# Patient Record
Sex: MALE | Race: WHITE | NOT HISPANIC OR LATINO | Employment: OTHER | URBAN - NONMETROPOLITAN AREA
[De-identification: names, ages, dates, MRNs, and addresses within clinical notes are randomized per-mention and may not be internally consistent; named-entity substitution may affect disease eponyms.]

---

## 2017-09-19 DIAGNOSIS — M25.511 RIGHT SHOULDER PAIN, UNSPECIFIED CHRONICITY: Primary | ICD-10-CM

## 2017-09-21 ENCOUNTER — OFFICE VISIT (OUTPATIENT)
Dept: ORTHOPEDIC SURGERY | Facility: CLINIC | Age: 72
End: 2017-09-21

## 2017-09-21 VITALS — BODY MASS INDEX: 28.25 KG/M2 | HEIGHT: 67 IN | WEIGHT: 180 LBS

## 2017-09-21 DIAGNOSIS — M25.511 RIGHT SHOULDER PAIN, UNSPECIFIED CHRONICITY: ICD-10-CM

## 2017-09-21 DIAGNOSIS — M19.011 PRIMARY OSTEOARTHRITIS OF RIGHT SHOULDER: Primary | ICD-10-CM

## 2017-09-21 PROCEDURE — 20610 DRAIN/INJ JOINT/BURSA W/O US: CPT | Performed by: NURSE PRACTITIONER

## 2017-09-21 PROCEDURE — 99214 OFFICE O/P EST MOD 30 MIN: CPT | Performed by: NURSE PRACTITIONER

## 2017-09-21 RX ORDER — FINASTERIDE 5 MG/1
5 TABLET, FILM COATED ORAL DAILY
COMMUNITY

## 2017-09-21 RX ORDER — NEBIVOLOL 20 MG/1
20 TABLET ORAL NIGHTLY
COMMUNITY

## 2017-09-21 RX ORDER — PHENYTOIN SODIUM 100 MG/1
CAPSULE, EXTENDED RELEASE ORAL 2 TIMES DAILY
COMMUNITY

## 2017-09-21 RX ORDER — BACLOFEN 10 MG/1
10 TABLET ORAL 2 TIMES DAILY
COMMUNITY

## 2017-09-21 RX ORDER — ASPIRIN 81 MG/1
81 TABLET ORAL DAILY
COMMUNITY

## 2017-09-21 RX ORDER — ASCORBIC ACID 500 MG
500 TABLET ORAL DAILY
COMMUNITY
End: 2018-01-01

## 2017-09-21 RX ORDER — TAMSULOSIN HYDROCHLORIDE 0.4 MG/1
1 CAPSULE ORAL NIGHTLY
COMMUNITY

## 2017-09-21 RX ORDER — DILTIAZEM HYDROCHLORIDE 60 MG/1
60 TABLET, FILM COATED ORAL 4 TIMES DAILY
COMMUNITY
End: 2018-03-16 | Stop reason: HOSPADM

## 2017-09-21 RX ORDER — HYDROCODONE BITARTRATE AND ACETAMINOPHEN 7.5; 325 MG/1; MG/1
1 TABLET ORAL EVERY 6 HOURS PRN
COMMUNITY
End: 2018-02-21 | Stop reason: DRUGHIGH

## 2017-09-21 RX ORDER — TRIAMCINOLONE ACETONIDE 40 MG/ML
80 INJECTION, SUSPENSION INTRA-ARTICULAR; INTRAMUSCULAR
Status: COMPLETED | OUTPATIENT
Start: 2017-09-21 | End: 2017-09-21

## 2017-09-21 RX ORDER — LIDOCAINE HYDROCHLORIDE 10 MG/ML
2 INJECTION, SOLUTION INFILTRATION; PERINEURAL
Status: COMPLETED | OUTPATIENT
Start: 2017-09-21 | End: 2017-09-21

## 2017-09-21 RX ORDER — CHLORAL HYDRATE 500 MG
CAPSULE ORAL
COMMUNITY
End: 2018-01-01

## 2017-09-21 RX ADMIN — LIDOCAINE HYDROCHLORIDE 2 ML: 10 INJECTION, SOLUTION INFILTRATION; PERINEURAL at 14:05

## 2017-09-21 RX ADMIN — TRIAMCINOLONE ACETONIDE 80 MG: 40 INJECTION, SUSPENSION INTRA-ARTICULAR; INTRAMUSCULAR at 14:05

## 2017-09-21 NOTE — PROGRESS NOTES
Mando Hill is a 72 y.o. male   Primary provider:  Valerie Marvin MD       Chief Complaint   Patient presents with   • Right Shoulder - Establish Care     Xray 9/8/17 (pateint brought disc)       HISTORY OF PRESENT ILLNESS:    Shoulder Injury    The right shoulder is affected. Incident onset: years. There was no injury mechanism. The quality of the pain is described as aching and stabbing. The pain is at a severity of 5/10. The pain is mild. Associated symptoms include numbness and tingling. The symptoms are aggravated by movement. He has tried rest and immobilization for the symptoms.        CONCURRENT MEDICAL HISTORY:    History reviewed. No pertinent past medical history.    Allergies   Allergen Reactions   • Aleve [Naproxen]    • Ambien [Zolpidem]    • Vancomycin          Current Outpatient Prescriptions:   •  aspirin 81 MG EC tablet, Take 81 mg by mouth Daily., Disp: , Rfl:   •  baclofen (LIORESAL) 10 MG tablet, Take 10 mg by mouth 2 (Two) Times a Day., Disp: , Rfl:   •  diltiaZEM (CARDIZEM) 60 MG tablet, Take 60 mg by mouth 4 (Four) Times a Day., Disp: , Rfl:   •  finasteride (PROSCAR) 5 MG tablet, Take 5 mg by mouth Daily., Disp: , Rfl:   •  HYDROcodone-acetaminophen (NORCO) 7.5-325 MG per tablet, Take 1 tablet by mouth Every 6 (Six) Hours As Needed for Moderate Pain ., Disp: , Rfl:   •  nebivolol (BYSTOLIC) 20 MG tablet, Take 20 mg by mouth Daily., Disp: , Rfl:   •  Omega-3 Fatty Acids (FISH OIL) 1000 MG capsule capsule, Take  by mouth Daily With Breakfast., Disp: , Rfl:   •  phenytoin (DILANTIN) 100 MG ER capsule, Take  by mouth 2 (Two) Times a Day., Disp: , Rfl:   •  rivaroxaban (XARELTO) 20 MG tablet, Take 20 mg by mouth Daily., Disp: , Rfl:   •  tamsulosin (FLOMAX) 0.4 MG capsule 24 hr capsule, Take 1 capsule by mouth Every Night., Disp: , Rfl:   •  vitamin C (ASCORBIC ACID) 500 MG tablet, Take 500 mg by mouth Daily., Disp: , Rfl:     Past Surgical History:   Procedure Laterality Date  "  • GALLBLADDER SURGERY         Family History   Problem Relation Age of Onset   • Stroke Mother    • Heart disease Father    • Hypertension Father    • Diabetes Father    • Stroke Father         Social History     Social History   • Marital status:      Spouse name: N/A   • Number of children: N/A   • Years of education: N/A     Occupational History   • Not on file.     Social History Main Topics   • Smoking status: Never Smoker   • Smokeless tobacco: Never Used   • Alcohol use No   • Drug use: No   • Sexual activity: Not on file     Other Topics Concern   • Not on file     Social History Narrative   • No narrative on file        Review of Systems   Constitutional: Positive for fatigue.   Musculoskeletal: Positive for arthralgias, back pain and joint swelling.   Neurological: Positive for tingling and numbness.   Psychiatric/Behavioral: Positive for sleep disturbance. The patient is nervous/anxious.    All other systems reviewed and are negative.      PHYSICAL EXAMINATION:       Ht 67\" (170.2 cm)  Wt 180 lb (81.6 kg)  BMI 28.19 kg/m2    Physical Exam   Constitutional: He is oriented to person, place, and time. Vital signs are normal. He appears well-developed and well-nourished. He is cooperative.   HENT:   Head: Normocephalic and atraumatic.   Neck: Trachea normal and phonation normal.   Pulmonary/Chest: Effort normal. No respiratory distress.   Abdominal: Soft. Normal appearance. He exhibits no distension.   Neurological: He is alert and oriented to person, place, and time. He displays atrophy. He exhibits abnormal muscle tone (Unable to move right upper extremity due to paralysis). Coordination normal. GCS eye subscore is 4. GCS verbal subscore is 5. GCS motor subscore is 6.   Skin: Skin is warm, dry and intact.   Psychiatric: He has a normal mood and affect. His speech is normal and behavior is normal. Judgment and thought content normal. Cognition and memory are normal.   Vitals reviewed.      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    Range of Motion   Active Abduction: abnormal   Forward Flexion: abnormal   External Rotation: abnormal   Internal Rotation 90 degrees: abnormal     Muscle Strength   Abduction: 4/5   Internal Rotation: 4/5   External Rotation: 4/5   Supraspinatus: 4/5     Other   Erythema: absent  Scars: absent  Sensation: normal  Pulse: present         Large Joint Arthrocentesis  Date/Time: 9/21/2017 2:05 PM  Consent given by: patient  Timeout: Immediately prior to procedure a time out was called to verify the correct patient, procedure, equipment, support staff and site/side marked as required   Supporting Documentation  Indications: pain   Procedure Details  Location: shoulder - R subacromial bursa  Preparation: Patient was prepped and draped in the usual sterile fashion  Needle size: 22 G  Approach: posterior  Medications administered: 2 mL lidocaine 1 %; 80 mg triamcinolone acetonide 40 MG/ML  Patient tolerance: patient tolerated the procedure well with no immediate complications              No results found.        ASSESSMENT:    Diagnoses and all orders for this visit:    Primary osteoarthritis of right shoulder    Right shoulder pain, unspecified chronicity    Other orders  -     rivaroxaban (XARELTO) 20 MG tablet; Take 20 mg by mouth Daily.  -     baclofen (LIORESAL) 10 MG tablet; Take 10 mg by mouth 2 (Two) Times a Day.  -     nebivolol (BYSTOLIC) 20 MG tablet; Take 20 mg by mouth Daily.  -     phenytoin (DILANTIN) 100 MG ER capsule; Take  by mouth 2 (Two) Times a Day.  -     finasteride (PROSCAR) 5 MG tablet; Take 5 mg by mouth Daily.  -     tamsulosin (FLOMAX) 0.4 MG  capsule 24 hr capsule; Take 1 capsule by mouth Every Night.  -     HYDROcodone-acetaminophen (NORCO) 7.5-325 MG per tablet; Take 1 tablet by mouth Every 6 (Six) Hours As Needed for Moderate Pain .  -     Omega-3 Fatty Acids (FISH OIL) 1000 MG capsule capsule; Take  by mouth Daily With Breakfast.  -     vitamin C (ASCORBIC ACID) 500 MG tablet; Take 500 mg by mouth Daily.  -     aspirin 81 MG EC tablet; Take 81 mg by mouth Daily.  -     diltiaZEM (CARDIZEM) 60 MG tablet; Take 60 mg by mouth 4 (Four) Times a Day.  -     Large Joint Arthrocentesis          PLAN  Injected the right shoulder and recommended continue progression range of motion and activity as tolerated based on pain.  Follow-up as needed for exacerbations and pain.  No Follow-up on file.    Edgar Irizarry, APRN

## 2017-12-21 ENCOUNTER — OFFICE VISIT (OUTPATIENT)
Dept: ORTHOPEDIC SURGERY | Facility: CLINIC | Age: 72
End: 2017-12-21

## 2017-12-21 VITALS — WEIGHT: 180 LBS | BODY MASS INDEX: 28.25 KG/M2 | HEIGHT: 67 IN

## 2017-12-21 DIAGNOSIS — M19.011 PRIMARY OSTEOARTHRITIS OF RIGHT SHOULDER: Primary | ICD-10-CM

## 2017-12-21 PROCEDURE — 20610 DRAIN/INJ JOINT/BURSA W/O US: CPT | Performed by: NURSE PRACTITIONER

## 2017-12-21 RX ADMIN — LIDOCAINE HYDROCHLORIDE 2 ML: 20 INJECTION, SOLUTION INFILTRATION; PERINEURAL at 10:41

## 2017-12-21 RX ADMIN — TRIAMCINOLONE ACETONIDE 40 MG: 40 INJECTION, SUSPENSION INTRA-ARTICULAR; INTRAMUSCULAR at 10:41

## 2017-12-21 NOTE — PROGRESS NOTES
"Mando Hill is a 72 y.o. male returns for     Chief Complaint   Patient presents with   • Right Shoulder - Follow-up       HISTORY OF PRESENT ILLNESS: Patient is here today for right shoulder osteoarthritis follow up. Patient states that he has severe pain in the shoulder today and constantly has pain.        CONCURRENT MEDICAL HISTORY:    No past medical history on file.    Allergies   Allergen Reactions   • Aleve [Naproxen]    • Ambien [Zolpidem]    • Vancomycin          Current Outpatient Prescriptions:   •  aspirin 81 MG EC tablet, Take 81 mg by mouth Daily., Disp: , Rfl:   •  baclofen (LIORESAL) 10 MG tablet, Take 10 mg by mouth 2 (Two) Times a Day., Disp: , Rfl:   •  diltiaZEM (CARDIZEM) 60 MG tablet, Take 60 mg by mouth 4 (Four) Times a Day., Disp: , Rfl:   •  finasteride (PROSCAR) 5 MG tablet, Take 5 mg by mouth Daily., Disp: , Rfl:   •  HYDROcodone-acetaminophen (NORCO) 7.5-325 MG per tablet, Take 1 tablet by mouth Every 6 (Six) Hours As Needed for Moderate Pain ., Disp: , Rfl:   •  nebivolol (BYSTOLIC) 20 MG tablet, Take 20 mg by mouth Daily., Disp: , Rfl:   •  Omega-3 Fatty Acids (FISH OIL) 1000 MG capsule capsule, Take  by mouth Daily With Breakfast., Disp: , Rfl:   •  phenytoin (DILANTIN) 100 MG ER capsule, Take  by mouth 2 (Two) Times a Day., Disp: , Rfl:   •  rivaroxaban (XARELTO) 20 MG tablet, Take 20 mg by mouth Daily., Disp: , Rfl:   •  tamsulosin (FLOMAX) 0.4 MG capsule 24 hr capsule, Take 1 capsule by mouth Every Night., Disp: , Rfl:   •  vitamin C (ASCORBIC ACID) 500 MG tablet, Take 500 mg by mouth Daily., Disp: , Rfl:     Past Surgical History:   Procedure Laterality Date   • GALLBLADDER SURGERY         ROS  No fevers or chills.  No chest pain or shortness of air.  No GI or  disturbances.    PHYSICAL EXAMINATION:       Ht 170.2 cm (67\")  Wt 81.6 kg (180 lb)  BMI 28.19 kg/m2    Physical Exam   Constitutional: He is oriented to person, place, and time. Vital signs are normal. He " appears well-developed and well-nourished. He is cooperative.   HENT:   Head: Normocephalic and atraumatic.   Neck: Trachea normal and phonation normal.   Pulmonary/Chest: Effort normal. No respiratory distress.   Abdominal: Soft. Normal appearance. He exhibits no distension.   Neurological: He is alert and oriented to person, place, and time. GCS eye subscore is 4. GCS verbal subscore is 5. GCS motor subscore is 6.   Skin: Skin is warm, dry and intact.   Psychiatric: He has a normal mood and affect. His speech is normal and behavior is normal. Judgment and thought content normal. Cognition and memory are normal.   Vitals reviewed.      GAIT:     []  Normal  []  Antalgic    Assistive device: []  None  []  Walker     []  Crutches  []  Cane     [x]  Wheelchair  []  Stretcher    Right Shoulder Exam     Tenderness   The patient is experiencing tenderness in the acromioclavicular joint.    Range of Motion   Right shoulder active abduction: 90.   Right shoulder forward flexion: 90.   External Rotation: abnormal   Internal Rotation 90 degrees: abnormal     Muscle Strength   Abduction: 4/5   Internal Rotation: 4/5   External Rotation: 4/5   Supraspinatus: 4/5     Tests   Drop Arm: positive  Impingement: positive    Other   Erythema: absent  Scars: absent  Sensation: normal  Pulse: present      Left Shoulder Exam     Tenderness   The patient is experiencing no tenderness.         Comments:  Hx of stroke, little function on left upper ex.               No results found.      Large Joint Arthrocentesis  Date/Time: 12/21/2017 10:41 AM  Consent given by: patient  Timeout: Immediately prior to procedure a time out was called to verify the correct patient, procedure, equipment, support staff and site/side marked as required   Supporting Documentation  Indications: pain   Procedure Details  Location: shoulder - R subacromial bursa  Preparation: Patient was prepped and draped in the usual sterile fashion  Needle size: 22  G  Approach: posterior  Medications administered: 40 mg triamcinolone acetonide 40 MG/ML; 2 mL lidocaine 2%  Patient tolerance: patient tolerated the procedure well with no immediate complications            ASSESSMENT:    Diagnoses and all orders for this visit:    Primary osteoarthritis of right shoulder  -     Large Joint Arthrocentesis          PLAN  Repeated steroid injection today and recommended progression range of motion and activity as tolerated based on pain follow-up as needed.  No Follow-up on file.    Edgar Irizarry, APRN

## 2017-12-22 RX ORDER — LIDOCAINE HYDROCHLORIDE 20 MG/ML
2 INJECTION, SOLUTION INFILTRATION; PERINEURAL
Status: COMPLETED | OUTPATIENT
Start: 2017-12-21 | End: 2017-12-21

## 2017-12-22 RX ORDER — TRIAMCINOLONE ACETONIDE 40 MG/ML
40 INJECTION, SUSPENSION INTRA-ARTICULAR; INTRAMUSCULAR
Status: COMPLETED | OUTPATIENT
Start: 2017-12-21 | End: 2017-12-21

## 2018-01-01 ENCOUNTER — ANESTHESIA (OUTPATIENT)
Dept: PERIOP | Facility: HOSPITAL | Age: 73
End: 2018-01-01

## 2018-01-01 ENCOUNTER — APPOINTMENT (OUTPATIENT)
Dept: GENERAL RADIOLOGY | Facility: HOSPITAL | Age: 73
End: 2018-01-01

## 2018-01-01 ENCOUNTER — ANESTHESIA EVENT (OUTPATIENT)
Dept: PERIOP | Facility: HOSPITAL | Age: 73
End: 2018-01-01

## 2018-01-01 ENCOUNTER — HOSPITAL ENCOUNTER (OUTPATIENT)
Facility: HOSPITAL | Age: 73
Setting detail: HOSPITAL OUTPATIENT SURGERY
Discharge: HOME | End: 2018-09-21
Attending: UROLOGY | Admitting: UROLOGY

## 2018-01-01 VITALS
WEIGHT: 170 LBS | TEMPERATURE: 96.9 F | SYSTOLIC BLOOD PRESSURE: 115 MMHG | HEART RATE: 78 BPM | BODY MASS INDEX: 26.68 KG/M2 | RESPIRATION RATE: 20 BRPM | OXYGEN SATURATION: 93 % | HEIGHT: 67 IN | DIASTOLIC BLOOD PRESSURE: 57 MMHG

## 2018-01-01 DIAGNOSIS — N20.0 CALCULUS OF KIDNEY: ICD-10-CM

## 2018-01-01 LAB
BILIRUB UR QL STRIP: NEGATIVE
CLARITY UR: ABNORMAL
COLOR UR: YELLOW
GLUCOSE UR STRIP-MCNC: NEGATIVE MG/DL
HGB UR QL STRIP.AUTO: NEGATIVE
KETONES UR QL STRIP: NEGATIVE
LEUKOCYTE ESTERASE UR QL STRIP.AUTO: ABNORMAL
NITRITE UR QL STRIP: NEGATIVE
PH UR STRIP.AUTO: 7 [PH] (ref 5–9)
PROT UR QL STRIP: ABNORMAL
SP GR UR STRIP: 1.01 (ref 1–1.03)
UROBILINOGEN UR QL STRIP: ABNORMAL

## 2018-01-01 PROCEDURE — 25010000002 ONDANSETRON PER 1 MG: Performed by: NURSE ANESTHETIST, CERTIFIED REGISTERED

## 2018-01-01 PROCEDURE — 74018 RADEX ABDOMEN 1 VIEW: CPT

## 2018-01-01 PROCEDURE — 81003 URINALYSIS AUTO W/O SCOPE: CPT | Performed by: UROLOGY

## 2018-01-01 PROCEDURE — 25010000002 MIDAZOLAM PER 1 MG: Performed by: NURSE ANESTHETIST, CERTIFIED REGISTERED

## 2018-01-01 PROCEDURE — 25010000002 FENTANYL CITRATE (PF) 100 MCG/2ML SOLUTION: Performed by: NURSE ANESTHETIST, CERTIFIED REGISTERED

## 2018-01-01 PROCEDURE — 25010000002 PROPOFOL 10 MG/ML EMULSION: Performed by: NURSE ANESTHETIST, CERTIFIED REGISTERED

## 2018-01-01 RX ORDER — PROPOFOL 10 MG/ML
VIAL (ML) INTRAVENOUS AS NEEDED
Status: DISCONTINUED | OUTPATIENT
Start: 2018-01-01 | End: 2018-01-01 | Stop reason: SURG

## 2018-01-01 RX ORDER — OXYCODONE AND ACETAMINOPHEN 7.5; 325 MG/1; MG/1
1-2 TABLET ORAL EVERY 4 HOURS PRN
Qty: 10 TABLET | Refills: 0 | Status: ON HOLD | OUTPATIENT
Start: 2018-01-01 | End: 2019-01-01

## 2018-01-01 RX ORDER — LEVOFLOXACIN 5 MG/ML
500 INJECTION, SOLUTION INTRAVENOUS ONCE
Status: DISCONTINUED | OUTPATIENT
Start: 2018-01-01 | End: 2018-01-01 | Stop reason: HOSPADM

## 2018-01-01 RX ORDER — SODIUM CHLORIDE, SODIUM GLUCONATE, SODIUM ACETATE, POTASSIUM CHLORIDE, AND MAGNESIUM CHLORIDE 526; 502; 368; 37; 30 MG/100ML; MG/100ML; MG/100ML; MG/100ML; MG/100ML
1000 INJECTION, SOLUTION INTRAVENOUS CONTINUOUS
Status: DISCONTINUED | OUTPATIENT
Start: 2018-01-01 | End: 2018-01-01 | Stop reason: HOSPADM

## 2018-01-01 RX ORDER — ONDANSETRON 2 MG/ML
4 INJECTION INTRAMUSCULAR; INTRAVENOUS ONCE AS NEEDED
Status: DISCONTINUED | OUTPATIENT
Start: 2018-01-01 | End: 2018-01-01 | Stop reason: HOSPADM

## 2018-01-01 RX ORDER — HYDROCODONE BITARTRATE AND ACETAMINOPHEN 10; 325 MG/1; MG/1
1 TABLET ORAL EVERY 6 HOURS PRN
COMMUNITY

## 2018-01-01 RX ORDER — LIDOCAINE HYDROCHLORIDE 20 MG/ML
INJECTION, SOLUTION INFILTRATION; PERINEURAL AS NEEDED
Status: DISCONTINUED | OUTPATIENT
Start: 2018-01-01 | End: 2018-01-01 | Stop reason: SURG

## 2018-01-01 RX ORDER — DOXYCYCLINE HYCLATE 100 MG/1
100 CAPSULE ORAL DAILY
Qty: 7 CAPSULE | Refills: 0 | Status: SHIPPED | OUTPATIENT
Start: 2018-01-01 | End: 2018-01-01

## 2018-01-01 RX ORDER — ALBUTEROL SULFATE 2.5 MG/3ML
2.5 SOLUTION RESPIRATORY (INHALATION) ONCE AS NEEDED
Status: DISCONTINUED | OUTPATIENT
Start: 2018-01-01 | End: 2018-01-01 | Stop reason: HOSPADM

## 2018-01-01 RX ORDER — ONDANSETRON 2 MG/ML
INJECTION INTRAMUSCULAR; INTRAVENOUS AS NEEDED
Status: DISCONTINUED | OUTPATIENT
Start: 2018-01-01 | End: 2018-01-01 | Stop reason: SURG

## 2018-01-01 RX ORDER — POTASSIUM CHLORIDE 750 MG/1
10 TABLET, EXTENDED RELEASE ORAL 2 TIMES DAILY
COMMUNITY

## 2018-01-01 RX ORDER — FENTANYL CITRATE 50 UG/ML
INJECTION, SOLUTION INTRAMUSCULAR; INTRAVENOUS AS NEEDED
Status: DISCONTINUED | OUTPATIENT
Start: 2018-01-01 | End: 2018-01-01 | Stop reason: SURG

## 2018-01-01 RX ORDER — MELATONIN
1000 DAILY
Status: ON HOLD | COMMUNITY
End: 2019-01-01

## 2018-01-01 RX ORDER — IPRATROPIUM BROMIDE AND ALBUTEROL SULFATE 2.5; .5 MG/3ML; MG/3ML
3 SOLUTION RESPIRATORY (INHALATION) ONCE
Status: DISCONTINUED | OUTPATIENT
Start: 2018-01-01 | End: 2018-01-01 | Stop reason: HOSPADM

## 2018-01-01 RX ORDER — EPHEDRINE SULFATE 50 MG/ML
INJECTION, SOLUTION INTRAVENOUS AS NEEDED
Status: DISCONTINUED | OUTPATIENT
Start: 2018-01-01 | End: 2018-01-01 | Stop reason: SURG

## 2018-01-01 RX ORDER — MIDAZOLAM HYDROCHLORIDE 1 MG/ML
INJECTION INTRAMUSCULAR; INTRAVENOUS AS NEEDED
Status: DISCONTINUED | OUTPATIENT
Start: 2018-01-01 | End: 2018-01-01 | Stop reason: SURG

## 2018-01-01 RX ORDER — MEPERIDINE HYDROCHLORIDE 50 MG/ML
12.5 INJECTION INTRAMUSCULAR; INTRAVENOUS; SUBCUTANEOUS
Status: DISCONTINUED | OUTPATIENT
Start: 2018-01-01 | End: 2018-01-01 | Stop reason: HOSPADM

## 2018-01-01 RX ADMIN — MIDAZOLAM HYDROCHLORIDE 2 MG: 2 INJECTION, SOLUTION INTRAMUSCULAR; INTRAVENOUS at 14:45

## 2018-01-01 RX ADMIN — SODIUM CHLORIDE, SODIUM GLUCONATE, SODIUM ACETATE, POTASSIUM CHLORIDE, AND MAGNESIUM CHLORIDE: 526; 502; 368; 37; 30 INJECTION, SOLUTION INTRAVENOUS at 14:56

## 2018-01-01 RX ADMIN — EPHEDRINE SULFATE 10 MG: 50 INJECTION INTRAVENOUS at 15:26

## 2018-01-01 RX ADMIN — LIDOCAINE HYDROCHLORIDE 50 MG: 20 INJECTION, SOLUTION INFILTRATION; PERINEURAL at 15:03

## 2018-01-01 RX ADMIN — PROPOFOL 50 MG: 10 INJECTION, EMULSION INTRAVENOUS at 15:03

## 2018-01-01 RX ADMIN — FENTANYL CITRATE 25 MCG: 50 INJECTION, SOLUTION INTRAMUSCULAR; INTRAVENOUS at 15:32

## 2018-01-01 RX ADMIN — EPHEDRINE SULFATE 10 MG: 50 INJECTION INTRAVENOUS at 15:18

## 2018-01-01 RX ADMIN — ONDANSETRON 4 MG: 2 INJECTION INTRAMUSCULAR; INTRAVENOUS at 15:12

## 2018-01-01 RX ADMIN — FENTANYL CITRATE 25 MCG: 50 INJECTION, SOLUTION INTRAMUSCULAR; INTRAVENOUS at 15:30

## 2018-02-21 ENCOUNTER — OFFICE VISIT (OUTPATIENT)
Dept: ORTHOPEDIC SURGERY | Facility: CLINIC | Age: 73
End: 2018-02-21

## 2018-02-21 DIAGNOSIS — M19.011 PRIMARY OSTEOARTHRITIS OF RIGHT SHOULDER: Primary | ICD-10-CM

## 2018-02-21 DIAGNOSIS — M25.511 RIGHT SHOULDER PAIN, UNSPECIFIED CHRONICITY: ICD-10-CM

## 2018-02-21 PROCEDURE — 20610 DRAIN/INJ JOINT/BURSA W/O US: CPT | Performed by: NURSE PRACTITIONER

## 2018-02-21 PROCEDURE — 99213 OFFICE O/P EST LOW 20 MIN: CPT | Performed by: NURSE PRACTITIONER

## 2018-02-21 RX ORDER — HYDROCODONE BITARTRATE AND ACETAMINOPHEN 10; 325 MG/1; MG/1
TABLET ORAL
Refills: 0 | COMMUNITY
Start: 2018-01-04 | End: 2018-01-01

## 2018-02-21 RX ORDER — TRIAMCINOLONE ACETONIDE 40 MG/ML
80 INJECTION, SUSPENSION INTRA-ARTICULAR; INTRAMUSCULAR
Status: COMPLETED | OUTPATIENT
Start: 2018-02-21 | End: 2018-02-21

## 2018-02-21 RX ORDER — LIDOCAINE HYDROCHLORIDE 10 MG/ML
2 INJECTION, SOLUTION INFILTRATION; PERINEURAL
Status: COMPLETED | OUTPATIENT
Start: 2018-02-21 | End: 2018-02-21

## 2018-02-21 RX ADMIN — TRIAMCINOLONE ACETONIDE 80 MG: 40 INJECTION, SUSPENSION INTRA-ARTICULAR; INTRAMUSCULAR at 11:00

## 2018-02-21 RX ADMIN — LIDOCAINE HYDROCHLORIDE 2 ML: 10 INJECTION, SOLUTION INFILTRATION; PERINEURAL at 11:00

## 2018-02-21 NOTE — PROGRESS NOTES
Mando Hill is a 73 y.o. male returns for     Chief Complaint   Patient presents with   • Right Shoulder - Follow-up       HISTORY OF PRESENT ILLNESS: Patient had a shoulder injection 12/21/17 that did not help at all.        CONCURRENT MEDICAL HISTORY:    History reviewed. No pertinent past medical history.    Allergies   Allergen Reactions   • Aleve [Naproxen]    • Ambien [Zolpidem]    • Vancomycin          Current Outpatient Prescriptions:   •  aspirin 81 MG EC tablet, Take 81 mg by mouth Daily., Disp: , Rfl:   •  baclofen (LIORESAL) 10 MG tablet, Take 10 mg by mouth 2 (Two) Times a Day., Disp: , Rfl:   •  diltiaZEM (CARDIZEM) 60 MG tablet, Take 60 mg by mouth 4 (Four) Times a Day., Disp: , Rfl:   •  finasteride (PROSCAR) 5 MG tablet, Take 5 mg by mouth Daily., Disp: , Rfl:   •  HYDROcodone-acetaminophen (NORCO)  MG per tablet, TAKE 1 TABLET BY MOUTH EVERY SIX HOURS AS NEEDED FOR CHRONIC PAIN, Disp: , Rfl: 0  •  nebivolol (BYSTOLIC) 20 MG tablet, Take 20 mg by mouth Daily., Disp: , Rfl:   •  Omega-3 Fatty Acids (FISH OIL) 1000 MG capsule capsule, Take  by mouth Daily With Breakfast., Disp: , Rfl:   •  phenytoin (DILANTIN) 100 MG ER capsule, Take  by mouth 2 (Two) Times a Day., Disp: , Rfl:   •  rivaroxaban (XARELTO) 20 MG tablet, Take 20 mg by mouth Daily., Disp: , Rfl:   •  tamsulosin (FLOMAX) 0.4 MG capsule 24 hr capsule, Take 1 capsule by mouth Every Night., Disp: , Rfl:   •  vitamin C (ASCORBIC ACID) 500 MG tablet, Take 500 mg by mouth Daily., Disp: , Rfl:     Past Surgical History:   Procedure Laterality Date   • GALLBLADDER SURGERY         ROS  No fevers or chills.  No chest pain or shortness of air.  No GI or  disturbances.    PHYSICAL EXAMINATION:       There were no vitals taken for this visit.    Physical Exam   Constitutional: He is oriented to person, place, and time. Vital signs are normal. He appears well-developed and well-nourished. He is cooperative.   HENT:   Head:  Normocephalic and atraumatic.   Neck: Trachea normal and phonation normal.   Pulmonary/Chest: Effort normal. No respiratory distress.   Abdominal: Soft. Normal appearance. He exhibits no distension.   Neurological: He is alert and oriented to person, place, and time. GCS eye subscore is 4. GCS verbal subscore is 5. GCS motor subscore is 6.   Skin: Skin is warm, dry and intact.   Psychiatric: He has a normal mood and affect. His speech is normal and behavior is normal. Judgment and thought content normal. Cognition and memory are normal.   Vitals reviewed.      GAIT:     []  Normal  []  Antalgic    Assistive device: []  None  []  Walker     []  Crutches  []  Cane     []  Wheelchair  []  Stretcher    Right Shoulder Exam     Tenderness   The patient is experiencing tenderness in the acromioclavicular joint.    Range of Motion   Right shoulder active abduction: 90.   Right shoulder forward flexion: 90.   External Rotation: abnormal   Internal Rotation 90 degrees: abnormal     Muscle Strength   Abduction: 4/5   Internal Rotation: 4/5   External Rotation: 4/5   Supraspinatus: 4/5     Tests   Drop Arm: positive  Impingement: positive    Other   Erythema: absent  Scars: absent  Sensation: normal  Pulse: present      Left Shoulder Exam     Tenderness   The patient is experiencing no tenderness.         Comments:  Hx of stroke, little function on left upper ex.         Large Joint Arthrocentesis  Date/Time: 2/21/2018 11:00 AM  Consent given by: patient  Site marked: site marked  Timeout: Immediately prior to procedure a time out was called to verify the correct patient, procedure, equipment, support staff and site/side marked as required   Supporting Documentation  Indications: pain   Procedure Details  Location: shoulder - R glenohumeral  Preparation: Patient was prepped and draped in the usual sterile fashion  Needle size: 22 G  Approach: posterior  Medications administered: 80 mg triamcinolone acetonide 40 MG/ML; 2 mL  lidocaine 1 %  Patient tolerance: patient tolerated the procedure well with no immediate complications            No results found.          ASSESSMENT:    Diagnoses and all orders for this visit:    Primary osteoarthritis of right shoulder    Right shoulder pain, unspecified chronicity    Other orders  -     HYDROcodone-acetaminophen (NORCO)  MG per tablet; TAKE 1 TABLET BY MOUTH EVERY SIX HOURS AS NEEDED FOR CHRONIC PAIN  -     Large Joint Arthrocentesis          PLAN  Repeated the intra-articular injection of the right shoulder recommended continue progression range of motion and activity as tolerated based on pain.  Follow-up in 3 months for recheck.  No Follow-up on file.    Edgar Irizarry, APRN

## 2018-03-05 ENCOUNTER — HOSPITAL ENCOUNTER (INPATIENT)
Facility: HOSPITAL | Age: 73
LOS: 11 days | Discharge: HOME HEALTH | End: 2018-03-16
Attending: FAMILY MEDICINE | Admitting: INTERNAL MEDICINE

## 2018-03-05 ENCOUNTER — APPOINTMENT (OUTPATIENT)
Dept: GENERAL RADIOLOGY | Facility: HOSPITAL | Age: 73
End: 2018-03-05

## 2018-03-05 DIAGNOSIS — R50.9 FEVER, UNSPECIFIED FEVER CAUSE: Primary | ICD-10-CM

## 2018-03-05 DIAGNOSIS — A41.9 SEPSIS, DUE TO UNSPECIFIED ORGANISM: ICD-10-CM

## 2018-03-05 DIAGNOSIS — Z74.09 IMPAIRED FUNCTIONAL MOBILITY, BALANCE, AND ENDURANCE: ICD-10-CM

## 2018-03-05 DIAGNOSIS — L03.317 CELLULITIS OF BUTTOCK: ICD-10-CM

## 2018-03-05 PROBLEM — T83.511A UTI (URINARY TRACT INFECTION) DUE TO URINARY INDWELLING CATHETER (HCC): Chronic | Status: ACTIVE | Noted: 2018-03-05

## 2018-03-05 PROBLEM — N39.0 UTI (URINARY TRACT INFECTION) DUE TO URINARY INDWELLING CATHETER (HCC): Chronic | Status: ACTIVE | Noted: 2018-03-05

## 2018-03-05 PROBLEM — I95.9 HYPOTENSION: Status: ACTIVE | Noted: 2018-03-05

## 2018-03-05 PROBLEM — L89.159 SACRAL DECUBITUS ULCER: Chronic | Status: ACTIVE | Noted: 2018-03-05

## 2018-03-05 LAB
ALBUMIN SERPL-MCNC: 3.1 G/DL (ref 3.4–4.8)
ALBUMIN SERPL-MCNC: 3.9 G/DL (ref 3.4–4.8)
ALBUMIN/GLOB SERPL: 1 G/DL (ref 1.1–1.8)
ALBUMIN/GLOB SERPL: 1.1 G/DL (ref 1.1–1.8)
ALP SERPL-CCNC: 122 U/L (ref 38–126)
ALP SERPL-CCNC: 89 U/L (ref 38–126)
ALT SERPL W P-5'-P-CCNC: 31 U/L (ref 21–72)
ALT SERPL W P-5'-P-CCNC: 32 U/L (ref 21–72)
ANION GAP SERPL CALCULATED.3IONS-SCNC: 13 MMOL/L (ref 5–15)
ANION GAP SERPL CALCULATED.3IONS-SCNC: 16 MMOL/L (ref 5–15)
AST SERPL-CCNC: 23 U/L (ref 17–59)
AST SERPL-CCNC: 27 U/L (ref 17–59)
BACTERIA UR QL AUTO: ABNORMAL /HPF
BASOPHILS # BLD AUTO: 0.02 10*3/MM3 (ref 0–0.2)
BASOPHILS # BLD AUTO: 0.03 10*3/MM3 (ref 0–0.2)
BASOPHILS NFR BLD AUTO: 0.1 % (ref 0–2)
BASOPHILS NFR BLD AUTO: 0.1 % (ref 0–2)
BILIRUB SERPL-MCNC: 0.4 MG/DL (ref 0.2–1.3)
BILIRUB SERPL-MCNC: 0.5 MG/DL (ref 0.2–1.3)
BILIRUB UR QL STRIP: ABNORMAL
BUN BLD-MCNC: 24 MG/DL (ref 7–21)
BUN BLD-MCNC: 29 MG/DL (ref 7–21)
BUN/CREAT SERPL: 20 (ref 7–25)
BUN/CREAT SERPL: 22.3 (ref 7–25)
CALCIUM SPEC-SCNC: 7.7 MG/DL (ref 8.4–10.2)
CALCIUM SPEC-SCNC: 8.6 MG/DL (ref 8.4–10.2)
CHLORIDE SERPL-SCNC: 100 MMOL/L (ref 95–110)
CHLORIDE SERPL-SCNC: 106 MMOL/L (ref 95–110)
CK SERPL-CCNC: 60 U/L (ref 55–170)
CLARITY UR: ABNORMAL
CO2 SERPL-SCNC: 24 MMOL/L (ref 22–31)
CO2 SERPL-SCNC: 26 MMOL/L (ref 22–31)
COLOR UR: YELLOW
CREAT BLD-MCNC: 1.2 MG/DL (ref 0.7–1.3)
CREAT BLD-MCNC: 1.3 MG/DL (ref 0.7–1.3)
D-LACTATE SERPL-SCNC: 2.4 MMOL/L (ref 0.5–2)
D-LACTATE SERPL-SCNC: 2.7 MMOL/L (ref 0.5–2)
DEPRECATED RDW RBC AUTO: 45.4 FL (ref 35.1–43.9)
DEPRECATED RDW RBC AUTO: 48.6 FL (ref 35.1–43.9)
EOSINOPHIL # BLD AUTO: 0.05 10*3/MM3 (ref 0–0.7)
EOSINOPHIL # BLD AUTO: 0.22 10*3/MM3 (ref 0–0.7)
EOSINOPHIL NFR BLD AUTO: 0.2 % (ref 0–7)
EOSINOPHIL NFR BLD AUTO: 0.9 % (ref 0–7)
ERYTHROCYTE [DISTWIDTH] IN BLOOD BY AUTOMATED COUNT: 13.4 % (ref 11.5–14.5)
ERYTHROCYTE [DISTWIDTH] IN BLOOD BY AUTOMATED COUNT: 13.8 % (ref 11.5–14.5)
FLUAV AG NPH QL: NEGATIVE
FLUBV AG NPH QL IA: NEGATIVE
GFR SERPL CREATININE-BSD FRML MDRD: 54 ML/MIN/1.73 (ref 42–98)
GFR SERPL CREATININE-BSD FRML MDRD: 59 ML/MIN/1.73 (ref 42–98)
GLOBULIN UR ELPH-MCNC: 3.1 GM/DL (ref 2.3–3.5)
GLOBULIN UR ELPH-MCNC: 3.6 GM/DL (ref 2.3–3.5)
GLUCOSE BLD-MCNC: 125 MG/DL (ref 60–100)
GLUCOSE BLD-MCNC: 128 MG/DL (ref 60–100)
GLUCOSE UR STRIP-MCNC: NEGATIVE MG/DL
HCT VFR BLD AUTO: 43.1 % (ref 39–49)
HCT VFR BLD AUTO: 45.6 % (ref 39–49)
HGB BLD-MCNC: 14.5 G/DL (ref 13.7–17.3)
HGB BLD-MCNC: 15.6 G/DL (ref 13.7–17.3)
HGB UR QL STRIP.AUTO: ABNORMAL
HOLD SPECIMEN: NORMAL
HYALINE CASTS UR QL AUTO: ABNORMAL /LPF
IMM GRANULOCYTES # BLD: 0.12 10*3/MM3 (ref 0–0.02)
IMM GRANULOCYTES # BLD: 0.26 10*3/MM3 (ref 0–0.02)
IMM GRANULOCYTES NFR BLD: 0.5 % (ref 0–0.5)
IMM GRANULOCYTES NFR BLD: 1.1 % (ref 0–0.5)
KETONES UR QL STRIP: NEGATIVE
LEUKOCYTE ESTERASE UR QL STRIP.AUTO: ABNORMAL
LYMPHOCYTES # BLD AUTO: 0.46 10*3/MM3 (ref 0.6–4.2)
LYMPHOCYTES # BLD AUTO: 0.65 10*3/MM3 (ref 0.6–4.2)
LYMPHOCYTES NFR BLD AUTO: 2 % (ref 10–50)
LYMPHOCYTES NFR BLD AUTO: 2.9 % (ref 10–50)
MAGNESIUM SERPL-MCNC: 1.8 MG/DL (ref 1.6–2.3)
MCH RBC QN AUTO: 31.6 PG (ref 26.5–34)
MCH RBC QN AUTO: 32.2 PG (ref 26.5–34)
MCHC RBC AUTO-ENTMCNC: 33.6 G/DL (ref 31.5–36.3)
MCHC RBC AUTO-ENTMCNC: 34.2 G/DL (ref 31.5–36.3)
MCV RBC AUTO: 92.5 FL (ref 80–98)
MCV RBC AUTO: 95.6 FL (ref 80–98)
MONOCYTES # BLD AUTO: 0.95 10*3/MM3 (ref 0–0.9)
MONOCYTES # BLD AUTO: 1.49 10*3/MM3 (ref 0–0.9)
MONOCYTES NFR BLD AUTO: 4.2 % (ref 0–12)
MONOCYTES NFR BLD AUTO: 6.4 % (ref 0–12)
NEUTROPHILS # BLD AUTO: 20.96 10*3/MM3 (ref 2–8.6)
NEUTROPHILS # BLD AUTO: 21.01 10*3/MM3 (ref 2–8.6)
NEUTROPHILS NFR BLD AUTO: 89.5 % (ref 37–80)
NEUTROPHILS NFR BLD AUTO: 92.1 % (ref 37–80)
NITRITE UR QL STRIP: NEGATIVE
NRBC BLD MANUAL-RTO: 0 /100 WBC (ref 0–0)
PH UR STRIP.AUTO: <=5 [PH] (ref 5–9)
PHENYTOIN SERPL-MCNC: 10.5 MCG/ML (ref 10–20)
PLATELET # BLD AUTO: 168 10*3/MM3 (ref 150–450)
PLATELET # BLD AUTO: 197 10*3/MM3 (ref 150–450)
PMV BLD AUTO: 10 FL (ref 8–12)
PMV BLD AUTO: 9.2 FL (ref 8–12)
POTASSIUM BLD-SCNC: 3.7 MMOL/L (ref 3.5–5.1)
POTASSIUM BLD-SCNC: 3.9 MMOL/L (ref 3.5–5.1)
PROT SERPL-MCNC: 6.2 G/DL (ref 6.3–8.6)
PROT SERPL-MCNC: 7.5 G/DL (ref 6.3–8.6)
PROT UR QL STRIP: ABNORMAL
RBC # BLD AUTO: 4.51 10*6/MM3 (ref 4.37–5.74)
RBC # BLD AUTO: 4.93 10*6/MM3 (ref 4.37–5.74)
RBC # UR: ABNORMAL /HPF
REF LAB TEST METHOD: ABNORMAL
SODIUM BLD-SCNC: 142 MMOL/L (ref 137–145)
SODIUM BLD-SCNC: 143 MMOL/L (ref 137–145)
SP GR UR STRIP: 1.02 (ref 1–1.03)
SQUAMOUS #/AREA URNS HPF: ABNORMAL /HPF
UNIDENT CRYS URNS QL MICRO: ABNORMAL /HPF
UROBILINOGEN UR QL STRIP: ABNORMAL
WBC NRBC COR # BLD: 22.76 10*3/MM3 (ref 3.2–9.8)
WBC NRBC COR # BLD: 23.46 10*3/MM3 (ref 3.2–9.8)
WBC UR QL AUTO: ABNORMAL /HPF
WHOLE BLOOD HOLD SPECIMEN: NORMAL
WHOLE BLOOD HOLD SPECIMEN: NORMAL

## 2018-03-05 PROCEDURE — 25010000002 PROMETHAZINE PER 50 MG: Performed by: HOSPITALIST

## 2018-03-05 PROCEDURE — 83605 ASSAY OF LACTIC ACID: CPT | Performed by: FAMILY MEDICINE

## 2018-03-05 PROCEDURE — 99285 EMERGENCY DEPT VISIT HI MDM: CPT

## 2018-03-05 PROCEDURE — 80053 COMPREHEN METABOLIC PANEL: CPT | Performed by: FAMILY MEDICINE

## 2018-03-05 PROCEDURE — 87086 URINE CULTURE/COLONY COUNT: CPT | Performed by: NURSE PRACTITIONER

## 2018-03-05 PROCEDURE — 71045 X-RAY EXAM CHEST 1 VIEW: CPT

## 2018-03-05 PROCEDURE — 25010000002 PIPERACILLIN SOD-TAZOBACTAM PER 1 G: Performed by: INTERNAL MEDICINE

## 2018-03-05 PROCEDURE — 87077 CULTURE AEROBIC IDENTIFY: CPT | Performed by: FAMILY MEDICINE

## 2018-03-05 PROCEDURE — 25010000002 LEVOFLOXACIN PER 250 MG: Performed by: FAMILY MEDICINE

## 2018-03-05 PROCEDURE — 25010000002 PIPERACILLIN SOD-TAZOBACTAM PER 1 G: Performed by: FAMILY MEDICINE

## 2018-03-05 PROCEDURE — 85025 COMPLETE CBC W/AUTO DIFF WBC: CPT | Performed by: FAMILY MEDICINE

## 2018-03-05 PROCEDURE — 81001 URINALYSIS AUTO W/SCOPE: CPT | Performed by: FAMILY MEDICINE

## 2018-03-05 PROCEDURE — 87804 INFLUENZA ASSAY W/OPTIC: CPT | Performed by: FAMILY MEDICINE

## 2018-03-05 PROCEDURE — 93005 ELECTROCARDIOGRAM TRACING: CPT | Performed by: INTERNAL MEDICINE

## 2018-03-05 PROCEDURE — 83735 ASSAY OF MAGNESIUM: CPT | Performed by: FAMILY MEDICINE

## 2018-03-05 PROCEDURE — 87086 URINE CULTURE/COLONY COUNT: CPT | Performed by: FAMILY MEDICINE

## 2018-03-05 PROCEDURE — 87186 SC STD MICRODIL/AGAR DIL: CPT | Performed by: FAMILY MEDICINE

## 2018-03-05 PROCEDURE — 93010 ELECTROCARDIOGRAM REPORT: CPT | Performed by: INTERNAL MEDICINE

## 2018-03-05 PROCEDURE — 25010000002 ONDANSETRON PER 1 MG: Performed by: FAMILY MEDICINE

## 2018-03-05 PROCEDURE — 80185 ASSAY OF PHENYTOIN TOTAL: CPT | Performed by: FAMILY MEDICINE

## 2018-03-05 PROCEDURE — 82550 ASSAY OF CK (CPK): CPT | Performed by: FAMILY MEDICINE

## 2018-03-05 PROCEDURE — 87040 BLOOD CULTURE FOR BACTERIA: CPT | Performed by: FAMILY MEDICINE

## 2018-03-05 RX ORDER — SODIUM CHLORIDE 9 MG/ML
125 INJECTION, SOLUTION INTRAVENOUS CONTINUOUS
Status: DISCONTINUED | OUTPATIENT
Start: 2018-03-05 | End: 2018-03-07

## 2018-03-05 RX ORDER — SODIUM CHLORIDE 0.9 % (FLUSH) 0.9 %
1-10 SYRINGE (ML) INJECTION AS NEEDED
Status: DISCONTINUED | OUTPATIENT
Start: 2018-03-05 | End: 2018-03-16 | Stop reason: HOSPADM

## 2018-03-05 RX ORDER — TAMSULOSIN HYDROCHLORIDE 0.4 MG/1
0.4 CAPSULE ORAL NIGHTLY
Status: DISCONTINUED | OUTPATIENT
Start: 2018-03-05 | End: 2018-03-16 | Stop reason: HOSPADM

## 2018-03-05 RX ORDER — ASCORBIC ACID 500 MG
500 TABLET ORAL DAILY
Status: DISCONTINUED | OUTPATIENT
Start: 2018-03-05 | End: 2018-03-16 | Stop reason: HOSPADM

## 2018-03-05 RX ORDER — BACLOFEN 10 MG/1
10 TABLET ORAL EVERY 12 HOURS SCHEDULED
Status: DISCONTINUED | OUTPATIENT
Start: 2018-03-06 | End: 2018-03-16 | Stop reason: HOSPADM

## 2018-03-05 RX ORDER — FINASTERIDE 5 MG/1
5 TABLET, FILM COATED ORAL DAILY
Status: DISCONTINUED | OUTPATIENT
Start: 2018-03-05 | End: 2018-03-16 | Stop reason: HOSPADM

## 2018-03-05 RX ORDER — SODIUM CHLORIDE 0.9 % (FLUSH) 0.9 %
10 SYRINGE (ML) INJECTION AS NEEDED
Status: DISCONTINUED | OUTPATIENT
Start: 2018-03-05 | End: 2018-03-16 | Stop reason: HOSPADM

## 2018-03-05 RX ORDER — NITROFURANTOIN MACROCRYSTALS 50 MG/1
50 CAPSULE ORAL NIGHTLY
COMMUNITY
End: 2019-01-01 | Stop reason: HOSPADM

## 2018-03-05 RX ORDER — ONDANSETRON 2 MG/ML
4 INJECTION INTRAMUSCULAR; INTRAVENOUS EVERY 4 HOURS PRN
Status: DISCONTINUED | OUTPATIENT
Start: 2018-03-05 | End: 2018-03-16 | Stop reason: HOSPADM

## 2018-03-05 RX ORDER — HYDROCODONE BITARTRATE AND ACETAMINOPHEN 10; 325 MG/1; MG/1
1 TABLET ORAL EVERY 4 HOURS PRN
Status: DISCONTINUED | OUTPATIENT
Start: 2018-03-05 | End: 2018-03-16 | Stop reason: HOSPADM

## 2018-03-05 RX ORDER — ASPIRIN 81 MG/1
81 TABLET ORAL DAILY
Status: DISCONTINUED | OUTPATIENT
Start: 2018-03-05 | End: 2018-03-16 | Stop reason: HOSPADM

## 2018-03-05 RX ORDER — PHENYTOIN SODIUM 100 MG/1
100 CAPSULE, EXTENDED RELEASE ORAL EVERY 12 HOURS SCHEDULED
Status: DISCONTINUED | OUTPATIENT
Start: 2018-03-05 | End: 2018-03-16 | Stop reason: HOSPADM

## 2018-03-05 RX ORDER — LEVOFLOXACIN 5 MG/ML
500 INJECTION, SOLUTION INTRAVENOUS EVERY 24 HOURS
Status: DISCONTINUED | OUTPATIENT
Start: 2018-03-05 | End: 2018-03-07

## 2018-03-05 RX ORDER — HYDROXYZINE HYDROCHLORIDE 25 MG/1
25 TABLET, FILM COATED ORAL 3 TIMES DAILY PRN
COMMUNITY
End: 2018-01-01

## 2018-03-05 RX ORDER — PROMETHAZINE HYDROCHLORIDE 25 MG/ML
12.5 INJECTION, SOLUTION INTRAMUSCULAR; INTRAVENOUS EVERY 6 HOURS PRN
Status: DISCONTINUED | OUTPATIENT
Start: 2018-03-05 | End: 2018-03-16 | Stop reason: HOSPADM

## 2018-03-05 RX ORDER — FUROSEMIDE 20 MG/1
20 TABLET ORAL DAILY
COMMUNITY
End: 2019-01-01 | Stop reason: HOSPADM

## 2018-03-05 RX ADMIN — TAMSULOSIN HYDROCHLORIDE 0.4 MG: 0.4 CAPSULE ORAL at 21:21

## 2018-03-05 RX ADMIN — HYDROCODONE BITARTRATE AND ACETAMINOPHEN 1 TABLET: 10; 325 TABLET ORAL at 08:27

## 2018-03-05 RX ADMIN — SODIUM CHLORIDE 2448 ML: 9 INJECTION, SOLUTION INTRAVENOUS at 01:53

## 2018-03-05 RX ADMIN — LEVOFLOXACIN 500 MG: 5 INJECTION, SOLUTION INTRAVENOUS at 13:33

## 2018-03-05 RX ADMIN — PIPERACILLIN SODIUM,TAZOBACTAM SODIUM 3.38 G: 3; .375 INJECTION, POWDER, FOR SOLUTION INTRAVENOUS at 08:42

## 2018-03-05 RX ADMIN — PHENYTOIN SODIUM 100 MG: 100 CAPSULE, EXTENDED RELEASE ORAL at 21:21

## 2018-03-05 RX ADMIN — PIPERACILLIN SODIUM,TAZOBACTAM SODIUM 3.38 G: 3; .375 INJECTION, POWDER, FOR SOLUTION INTRAVENOUS at 17:03

## 2018-03-05 RX ADMIN — HYDROCODONE BITARTRATE AND ACETAMINOPHEN 1 TABLET: 10; 325 TABLET ORAL at 13:39

## 2018-03-05 RX ADMIN — PHENYTOIN SODIUM 100 MG: 100 CAPSULE, EXTENDED RELEASE ORAL at 08:41

## 2018-03-05 RX ADMIN — HYDROCORTISONE: 1 CREAM TOPICAL at 21:31

## 2018-03-05 RX ADMIN — ONDANSETRON 4 MG: 2 INJECTION INTRAMUSCULAR; INTRAVENOUS at 16:11

## 2018-03-05 RX ADMIN — ASPIRIN 81 MG: 81 TABLET, COATED ORAL at 08:43

## 2018-03-05 RX ADMIN — SODIUM CHLORIDE 125 ML/HR: 900 INJECTION, SOLUTION INTRAVENOUS at 08:33

## 2018-03-05 RX ADMIN — SODIUM CHLORIDE 125 ML/HR: 900 INJECTION, SOLUTION INTRAVENOUS at 08:27

## 2018-03-05 RX ADMIN — RIVAROXABAN 20 MG: 10 TABLET, FILM COATED ORAL at 08:42

## 2018-03-05 RX ADMIN — PROMETHAZINE HYDROCHLORIDE 12.5 MG: 25 INJECTION INTRAMUSCULAR; INTRAVENOUS at 21:21

## 2018-03-05 RX ADMIN — OXYCODONE HYDROCHLORIDE AND ACETAMINOPHEN 500 MG: 500 TABLET ORAL at 08:42

## 2018-03-05 RX ADMIN — Medication: at 15:41

## 2018-03-05 RX ADMIN — BACLOFEN 10 MG: 10 TABLET ORAL at 23:58

## 2018-03-05 RX ADMIN — FINASTERIDE 5 MG: 5 TABLET, FILM COATED ORAL at 08:43

## 2018-03-05 RX ADMIN — PIPERACILLIN SODIUM,TAZOBACTAM SODIUM 3.38 G: 3; .375 INJECTION, POWDER, FOR SOLUTION INTRAVENOUS at 01:53

## 2018-03-05 RX ADMIN — ONDANSETRON 4 MG: 2 INJECTION INTRAMUSCULAR; INTRAVENOUS at 23:58

## 2018-03-05 NOTE — H&P
History and Physical  Yoan Hamilton MD  Hospitalist    Date of admission: 3/5/2018    Patient Care Team:  Valerie Marvin MD as PCP - General    Chief complaint   Chief Complaint   Patient presents with   • Nausea     Subjective     Patient is a 73 y.o. male admitted for weakness, mild nausea, low grade fever, worsening weakness. He has felt bad for the last several days, has experienced more generalized pain than usual and has had a poorer appetite. He might have been coughing more than usual as well. His effort capacity is significantly limited by his L hemiplegia.    History  Past Medical History:   Diagnosis Date   • Arthritis    • Atherosclerosis    • Atrial fibrillation    • BPH (benign prostatic hyperplasia)    • Brain injury    • Chronic bronchitis    • Hypertension    • Left hemiplegia    • UTI (urinary tract infection) due to urinary indwelling catheter 3/5/2018     Past Surgical History:   Procedure Laterality Date   • CHOLECYSTECTOMY     • EYE SURGERY     • SUPRAPUBIC CATHETER INSERTION       Family History   Problem Relation Age of Onset   • Stroke Mother    • Hypertension Father      Social History   Substance Use Topics   • Smoking status: Former Smoker   • Smokeless tobacco: Never Used   • Alcohol use No     Prescriptions Prior to Admission   Medication Sig Dispense Refill Last Dose   • aspirin 81 MG EC tablet Take 81 mg by mouth Daily.   Taking   • baclofen (LIORESAL) 10 MG tablet Take 10 mg by mouth 2 (Two) Times a Day.   Taking   • diltiaZEM (CARDIZEM) 60 MG tablet Take 60 mg by mouth 4 (Four) Times a Day.   Taking   • finasteride (PROSCAR) 5 MG tablet Take 5 mg by mouth Daily.   Taking   • furosemide (LASIX) 20 MG tablet Take 20 mg by mouth Daily.      • HYDROcodone-acetaminophen (NORCO)  MG per tablet TAKE 1 TABLET BY MOUTH EVERY SIX HOURS AS NEEDED FOR CHRONIC PAIN  0 Taking   • hydrOXYzine (ATARAX) 25 MG tablet Take 25 mg by mouth 3 (Three) Times a Day As Needed for Itching.      •  nebivolol (BYSTOLIC) 20 MG tablet Take 20 mg by mouth Daily.   Taking   • nitrofurantoin (MACRODANTIN) 50 MG capsule Take 50 mg by mouth Every Night.      • Omega-3 Fatty Acids (FISH OIL) 1000 MG capsule capsule Take  by mouth Daily With Breakfast.   Taking   • phenytoin (DILANTIN) 100 MG ER capsule Take  by mouth 2 (Two) Times a Day.   Taking   • rivaroxaban (XARELTO) 20 MG tablet Take 20 mg by mouth Daily.   Taking   • tamsulosin (FLOMAX) 0.4 MG capsule 24 hr capsule Take 1 capsule by mouth Every Night.   Taking   • vitamin C (ASCORBIC ACID) 500 MG tablet Take 500 mg by mouth Daily.   Taking     Allergies:  Aleve [naproxen]; Ambien [zolpidem]; and Vancomycin    Review of Systems  Review of Systems   Constitutional: Positive for fatigue and fever.   HENT: Positive for congestion.    Respiratory: Positive for cough. Negative for stridor.    Cardiovascular: Positive for chest pain. Negative for palpitations and leg swelling.   Gastrointestinal: Negative for abdominal distention, abdominal pain, anal bleeding, diarrhea, rectal pain and vomiting.   Genitourinary: Negative for dysuria, frequency, testicular pain and urgency.   Musculoskeletal: Positive for arthralgias, back pain and myalgias. Negative for neck pain.   Skin: Positive for pallor.   Neurological: Positive for weakness and headaches. Negative for seizures, syncope and numbness.   Psychiatric/Behavioral: Negative for agitation, behavioral problems and confusion.   All other systems reviewed and are negative.      Objective     Vital Signs  Temp:  [98.5 °F (36.9 °C)-100.9 °F (38.3 °C)] 98.5 °F (36.9 °C)  Heart Rate:  [] 96  Resp:  [18-20] 18  BP: ()/(54-58) 100/58    Physical Exam:  Physical Exam   Constitutional: He appears cachectic. He appears ill. No distress.   HENT:   Head: Normocephalic and atraumatic.   Eyes: EOM are normal. Pupils are equal, round, and reactive to light. No scleral icterus.   Neck: Normal range of motion. Neck supple.    Cardiovascular: Normal rate.  An irregular rhythm present.   Pulmonary/Chest: Effort normal. He has wheezes. He has no rales.   Abdominal: Soft. Bowel sounds are normal. He exhibits no distension. There is no tenderness.   Suprapubic catheter present   Musculoskeletal: He exhibits no edema or tenderness.   Neurological: He is alert. No cranial nerve deficit.   L hemiparesis   Skin: Skin is dry. No rash noted. There is pallor.   Extensive area of erythema / skin breakdown on his buttocks   Psychiatric: He has a normal mood and affect. His behavior is normal.   Vitals reviewed.      Results Review:   Lab Results (last 24 hours)     Procedure Component Value Units Date/Time    Blood Culture - Blood, [[id number]] Collected:  03/05/18 0136    Specimen:  Blood from Arm, Right Updated:  03/05/18 0146    CBC & Differential [[id number]] Collected:  03/05/18 0136    Specimen:  Blood Updated:  03/05/18 0149    Narrative:       The following orders were created for panel order CBC & Differential.  Procedure                               Abnormality         Status                     ---------                               -----------         ------                     CBC Auto Differential[[id number]]        Abnormal            Final result                 Please view results for these tests on the individual orders.    CBC Auto Differential [[id number]]  (Abnormal) Collected:  03/05/18 0136    Specimen:  Blood Updated:  03/05/18 0149     WBC 23.46 (H) 10*3/mm3      RBC 4.93 10*6/mm3      Hemoglobin 15.6 g/dL      Hematocrit 45.6 %      MCV 92.5 fL      MCH 31.6 pg      MCHC 34.2 g/dL      RDW 13.4 %      RDW-SD 45.4 (H) fl      MPV 10.0 fL      Platelets 197 10*3/mm3      Neutrophil % 89.5 (H) %      Lymphocyte % 2.0 (L) %      Monocyte % 6.4 %      Eosinophil % 0.9 %      Basophil % 0.1 %      Immature Grans % 1.1 (H) %      Neutrophils, Absolute 21.01 (H) 10*3/mm3      Lymphocytes, Absolute 0.46 (L) 10*3/mm3      Monocytes,  Absolute 1.49 (H) 10*3/mm3      Eosinophils, Absolute 0.22 10*3/mm3      Basophils, Absolute 0.02 10*3/mm3      Immature Grans, Absolute 0.26 (H) 10*3/mm3     Lactic Acid, Plasma [[id number]]  (Abnormal) Collected:  03/05/18 0136    Specimen:  Blood Updated:  03/05/18 0205     Lactate 2.7 (C) mmol/L     Urinalysis With / Culture If Indicated - Urine, Clean Catch [[id number]]  (Abnormal) Collected:  03/05/18 0154    Specimen:  Urine from Urine, Catheter Updated:  03/05/18 0209     Color, UA Yellow     Appearance, UA Cloudy (A)     pH, UA <=5.0     Specific Gravity, UA 1.020     Glucose, UA Negative     Ketones, UA Negative     Bilirubin, UA Small (1+) (A)     Blood, UA Large (3+) (A)     Protein, UA Trace (A)     Leuk Esterase, UA Large (3+) (A)     Nitrite, UA Negative     Urobilinogen, UA 1.0 E.U./dL    Influenza Antigen, Rapid - Swab, Nasopharynx [[id number]]  (Normal) Collected:  03/05/18 0147    Specimen:  Swab from Nasopharynx Updated:  03/05/18 0210     Influenza A Ag, EIA Negative     Influenza B Ag, EIA Negative    Phenytoin Level, Total [[id number]]  (Normal) Collected:  03/05/18 0136    Specimen:  Blood Updated:  03/05/18 0212     Phenytoin Level 10.5 mcg/mL     CK [[id number]]  (Normal) Collected:  03/05/18 0136    Specimen:  Blood Updated:  03/05/18 0213     Creatine Kinase 60 U/L     Magnesium [[id number]]  (Normal) Collected:  03/05/18 0136    Specimen:  Blood Updated:  03/05/18 0234     Magnesium 1.8 mg/dL     Urinalysis, Microscopic Only - Urine, Clean Catch [[id number]]  (Abnormal) Collected:  03/05/18 0154    Specimen:  Urine from Urine, Catheter Updated:  03/05/18 0236     RBC, UA 21-30 (A) /HPF      WBC, UA 13-20 (A) /HPF      Bacteria, UA 1+ (A) /HPF      Squamous Epithelial Cells, UA 3-5 (A) /HPF      Hyaline Casts, UA None Seen /LPF      Amorphous Urate Crystals, UA Small/1+ /HPF      Methodology Manual Light Microscopy    Comprehensive Metabolic Panel [[id number]]  (Abnormal) Collected:  03/05/18  0136    Specimen:  Blood Updated:  03/05/18 0241     Glucose 125 (H) mg/dL      BUN 29 (H) mg/dL      Creatinine 1.30 mg/dL      Sodium 142 mmol/L      Potassium 3.7 mmol/L      Chloride 100 mmol/L      CO2 26.0 mmol/L      Calcium 8.6 mg/dL      Total Protein 7.5 g/dL      Albumin 3.90 g/dL      ALT (SGPT) 31 U/L      AST (SGOT) 27 U/L      Alkaline Phosphatase 122 U/L      Total Bilirubin 0.4 mg/dL      eGFR Non African Amer 54 mL/min/1.73      Globulin 3.6 (H) gm/dL      A/G Ratio 1.1 g/dL      BUN/Creatinine Ratio 22.3     Anion Gap 16.0 (H) mmol/L     Narrative:       The MDRD GFR formula is only valid for adults with stable renal function between ages 18 and 70.    Blood Culture - Blood, [[id number]] Collected:  03/05/18 0235    Specimen:  Blood from Wrist, Right Updated:  03/05/18 0243    Light Blue Top [[id number]] Collected:  03/05/18 0136    Specimen:  Blood Updated:  03/05/18 0246     Extra Tube hold for add-on      Auto resulted       Lavender Top [[id number]] Collected:  03/05/18 0136    Specimen:  Blood Updated:  03/05/18 0246     Extra Tube hold for add-on      Auto resulted       Gold Top - SST [[id number]] Collected:  03/05/18 0136    Specimen:  Blood Updated:  03/05/18 0246     Extra Tube Hold for add-ons.      Auto resulted.       [place] Draw [[id number]] Collected:  03/05/18 0136    Specimen:  Blood Updated:  03/05/18 0346    Narrative:       The following orders were created for panel order [place] Draw.  Procedure                               Abnormality         Status                     ---------                               -----------         ------                     Light Blue Top[[id number]]                                   Final result               Green Top (Gel)[[id number]]                                  Final result               Lavender Top[[id number]]                                     Final result               Gold Top - SST[[id number]]                                   Final  result                 Please view results for these tests on the individual orders.    Green Top (Gel) [[id number]] Collected:  03/05/18 0235    Specimen:  Blood Updated:  03/05/18 0346     Extra Tube Hold for add-ons.      Auto resulted.       Lactic Acid, Reflex Timer (This will reflex a repeat order 3-3:15 hours after ordered.) [[id number]] Collected:  03/05/18 0136    Specimen:  Blood Updated:  03/05/18 0516     Extra Tube Hold for add-ons.      Auto resulted.       Lactic Acid, Reflex [[id number]]  (Abnormal) Collected:  03/05/18 0539    Specimen:  Blood Updated:  03/05/18 0610     Lactate 2.4 (C) mmol/L     Urine Culture - Urine, Urine, Clean Catch [[id number]]  (Normal) Collected:  03/05/18 0154    Specimen:  Urine from Urine, Catheter Updated:  03/05/18 0618     Urine Culture Culture in progress          Imaging Results (last 24 hours)     Procedure Component Value Units Date/Time    XR Chest 1 View [[id number]] Collected:  03/05/18 0140     Updated:  03/05/18 0200    Narrative:         Chest single view on  3/5/2018     CLINICAL INDICATION: Nausea, fever, tachycardia    COMPARISON: 12/15/2016    FINDINGS: There is mild elevation of the right hemidiaphragm.  Mild chronic interstitial changes are noted. Heart is upper  limits normal for size. Hilar and mediastinal contours are within  normal limits.      Impression:       No acute disease.    Electronically signed by:  Luke Bustos  3/5/2018 1:58 AM CST  [email or web address]          Assessment/Plan     Principal Problem:    Sepsis  Active Problems:    UTI (urinary tract infection) due to urinary indwelling catheter    Hypotension    Sacral decubitus ulcer    Atrial fibrillation    IV fluid, IV antibiotics, follow up blood and urine cultures. Consult Wound Care regarding his extensive skin breakdown on his buttocks - provide local care.    Yoan Hamilton MD  03/05/18  7:40 AM

## 2018-03-05 NOTE — CONSULTS
Inpatient Urology Consult  Consult performed by: LOULOU URBINA  Consult ordered by: JIMENEZ ROBERSON          Patient Care Team:  Valerie Marvin MD as PCP - General    Chief complaint: NGB UTI    Subjective     HPI Comments: Mr. Hill is a 73-year-old male known to Dr. Al at [facility], history of neurogenic bladder, BPH, left hemiplegia, and has chronic suprapubic catheter that is changed monthly by nursing staff by [facility]. He takes antibiotics daily for UTI suppression. He states that in the last few days he noticed weakness, fever unknown temperature max, chills, nausea. These symptoms came on more acutely than with his previous UTIs and noted is left lower extremity redness and buttock wound. He is admitted for sepsis, urinary and wound sources.     Urinary Tract Infection    This is a new problem. The current episode started in the past 7 days. The quality of the pain is described as aching. Maximum temperature: unknown TMax. The fever has been present for 1 - 2 days. There is a history of pyelonephritis. Associated symptoms include chills and nausea. Pertinent negatives include no discharge, flank pain, hematuria or vomiting. He has tried antibiotics for the symptoms. The treatment provided mild relief. His past medical history is significant for catheterization and recurrent UTIs.       Review of Systems   Constitutional: Positive for activity change, chills, fatigue and fever.   Respiratory: Negative for cough and shortness of breath.    Cardiovascular: Negative for palpitations.   Gastrointestinal: Positive for nausea. Negative for abdominal distention, abdominal pain and vomiting.   Genitourinary: Positive for decreased urine volume. Negative for flank pain, hematuria, penile pain, scrotal swelling and testicular pain.   Skin: Positive for wound.   Neurological: Positive for weakness. Negative for dizziness and headaches.   Hematological: Does not bruise/bleed easily.    Psychiatric/Behavioral: Negative for confusion.   All other systems reviewed and are negative.       Past Medical History:   Diagnosis Date   • Arthritis    • Atherosclerosis    • Atrial fibrillation    • BPH (benign prostatic hyperplasia)    • Brain injury    • Chronic bronchitis    • Hypertension    • Left hemiplegia    • UTI (urinary tract infection) due to urinary indwelling catheter 3/5/2018   ,   Past Surgical History:   Procedure Laterality Date   • CHOLECYSTECTOMY     • EYE SURGERY     • SUPRAPUBIC CATHETER INSERTION     ,   Family History   Problem Relation Age of Onset   • Stroke Mother    • Hypertension Father    ,   Social History   Substance Use Topics   • Smoking status: Former Smoker   • Smokeless tobacco: Never Used   • Alcohol use No   ,   Prescriptions Prior to Admission   Medication Sig Dispense Refill Last Dose   • aspirin 81 MG EC tablet Take 81 mg by mouth Daily.   Taking   • baclofen (LIORESAL) 10 MG tablet Take 10 mg by mouth 2 (Two) Times a Day.   Taking   • diltiaZEM (CARDIZEM) 60 MG tablet Take 60 mg by mouth 4 (Four) Times a Day.   Taking   • finasteride (PROSCAR) 5 MG tablet Take 5 mg by mouth Daily.   Taking   • furosemide (LASIX) 20 MG tablet Take 20 mg by mouth Daily.      • HYDROcodone-acetaminophen (NORCO)  MG per tablet TAKE 1 TABLET BY MOUTH EVERY SIX HOURS AS NEEDED FOR CHRONIC PAIN  0 Taking   • hydrOXYzine (ATARAX) 25 MG tablet Take 25 mg by mouth 3 (Three) Times a Day As Needed for Itching.      • nebivolol (BYSTOLIC) 20 MG tablet Take 20 mg by mouth Daily.   Taking   • nitrofurantoin (MACRODANTIN) 50 MG capsule Take 50 mg by mouth Every Night.      • Omega-3 Fatty Acids (FISH OIL) 1000 MG capsule capsule Take  by mouth Daily With Breakfast.   Taking   • phenytoin (DILANTIN) 100 MG ER capsule Take  by mouth 2 (Two) Times a Day.   Taking   • rivaroxaban (XARELTO) 20 MG tablet Take 20 mg by mouth Daily.   Taking   • tamsulosin (FLOMAX) 0.4 MG capsule 24 hr capsule Take 1  capsule by mouth Every Night.   Taking   • vitamin C (ASCORBIC ACID) 500 MG tablet Take 500 mg by mouth Daily.   Taking        Current Facility-Administered Medications:   •  albuterol (PROVENTIL) nebulizer solution 0.5% 2.5 mg/0.5mL, 2.5 mg, Nebulization, Q6H PRN, Yoan Hamilton MD  •  aspirin EC tablet 81 mg, 81 mg, Oral, Daily, Yoan Hamilton MD, 81 mg at 03/05/18 0843  •  finasteride (PROSCAR) tablet 5 mg, 5 mg, Oral, Daily, Yoan Hamilton MD, 5 mg at 03/05/18 0843  •  HYDROcodone-acetaminophen (NORCO)  MG per tablet 1 tablet, 1 tablet, Oral, Q4H PRN, Yoan Hamilton MD, 1 tablet at 03/05/18 1339  •  levoFLOXacin (LEVAQUIN) 500 mg/100 mL D5W (premix) (LEVAQUIN) 500 mg, 500 mg, Intravenous, Q24H, Evelio Christensen MD, 500 mg at 03/05/18 1333  •  Pharmacy to Dose LevoFLOXacin (LEVAQUIN), , Does not apply, Continuous PRN, Evelio Christensen MD  •  phenytoin (DILANTIN) ER capsule 100 mg, 100 mg, Oral, Q12H, Yoan Hamilton MD, 100 mg at 03/05/18 0841  •  piperacillin-tazobactam (ZOSYN) 3.375 g/100 mL 0.9% NS IVPB (mbp), 3.375 g, Intravenous, Q8H, Yoan Hamilton MD, 3.375 g at 03/05/18 0842  •  rivaroxaban (XARELTO) tablet 20 mg, 20 mg, Oral, Daily, Yoan Hamilton MD, 20 mg at 03/05/18 0842  •  sodium chloride 0.9 % flush 1-10 mL, 1-10 mL, Intravenous, PRN, Yoan Hamilton MD  •  sodium chloride 0.9 % flush 10 mL, 10 mL, Intravenous, PRN, Robert Anderson MD  •  sodium chloride 0.9 % infusion, 125 mL/hr, Intravenous, Continuous, Yoan Hamilton MD, Last Rate: 125 mL/hr at 03/05/18 0833, 125 mL/hr at 03/05/18 0833  •  tamsulosin (FLOMAX) 24 hr capsule 0.4 mg, 0.4 mg, Oral, Nightly, Yoan Hamilton MD  •  vitamin C (ASCORBIC ACID) tablet 500 mg, 500 mg, Oral, Daily, Yoan Hamilton MD, 500 mg at 03/05/18 0842      ALLERGIES:  Aleve [naproxen]; Ambien [zolpidem]; and Vancomycin    Objective      Vital Signs  Temp:  [98 °F (36.7 °C)-100.9 °F (38.3 °C)] 98 °F (36.7 °C)  Heart Rate:  [] 112  Resp:  [18-20] 18  BP:  ()/(54-64) 104/64    Physical Exam   Constitutional: He is oriented to person, place, and time. He appears well-developed and well-nourished.   HENT:   Head: Normocephalic and atraumatic.   Eyes: Pupils are equal, round, and reactive to light. Right eye exhibits no discharge. Left eye exhibits no discharge. No scleral icterus.   Cardiovascular: Normal rate, normal heart sounds and intact distal pulses.  An irregular rhythm present.   No murmur heard.  Pulmonary/Chest: Effort normal and breath sounds normal. No respiratory distress. He has no wheezes.   Abdominal: Soft. Bowel sounds are normal. He exhibits no distension.   SP tube   Musculoskeletal: He exhibits no edema.   Neurological: He is alert and oriented to person, place, and time.   L hemiparesis    Skin: Skin is warm and dry. Rash (cystostomy site) noted. There is erythema (LLE). There is pallor.   Psychiatric: He has a normal mood and affect. His speech is normal and behavior is normal. Judgment and thought content normal. He is attentive.   Vitals reviewed.      Results Review:   Lab Results (last 24 hours)     Procedure Component Value Units Date/Time    CBC & Differential [[id number]] Collected:  03/05/18 0136    Specimen:  Blood Updated:  03/05/18 0149    Narrative:       The following orders were created for panel order CBC & Differential.  Procedure                               Abnormality         Status                     ---------                               -----------         ------                     CBC Auto Differential[[id number]]        Abnormal            Final result                 Please view results for these tests on the individual orders.    CBC Auto Differential [[id number]]  (Abnormal) Collected:  03/05/18 0136    Specimen:  Blood Updated:  03/05/18 0149     WBC 23.46 (H) 10*3/mm3      RBC 4.93 10*6/mm3      Hemoglobin 15.6 g/dL      Hematocrit 45.6 %      MCV 92.5 fL      MCH 31.6 pg      MCHC 34.2 g/dL      RDW 13.4 %       RDW-SD 45.4 (H) fl      MPV 10.0 fL      Platelets 197 10*3/mm3      Neutrophil % 89.5 (H) %      Lymphocyte % 2.0 (L) %      Monocyte % 6.4 %      Eosinophil % 0.9 %      Basophil % 0.1 %      Immature Grans % 1.1 (H) %      Neutrophils, Absolute 21.01 (H) 10*3/mm3      Lymphocytes, Absolute 0.46 (L) 10*3/mm3      Monocytes, Absolute 1.49 (H) 10*3/mm3      Eosinophils, Absolute 0.22 10*3/mm3      Basophils, Absolute 0.02 10*3/mm3      Immature Grans, Absolute 0.26 (H) 10*3/mm3     Lactic Acid, Plasma [[id number]]  (Abnormal) Collected:  03/05/18 0136    Specimen:  Blood Updated:  03/05/18 0205     Lactate 2.7 (C) mmol/L     Urinalysis With / Culture If Indicated - Urine, Clean Catch [[id number]]  (Abnormal) Collected:  03/05/18 0154    Specimen:  Urine from Urine, Catheter Updated:  03/05/18 0209     Color, UA Yellow     Appearance, UA Cloudy (A)     pH, UA <=5.0     Specific Gravity, UA 1.020     Glucose, UA Negative     Ketones, UA Negative     Bilirubin, UA Small (1+) (A)     Blood, UA Large (3+) (A)     Protein, UA Trace (A)     Leuk Esterase, UA Large (3+) (A)     Nitrite, UA Negative     Urobilinogen, UA 1.0 E.U./dL    Influenza Antigen, Rapid - Swab, Nasopharynx [[id number]]  (Normal) Collected:  03/05/18 0147    Specimen:  Swab from Nasopharynx Updated:  03/05/18 0210     Influenza A Ag, EIA Negative     Influenza B Ag, EIA Negative    Phenytoin Level, Total [[id number]]  (Normal) Collected:  03/05/18 0136    Specimen:  Blood Updated:  03/05/18 0212     Phenytoin Level 10.5 mcg/mL     CK [[id number]]  (Normal) Collected:  03/05/18 0136    Specimen:  Blood Updated:  03/05/18 0213     Creatine Kinase 60 U/L     Magnesium [[id number]]  (Normal) Collected:  03/05/18 0136    Specimen:  Blood Updated:  03/05/18 0234     Magnesium 1.8 mg/dL     Urinalysis, Microscopic Only - Urine, Clean Catch [[id number]]  (Abnormal) Collected:  03/05/18 0154    Specimen:  Urine from Urine, Catheter Updated:  03/05/18 0236     RBC, UA  21-30 (A) /HPF      WBC, UA 13-20 (A) /HPF      Bacteria, UA 1+ (A) /HPF      Squamous Epithelial Cells, UA 3-5 (A) /HPF      Hyaline Casts, UA None Seen /LPF      Amorphous Urate Crystals, UA Small/1+ /HPF      Methodology Manual Light Microscopy    Comprehensive Metabolic Panel [[id number]]  (Abnormal) Collected:  03/05/18 0136    Specimen:  Blood Updated:  03/05/18 0241     Glucose 125 (H) mg/dL      BUN 29 (H) mg/dL      Creatinine 1.30 mg/dL      Sodium 142 mmol/L      Potassium 3.7 mmol/L      Chloride 100 mmol/L      CO2 26.0 mmol/L      Calcium 8.6 mg/dL      Total Protein 7.5 g/dL      Albumin 3.90 g/dL      ALT (SGPT) 31 U/L      AST (SGOT) 27 U/L      Alkaline Phosphatase 122 U/L      Total Bilirubin 0.4 mg/dL      eGFR Non African Amer 54 mL/min/1.73      Globulin 3.6 (H) gm/dL      A/G Ratio 1.1 g/dL      BUN/Creatinine Ratio 22.3     Anion Gap 16.0 (H) mmol/L     Narrative:       The MDRD GFR formula is only valid for adults with stable renal function between ages 18 and 70.    Blood Culture - Blood, [[id number]] Collected:  03/05/18 0235    Specimen:  Blood from Wrist, Right Updated:  03/05/18 0243    Light Blue Top [[id number]] Collected:  03/05/18 0136    Specimen:  Blood Updated:  03/05/18 0246     Extra Tube hold for add-on      Auto resulted       Lavender Top [[id number]] Collected:  03/05/18 0136    Specimen:  Blood Updated:  03/05/18 0246     Extra Tube hold for add-on      Auto resulted       Gold Top - SST [[id number]] Collected:  03/05/18 0136    Specimen:  Blood Updated:  03/05/18 0246     Extra Tube Hold for add-ons.      Auto resulted.       [place] Draw [[id number]] Collected:  03/05/18 0136    Specimen:  Blood Updated:  03/05/18 0346    Narrative:       The following orders were created for panel order [place] Draw.  Procedure                               Abnormality         Status                     ---------                               -----------         ------                      Light Blue Top[[id number]]                                   Final result               Green Top (Gel)[[id number]]                                  Final result               Lavender Top[[id number]]                                     Final result               Gold Top - SST[[id number]]                                   Final result                 Please view results for these tests on the individual orders.    Green Top (Gel) [[id number]] Collected:  03/05/18 0235    Specimen:  Blood Updated:  03/05/18 0346     Extra Tube Hold for add-ons.      Auto resulted.       Lactic Acid, Reflex Timer (This will reflex a repeat order 3-3:15 hours after ordered.) [[id number]] Collected:  03/05/18 0136    Specimen:  Blood Updated:  03/05/18 0516     Extra Tube Hold for add-ons.      Auto resulted.       Lactic Acid, Reflex [[id number]]  (Abnormal) Collected:  03/05/18 0539    Specimen:  Blood Updated:  03/05/18 0610     Lactate 2.4 (C) mmol/L     Urine Culture - Urine, Urine, Clean Catch [[id number]]  (Normal) Collected:  03/05/18 0154    Specimen:  Urine from Urine, Catheter Updated:  03/05/18 0618     Urine Culture Culture in progress    CBC & Differential [[id number]] Collected:  03/05/18 0923    Specimen:  Blood Updated:  03/05/18 0938    Narrative:       The following orders were created for panel order CBC & Differential.  Procedure                               Abnormality         Status                     ---------                               -----------         ------                     CBC Auto Differential[[id number]]        Abnormal            Final result                 Please view results for these tests on the individual orders.    CBC Auto Differential [[id number]]  (Abnormal) Collected:  03/05/18 0923    Specimen:  Blood Updated:  03/05/18 0938     WBC 22.76 (H) 10*3/mm3      RBC 4.51 10*6/mm3      Hemoglobin 14.5 g/dL      Hematocrit 43.1 %      MCV 95.6 fL      MCH 32.2 pg      MCHC 33.6 g/dL      RDW 13.8 %       RDW-SD 48.6 (H) fl      MPV 9.2 fL      Platelets 168 10*3/mm3      Neutrophil % 92.1 (H) %      Lymphocyte % 2.9 (L) %      Monocyte % 4.2 %      Eosinophil % 0.2 %      Basophil % 0.1 %      Immature Grans % 0.5 %      Neutrophils, Absolute 20.96 (H) 10*3/mm3      Lymphocytes, Absolute 0.65 10*3/mm3      Monocytes, Absolute 0.95 (H) 10*3/mm3      Eosinophils, Absolute 0.05 10*3/mm3      Basophils, Absolute 0.03 10*3/mm3      Immature Grans, Absolute 0.12 (H) 10*3/mm3      nRBC 0.0 /100 WBC     Comprehensive Metabolic Panel [[id number]]  (Abnormal) Collected:  03/05/18 0924    Specimen:  Blood Updated:  03/05/18 0954     Glucose 128 (H) mg/dL      BUN 24 (H) mg/dL      Creatinine 1.20 mg/dL      Sodium 143 mmol/L      Potassium 3.9 mmol/L      Chloride 106 mmol/L      CO2 24.0 mmol/L      Calcium 7.7 (L) mg/dL      Total Protein 6.2 (L) g/dL      Albumin 3.10 (L) g/dL      ALT (SGPT) 32 U/L      AST (SGOT) 23 U/L      Alkaline Phosphatase 89 U/L      Total Bilirubin 0.5 mg/dL      eGFR Non African Amer 59 mL/min/1.73      Globulin 3.1 gm/dL      A/G Ratio 1.0 (L) g/dL      BUN/Creatinine Ratio 20.0     Anion Gap 13.0 mmol/L     Narrative:       The MDRD GFR formula is only valid for adults with stable renal function between ages 18 and 70.    Blood Culture - Blood, [[id number]]  (Normal) Collected:  03/05/18 0136    Specimen:  Blood from Arm, Right Updated:  03/05/18 1401     Blood Culture No growth at less than 24 hours         Imaging Results (last 24 hours)     Procedure Component Value Units Date/Time    XR Chest 1 View [[id number]] Collected:  03/05/18 0140     Updated:  03/05/18 0200    Narrative:         Chest single view on  3/5/2018     CLINICAL INDICATION: Nausea, fever, tachycardia    COMPARISON: 12/15/2016    FINDINGS: There is mild elevation of the right hemidiaphragm.  Mild chronic interstitial changes are noted. Heart is upper  limits normal for size. Hilar and mediastinal contours are  within  normal limits.      Impression:       No acute disease.    Electronically signed by:  Luke Bustos  3/5/2018 1:58 AM [facility]  Workstation: [id number]           I reviewed the patient's new clinical results.  I reviewed the patient's new imaging results and agree with the interpretation.  I reviewed the patient's other test results and agree with the interpretation        Assessment/Plan     Principal Problem:    Sepsis  Active Problems:    Sacral decubitus ulcer    UTI (urinary tract infection) due to urinary indwelling catheter    Hypotension    Atrial fibrillation      Assessment & Plan  UTI, acute, causing sepsis related to chronic SP tube last changed 4 weeks ago.  -Urine culture in progress  -WBC 23.46-->22.76, lactic acid 2.7-->2.4  -Zosyn and Levaquin, IV fluids  -TMax 100.9, vitals noted  -Estimated Creatinine Clearance: 54.4 mL/min (by C-G formula based on Cr of 1.2).    Neurogenic bladder  -chronic SP tube, changed last 4 weeks ago    Plan: SP tube changed, urine culture, antibiotics.    PRE DIAGNOSIS:  NGB     POST DIAGNOSIS:  NGB     PROCEDURE DETAILS:  I have reviewed the diagnosis and treatment options with patient. Risks and benefits explained and patient wishes to proceed with catheter change. Supra pubic site prepped in sterile technique. Existing catheter removed with 16 F catheter inserted with 10 mL sterile water in balloon. Supra pubic site with erythematic rash and no drainage noted. Placement was checked. Catheter connected to gravity bag, closed system.      COMPLICATIONS:  No Complications.     FINDINGS: Urine clear and yellow, sediment in tubing.       INSTRUCTIONS: Appropriate post procedure instructions given. Verbalized understanding. Order placed for urine culture.     I discussed the patients findings and my recommendations with patient, nursing staff, primary care team and JEAN Chou  03/05/18  2:27 PM

## 2018-03-05 NOTE — NURSING NOTE
"Patient has reddened buttocks. Patient says he has a history of \"Nena \" skin condition. Heels dry and flakey . Needs good skin care , offloading and protection. POA yes  "

## 2018-03-05 NOTE — ED PROVIDER NOTES
Subjective   HPI Comments: Suprapubic cath x 2 years secondary to prostatic hypertrophy    Patient is a 73 y.o. male presenting with nausea.   Nausea   The primary symptoms include fever, nausea and myalgias. Primary symptoms do not include fatigue, abdominal pain, vomiting, diarrhea, jaundice, dysuria, arthralgias or rash. The illness began today.   Myalgias began today. The myalgias are not associated with weakness.   The illness is also significant for chills. The illness does not include bloating or constipation.       Review of Systems   Constitutional: Positive for chills and fever. Negative for appetite change, diaphoresis and fatigue.   HENT: Negative for congestion, ear discharge, ear pain, nosebleeds, rhinorrhea, sinus pressure, sore throat and trouble swallowing.    Eyes: Negative for discharge and redness.   Respiratory: Negative for apnea, cough, chest tightness, shortness of breath and wheezing.    Cardiovascular: Negative for chest pain.   Gastrointestinal: Positive for nausea. Negative for abdominal pain, bloating, constipation, diarrhea, jaundice and vomiting.   Endocrine: Negative for polyuria.   Genitourinary: Negative for dysuria, frequency and urgency.   Musculoskeletal: Positive for myalgias. Negative for arthralgias and neck pain.   Skin: Negative for color change and rash.   Allergic/Immunologic: Negative for immunocompromised state.   Neurological: Negative for dizziness, seizures, syncope, weakness, light-headedness and headaches.   Hematological: Negative for adenopathy. Does not bruise/bleed easily.   Psychiatric/Behavioral: Negative for behavioral problems and confusion.   All other systems reviewed and are negative.      History reviewed. No pertinent past medical history.    Allergies   Allergen Reactions   • Aleve [Naproxen]    • Ambien [Zolpidem]    • Vancomycin        Past Surgical History:   Procedure Laterality Date   • GALLBLADDER SURGERY         Family History   Problem Relation  Age of Onset   • Stroke Mother    • Heart disease Father    • Hypertension Father    • Diabetes Father    • Stroke Father        Social History     Social History   • Marital status:      Social History Main Topics   • Smoking status: Never Smoker   • Smokeless tobacco: Never Used   • Alcohol use No   • Drug use: No           Objective   Physical Exam   Constitutional: He is oriented to person, place, and time. He appears well-developed and well-nourished.   HENT:   Head: Normocephalic and atraumatic.   Nose: Nose normal.   Mouth/Throat: Oropharynx is clear and moist.   Eyes: Conjunctivae and EOM are normal. Pupils are equal, round, and reactive to light. Right eye exhibits no discharge. Left eye exhibits no discharge. No scleral icterus.   Neck: Normal range of motion. Neck supple. No tracheal deviation present.   Cardiovascular: Regular rhythm and normal heart sounds.  Tachycardia present.    No murmur heard.  Pulmonary/Chest: Effort normal and breath sounds normal. No stridor. No respiratory distress. He has no wheezes. He has no rales.   Abdominal: Soft. Bowel sounds are normal. He exhibits no distension and no mass. There is no tenderness. There is no rebound and no guarding.   Musculoskeletal: He exhibits no edema.   Neurological: He is alert and oriented to person, place, and time. Coordination normal.   Skin: Skin is warm and dry. No rash noted. There is erythema.        Large area of warmth and erythema over the lower lumbar sacral and buttock region, including the scrotum.  Small 2 cm area of broken skin noted in the sacral area.   Psychiatric: He has a normal mood and affect. His behavior is normal. Thought content normal.   Nursing note and vitals reviewed.      Procedures         ED Course  ED Course          Labs Reviewed   COMPREHENSIVE METABOLIC PANEL - Abnormal; Notable for the following:        Result Value    Glucose 125 (*)     BUN 29 (*)     Globulin 3.6 (*)     Anion Gap 16.0 (*)      All other components within normal limits    Narrative:     The MDRD GFR formula is only valid for adults with stable renal function between ages 18 and 70.   LACTIC ACID, PLASMA - Abnormal; Notable for the following:     Lactate 2.7 (*)     All other components within normal limits   URINALYSIS W/ CULTURE IF INDICATED - Abnormal; Notable for the following:     Appearance, UA Cloudy (*)     Bilirubin, UA Small (1+) (*)     Blood, UA Large (3+) (*)     Protein, UA Trace (*)     Leuk Esterase, UA Large (3+) (*)     All other components within normal limits   CBC WITH AUTO DIFFERENTIAL - Abnormal; Notable for the following:     WBC 23.46 (*)     RDW-SD 45.4 (*)     Neutrophil % 89.5 (*)     Lymphocyte % 2.0 (*)     Immature Grans % 1.1 (*)     Neutrophils, Absolute 21.01 (*)     Lymphocytes, Absolute 0.46 (*)     Monocytes, Absolute 1.49 (*)     Immature Grans, Absolute 0.26 (*)     All other components within normal limits   URINALYSIS, MICROSCOPIC ONLY - Abnormal; Notable for the following:     RBC, UA 21-30 (*)     WBC, UA 13-20 (*)     Bacteria, UA 1+ (*)     Squamous Epithelial Cells, UA 3-5 (*)     All other components within normal limits   INFLUENZA ANTIGEN, RAPID - Normal   PHENYTOIN LEVEL, TOTAL - Normal   CK - Normal   MAGNESIUM - Normal   BLOOD CULTURE   BLOOD CULTURE   URINE CULTURE   RAINBOW DRAW    Narrative:     The following orders were created for panel order [place] Draw.  Procedure                               Abnormality         Status                     ---------                               -----------         ------                     Light Blue Top[[id number]]                                   Final result               Green Top (Gel)[[id number]]                                  In process                 Lavender Top[[id number]]                                     Final result               Gold Top - SST[[id number]]                                   Final result                 Please view  results for these tests on the individual orders.   LACTIC ACID REFLEX TIMER   CBC AND DIFFERENTIAL    Narrative:     The following orders were created for panel order CBC & Differential.  Procedure                               Abnormality         Status                     ---------                               -----------         ------                     CBC Auto Differential[[id number]]        Abnormal            Final result                 Please view results for these tests on the individual orders.   LIGHT BLUE TOP   LAVENDER TOP   GOLD TOP - SST   GREEN TOP       XR Chest 1 View   Final Result   No acute disease.      Electronically signed by:  Luke Bustos  3/5/2018 1:58 AM [facility]   Workstation: [id number]                    [facility]    Final diagnoses:   Fever, unspecified fever cause   Sepsis, due to unspecified organism   Cellulitis of buttock            Robert Anderson MD  03/05/18 0256

## 2018-03-06 LAB
ALBUMIN SERPL-MCNC: 3 G/DL (ref 3.4–4.8)
ALBUMIN/GLOB SERPL: 0.9 G/DL (ref 1.1–1.8)
ALP SERPL-CCNC: 70 U/L (ref 38–126)
ALT SERPL W P-5'-P-CCNC: 32 U/L (ref 21–72)
ANION GAP SERPL CALCULATED.3IONS-SCNC: 11 MMOL/L (ref 5–15)
AST SERPL-CCNC: 18 U/L (ref 17–59)
BASOPHILS # BLD AUTO: 0.02 10*3/MM3 (ref 0–0.2)
BASOPHILS NFR BLD AUTO: 0.1 % (ref 0–2)
BILIRUB SERPL-MCNC: 0.4 MG/DL (ref 0.2–1.3)
BUN BLD-MCNC: 19 MG/DL (ref 7–21)
BUN/CREAT SERPL: 17.8 (ref 7–25)
CALCIUM SPEC-SCNC: 7.6 MG/DL (ref 8.4–10.2)
CHLORIDE SERPL-SCNC: 107 MMOL/L (ref 95–110)
CO2 SERPL-SCNC: 23 MMOL/L (ref 22–31)
CREAT BLD-MCNC: 1.07 MG/DL (ref 0.7–1.3)
DEPRECATED RDW RBC AUTO: 46.5 FL (ref 35.1–43.9)
EOSINOPHIL # BLD AUTO: 0.01 10*3/MM3 (ref 0–0.7)
EOSINOPHIL NFR BLD AUTO: 0.1 % (ref 0–7)
ERYTHROCYTE [DISTWIDTH] IN BLOOD BY AUTOMATED COUNT: 13.6 % (ref 11.5–14.5)
GFR SERPL CREATININE-BSD FRML MDRD: 67 ML/MIN/1.73 (ref 42–98)
GLOBULIN UR ELPH-MCNC: 3.2 GM/DL (ref 2.3–3.5)
GLUCOSE BLD-MCNC: 123 MG/DL (ref 60–100)
HCT VFR BLD AUTO: 38.5 % (ref 39–49)
HGB BLD-MCNC: 12.7 G/DL (ref 13.7–17.3)
IMM GRANULOCYTES # BLD: 0.08 10*3/MM3 (ref 0–0.02)
IMM GRANULOCYTES NFR BLD: 0.5 % (ref 0–0.5)
LYMPHOCYTES # BLD AUTO: 0.7 10*3/MM3 (ref 0.6–4.2)
LYMPHOCYTES NFR BLD AUTO: 4.4 % (ref 10–50)
MCH RBC QN AUTO: 30.8 PG (ref 26.5–34)
MCHC RBC AUTO-ENTMCNC: 33 G/DL (ref 31.5–36.3)
MCV RBC AUTO: 93.2 FL (ref 80–98)
MONOCYTES # BLD AUTO: 0.95 10*3/MM3 (ref 0–0.9)
MONOCYTES NFR BLD AUTO: 6 % (ref 0–12)
NEUTROPHILS # BLD AUTO: 14.14 10*3/MM3 (ref 2–8.6)
NEUTROPHILS NFR BLD AUTO: 88.9 % (ref 37–80)
PLATELET # BLD AUTO: 172 10*3/MM3 (ref 150–450)
PMV BLD AUTO: 10 FL (ref 8–12)
POTASSIUM BLD-SCNC: 3.9 MMOL/L (ref 3.5–5.1)
PROT SERPL-MCNC: 6.2 G/DL (ref 6.3–8.6)
RBC # BLD AUTO: 4.13 10*6/MM3 (ref 4.37–5.74)
SODIUM BLD-SCNC: 141 MMOL/L (ref 137–145)
WBC NRBC COR # BLD: 15.9 10*3/MM3 (ref 3.2–9.8)

## 2018-03-06 PROCEDURE — 85025 COMPLETE CBC W/AUTO DIFF WBC: CPT | Performed by: FAMILY MEDICINE

## 2018-03-06 PROCEDURE — 25010000002 ONDANSETRON PER 1 MG: Performed by: FAMILY MEDICINE

## 2018-03-06 PROCEDURE — 80053 COMPREHEN METABOLIC PANEL: CPT | Performed by: FAMILY MEDICINE

## 2018-03-06 PROCEDURE — 25010000002 LEVOFLOXACIN PER 250 MG: Performed by: FAMILY MEDICINE

## 2018-03-06 PROCEDURE — 25010000002 PROMETHAZINE PER 50 MG: Performed by: HOSPITALIST

## 2018-03-06 PROCEDURE — 25010000002 PIPERACILLIN SOD-TAZOBACTAM PER 1 G: Performed by: INTERNAL MEDICINE

## 2018-03-06 RX ORDER — DOCUSATE SODIUM 100 MG/1
100 CAPSULE, LIQUID FILLED ORAL DAILY
Status: DISCONTINUED | OUTPATIENT
Start: 2018-03-06 | End: 2018-03-16 | Stop reason: HOSPADM

## 2018-03-06 RX ADMIN — HYDROCORTISONE: 1 CREAM TOPICAL at 08:48

## 2018-03-06 RX ADMIN — TAMSULOSIN HYDROCHLORIDE 0.4 MG: 0.4 CAPSULE ORAL at 20:23

## 2018-03-06 RX ADMIN — FINASTERIDE 5 MG: 5 TABLET, FILM COATED ORAL at 08:46

## 2018-03-06 RX ADMIN — BACLOFEN 10 MG: 10 TABLET ORAL at 20:23

## 2018-03-06 RX ADMIN — DOCUSATE SODIUM 100 MG: 100 CAPSULE, LIQUID FILLED ORAL at 22:13

## 2018-03-06 RX ADMIN — ONDANSETRON 4 MG: 2 INJECTION INTRAMUSCULAR; INTRAVENOUS at 15:20

## 2018-03-06 RX ADMIN — Medication: at 08:48

## 2018-03-06 RX ADMIN — RIVAROXABAN 20 MG: 10 TABLET, FILM COATED ORAL at 08:45

## 2018-03-06 RX ADMIN — BACLOFEN 10 MG: 10 TABLET ORAL at 08:45

## 2018-03-06 RX ADMIN — PIPERACILLIN SODIUM,TAZOBACTAM SODIUM 3.38 G: 3; .375 INJECTION, POWDER, FOR SOLUTION INTRAVENOUS at 01:54

## 2018-03-06 RX ADMIN — PHENYTOIN SODIUM 100 MG: 100 CAPSULE, EXTENDED RELEASE ORAL at 20:23

## 2018-03-06 RX ADMIN — HYDROCODONE BITARTRATE AND ACETAMINOPHEN 1 TABLET: 10; 325 TABLET ORAL at 20:23

## 2018-03-06 RX ADMIN — ASPIRIN 81 MG: 81 TABLET, COATED ORAL at 08:45

## 2018-03-06 RX ADMIN — ONDANSETRON 4 MG: 2 INJECTION INTRAMUSCULAR; INTRAVENOUS at 19:43

## 2018-03-06 RX ADMIN — PROMETHAZINE HYDROCHLORIDE 12.5 MG: 25 INJECTION INTRAMUSCULAR; INTRAVENOUS at 08:14

## 2018-03-06 RX ADMIN — HYDROCORTISONE: 1 CREAM TOPICAL at 20:24

## 2018-03-06 RX ADMIN — SODIUM CHLORIDE 125 ML/HR: 900 INJECTION, SOLUTION INTRAVENOUS at 17:50

## 2018-03-06 RX ADMIN — SODIUM CHLORIDE 125 ML/HR: 900 INJECTION, SOLUTION INTRAVENOUS at 08:45

## 2018-03-06 RX ADMIN — LEVOFLOXACIN 500 MG: 5 INJECTION, SOLUTION INTRAVENOUS at 11:22

## 2018-03-06 RX ADMIN — OXYCODONE HYDROCHLORIDE AND ACETAMINOPHEN 500 MG: 500 TABLET ORAL at 08:46

## 2018-03-06 RX ADMIN — PHENYTOIN SODIUM 100 MG: 100 CAPSULE, EXTENDED RELEASE ORAL at 08:45

## 2018-03-06 NOTE — PLAN OF CARE
Problem: Patient Care Overview (Adult)  Goal: Plan of Care Review  Outcome: Ongoing (interventions implemented as appropriate)   03/06/18 0417   Coping/Psychosocial Response Interventions   Plan Of Care Reviewed With patient   Patient Care Overview   Progress no change   Outcome Evaluation   Outcome Summary/Follow up Plan Pt experiencing nausea throughout shift. PRN phenergan and zofran provided as ordered. Vital signs stable.       Problem: Fall Risk (Adult)  Goal: Absence of Falls  Outcome: Ongoing (interventions implemented as appropriate)   03/06/18 0417   Fall Risk (Adult)   Absence of Falls achieves outcome       Problem: Skin Integrity Impairment, Risk/Actual (Adult)  Goal: Skin Integrity/Wound Healing  Outcome: Ongoing (interventions implemented as appropriate)   03/06/18 0417   Skin Integrity Impairment, Risk/Actual (Adult)   Skin Integrity/Wound Healing making progress toward outcome       Problem: Sepsis (Adult)  Goal: Signs and Symptoms of Listed Potential Problems Will be Absent or Manageable (Sepsis)  Outcome: Ongoing (interventions implemented as appropriate)   03/06/18 0417   Sepsis   Problems Assessed (Sepsis) all   Problems Present (Sepsis) situational response       Problem: Pressure Ulcer (Adult)  Goal: Signs and Symptoms of Listed Potential Problems Will be Absent or Manageable (Pressure Ulcer)  Outcome: Ongoing (interventions implemented as appropriate)   03/06/18 0417   Pressure Ulcer   Problems Assessed (Pressure Ulcer) all   Problems Present (Pressure Ulcer) wound progression/extension       Problem: Pain, Acute (Adult)  Goal: Acceptable Pain Control/Comfort Level  Outcome: Ongoing (interventions implemented as appropriate)   03/06/18 0417   Pain, Acute (Adult)   Acceptable Pain Control/Comfort Level making progress toward outcome

## 2018-03-06 NOTE — PLAN OF CARE
Problem: Patient Care Overview (Adult)  Goal: Plan of Care Review  Outcome: Ongoing (interventions implemented as appropriate)   03/05/18 1948   Coping/Psychosocial Response Interventions   Plan Of Care Reviewed With patient     Goal: Adult Individualization and Mutuality  Outcome: Ongoing (interventions implemented as appropriate)    Goal: Discharge Needs Assessment  Outcome: Ongoing (interventions implemented as appropriate)      Problem: Fall Risk (Adult)  Goal: Identify Related Risk Factors and Signs and Symptoms  Outcome: Ongoing (interventions implemented as appropriate)    Goal: Absence of Falls  Outcome: Ongoing (interventions implemented as appropriate)      Problem: Skin Integrity Impairment, Risk/Actual (Adult)  Goal: Identify Related Risk Factors and Signs and Symptoms  Outcome: Ongoing (interventions implemented as appropriate)    Goal: Skin Integrity/Wound Healing  Outcome: Ongoing (interventions implemented as appropriate)

## 2018-03-06 NOTE — PROGRESS NOTES
"   LOS: 1 day   Patient Care Team:  Valerie Marvin MD as PCP - General    Subjective     Subjective:  Symptoms:  (Patient complains of nausea. Family concerned about LLE redness. Urine clear and yellow in SP tube's gravity bag. ).    Diet:  He reports  nausea.        History taken from: patient chart family    Objective     Vital Signs  Temp:  [98.4 °F (36.9 °C)] 98.4 °F (36.9 °C)  Heart Rate:  [78-87] 78  Resp:  [18] 18  BP: (112-120)/(63-70) 120/70    Objective:  General Appearance:  In no acute distress.    Vital signs: (most recent): Blood pressure 120/70, pulse 78, temperature 98.4 °F (36.9 °C), temperature source Temporal Artery , resp. rate 18, height 170.2 cm (67\"), weight 81.6 kg (180 lb), SpO2 98 %.  Vital signs are normal.  No fever.    Output: Producing urine.    HEENT: Normal HEENT exam.    Lungs:  Normal respiratory rate and normal effort.  He is not in respiratory distress.  Breath sounds clear to auscultation.    Heart: Normal rate.  S1 normal and S2 normal.    Chest: Symmetric chest wall expansion.   Abdomen: Abdomen is soft and non-distended.  Bowel sounds are normal.     Extremities: Decreased range of motion.  There is deformity and dependent edema (LLE).    Pulses: Distal pulses are intact.    Neurological: Patient is alert and oriented to person, place and time.  GCS score is 15.    Pupils:  Pupils are equal, round, and reactive to light.    Skin:  Warm and dry.  (Erythema and increased warmth LLE)            Results Review:    Lab Results (last 24 hours)     Procedure Component Value Units Date/Time    CBC & Differential [[id number]] Collected:  03/05/18 0923    Specimen:  Blood Updated:  03/05/18 0938    Narrative:       The following orders were created for panel order CBC & Differential.  Procedure                               Abnormality         Status                     ---------                               -----------         ------                     CBC Auto " Differential[[id number]]        Abnormal            Final result                 Please view results for these tests on the individual orders.    CBC Auto Differential [[id number]]  (Abnormal) Collected:  03/05/18 0923    Specimen:  Blood Updated:  03/05/18 0938     WBC 22.76 (H) 10*3/mm3      RBC 4.51 10*6/mm3      Hemoglobin 14.5 g/dL      Hematocrit 43.1 %      MCV 95.6 fL      MCH 32.2 pg      MCHC 33.6 g/dL      RDW 13.8 %      RDW-SD 48.6 (H) fl      MPV 9.2 fL      Platelets 168 10*3/mm3      Neutrophil % 92.1 (H) %      Lymphocyte % 2.9 (L) %      Monocyte % 4.2 %      Eosinophil % 0.2 %      Basophil % 0.1 %      Immature Grans % 0.5 %      Neutrophils, Absolute 20.96 (H) 10*3/mm3      Lymphocytes, Absolute 0.65 10*3/mm3      Monocytes, Absolute 0.95 (H) 10*3/mm3      Eosinophils, Absolute 0.05 10*3/mm3      Basophils, Absolute 0.03 10*3/mm3      Immature Grans, Absolute 0.12 (H) 10*3/mm3      nRBC 0.0 /100 WBC     Comprehensive Metabolic Panel [[id number]]  (Abnormal) Collected:  03/05/18 0924    Specimen:  Blood Updated:  03/05/18 0954     Glucose 128 (H) mg/dL      BUN 24 (H) mg/dL      Creatinine 1.20 mg/dL      Sodium 143 mmol/L      Potassium 3.9 mmol/L      Chloride 106 mmol/L      CO2 24.0 mmol/L      Calcium 7.7 (L) mg/dL      Total Protein 6.2 (L) g/dL      Albumin 3.10 (L) g/dL      ALT (SGPT) 32 U/L      AST (SGOT) 23 U/L      Alkaline Phosphatase 89 U/L      Total Bilirubin 0.5 mg/dL      eGFR Non African Amer 59 mL/min/1.73      Globulin 3.1 gm/dL      A/G Ratio 1.0 (L) g/dL      BUN/Creatinine Ratio 20.0     Anion Gap 13.0 mmol/L     Narrative:       The MDRD GFR formula is only valid for adults with stable renal function between ages 18 and 70.    Blood Culture - Blood, [[id number]]  (Normal) Collected:  03/05/18 0136    Specimen:  Blood from Arm, Right Updated:  03/06/18 0203     Blood Culture No growth at 24 hours    Blood Culture - Blood, [[id number]]  (Normal) Collected:  03/05/18 0231     Specimen:  Blood from Wrist, Right Updated:  03/06/18 0246     Blood Culture No growth at 24 hours    CBC & Differential [[id number]] Collected:  03/06/18 0600    Specimen:  Blood Updated:  03/06/18 0619    Narrative:       The following orders were created for panel order CBC & Differential.  Procedure                               Abnormality         Status                     ---------                               -----------         ------                     CBC Auto Differential[[id number]]        Abnormal            Final result                 Please view results for these tests on the individual orders.    CBC Auto Differential [[id number]]  (Abnormal) Collected:  03/06/18 0600    Specimen:  Blood Updated:  03/06/18 0619     WBC 15.90 (H) 10*3/mm3      RBC 4.13 (L) 10*6/mm3      Hemoglobin 12.7 (L) g/dL      Hematocrit 38.5 (L) %      MCV 93.2 fL      MCH 30.8 pg      MCHC 33.0 g/dL      RDW 13.6 %      RDW-SD 46.5 (H) fl      MPV 10.0 fL      Platelets 172 10*3/mm3      Neutrophil % 88.9 (H) %      Lymphocyte % 4.4 (L) %      Monocyte % 6.0 %      Eosinophil % 0.1 %      Basophil % 0.1 %      Immature Grans % 0.5 %      Neutrophils, Absolute 14.14 (H) 10*3/mm3      Lymphocytes, Absolute 0.70 10*3/mm3      Monocytes, Absolute 0.95 (H) 10*3/mm3      Eosinophils, Absolute 0.01 10*3/mm3      Basophils, Absolute 0.02 10*3/mm3      Immature Grans, Absolute 0.08 (H) 10*3/mm3     Urine Culture - Urine, Urine, Clean Catch [[id number]]  (Abnormal) Collected:  03/05/18 0154    Specimen:  Urine from Urine, Catheter Updated:  03/06/18 0620     Urine Culture --      80,000-90,000 CFU/mL Gram Negative Bacilli (A)    Urine Culture - Urine, Urine, Clean Catch [[id number]]  (Normal) Collected:  03/05/18 1549    Specimen:  Urine from Urine, Clean Catch Updated:  03/06/18 0628     Urine Culture Culture in progress    Comprehensive Metabolic Panel [[id number]]  (Abnormal) Collected:  03/06/18 0600    Specimen:  Blood Updated:   03/06/18 0632     Glucose 123 (H) mg/dL      BUN 19 mg/dL      Creatinine 1.07 mg/dL      Sodium 141 mmol/L      Potassium 3.9 mmol/L      Chloride 107 mmol/L      CO2 23.0 mmol/L      Calcium 7.6 (L) mg/dL      Total Protein 6.2 (L) g/dL      Albumin 3.00 (L) g/dL      ALT (SGPT) 32 U/L      AST (SGOT) 18 U/L      Alkaline Phosphatase 70 U/L      Total Bilirubin 0.4 mg/dL      eGFR Non African Amer 67 mL/min/1.73      Globulin 3.2 gm/dL      A/G Ratio 0.9 (L) g/dL      BUN/Creatinine Ratio 17.8     Anion Gap 11.0 mmol/L     Narrative:       The MDRD GFR formula is only valid for adults with stable renal function between ages 18 and 70.         Imaging Results (last 24 hours)     ** No results found for the last 24 hours. **           I reviewed the patient's new clinical results.  I reviewed the patient's new imaging results and agree with the interpretation.  I reviewed the patient's other test results and agree with the interpretation      Assessment/Plan     Principal Problem:    Sepsis  Active Problems:    Sacral decubitus ulcer    UTI (urinary tract infection) due to urinary indwelling catheter    Hypotension    Atrial fibrillation      Assessment & Plan    UTI, acute, causing sepsis related to chronic SP tube last changed 4 weeks ago.  -Urine culture 80,000-90,000 CFU/mL Gram Negative Bacilli   -WBC 23.46-->22.76-->15.9   -lactic acid 2.7-->2.4  -Zosyn and Levaquin, IV fluids  -TMax 98.4, vitals noted  -Estimated Creatinine Clearance: 61 mL/min (by C-G formula based on Cr of 1.07).     Neurogenic bladder  -chronic SP tube changed 3/5/18 and urine recollected for culture    JEAN Robbins  03/06/18  8:57 AM

## 2018-03-07 ENCOUNTER — APPOINTMENT (OUTPATIENT)
Dept: GENERAL RADIOLOGY | Facility: HOSPITAL | Age: 73
End: 2018-03-07

## 2018-03-07 ENCOUNTER — APPOINTMENT (OUTPATIENT)
Dept: CARDIOLOGY | Facility: HOSPITAL | Age: 73
End: 2018-03-07
Attending: INTERNAL MEDICINE

## 2018-03-07 ENCOUNTER — APPOINTMENT (OUTPATIENT)
Dept: INTERVENTIONAL RADIOLOGY/VASCULAR | Facility: HOSPITAL | Age: 73
End: 2018-03-07

## 2018-03-07 ENCOUNTER — APPOINTMENT (OUTPATIENT)
Dept: ULTRASOUND IMAGING | Facility: HOSPITAL | Age: 73
End: 2018-03-07

## 2018-03-07 LAB
ALBUMIN SERPL-MCNC: 3.3 G/DL (ref 3.4–4.8)
ALBUMIN/GLOB SERPL: 1 G/DL (ref 1.1–1.8)
ALP SERPL-CCNC: 79 U/L (ref 38–126)
ALT SERPL W P-5'-P-CCNC: 28 U/L (ref 21–72)
ANION GAP SERPL CALCULATED.3IONS-SCNC: 14 MMOL/L (ref 5–15)
AST SERPL-CCNC: 17 U/L (ref 17–59)
BACTERIA SPEC AEROBE CULT: ABNORMAL
BACTERIA SPEC AEROBE CULT: ABNORMAL
BACTERIA SPEC AEROBE CULT: NORMAL
BACTERIA SPEC AEROBE CULT: NORMAL
BASOPHILS # BLD AUTO: 0.01 10*3/MM3 (ref 0–0.2)
BASOPHILS NFR BLD AUTO: 0.1 % (ref 0–2)
BH CV ECHO MEAS - ACS: 2 CM
BH CV ECHO MEAS - AO MAX PG (FULL): 1.6 MMHG
BH CV ECHO MEAS - AO MAX PG: 5 MMHG
BH CV ECHO MEAS - AO MEAN PG (FULL): 0 MMHG
BH CV ECHO MEAS - AO MEAN PG: 2 MMHG
BH CV ECHO MEAS - AO ROOT AREA (BSA CORRECTED): 1.9
BH CV ECHO MEAS - AO ROOT AREA: 10.2 CM^2
BH CV ECHO MEAS - AO ROOT DIAM: 3.6 CM
BH CV ECHO MEAS - AO V2 MAX: 112 CM/SEC
BH CV ECHO MEAS - AO V2 MEAN: 69.5 CM/SEC
BH CV ECHO MEAS - AO V2 VTI: 13.2 CM
BH CV ECHO MEAS - AVA(I,A): 2.9 CM^2
BH CV ECHO MEAS - AVA(I,D): 2.9 CM^2
BH CV ECHO MEAS - AVA(V,A): 2.3 CM^2
BH CV ECHO MEAS - AVA(V,D): 2.3 CM^2
BH CV ECHO MEAS - BSA(HAYCOCK): 2 M^2
BH CV ECHO MEAS - BSA: 1.9 M^2
BH CV ECHO MEAS - BZI_BMI: 28.2 KILOGRAMS/M^2
BH CV ECHO MEAS - BZI_METRIC_HEIGHT: 170.2 CM
BH CV ECHO MEAS - BZI_METRIC_WEIGHT: 81.6 KG
BH CV ECHO MEAS - EDV(CUBED): 74.1 ML
BH CV ECHO MEAS - EDV(TEICH): 78.6 ML
BH CV ECHO MEAS - EF(CUBED): 51.5 %
BH CV ECHO MEAS - EF(MOD-SP4): 60 %
BH CV ECHO MEAS - EF(TEICH): 43.8 %
BH CV ECHO MEAS - ESV(CUBED): 35.9 ML
BH CV ECHO MEAS - ESV(TEICH): 44.1 ML
BH CV ECHO MEAS - FS: 21.4 %
BH CV ECHO MEAS - IVS/LVPW: 1
BH CV ECHO MEAS - IVSD: 1.1 CM
BH CV ECHO MEAS - LA DIMENSION: 4 CM
BH CV ECHO MEAS - LA/AO: 1.1
BH CV ECHO MEAS - LV MASS(C)D: 157.1 GRAMS
BH CV ECHO MEAS - LV MASS(C)DI: 81.2 GRAMS/M^2
BH CV ECHO MEAS - LV MAX PG: 3.4 MMHG
BH CV ECHO MEAS - LV MEAN PG: 2 MMHG
BH CV ECHO MEAS - LV V1 MAX: 92.5 CM/SEC
BH CV ECHO MEAS - LV V1 MEAN: 59.6 CM/SEC
BH CV ECHO MEAS - LV V1 VTI: 13.4 CM
BH CV ECHO MEAS - LVIDD: 4.2 CM
BH CV ECHO MEAS - LVIDS: 3.3 CM
BH CV ECHO MEAS - LVOT AREA (M): 2.8 CM^2
BH CV ECHO MEAS - LVOT AREA: 2.8 CM^2
BH CV ECHO MEAS - LVOT DIAM: 1.9 CM
BH CV ECHO MEAS - LVPWD: 1.1 CM
BH CV ECHO MEAS - MR MAX PG: 78.1 MMHG
BH CV ECHO MEAS - MR MAX VEL: 442 CM/SEC
BH CV ECHO MEAS - MV A MAX VEL: 134 CM/SEC
BH CV ECHO MEAS - MV DEC SLOPE: 828 CM/SEC^2
BH CV ECHO MEAS - MV E MAX VEL: 87.9 CM/SEC
BH CV ECHO MEAS - MV E/A: 0.66
BH CV ECHO MEAS - MV MAX PG: 14.9 MMHG
BH CV ECHO MEAS - MV MEAN PG: 6 MMHG
BH CV ECHO MEAS - MV P1/2T MAX VEL: 117 CM/SEC
BH CV ECHO MEAS - MV P1/2T: 41.4 MSEC
BH CV ECHO MEAS - MV V2 MAX: 193 CM/SEC
BH CV ECHO MEAS - MV V2 MEAN: 113 CM/SEC
BH CV ECHO MEAS - MV V2 VTI: 19.6 CM
BH CV ECHO MEAS - MVA P1/2T LCG: 1.9 CM^2
BH CV ECHO MEAS - MVA(P1/2T): 5.3 CM^2
BH CV ECHO MEAS - MVA(VTI): 1.9 CM^2
BH CV ECHO MEAS - PA MAX PG: 4 MMHG
BH CV ECHO MEAS - PA V2 MAX: 100 CM/SEC
BH CV ECHO MEAS - RAP SYSTOLE: 10 MMHG
BH CV ECHO MEAS - RVDD: 1.9 CM
BH CV ECHO MEAS - RVSP: 50.4 MMHG
BH CV ECHO MEAS - SI(AO): 69.5 ML/M^2
BH CV ECHO MEAS - SI(CUBED): 19.7 ML/M^2
BH CV ECHO MEAS - SI(LVOT): 19.6 ML/M^2
BH CV ECHO MEAS - SI(TEICH): 17.8 ML/M^2
BH CV ECHO MEAS - SV(AO): 134.4 ML
BH CV ECHO MEAS - SV(CUBED): 38.2 ML
BH CV ECHO MEAS - SV(LVOT): 38 ML
BH CV ECHO MEAS - SV(TEICH): 34.4 ML
BH CV ECHO MEAS - TR MAX VEL: 318 CM/SEC
BILIRUB SERPL-MCNC: 0.3 MG/DL (ref 0.2–1.3)
BUN BLD-MCNC: 14 MG/DL (ref 7–21)
BUN/CREAT SERPL: 14.3 (ref 7–25)
CALCIUM SPEC-SCNC: 7.9 MG/DL (ref 8.4–10.2)
CHLORIDE SERPL-SCNC: 109 MMOL/L (ref 95–110)
CO2 SERPL-SCNC: 19 MMOL/L (ref 22–31)
CREAT BLD-MCNC: 0.98 MG/DL (ref 0.7–1.3)
DEPRECATED RDW RBC AUTO: 50.5 FL (ref 35.1–43.9)
EOSINOPHIL # BLD AUTO: 0.02 10*3/MM3 (ref 0–0.7)
EOSINOPHIL NFR BLD AUTO: 0.1 % (ref 0–7)
ERYTHROCYTE [DISTWIDTH] IN BLOOD BY AUTOMATED COUNT: 14 % (ref 11.5–14.5)
GFR SERPL CREATININE-BSD FRML MDRD: 75 ML/MIN/1.73 (ref 42–98)
GLOBULIN UR ELPH-MCNC: 3.4 GM/DL (ref 2.3–3.5)
GLUCOSE BLD-MCNC: 126 MG/DL (ref 60–100)
HCT VFR BLD AUTO: 45.7 % (ref 39–49)
HGB BLD-MCNC: 14.9 G/DL (ref 13.7–17.3)
IMM GRANULOCYTES # BLD: 0.07 10*3/MM3 (ref 0–0.02)
IMM GRANULOCYTES NFR BLD: 0.4 % (ref 0–0.5)
LV EF 2D ECHO EST: 55 %
LYMPHOCYTES # BLD AUTO: 0.74 10*3/MM3 (ref 0.6–4.2)
LYMPHOCYTES NFR BLD AUTO: 4.5 % (ref 10–50)
MAXIMAL PREDICTED HEART RATE: 145 BPM
MCH RBC QN AUTO: 32.1 PG (ref 26.5–34)
MCHC RBC AUTO-ENTMCNC: 32.6 G/DL (ref 31.5–36.3)
MCV RBC AUTO: 98.5 FL (ref 80–98)
MONOCYTES # BLD AUTO: 1.13 10*3/MM3 (ref 0–0.9)
MONOCYTES NFR BLD AUTO: 6.9 % (ref 0–12)
NEUTROPHILS # BLD AUTO: 14.33 10*3/MM3 (ref 2–8.6)
NEUTROPHILS NFR BLD AUTO: 88 % (ref 37–80)
NRBC BLD MANUAL-RTO: 0 /100 WBC (ref 0–0)
PLATELET # BLD AUTO: 152 10*3/MM3 (ref 150–450)
PMV BLD AUTO: 10.5 FL (ref 8–12)
POTASSIUM BLD-SCNC: 4.4 MMOL/L (ref 3.5–5.1)
PROT SERPL-MCNC: 6.7 G/DL (ref 6.3–8.6)
RBC # BLD AUTO: 4.64 10*6/MM3 (ref 4.37–5.74)
SODIUM BLD-SCNC: 142 MMOL/L (ref 137–145)
STRESS TARGET HR: 123 BPM
WBC NRBC COR # BLD: 16.3 10*3/MM3 (ref 3.2–9.8)

## 2018-03-07 PROCEDURE — G8989 SELF CARE D/C STATUS: HCPCS

## 2018-03-07 PROCEDURE — 25010000002 AMIODARONE IN DEXTROSE 5% 360-4.14 MG/200ML-% SOLUTION: Performed by: INTERNAL MEDICINE

## 2018-03-07 PROCEDURE — G8987 SELF CARE CURRENT STATUS: HCPCS

## 2018-03-07 PROCEDURE — 93306 TTE W/DOPPLER COMPLETE: CPT

## 2018-03-07 PROCEDURE — C1751 CATH, INF, PER/CENT/MIDLINE: HCPCS

## 2018-03-07 PROCEDURE — 93005 ELECTROCARDIOGRAM TRACING: CPT | Performed by: FAMILY MEDICINE

## 2018-03-07 PROCEDURE — G8988 SELF CARE GOAL STATUS: HCPCS

## 2018-03-07 PROCEDURE — 25010000002 LEVOFLOXACIN PER 250 MG: Performed by: INTERNAL MEDICINE

## 2018-03-07 PROCEDURE — 85025 COMPLETE CBC W/AUTO DIFF WBC: CPT | Performed by: FAMILY MEDICINE

## 2018-03-07 PROCEDURE — 93010 ELECTROCARDIOGRAM REPORT: CPT | Performed by: INTERNAL MEDICINE

## 2018-03-07 PROCEDURE — 25010000002 ONDANSETRON PER 1 MG: Performed by: FAMILY MEDICINE

## 2018-03-07 PROCEDURE — 94799 UNLISTED PULMONARY SVC/PX: CPT

## 2018-03-07 PROCEDURE — 25010000002 METHYLPREDNISOLONE PER 125 MG: Performed by: HOSPITALIST

## 2018-03-07 PROCEDURE — 82803 BLOOD GASES ANY COMBINATION: CPT | Performed by: HOSPITALIST

## 2018-03-07 PROCEDURE — B548ZZA ULTRASONOGRAPHY OF SUPERIOR VENA CAVA, GUIDANCE: ICD-10-PCS | Performed by: RADIOLOGY

## 2018-03-07 PROCEDURE — 93005 ELECTROCARDIOGRAM TRACING: CPT | Performed by: INTERNAL MEDICINE

## 2018-03-07 PROCEDURE — 94760 N-INVAS EAR/PLS OXIMETRY 1: CPT

## 2018-03-07 PROCEDURE — 25010000002 PIPERACILLIN SOD-TAZOBACTAM PER 1 G: Performed by: INTERNAL MEDICINE

## 2018-03-07 PROCEDURE — 76937 US GUIDE VASCULAR ACCESS: CPT

## 2018-03-07 PROCEDURE — 93306 TTE W/DOPPLER COMPLETE: CPT | Performed by: INTERNAL MEDICINE

## 2018-03-07 PROCEDURE — 97165 OT EVAL LOW COMPLEX 30 MIN: CPT

## 2018-03-07 PROCEDURE — 25010000002 PROMETHAZINE PER 50 MG: Performed by: HOSPITALIST

## 2018-03-07 PROCEDURE — 99233 SBSQ HOSP IP/OBS HIGH 50: CPT | Performed by: INTERNAL MEDICINE

## 2018-03-07 PROCEDURE — 02HV33Z INSERTION OF INFUSION DEVICE INTO SUPERIOR VENA CAVA, PERCUTANEOUS APPROACH: ICD-10-PCS | Performed by: RADIOLOGY

## 2018-03-07 PROCEDURE — 80053 COMPREHEN METABOLIC PANEL: CPT | Performed by: FAMILY MEDICINE

## 2018-03-07 RX ORDER — SODIUM CHLORIDE 0.9 % (FLUSH) 0.9 %
10 SYRINGE (ML) INJECTION AS NEEDED
Status: DISCONTINUED | OUTPATIENT
Start: 2018-03-07 | End: 2018-03-16 | Stop reason: HOSPADM

## 2018-03-07 RX ORDER — DILTIAZEM HYDROCHLORIDE 240 MG/1
240 CAPSULE, COATED, EXTENDED RELEASE ORAL
Status: DISCONTINUED | OUTPATIENT
Start: 2018-03-07 | End: 2018-03-15

## 2018-03-07 RX ORDER — METHYLPREDNISOLONE SODIUM SUCCINATE 125 MG/2ML
60 INJECTION, POWDER, LYOPHILIZED, FOR SOLUTION INTRAMUSCULAR; INTRAVENOUS EVERY 8 HOURS
Status: DISCONTINUED | OUTPATIENT
Start: 2018-03-07 | End: 2018-03-08

## 2018-03-07 RX ORDER — ECHINACEA PURPUREA EXTRACT 125 MG
2 TABLET ORAL AS NEEDED
Status: DISCONTINUED | OUTPATIENT
Start: 2018-03-07 | End: 2018-03-16 | Stop reason: HOSPADM

## 2018-03-07 RX ORDER — DILTIAZEM HYDROCHLORIDE 5 MG/ML
20 INJECTION INTRAVENOUS ONCE
Status: COMPLETED | OUTPATIENT
Start: 2018-03-07 | End: 2018-03-07

## 2018-03-07 RX ORDER — TEMAZEPAM 15 MG/1
15 CAPSULE ORAL NIGHTLY PRN
Status: DISCONTINUED | OUTPATIENT
Start: 2018-03-07 | End: 2018-03-16 | Stop reason: HOSPADM

## 2018-03-07 RX ORDER — LEVOFLOXACIN 5 MG/ML
750 INJECTION, SOLUTION INTRAVENOUS EVERY 24 HOURS
Status: DISCONTINUED | OUTPATIENT
Start: 2018-03-07 | End: 2018-03-07 | Stop reason: ALTCHOICE

## 2018-03-07 RX ORDER — DILTIAZEM HYDROCHLORIDE 60 MG/1
60 TABLET, FILM COATED ORAL 4 TIMES DAILY
Status: DISCONTINUED | OUTPATIENT
Start: 2018-03-07 | End: 2018-03-07

## 2018-03-07 RX ORDER — SODIUM CHLORIDE 0.9 % (FLUSH) 0.9 %
10 SYRINGE (ML) INJECTION AS NEEDED
Status: DISCONTINUED | OUTPATIENT
Start: 2018-03-07 | End: 2018-03-10

## 2018-03-07 RX ORDER — SODIUM CHLORIDE 0.9 % (FLUSH) 0.9 %
10 SYRINGE (ML) INJECTION EVERY 12 HOURS SCHEDULED
Status: DISCONTINUED | OUTPATIENT
Start: 2018-03-07 | End: 2018-03-16 | Stop reason: HOSPADM

## 2018-03-07 RX ORDER — NEBIVOLOL 5 MG/1
20 TABLET ORAL DAILY
Status: DISCONTINUED | OUTPATIENT
Start: 2018-03-07 | End: 2018-03-16 | Stop reason: HOSPADM

## 2018-03-07 RX ADMIN — DILTIAZEM HYDROCHLORIDE 20 MG: 5 INJECTION INTRAVENOUS at 03:12

## 2018-03-07 RX ADMIN — BACLOFEN 10 MG: 10 TABLET ORAL at 09:52

## 2018-03-07 RX ADMIN — PHENYTOIN SODIUM 100 MG: 100 CAPSULE, EXTENDED RELEASE ORAL at 09:52

## 2018-03-07 RX ADMIN — NEBIVOLOL HYDROCHLORIDE 20 MG: 5 TABLET ORAL at 10:00

## 2018-03-07 RX ADMIN — HYDROCORTISONE: 1 CREAM TOPICAL at 20:57

## 2018-03-07 RX ADMIN — TAMSULOSIN HYDROCHLORIDE 0.4 MG: 0.4 CAPSULE ORAL at 20:57

## 2018-03-07 RX ADMIN — AMIODARONE HYDROCHLORIDE 1 MG/MIN: 1.8 INJECTION, SOLUTION INTRAVENOUS at 14:04

## 2018-03-07 RX ADMIN — Medication 10 ML: at 14:15

## 2018-03-07 RX ADMIN — DILTIAZEM HYDROCHLORIDE 60 MG: 60 TABLET, FILM COATED ORAL at 11:33

## 2018-03-07 RX ADMIN — FINASTERIDE 5 MG: 5 TABLET, FILM COATED ORAL at 09:52

## 2018-03-07 RX ADMIN — DILTIAZEM HYDROCHLORIDE 240 MG: 240 CAPSULE, COATED, EXTENDED RELEASE ORAL at 17:52

## 2018-03-07 RX ADMIN — TAZOBACTAM SODIUM AND PIPERACILLIN SODIUM 3.38 G: 375; 3 INJECTION, SOLUTION INTRAVENOUS at 19:46

## 2018-03-07 RX ADMIN — Medication 10 ML: at 20:57

## 2018-03-07 RX ADMIN — Medication 10 ML: at 16:15

## 2018-03-07 RX ADMIN — ASPIRIN 81 MG: 81 TABLET, COATED ORAL at 09:52

## 2018-03-07 RX ADMIN — ONDANSETRON 4 MG: 2 INJECTION INTRAMUSCULAR; INTRAVENOUS at 01:34

## 2018-03-07 RX ADMIN — AMIODARONE HYDROCHLORIDE 1 MG/MIN: 1.8 INJECTION, SOLUTION INTRAVENOUS at 19:46

## 2018-03-07 RX ADMIN — METHYLPREDNISOLONE SODIUM SUCCINATE 60 MG: 125 INJECTION, POWDER, FOR SOLUTION INTRAMUSCULAR; INTRAVENOUS at 23:29

## 2018-03-07 RX ADMIN — Medication 10 ML: at 10:22

## 2018-03-07 RX ADMIN — Medication 10 ML: at 14:04

## 2018-03-07 RX ADMIN — PROMETHAZINE HYDROCHLORIDE 12.5 MG: 25 INJECTION INTRAMUSCULAR; INTRAVENOUS at 10:21

## 2018-03-07 RX ADMIN — DILTIAZEM HYDROCHLORIDE 5 MG/HR: 5 INJECTION INTRAVENOUS at 04:45

## 2018-03-07 RX ADMIN — RIVAROXABAN 20 MG: 10 TABLET, FILM COATED ORAL at 09:52

## 2018-03-07 RX ADMIN — PROMETHAZINE HYDROCHLORIDE 12.5 MG: 25 INJECTION INTRAMUSCULAR; INTRAVENOUS at 16:13

## 2018-03-07 RX ADMIN — TAZOBACTAM SODIUM AND PIPERACILLIN SODIUM 3.38 G: 375; 3 INJECTION, SOLUTION INTRAVENOUS at 13:59

## 2018-03-07 RX ADMIN — PROMETHAZINE HYDROCHLORIDE 12.5 MG: 25 INJECTION INTRAMUSCULAR; INTRAVENOUS at 02:33

## 2018-03-07 RX ADMIN — TEMAZEPAM 15 MG: 15 CAPSULE ORAL at 20:57

## 2018-03-07 RX ADMIN — OXYCODONE HYDROCHLORIDE AND ACETAMINOPHEN 500 MG: 500 TABLET ORAL at 09:52

## 2018-03-07 RX ADMIN — PHENYTOIN SODIUM 100 MG: 100 CAPSULE, EXTENDED RELEASE ORAL at 20:57

## 2018-03-07 RX ADMIN — LEVOFLOXACIN 750 MG: 5 INJECTION, SOLUTION INTRAVENOUS at 10:02

## 2018-03-07 RX ADMIN — BACLOFEN 10 MG: 10 TABLET ORAL at 20:57

## 2018-03-07 RX ADMIN — SODIUM CHLORIDE 125 ML/HR: 900 INJECTION, SOLUTION INTRAVENOUS at 01:33

## 2018-03-07 RX ADMIN — SODIUM CHLORIDE 125 ML/HR: 900 INJECTION, SOLUTION INTRAVENOUS at 04:27

## 2018-03-07 RX ADMIN — Medication: at 09:54

## 2018-03-07 RX ADMIN — SALINE NASAL SPRAY 2 SPRAY: 1.5 SOLUTION NASAL at 17:50

## 2018-03-07 RX ADMIN — HYDROCORTISONE: 1 CREAM TOPICAL at 09:54

## 2018-03-07 NOTE — SIGNIFICANT NOTE
Called by nursing secondary to change in patient's condition.  On arrival to the room, patient was short of breath and diaphoretic.  He was markedly tachycardic by pulse.  Blood pressure was difficult to obtain, but systolic was in the 150's when it was obtained.  EKG showed atrial fibrillation with rapid ventricular rate in the 200's.  He was given a cardizem bolus of 20mg and transferred to CCU for a cardizem gtt.          This document has been electronically signed by Jordi Marie MD on March 7, 2018 4:11 AM

## 2018-03-07 NOTE — PLAN OF CARE
Problem: Patient Care Overview (Adult)  Goal: Plan of Care Review  Outcome: Ongoing (interventions implemented as appropriate)   03/07/18 0300   Coping/Psychosocial Response Interventions   Plan Of Care Reviewed With patient   Patient Care Overview   Progress declining   Outcome Evaluation   Outcome Summary/Follow up Plan Pt is being transfered to the unit.

## 2018-03-07 NOTE — PROGRESS NOTES
TWO PATIENT IDENTIFIERS WERE USED. CONSENT WAS SIGNED PER PATIENT EDUCATION MATERIAL WAS GIVEN TO PATIENT AND / OR FAMILY. THE PATIENT WAS DRAPED WITH FULL BODY DRAPE AND PATIENT'S RIGHT ARM WAS PREPPED WITH CHLORAPREP.  ULTRASOUND WAS USED TO LOCALIZE THERIGHT BASILIC  VEIN. SUBCUTANEOUS TISSUE AT THE CATHETER SITE WAS INFILTRATED WITH 2% LIDOCAINE. UNDER ULTRASOUND GUIDANCE, THE VEIN WAS ACCESSED WITH A 21GAUGE  NEEDLE. AN 0.018 WIRE WAS THEN THREADED THROUGH THE NEEDLE INTO THE CENTRAL VENOUS SYSTEM. THE 21GAUGE  NEEDLE WAS REMOVED AND A 6 Iranian PEEL AWAY SHEATH WAS PLACED OVER THE WIRE. THE PICC LINE CATHETER WAS CUT AT 36 CM. THE PICC LINE CATHETER WAS THEN PLACED OVER THE WIRE INTO THE VEIN, THE SHEATH WAS PEELED AWAY,WIRE WAS REMOVED. CATHETER WAS FLUSHED WITH NORMAL SALINE AND TIPS APPLIED. BIOPATCH PLACED. CATHETER SECURED WITH STATLOCK AND TEGADERM. PATIENT TOLERATED PROCEDURE WELL. THIS WAS DONE IN THE   ICU      IMPRESSION: SUCCESSFUL PLACEMENT OF TRIPLE LUMEN SOLO PICC        Patricia Reilly  3/7/2018  1:36 PM

## 2018-03-07 NOTE — PROGRESS NOTES
[facility] Medicine Services  INPATIENT PROGRESS NOTE     LOS: 2 days   Patient Care Team:  Valerie Marvin MD as PCP - General    Chief Complaint:  Sepsis      Subjective     Interval History:   Patient is in for follow-up today.  He was transferred to the ICU yesterday due to uncontrolled atrial fibrillation.  He has since been on Cardizem infusion and now in controlled ventricular response.  Patient is mostly bedbound due to history of stroke but does use a wheelchair as an assistive means of ambulation.  He is on supplemental oxygen of 2 L nasal prongs and maintaining saturation of greater than 94%.  He is tolerating prescribed diet and no new complaints or changes been reported.    Patient Complaints: As above    History taken from: Patient and wife    Review of Systems:    Review of Systems   Constitutional: Positive for activity change and fatigue. Negative for appetite change, chills, diaphoresis and fever.   HENT: Negative for trouble swallowing and voice change.    Eyes: Negative for photophobia and visual disturbance.   Respiratory: Negative for cough, choking, chest tightness, shortness of breath, wheezing and stridor.    Cardiovascular: Negative for chest pain, palpitations and leg swelling.   Gastrointestinal: Negative for abdominal distention, abdominal pain, blood in stool, constipation, diarrhea, nausea and vomiting.   Endocrine: Negative for cold intolerance, heat intolerance, polydipsia, polyphagia and polyuria.   Genitourinary: Negative for decreased urine volume, difficulty urinating, dysuria, enuresis, flank pain, frequency, hematuria and urgency.   Musculoskeletal: Positive for gait problem. Negative for arthralgias, myalgias, neck pain and neck stiffness.   Skin: Positive for color change and wound. Negative for pallor and rash.   Neurological: Positive for weakness. Negative for dizziness, tremors, seizures, syncope, facial asymmetry, speech  difficulty, light-headedness, numbness and headaches.   Hematological: Does not bruise/bleed easily.   Psychiatric/Behavioral: Negative for agitation, behavioral problems and confusion.         Objective     Vital Signs  Temp:  [97 °F (36.1 °C)-99.1 °F (37.3 °C)] 98.6 °F (37 °C)  Heart Rate:  [] 80  Resp:  [18-26] 24  BP: (112-150)/(57-90) 133/86    Physical Exam:   Physical Exam   Constitutional: He is oriented to person, place, and time. He appears well-developed and well-nourished. No distress.   HENT:   Head: Normocephalic and atraumatic.   Eyes: EOM are normal. Pupils are equal, round, and reactive to light. No scleral icterus.   Neck: Normal range of motion. Neck supple. No JVD present. No thyromegaly present.   Cardiovascular: Normal rate and normal heart sounds.  An irregularly irregular rhythm present. Exam reveals no gallop and no friction rub.    No murmur heard.  Pulmonary/Chest: Effort normal. He has no wheezes. He has rhonchi. He has no rales. He exhibits no tenderness.   Abdominal: Soft. Bowel sounds are normal. He exhibits no distension and no mass. There is no tenderness. There is no rebound and no guarding.   There is a suprapubic catheter which was changed 2 days ago.   Musculoskeletal: He exhibits deformity. He exhibits no edema or tenderness.   Neurological: He is alert and oriented to person, place, and time. No cranial nerve deficit. He exhibits normal muscle tone.   Skin: Skin is warm and dry. No rash noted. He is not diaphoretic. No erythema. No pallor.   Cellulitis left lower extremity and buttock persists but improving.   Psychiatric: He has a normal mood and affect. His behavior is normal. Judgment and thought content normal.   Nursing note and vitals reviewed.         Results Review:       Results from last 7 days  Lab Units 03/07/18  0751 03/06/18  0600 03/05/18  0924 03/05/18  0136   SODIUM mmol/L 142 141 143 142   POTASSIUM mmol/L 4.4 3.9 3.9 3.7   CHLORIDE mmol/L 109 107 106  100   CO2 mmol/L 19.0* 23.0 24.0 26.0   BUN mg/dL 14 19 24* 29*   CREATININE mg/dL 0.98 1.07 1.20 1.30   GLUCOSE mg/dL 126* 123* 128* 125*   CALCIUM mg/dL 7.9* 7.6* 7.7* 8.6   BILIRUBIN mg/dL 0.3 0.4 0.5 0.4   ALK PHOS U/L 79 70 89 122   ALT (SGPT) U/L 28 32 32 31   AST (SGOT) U/L 17 18 23 27         Results from last 7 days  Lab Units 03/05/18  0136   MAGNESIUM mg/dL 1.8         Results from last 7 days  Lab Units 03/07/18  0547 03/06/18  0600 03/05/18  0923 03/05/18  0136   WBC 10*3/mm3 16.30* 15.90* 22.76* 23.46*   HEMOGLOBIN g/dL 14.9 12.7* 14.5 15.6   HEMATOCRIT % 45.7 38.5* 43.1 45.6   PLATELETS 10*3/mm3 152 172 168 197       Lab Results   Component Value Date    CKTOTAL 60 03/05/2018    CKMB 2.6 02/01/2015    TROPONINI 0.015 02/01/2015       CO2   Date Value Ref Range Status   03/07/2018 19.0 (L) 22.0 - 31.0 mmol/L Final              Imaging Results (last 7 days)     Procedure Component Value Units Date/Time    XR Chest 1 View [[id number]] Collected:  03/05/18 0140     Updated:  03/05/18 0200    Narrative:         Chest single view on  3/5/2018     CLINICAL INDICATION: Nausea, fever, tachycardia    COMPARISON: 12/15/2016    FINDINGS: There is mild elevation of the right hemidiaphragm.  Mild chronic interstitial changes are noted. Heart is upper  limits normal for size. Hilar and mediastinal contours are within  normal limits.      Impression:       No acute disease.    Electronically signed by:  Luke Bustos  3/5/2018 1:58 AM CST  Workstation: [id number]           Blood Culture   Date Value Ref Range Status   03/05/2018 No growth at 2 days  Preliminary                    Urine Culture   Date Value Ref Range Status   03/05/2018 Mixed Tina Isolated  Final           Medication Review:   Current Facility-Administered Medications   Medication Dose Route Frequency Provider Last Rate Last Dose   • 8-hydroxyquinoline sulfate (BAG BALM) ointment   Apply externally Daily Evelio Christensen MD       • albuterol  (PROVENTIL) nebulizer solution 0.5% 2.5 mg/0.5mL  2.5 mg Nebulization Q6H PRN Yoan Hamilton MD       • aspirin EC tablet 81 mg  81 mg Oral Daily Yoan Hamilton MD   81 mg at 03/07/18 0952   • baclofen (LIORESAL) tablet 10 mg  10 mg Oral Q12H Jordi Marie MD   10 mg at 03/07/18 0952   • diltiaZEM (CARDIZEM) 125 mg in 100 mL sodium chloride 0.9% (total volume 125 mL)  5-15 mg/hr Intravenous Titrated Jordi Marie MD 10 mL/hr at 03/07/18 1029 10 mg/hr at 03/07/18 1029   • diltiaZEM (CARDIZEM) tablet 60 mg  60 mg Oral 4x Daily Hardy Fisher MD   60 mg at 03/07/18 1133   • docusate sodium (COLACE) capsule 100 mg  100 mg Oral Daily Jordi Marie MD   100 mg at 03/06/18 2213   • finasteride (PROSCAR) tablet 5 mg  5 mg Oral Daily Yoan Hamilton MD   5 mg at 03/07/18 0952   • HYDROcodone-acetaminophen (NORCO)  MG per tablet 1 tablet  1 tablet Oral Q4H PRN Yoan Hamilton MD   1 tablet at 03/06/18 2023   • magic butt ointment   Topical BID Evelio Christensen MD       • nebivolol (BYSTOLIC) tablet 20 mg  20 mg Oral Daily Hardy Fisher MD   20 mg at 03/07/18 1000   • ondansetron (ZOFRAN) injection 4 mg  4 mg Intravenous Q4H PRN Evelio Christensen MD   4 mg at 03/07/18 0134   • phenytoin (DILANTIN) ER capsule 100 mg  100 mg Oral Q12H Yoan Hamilton MD   100 mg at 03/07/18 0952   • piperacillin-tazobactam (ZOSYN) 3.375 g in iso-osmotic dextrose 50 ml (premix)  3.375 g Intravenous Once Hardy Fisher MD       • piperacillin-tazobactam (ZOSYN) 3.375 g in iso-osmotic dextrose 50 ml (premix)  3.375 g Intravenous Q8H Hardy Fisher MD       • promethazine (PHENERGAN) injection 12.5 mg  12.5 mg Intravenous Q6H PRN Jordi Marie MD   12.5 mg at 03/07/18 1021   • rivaroxaban (XARELTO) tablet 20 mg  20 mg Oral Daily Yoan Hamilton MD   20 mg at 03/07/18 0952   • sodium chloride 0.9 % flush 1-10 mL  1-10 mL Intravenous PRN Yoan Hamilton MD   10 mL at 03/07/18 1022   • sodium chloride 0.9 % flush 10 mL  10 mL  Intravenous PRN Robert Anderson MD       • tamsulosin (FLOMAX) 24 hr capsule 0.4 mg  0.4 mg Oral Nightly Yoan Hamilton MD   0.4 mg at 03/06/18 2023   • vitamin C (ASCORBIC ACID) tablet 500 mg  500 mg Oral Daily Yoan Hamilton MD   500 mg at 03/07/18 0952         Assessment/Plan     Principal Problem:    Sepsis  Active Problems:    Sacral decubitus ulcer    UTI (urinary tract infection) due to urinary indwelling catheter    Hypotension    Atrial fibrillation  - Pseudomonas UTI complicated by sepsis: Continue Zosyn. Leucocytosis is reactive and will be monitored.  - Cellulitis: Improving.  Continue broad-spectrum antibiotics.  - Atrial fibrillation with rapid ventricular response: Continue rate control and anti-coagulation.  Check echocardiogram and consult cardiologist.  -History of BPH: Continue home medications.  - Continue GI prophylaxis.  - Discharge planning is ongoing.            Haryd Fisher MD  03/07/18      EMR Dragon/Transcription disclaimer:   Much of this encounter note is an electronic transcription/translation of spoken language to printed text. The electronic translation of spoken language may permit erroneous, or at times, nonsensical words or phrases to be inadvertently transcribed; Although I have reviewed the note for such errors, some may still exist.

## 2018-03-07 NOTE — PROGRESS NOTES
"   LOS: 2 days   Patient Care Team:  Valerie Marvin MD as PCP - General    Subjective     Subjective:  Symptoms:  (No urinary complaints, less nausea since Cardizem restarted. ).    Diet:  He reports  nausea.        History taken from: patient chart family    Objective     Vital Signs  Temp:  [97 °F (36.1 °C)-98.7 °F (37.1 °C)] 98.2 °F (36.8 °C)  Heart Rate:  [] 76  Resp:  [20-26] 24  BP: (112-150)/(57-90) 149/65    Objective:  General Appearance:  In no acute distress.    Vital signs: (most recent): Blood pressure 149/65, pulse 76, temperature 98.2 °F (36.8 °C), temperature source Oral, resp. rate 24, height 170.2 cm (67\"), weight 81.6 kg (180 lb), SpO2 95 %.  Vital signs are normal.  No fever.    Output: Producing urine (SP Tube, urine clear and yellow).    HEENT: Normal HEENT exam.    Lungs:  Normal respiratory rate and normal effort.  He is not in respiratory distress.  Breath sounds clear to auscultation.    Heart: Normal rate.  S1 normal and S2 normal.    Chest: Symmetric chest wall expansion.   Abdomen: Abdomen is soft and non-distended.  Bowel sounds are normal.     Extremities: Decreased range of motion.  There is deformity and dependent edema (LLE).    Pulses: Distal pulses are intact.    Neurological: Patient is alert and oriented to person, place and time.  GCS score is 15.    Pupils:  Pupils are equal, round, and reactive to light.    Skin:  Warm and dry.  (Erythema and increased warmth LLE)          Results Review:    Lab Results (last 24 hours)     Procedure Component Value Units Date/Time    Blood Culture - Blood, [[id number]]  (Normal) Collected:  03/05/18 0136    Specimen:  Blood from Arm, Right Updated:  03/07/18 0203     Blood Culture No growth at 2 days    Blood Culture - Blood, [[id number]]  (Normal) Collected:  03/05/18 0235    Specimen:  Blood from Wrist, Right Updated:  03/07/18 0246     Blood Culture No growth at 2 days    Urine Culture - Urine, Urine, Clean Catch [[id number]] " Collected:  03/05/18 1549    Specimen:  Urine from Urine, Clean Catch Updated:  03/07/18 0622     Urine Culture --      Mixed Annamarie Isolated    Narrative:         Specimen contains mixed organisms of questionable pathogenicity which indicates contamination with commensal annamarie.  Further identification is unlikely to provide clinically useful information.  Suggest recollection.    CBC & Differential [[id number]] Collected:  03/07/18 0547    Specimen:  Blood Updated:  03/07/18 0628    Narrative:       The following orders were created for panel order CBC & Differential.  Procedure                               Abnormality         Status                     ---------                               -----------         ------                     CBC Auto Differential[[id number]]        Abnormal            Final result                 Please view results for these tests on the individual orders.    CBC Auto Differential [[id number]]  (Abnormal) Collected:  03/07/18 0547    Specimen:  Blood Updated:  03/07/18 0628     WBC 16.30 (H) 10*3/mm3      RBC 4.64 10*6/mm3      Hemoglobin 14.9 g/dL      Hematocrit 45.7 %      MCV 98.5 (H) fL      MCH 32.1 pg      MCHC 32.6 g/dL      RDW 14.0 %      RDW-SD 50.5 (H) fl      MPV 10.5 fL      Platelets 152 10*3/mm3      Neutrophil % 88.0 (H) %      Lymphocyte % 4.5 (L) %      Monocyte % 6.9 %      Eosinophil % 0.1 %      Basophil % 0.1 %      Immature Grans % 0.4 %      Neutrophils, Absolute 14.33 (H) 10*3/mm3      Lymphocytes, Absolute 0.74 10*3/mm3      Monocytes, Absolute 1.13 (H) 10*3/mm3      Eosinophils, Absolute 0.02 10*3/mm3      Basophils, Absolute 0.01 10*3/mm3      Immature Grans, Absolute 0.07 (H) 10*3/mm3      nRBC 0.0 /100 WBC     Comprehensive Metabolic Panel [[id number]]  (Abnormal) Collected:  03/07/18 0751    Specimen:  Blood Updated:  03/07/18 0842     Glucose 126 (H) mg/dL      BUN 14 mg/dL      Creatinine 0.98 mg/dL      Sodium 142 mmol/L      Potassium 4.4 mmol/L       Chloride 109 mmol/L      CO2 19.0 (L) mmol/L      Calcium 7.9 (L) mg/dL      Total Protein 6.7 g/dL      Albumin 3.30 (L) g/dL      ALT (SGPT) 28 U/L      AST (SGOT) 17 U/L      Alkaline Phosphatase 79 U/L      Total Bilirubin 0.3 mg/dL      eGFR Non African Amer 75 mL/min/1.73      Globulin 3.4 gm/dL      A/G Ratio 1.0 (L) g/dL      BUN/Creatinine Ratio 14.3     Anion Gap 14.0 mmol/L     Narrative:       The MDRD GFR formula is only valid for adults with stable renal function between ages 18 and 70.    Urine Culture - Urine, Urine, Clean Catch [[id number]]  (Abnormal)  (Susceptibility) Collected:  03/05/18 0154    Specimen:  Urine from Urine, Catheter Updated:  03/07/18 0849     Urine Culture --      80,000-90,000 CFU/mL Pseudomonas aeruginosa (A)    Narrative:       Pseudomonas aeruginosa may develop resistance during prolonged therapy with all antibiotics. Isolates that are initially susceptible may become resistant within 3 or 4 days. Repeat susceptibility testing may be indicated.    Susceptibility      Pseudomonas aeruginosa     LULA     Amikacin <=16 ug/ml Susceptible     Aztreonam <=8 ug/ml Susceptible     Cefepime >16 ug/ml Resistant     Ceftazidime 8 ug/ml Susceptible     Gentamicin <=4 ug/ml Susceptible     Levofloxacin >4 ug/ml Resistant     Meropenem <=4 ug/ml Susceptible     Piperacillin + Tazobactam <=16 ug/ml Susceptible     Tobramycin <=4 ug/ml Susceptible                         Imaging Results (last 24 hours)     Procedure Component Value Units Date/Time    IR PICC wo fluoro guidance [[id number]] Resulted:  03/07/18 1337     Updated:  03/07/18 1337    Narrative:       This procedure was auto-finalized with no dictation required.    XR Chest Post CVA Port [[id number]] Collected:  03/07/18 1330     Updated:  03/07/18 1348    Narrative:         EXAM:         Radiograph(s), Chest   VIEWS:   Portable ; 1       DATE/TIME:  3/7/2018 1:46 PM CST                INDICATION:   verification, R50.9 Fever,  unspecified A41.9  Sepsis, unspecified organism L03.317 Cellulitis of buttock    COMPARISON:  CXR: 3/5/18             FINDINGS:             - lines/tubes:    Right approach PICC line in standard position.      - cardiac:         size within normal limits         - mediastinum: contour within normal limits         - lungs:         Focal airspace disease, right upper lobe. A  second focus of airspace disease may be present in the left base.              - pleura:         no evidence of  fluid                  - osseous:         unremarkable for age                  - misc.:         Impression:       CONCLUSION:        1. Right approach PICC line in standard position.  2. Right upper lobe airspace disease.                                               Electronically signed by:  ERIC Sibley MD  3/7/2018 1:47 PM  CST Workstation: [id number]    US Guided Vascular Access [[id number]] Resulted:  03/07/18 1355     Updated:  03/07/18 1355    Narrative:       This procedure was auto-finalized with no dictation required.           I reviewed the patient's new clinical results.  I reviewed the patient's new imaging results and agree with the interpretation.  I reviewed the patient's other test results and agree with the interpretation      Assessment/Plan     Principal Problem:    Sepsis  Active Problems:    Sacral decubitus ulcer    UTI (urinary tract infection) due to urinary indwelling catheter    Hypotension    Atrial fibrillation      Assessment & Plan    UTI, acute, causing sepsis related to chronic SP tube last changed 4 weeks ago.  -Urine culture Pseudomonas aeruginosa  -WBC 23.46-->22.76-->15.9-->16.3  -Zosyn  -TMax 98.2, vitals noted  -Estimated Creatinine Clearance: 66.6 mL/min (by C-G formula based on Cr of 0.98).     Neurogenic bladder  -chronic SP tube changed 3/5/18 and urine recollected was mixed.     JEAN Robbins  03/07/18  4:46 PM

## 2018-03-07 NOTE — CONSULTS
[facility]  INPATIENT CONSULT NOTE  [place] Hill  73 y.o. 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 fib with RVR and he was hypotensive.  Currently he is doing okay he never had any cardiac problems.  He has atrial flutter with variable block.  He is on anticoagulation with Xarelto.  He has a suprapubic catheter chronically.  He lives with his girlfriend.  Never had any cardiac problems in the past other than atrial fibrillation and on anticoagulation.      Allergies   Allergen Reactions   • Aleve [Naproxen]    • Ambien [Zolpidem]    • Vancomycin          Past Medical History:   Diagnosis Date   • Arthritis    • Atherosclerosis    • Atrial fibrillation    • BPH (benign prostatic hyperplasia)    • Brain injury    • Chronic bronchitis    • Hypertension    • Left hemiplegia    • UTI (urinary tract infection) due to urinary indwelling catheter 3/5/2018         Past Surgical History:   Procedure Laterality Date   • CHOLECYSTECTOMY     • EYE SURGERY     • SUPRAPUBIC CATHETER INSERTION           Family History   Problem Relation Age of Onset   • Stroke Mother    • Hypertension Father          Social History     Social History   • Marital status:      Spouse name: N/A   • Number of children: N/A   • Years of education: N/A     Occupational History   • Not on file.     Social History Main Topics   • Smoking status: Former Smoker   • Smokeless tobacco: Never Used   • Alcohol use No   • Drug use: No   • Sexual activity: Not Currently     Other Topics Concern   • Not on file     Social History Narrative         Current Facility-Administered Medications   Medication Dose Route Frequency Provider Last Rate Last Dose   •  8-hydroxyquinoline sulfate (BAG BALM) ointment   Apply externally Daily Evelio Christensen MD       • albuterol (PROVENTIL) nebulizer solution 0.5% 2.5 mg/0.5mL  2.5 mg Nebulization Q6H PRN Yoan Hamilton MD       • amiodarone (NEXTERONE) 360 mg/200 mL (1.8 mg/mL) infusion  1 mg/min Intravenous Continuous Sai Muñoz MD       • aspirin EC tablet 81 mg  81 mg Oral Daily Yoan Hamilton MD   81 mg at 03/07/18 0952   • baclofen (LIORESAL) tablet 10 mg  10 mg Oral Q12H Jordi Marie MD   10 mg at 03/07/18 0952   • diltiaZEM (CARDIZEM) 125 mg in 100 mL sodium chloride 0.9% (total volume 125 mL)  5-15 mg/hr Intravenous Titrated Jordi Marie MD 10 mL/hr at 03/07/18 1029 10 mg/hr at 03/07/18 1029   • diltiaZEM CD (CARDIZEM CD) 24 hr capsule 240 mg  240 mg Oral Daily With Dinner Sai Muñoz MD       • docusate sodium (COLACE) capsule 100 mg  100 mg Oral Daily Jordi Marie MD   100 mg at 03/06/18 2213   • finasteride (PROSCAR) tablet 5 mg  5 mg Oral Daily Yoan Hamilton MD   5 mg at 03/07/18 0952   • HYDROcodone-acetaminophen (NORCO)  MG per tablet 1 tablet  1 tablet Oral Q4H PRN Yoan Hamilton MD   1 tablet at 03/06/18 2023   • magic butt ointment   Topical BID Evelio Christensen MD       • nebivolol (BYSTOLIC) tablet 20 mg  20 mg Oral Daily Hardy Fisher MD   20 mg at 03/07/18 1000   • ondansetron (ZOFRAN) injection 4 mg  4 mg Intravenous Q4H PRN Evelio Christensen MD   4 mg at 03/07/18 0134   • phenytoin (DILANTIN) ER capsule 100 mg  100 mg Oral Q12H Yoan Hamilton MD   100 mg at 03/07/18 0952   • piperacillin-tazobactam (ZOSYN) 3.375 g in iso-osmotic dextrose 50 ml (premix)  3.375 g Intravenous Once Hardy Fisher MD       • piperacillin-tazobactam (ZOSYN) 3.375 g in iso-osmotic dextrose 50 ml (premix)  3.375 g Intravenous Q8H Hardy Fisher MD       • promethazine (PHENERGAN) injection 12.5 mg  12.5 mg Intravenous Q6H PRN Jordi Marie MD   12.5 mg at 03/07/18 1021   • rivaroxaban  "(XARELTO) tablet 20 mg  20 mg Oral Daily Yoan Hamilton MD   20 mg at 03/07/18 0952   • sodium chloride 0.9 % flush 1-10 mL  1-10 mL Intravenous PRN Yoan Hamilton MD   10 mL at 03/07/18 1022   • sodium chloride 0.9 % flush 10 mL  10 mL Intravenous PRN Robert Anderson MD       • tamsulosin (FLOMAX) 24 hr capsule 0.4 mg  0.4 mg Oral Nightly Yoan Hamilton MD   0.4 mg at 03/06/18 2023   • vitamin C (ASCORBIC ACID) tablet 500 mg  500 mg Oral Daily Yoan Hamilton MD   500 mg at 03/07/18 0952         Review of Systems     Constitution: Denies any fatigue, fever or chills    HENT: Denies any headache, hearing impairment    Eyes: Denies any blurring of vision,      Cardivascular - As per history of present illness     Respiratory system-denies any COPD, shortness of breath     Endocrine:  No history of hyperlipidemia,       Musculoskeletal:  Wheelchair bound    Gastrointestinal: No nausea, vomiting, or melena    Genitourinary: Chronic suprapubic Gayle catheter due to previous injury    Neurological:   Traumatic brain injury with left hemiparesis    Psychiatric/Behavioral:        No history of depression    Hematological- no history of easy bruising            OBJECTIVE    /63  Pulse 80  Temp 98.2 °F (36.8 °C) (Oral)   Resp 24  Ht 170.2 cm (67\")  Wt 81.6 kg (180 lb)  SpO2 94%  BMI 28.19 kg/m2      Physical Exam     Constitutional: is oriented to person, place, and time.     Skin-warm and dry    Well developed and nourished in no acute distress      Head: Normocephalic    Eyes: Pupils are equal, round,     Neck: Neck supple. No bruit in the carotids,    Cardiovascular: [place] in the fifth intercostal space, irregular rate, and  Rhythm,  S1 greater than S2,       Pulmonary/Chest:   Air  Entry is equal on both sides  No wheezing or crackles,      Abdominal: Soft.  No hepatosplenomegaly, suprapubic catheter in situ    Musculoskeletal: No kyphoscoliosis    Neurological: is alert and oriented to person, place, and " time.  Left hemiparesis    Extremities- there is erythema of the left leg and the gluteal area      Psychiatric: He has a normal mood and affect.                          Lab Results (last 24 hours)     Procedure Component Value Units Date/Time    CBC & Differential [[id number]] Collected:  03/07/18 0547    Specimen:  Blood Updated:  03/07/18 0628    Narrative:       The following orders were created for panel order CBC & Differential.  Procedure                               Abnormality         Status                     ---------                               -----------         ------                     CBC Auto Differential[[id number]]        Abnormal            Final result                 Please view results for these tests on the individual orders.    CBC Auto Differential [[id number]]  (Abnormal) Collected:  03/07/18 0547    Specimen:  Blood Updated:  03/07/18 0628     WBC 16.30 (H) 10*3/mm3      RBC 4.64 10*6/mm3      Hemoglobin 14.9 g/dL      Hematocrit 45.7 %      MCV 98.5 (H) fL      MCH 32.1 pg      MCHC 32.6 g/dL      RDW 14.0 %      RDW-SD 50.5 (H) fl      MPV 10.5 fL      Platelets 152 10*3/mm3      Neutrophil % 88.0 (H) %      Lymphocyte % 4.5 (L) %      Monocyte % 6.9 %      Eosinophil % 0.1 %      Basophil % 0.1 %      Immature Grans % 0.4 %      Neutrophils, Absolute 14.33 (H) 10*3/mm3      Lymphocytes, Absolute 0.74 10*3/mm3      Monocytes, Absolute 1.13 (H) 10*3/mm3      Eosinophils, Absolute 0.02 10*3/mm3      Basophils, Absolute 0.01 10*3/mm3      Immature Grans, Absolute 0.07 (H) 10*3/mm3      nRBC 0.0 /100 WBC     Comprehensive Metabolic Panel [[id number]]  (Abnormal) Collected:  03/07/18 0751    Specimen:  Blood Updated:  03/07/18 0842     Glucose 126 (H) mg/dL      BUN 14 mg/dL      Creatinine 0.98 mg/dL      Sodium 142 mmol/L      Potassium 4.4 mmol/L      Chloride 109 mmol/L      CO2 19.0 (L) mmol/L      Calcium 7.9 (L) mg/dL      Total Protein 6.7 g/dL      Albumin 3.30 (L) g/dL      ALT  (SGPT) 28 U/L      AST (SGOT) 17 U/L      Alkaline Phosphatase 79 U/L      Total Bilirubin 0.3 mg/dL      eGFR Non African Amer 75 mL/min/1.73      Globulin 3.4 gm/dL      A/G Ratio 1.0 (L) g/dL      BUN/Creatinine Ratio 14.3     Anion Gap 14.0 mmol/L     Narrative:       The MDRD GFR formula is only valid for adults with stable renal function between ages 18 and 70.            A/P    Cellulitis with erythema of the leg and gluteal area on IV antibiotics.  Patient is wheelchair-bound due to previous stroke from traumatic brain injury.    Atrial flutter with RVR, difficult to control the rate, we will switch him from short-acting Cardizem to long-acting and add amiodarone IV loading dose and then switched to by mouth.  Patient is anticoagulated with xarelto.    Will follow          This document has been electronically signed by Sai Muñoz MD on March 7, 2018 1:05 PM           EMR Dragon/Transcription disclaimer:   Some of this note may be an electronic transcription/translation of spoken language to printed text. The electronic translation of spoken language may permit erroneous, or at times, nonsensical words or phrases to be inadvertently transcribed; Although I have reviewed the note for such errors, some may still exist.

## 2018-03-07 NOTE — NURSING NOTE
Patient began to have difficulty breathing and was diaphoretic. Patient's oxygen was 89% on 2L and was increased to 3.5L holding at 94%. Pulse ox showed a pulse rate that was flucatuating between 112 and 200's. He began to have a change in his mental status slurring his words and was not able to hold a conversation. EKG was ordered, Dr. Bhakta's paged to come assess patient due to his change in status. EKG showed a-fib with AVR (patient has a history of a-fib). VS obtained, telemetry placed and 20mg of Cardizem was given. Patient's rate before being transferred to the unit decreased down to 119.

## 2018-03-07 NOTE — PROGRESS NOTES
[facility]  INPATIENT PROGRESS NOTE    Length of Stay: 1  Date of Admission: 3/5/2018  Primary Care Physician: Valerie Marvin MD    Subjective     Chief Complaint   Patient presents with   • Nausea       HPI:  Patient was seen and examined this morning. Patient feels better . Patient is accompanied by his wife. Still complains of nausea and decreased appetite.  Patient is tolerating diet , passing gas, Patient denies fever or chills, headache or dizziness, chest pain or palpitations, difficulty in breathing or cough,  weakness or numbness or tingling in the extremities or visual changes.    Review of Systems     All pertinent negatives and positives are as above. All other systems have been reviewed and are negative unless otherwise stated.   Objective    Physical exam    Temp:  [98.4 °F (36.9 °C)-99.1 °F (37.3 °C)] 99.1 °F (37.3 °C)  Heart Rate:  [78-96] 96  Resp:  [18] 18  BP: (112-134)/(64-83) 134/83    -Constitutional: Patient is AAO x 3. Patient appears well-developed and well-nourished.   -CVS: Normal rate, regular rhythm, normal heart sounds and intact distal pulses.  Exam reveals no gallop and no friction rub.  No murmur heard.  -Pulm: Effort normal and breath sounds normal. No respiratory distress. No wheezes, rales or ronchi.  -Abdominal: Soft. Bowel sounds are normal. No distension, no tenderness. There is no rebound and no guarding. No hernia.   -Musculoskeletal: No Cyanosis clubbing . Mild b/l edema    Results Review:    Laboratory Data:     Results from last 7 days  Lab Units 03/06/18  0600 03/05/18  0924 03/05/18  0136   SODIUM mmol/L 141 143 142   POTASSIUM mmol/L 3.9 3.9 3.7   CHLORIDE mmol/L 107 106 100   CO2 mmol/L 23.0 24.0 26.0   BUN mg/dL 19 24* 29*   CREATININE mg/dL 1.07 1.20 1.30   GLUCOSE mg/dL 123* 128* 125*   CALCIUM mg/dL 7.6* 7.7* 8.6   BILIRUBIN mg/dL 0.4 0.5 0.4   ALK PHOS U/L 70 89 122   ALT (SGPT) U/L 32 32 31   AST  (SGOT) U/L 18 23 27   ANION GAP mmol/L 11.0 13.0 16.0*     Estimated Creatinine Clearance: 61 mL/min (by C-G formula based on Cr of 1.07).    Results from last 7 days  Lab Units 03/05/18  0136   MAGNESIUM mg/dL 1.8           Results from last 7 days  Lab Units 03/06/18  0600 03/05/18  0923 03/05/18  0136   WBC 10*3/mm3 15.90* 22.76* 23.46*   HEMOGLOBIN g/dL 12.7* 14.5 15.6   HEMATOCRIT % 38.5* 43.1 45.6   PLATELETS 10*3/mm3 172 168 197           Culture Data:   Blood Culture   Date Value Ref Range Status   03/05/2018 No growth at 24 hours  Preliminary   03/05/2018 No growth at 24 hours  Preliminary     Urine Culture   Date Value Ref Range Status   03/05/2018 Culture in progress  Preliminary   03/05/2018 80,000-90,000 CFU/mL Gram Negative Bacilli (A)  Preliminary     No results found for: RESPCX  No results found for: WOUNDCX  No results found for: STOOLCX  No components found for: BODYFLD    Micobiology  Blood Culture   Date Value Ref Range Status   03/05/2018 No growth at 24 hours  Preliminary                    Urine Culture   Date Value Ref Range Status   03/05/2018 Culture in progress  Preliminary          Radiology Data:   Imaging Results (all)     Procedure Component Value Units Date/Time    XR Chest 1 View [[id number]] Collected:  03/05/18 0140     Updated:  03/05/18 0200    Narrative:         Chest single view on  3/5/2018     CLINICAL INDICATION: Nausea, fever, tachycardia    COMPARISON: 12/15/2016    FINDINGS: There is mild elevation of the right hemidiaphragm.  Mild chronic interstitial changes are noted. Heart is upper  limits normal for size. Hilar and mediastinal contours are within  normal limits.      Impression:       No acute disease.    Electronically signed by:  Luke Bustos  3/5/2018 1:58 AM CST  Workstation: [id number]          I have reviewed the patient current medications.   Assessment/Plan     Hospital Problem List     * (Principal)Sepsis    Sacral decubitus ulcer (Chronic)    UTI  (urinary tract infection) due to urinary indwelling catheter (Chronic)    Hypotension    Atrial fibrillation (Chronic)          Assessment / Plan    --sepsis secondary to UTI  Improved and resolving   WBC 23.46-->15.90  continue IVF and IVAB( Levaquin and zosyn)    --UTI,gram negative bacilli  SP catheter changed by Marco A Frederick on 3/5/18  continue IVAB as above    --BPH  Flomax and finasteride     --afib  Rate controlled . continue xeralto .    This document has been electronically signed by Evelio Christensen MD on March 6, 2018 6:04 PM

## 2018-03-07 NOTE — SIGNIFICANT NOTE
03/07/18 1505   Rehab Treatment   Discipline physical therapist   Rehab Evaluation   Evaluation Not Performed other (see comments)  (OT reports pt not feeling well. CCU wanting pt to rest today. PT will check tomorrow.)   Recommendation   PT - Next Appointment 03/08/18

## 2018-03-07 NOTE — PLAN OF CARE
Problem: Patient Care Overview (Adult)  Goal: Plan of Care Review  Outcome: Ongoing (interventions implemented as appropriate)   03/07/18 6183   Coping/Psychosocial Response Interventions   Plan Of Care Reviewed With patient;significant other   Outcome Evaluation   Outcome Summary/Follow up Plan OT evaluation completed this date. Per pt/SO report, pt requires total(A) with ADLs, bed mobility, & sliding board t/f to power chair at home. Pt reports progressively decreased strength of RUE over the last few years. He deferred all mobility this date 2' SOA, fatigue, & not feeling well. Discussed OT role in hospital. Pt & SO report no concerns to be addressed by OT & no significant functional improvement expected 2' deficits prior to admit. They are agreeable to PT for OOB as tolerated but asked that PT wait until tomorrow. Will complete OT orders at this time.

## 2018-03-07 NOTE — THERAPY DISCHARGE NOTE
Acute Care - Occupational Therapy Initial Eval/Discharge  [facility]     Patient Name: Mando Hill  : 1945  MRN: [id number]  Today's Date: 3/7/2018  Onset of Illness/Injury or Date of Surgery Date: 18  Date of Referral to OT: 18  Referring Physician: Dr. Fisher      Admit Date: 3/5/2018       ICD-10-CM ICD-9-CM   1. Fever, unspecified fever cause R50.9 780.60   2. Sepsis, due to unspecified organism A41.9 038.9     995.91   3. Cellulitis of buttock L03.317 682.5     Patient Active Problem List   Diagnosis   • Osteoarthritis of right shoulder   • Sepsis   • Sacral decubitus ulcer   • UTI (urinary tract infection) due to urinary indwelling catheter   • Hypotension   • Atrial fibrillation     Past Medical History:   Diagnosis Date   • Arthritis    • Atherosclerosis    • Atrial fibrillation    • BPH (benign prostatic hyperplasia)    • Brain injury    • Chronic bronchitis    • Hypertension    • Left hemiplegia    • UTI (urinary tract infection) due to urinary indwelling catheter 3/5/2018     Past Surgical History:   Procedure Laterality Date   • CHOLECYSTECTOMY     • EYE SURGERY     • SUPRAPUBIC CATHETER INSERTION            OT ASSESSMENT FLOWSHEET (last 72 hours)      OT Evaluation       18 1608 18 1505 18 1426 18 0455 18 0448    Rehab Evaluation    Document Type   evaluation  -RW      Subjective Information   agree to therapy;no complaints  -RW      Evaluation Not Performed  other (see comments)   OT reports pt not feeling well. CCU wanting pt to rest today. PT will check tomorrow.  -BERTRAM       Patient Effort, Rehab Treatment   fair  -RW      Symptoms Noted During/After Treatment   fatigue  -RW      General Information    Patient Profile Review   yes  -RW      Onset of Illness/Injury or Date of Surgery Date   18  -RW      Referring Physician   Dr. Fisher  -RW      Precautions/Limitations   fall precautions;oxygen therapy device and L/min  -RW       Prior Level of Function   dependent:;ADL's;home management;transfer   spongebathes  -RW      Equipment Currently Used at Home   wheelchair, motorized  -RW  wheelchair, motorized;oxygen;hospital bed;grab bar;lift device;commode;nebulizer;ramp  -RE    Plans/Goals Discussed With   patient;spouse/S.O.  -RW      Barriers to Rehab   previous functional deficit  -RW      Living Environment    Lives With   significant other   girlfriend of 30 years  -RW spouse  -RE     Living Arrangements   mobile home  -RW house  -RE     Home Accessibility   ramps present at home   elevator to enter  -RW no concerns;ramps present at home  -RE     Stair Railings at Home   outside, present at both sides  -RW inside, present at both sides  -RE     Type of Financial/Environmental Concern   none  -RW none  -RE     Transportation Available   family or friend will provide  -RW car  -RE     Clinical Impression    Date of Referral to OT   03/07/18  -RW      OT Diagnosis   impaired mobility & ADLs  -RW      Impairments Found (describe specific impairments)   aerobic capacity/endurance;gait, locomotion, and balance;joint integrity and mobility;motor function;muscle performance;ROM  -RW      Patient/Family Goals Statement   return home with SO, PLOF  -RW      Criteria for Skilled Therapeutic Interventions Met   no significant expected improvement in functional status;patient/family declined skilled intervention at this time  -RW      Therapy Frequency   evaluation only  -RW      Anticipated Discharge Disposition home with 24/7 care;skilled nursing facility  -RW  home with 24/7 care;skilled nursing facility  -RW      Functional Level Prior    Ambulation     3-->assistive equipment and person  -RE    Transferring     3-->assistive equipment and person  -RE    Toileting     3-->assistive equipment and person  -RE    Bathing     3-->assistive equipment and person  -RE    Dressing     2-->assistive person  -RE    Eating     0-->independent  -RE     Communication     0-->understands/communicates without difficulty  -RE    Swallowing     0-->swallows foods/liquids without difficulty  -RE    Vital Signs    Pre Systolic BP Rehab   139  -RW      Pre Treatment Diastolic BP   64  -RW      Pretreatment Heart Rate (beats/min)   80  -RW      Posttreatment Heart Rate (beats/min)   83  -RW      Pre SpO2 (%)   96  -RW      O2 Delivery Pre Treatment   supplemental O2   3L  -RW      Post SpO2 (%)   98  -RW      O2 Delivery Post Treatment   supplemental O2  -RW      Pre Patient Position   Supine  -RW      Post Patient Position   Supine  -RW      Pain Assessment    Pain Assessment   0-10  -RW      Pain Score   4  -RW      Post Pain Score   4  -RW      Pain Type   Chronic pain  -RW      Pain Location   Back  -RW      Pain Intervention(s)   Declines  -RW      Vision Assessment/Intervention    Visual Impairment Comment   reading glasses  -RW      Cognitive Assessment/Intervention    Current Cognitive/Communication Assessment   functional  -RW      Orientation Status   oriented x 4  -RW      Follows Commands/Answers Questions   100% of the time  -RW      Personal Safety   unable/difficult to assess   bed assessment only  -RW      ROM (Range of Motion)    General ROM   no range of motion deficits identified   BUE PROM  -RW      MMT (Manual Muscle Testing)    General MMT Assessment   upper extremity strength deficits identified  -RW      General MMT Assessment Detail   no active motion LUE; RUE: shoulder 2/5, elbow flexion 3-/5, wrist extension 3/5,  3+/5  -RW      Bed Mobility, Assessment/Treatment    Bed Mobility, Comment   Pt deferred 2' SOA, reports total(A) required.  -RW      Positioning and Restraints    Pre-Treatment Position   in bed  -RW      Post Treatment Position   bed  -RW      In Bed   notified nsg;fowlers;call light within reach;encouraged to call for assist;with family/caregiver  -RW        User Key  (r) = Recorded By, (t) = Taken By, (c) = Cosigned By     Initials Name Effective Dates     Zaina Pickens OTR/L 03/07/18 -     BERTRAM Zuluaga, PT 03/07/18 -     RE Dena Ramirez RN 10/17/16 -           Occupational Therapy Education     Title: PT OT SLP Therapies (Resolved)     Topic: Occupational Therapy (Resolved)     Point: ADL training (Resolved)    Description: Instruct learner(s) on proper safety adaptation and remediation techniques during self care or transfers.   Instruct in proper use of assistive devices.    Learning Progress Summary    Learner Readiness Method Response Comment Documented by Status   Patient Acceptance E VU OT role in hospital  03/07/18 1602 Done   Significant Other Acceptance E VU OT role in hospital  03/07/18 1602 Done                      User Key     Initials Effective Dates Name Provider Type Discipline     03/07/18 -  Zaina Pickens OTR/L Occupational Therapist OT                OT Recommendation and Plan  Anticipated Discharge Disposition: home with 24/7 [facility], skilled nursing facility  Therapy Frequency: evaluation only  Plan of Care Review  Plan Of Care Reviewed With: patient, significant other  Outcome Summary/Follow up Plan: OT evaluation completed this date. Per pt/SO report, pt requires total(A) with ADLs, bed mobility, & sliding board t/f to power chair at home. Pt reports progressively decreased strength of RUE over the last few years. He deferred all mobility this date 2' SOA, fatigue, & not feeling well. Discussed OT role in hospital. Pt & SO report no concerns to be addressed by OT & no significant functional improvement expected 2' deficits prior to admit. They are agreeable to PT for OOB as tolerated but asked that PT wait until tomorrow. Will complete OT orders at this time.               Outcome Measures       03/07/18 9757          How much help from another is currently needed...    Putting on and taking off regular lower body clothing? 1  -RW      Bathing (including washing, rinsing, and drying) 1   -RW      Toileting (which includes using toilet bed pan or urinal) 1  -RW      Putting on and taking off regular upper body clothing 1  -RW      Taking care of personal grooming (such as brushing teeth) 2  -RW      Eating meals 2  -RW      Score 8  -RW      Functional Assessment    Outcome Measure Options AM-PAC 6 Clicks Daily Activity (OT)  -RW        User Key  (r) = Recorded By, (t) = Taken By, (c) = Cosigned By    Initials Name Provider Type    RW Zaina E Wieling, OTR/L Occupational Therapist          Time Calculation:         Time Calculation- OT       03/07/18 1602          Time Calculation- OT    OT Start Time 1426  -RW      OT Stop Time 1445  -RW      OT Time Calculation (min) 19 min  -RW      OT Received On 03/07/18  -RW        User Key  (r) = Recorded By, (t) = Taken By, (c) = Cosigned By    Initials Name Provider Type    RW Zaina E Wieling, OTR/L Occupational Therapist          Therapy Charges for Today     Code Description Service Date Service Provider Modifiers Qty    [id number]  OT SELFCARE CURRENT 3/7/2018 Zaina E Wieling, OTR/L GO, CM 1    [id number]  OT SELFCARE PROJECTED 3/7/2018 Zaina E Wieling, OTR/L GO, CM 1    [id number]  OT SELFCARE DISCHARGE 3/7/2018 Zaina E Wieling, OTR/L GO, CM 1    [id number]  OT EVAL LOW COMPLEXITY 1 3/7/2018 Zaina E Wieling, OTR/L GO 1          OT G-codes  OT Professional Judgement Used?: Yes  OT Functional Scales Options: AM-PAC 6 Clicks Daily Activity (OT)  Score: 8  Functional Limitation: Self care  Self Care Current Status (): At least 80 percent but less than 100 percent impaired, limited or restricted  Self Care Goal Status (): At least 80 percent but less than 100 percent impaired, limited or restricted  Self Care Discharge Status (): At least 80 percent but less than 100 percent impaired, limited or restricted    OT Discharge Summary  Anticipated Discharge Disposition: home with 24/7 care, skilled nursing  facility  Reason for Discharge: Patient/Caregiver request, At baseline function    Zaina Pickens, OTR/LAKISHA  3/7/2018

## 2018-03-08 ENCOUNTER — APPOINTMENT (OUTPATIENT)
Dept: GENERAL RADIOLOGY | Facility: HOSPITAL | Age: 73
End: 2018-03-08

## 2018-03-08 LAB
ALBUMIN SERPL-MCNC: 3.8 G/DL (ref 3.4–4.8)
ALBUMIN/GLOB SERPL: 1 G/DL (ref 1.1–1.8)
ALP SERPL-CCNC: 92 U/L (ref 38–126)
ALT SERPL W P-5'-P-CCNC: 33 U/L (ref 21–72)
ANION GAP SERPL CALCULATED.3IONS-SCNC: 13 MMOL/L (ref 5–15)
ARTERIAL PATENCY WRIST A: ABNORMAL
AST SERPL-CCNC: 22 U/L (ref 17–59)
ATMOSPHERIC PRESS: ABNORMAL MMHG
BASE EXCESS BLDA CALC-SCNC: -0.6 MMOL/L (ref -2.4–2.4)
BASE EXCESS BLDA CALC-SCNC: -5.7 MMOL/L (ref -2.4–2.4)
BASE EXCESS BLDA CALC-SCNC: -6.6 MMOL/L (ref -2.4–2.4)
BASE EXCESS BLDA CALC-SCNC: -9.6 MMOL/L (ref -2.4–2.4)
BASOPHILS # BLD AUTO: 0.01 10*3/MM3 (ref 0–0.2)
BASOPHILS NFR BLD AUTO: 0.1 % (ref 0–2)
BDY SITE: ABNORMAL
BILIRUB SERPL-MCNC: 0.3 MG/DL (ref 0.2–1.3)
BUN BLD-MCNC: 15 MG/DL (ref 7–21)
BUN/CREAT SERPL: 14 (ref 7–25)
CA-I BLD-MCNC: 4.7 MG/DL (ref 4.5–4.9)
CA-I BLD-MCNC: 4.8 MG/DL (ref 4.5–4.9)
CA-I BLD-MCNC: 4.9 MG/DL (ref 4.5–4.9)
CA-I BLD-MCNC: 4.9 MG/DL (ref 4.5–4.9)
CALCIUM SPEC-SCNC: 9 MG/DL (ref 8.4–10.2)
CHLORIDE SERPL-SCNC: 106 MMOL/L (ref 95–110)
CO2 BLDA-SCNC: 23 MMOL/L (ref 23–27)
CO2 BLDA-SCNC: 23 MMOL/L (ref 23–27)
CO2 BLDA-SCNC: 24 MMOL/L (ref 23–27)
CO2 BLDA-SCNC: 27.4 MMOL/L (ref 23–27)
CO2 SERPL-SCNC: 21 MMOL/L (ref 22–31)
CREAT BLD-MCNC: 1.07 MG/DL (ref 0.7–1.3)
DEPRECATED RDW RBC AUTO: 45.8 FL (ref 35.1–43.9)
EOSINOPHIL # BLD AUTO: 0 10*3/MM3 (ref 0–0.7)
EOSINOPHIL NFR BLD AUTO: 0 % (ref 0–7)
ERYTHROCYTE [DISTWIDTH] IN BLOOD BY AUTOMATED COUNT: 13.3 % (ref 11.5–14.5)
GFR SERPL CREATININE-BSD FRML MDRD: 67 ML/MIN/1.73 (ref 60–98)
GLOBULIN UR ELPH-MCNC: 3.9 GM/DL (ref 2.3–3.5)
GLUCOSE BLD-MCNC: 174 MG/DL (ref 60–100)
GLUCOSE BLDA-MCNC: 152 MMOL/L
GLUCOSE BLDA-MCNC: 179 MMOL/L
GLUCOSE BLDA-MCNC: 189 MMOL/L
GLUCOSE BLDA-MCNC: 202 MMOL/L
HCO3 BLDA-SCNC: 21.4 MMOL/L (ref 22–26)
HCO3 BLDA-SCNC: 21.5 MMOL/L (ref 22–26)
HCO3 BLDA-SCNC: 21.8 MMOL/L (ref 22–26)
HCO3 BLDA-SCNC: 25.9 MMOL/L (ref 22–26)
HCT VFR BLD AUTO: 47.1 % (ref 39–49)
HCT VFR BLD CALC: 42 % (ref 40–54)
HCT VFR BLD CALC: 45 % (ref 40–54)
HCT VFR BLD CALC: 47 % (ref 40–54)
HCT VFR BLD CALC: 49 % (ref 40–54)
HGB BLD-MCNC: 15.9 G/DL (ref 13.7–17.3)
HGB BLDA-MCNC: 14.3 G/DL (ref 14–18)
HGB BLDA-MCNC: 15.3 G/DL (ref 14–18)
HGB BLDA-MCNC: 16.1 G/DL (ref 14–18)
HGB BLDA-MCNC: 16.5 G/DL (ref 14–18)
IMM GRANULOCYTES # BLD: 0.06 10*3/MM3 (ref 0–0.02)
IMM GRANULOCYTES NFR BLD: 0.3 % (ref 0–0.5)
LYMPHOCYTES # BLD AUTO: 0.3 10*3/MM3 (ref 0.6–4.2)
LYMPHOCYTES NFR BLD AUTO: 1.7 % (ref 10–50)
MCH RBC QN AUTO: 31.5 PG (ref 26.5–34)
MCHC RBC AUTO-ENTMCNC: 33.8 G/DL (ref 31.5–36.3)
MCV RBC AUTO: 93.5 FL (ref 80–98)
MODALITY: ABNORMAL
MONOCYTES # BLD AUTO: 0.69 10*3/MM3 (ref 0–0.9)
MONOCYTES NFR BLD AUTO: 3.9 % (ref 0–12)
NEUTROPHILS # BLD AUTO: 16.78 10*3/MM3 (ref 2–8.6)
NEUTROPHILS NFR BLD AUTO: 94 % (ref 37–80)
PCO2 BLDA: 48 MM HG (ref 35–45)
PCO2 BLDA: 49.9 MM HG (ref 35–45)
PCO2 BLDA: 52.6 MM HG (ref 35–45)
PCO2 BLDA: 72.7 MM HG (ref 35–45)
PH BLDA: 7.09 PH UNITS (ref 7.35–7.45)
PH BLDA: 7.23 PH UNITS (ref 7.35–7.45)
PH BLDA: 7.27 PH UNITS (ref 7.35–7.45)
PH BLDA: 7.33 PH UNITS (ref 7.35–7.45)
PLATELET # BLD AUTO: 204 10*3/MM3 (ref 150–450)
PMV BLD AUTO: 10 FL (ref 8–12)
PO2 BLDA: 103.5 MM HG (ref 80–105)
PO2 BLDA: 59.7 MM HG (ref 80–105)
PO2 BLDA: 72.8 MM HG (ref 80–105)
PO2 BLDA: 81.6 MM HG (ref 80–105)
POTASSIUM BLD-SCNC: 4.1 MMOL/L (ref 3.5–5.1)
POTASSIUM BLDA-SCNC: 3.69 MMOL/L (ref 3.6–4.9)
POTASSIUM BLDA-SCNC: 4.1 MMOL/L (ref 3.6–4.9)
POTASSIUM BLDA-SCNC: 4.25 MMOL/L (ref 3.6–4.9)
POTASSIUM BLDA-SCNC: 4.45 MMOL/L (ref 3.6–4.9)
PROT SERPL-MCNC: 7.7 G/DL (ref 6.3–8.6)
RBC # BLD AUTO: 5.04 10*6/MM3 (ref 4.37–5.74)
SAO2 % BLDCOA: 90 % (ref 94–100)
SAO2 % BLDCOA: 94.4 % (ref 94–100)
SAO2 % BLDCOA: 96.1 % (ref 94–100)
SAO2 % BLDCOA: 97 % (ref 94–100)
SODIUM BLD-SCNC: 140 MMOL/L (ref 137–145)
SODIUM BLDA-SCNC: 137.5 MMOL/L (ref 138–146)
SODIUM BLDA-SCNC: 138 MMOL/L (ref 138–146)
SODIUM BLDA-SCNC: 138.3 MMOL/L (ref 138–146)
SODIUM BLDA-SCNC: 138.9 MMOL/L (ref 138–146)
WBC NRBC COR # BLD: 17.84 10*3/MM3 (ref 3.2–9.8)

## 2018-03-08 PROCEDURE — 94660 CPAP INITIATION&MGMT: CPT

## 2018-03-08 PROCEDURE — 25010000002 METHYLPREDNISOLONE PER 125 MG: Performed by: HOSPITALIST

## 2018-03-08 PROCEDURE — 71045 X-RAY EXAM CHEST 1 VIEW: CPT

## 2018-03-08 PROCEDURE — 93010 ELECTROCARDIOGRAM REPORT: CPT | Performed by: INTERNAL MEDICINE

## 2018-03-08 PROCEDURE — 25010000002 AMIODARONE IN DEXTROSE 5% 360-4.14 MG/200ML-% SOLUTION: Performed by: INTERNAL MEDICINE

## 2018-03-08 PROCEDURE — G8978 MOBILITY CURRENT STATUS: HCPCS

## 2018-03-08 PROCEDURE — G8979 MOBILITY GOAL STATUS: HCPCS

## 2018-03-08 PROCEDURE — 99233 SBSQ HOSP IP/OBS HIGH 50: CPT | Performed by: INTERNAL MEDICINE

## 2018-03-08 PROCEDURE — 97162 PT EVAL MOD COMPLEX 30 MIN: CPT

## 2018-03-08 PROCEDURE — 80053 COMPREHEN METABOLIC PANEL: CPT | Performed by: FAMILY MEDICINE

## 2018-03-08 PROCEDURE — 94799 UNLISTED PULMONARY SVC/PX: CPT

## 2018-03-08 PROCEDURE — 25010000002 PIPERACILLIN SOD-TAZOBACTAM PER 1 G: Performed by: INTERNAL MEDICINE

## 2018-03-08 PROCEDURE — 85025 COMPLETE CBC W/AUTO DIFF WBC: CPT | Performed by: FAMILY MEDICINE

## 2018-03-08 PROCEDURE — 82803 BLOOD GASES ANY COMBINATION: CPT | Performed by: HOSPITALIST

## 2018-03-08 PROCEDURE — 82803 BLOOD GASES ANY COMBINATION: CPT | Performed by: INTERNAL MEDICINE

## 2018-03-08 PROCEDURE — 93005 ELECTROCARDIOGRAM TRACING: CPT | Performed by: INTERNAL MEDICINE

## 2018-03-08 RX ORDER — PREDNISONE 20 MG/1
20 TABLET ORAL
Status: DISPENSED | OUTPATIENT
Start: 2018-03-08 | End: 2018-03-12

## 2018-03-08 RX ORDER — FUROSEMIDE 40 MG/1
40 TABLET ORAL DAILY
Status: DISCONTINUED | OUTPATIENT
Start: 2018-03-08 | End: 2018-03-11

## 2018-03-08 RX ADMIN — Medication 10 ML: at 09:16

## 2018-03-08 RX ADMIN — FINASTERIDE 5 MG: 5 TABLET, FILM COATED ORAL at 08:58

## 2018-03-08 RX ADMIN — TAZOBACTAM SODIUM AND PIPERACILLIN SODIUM 3.38 G: 375; 3 INJECTION, SOLUTION INTRAVENOUS at 12:08

## 2018-03-08 RX ADMIN — Medication 10 ML: at 19:46

## 2018-03-08 RX ADMIN — OXYCODONE HYDROCHLORIDE AND ACETAMINOPHEN 500 MG: 500 TABLET ORAL at 08:58

## 2018-03-08 RX ADMIN — DOCUSATE SODIUM 100 MG: 100 CAPSULE, LIQUID FILLED ORAL at 08:58

## 2018-03-08 RX ADMIN — TEMAZEPAM 15 MG: 15 CAPSULE ORAL at 21:38

## 2018-03-08 RX ADMIN — HYDROCORTISONE: 1 CREAM TOPICAL at 08:58

## 2018-03-08 RX ADMIN — TAMSULOSIN HYDROCHLORIDE 0.4 MG: 0.4 CAPSULE ORAL at 19:51

## 2018-03-08 RX ADMIN — AMIODARONE HYDROCHLORIDE 1 MG/MIN: 1.8 INJECTION, SOLUTION INTRAVENOUS at 09:16

## 2018-03-08 RX ADMIN — PHENYTOIN SODIUM 100 MG: 100 CAPSULE, EXTENDED RELEASE ORAL at 08:58

## 2018-03-08 RX ADMIN — BACLOFEN 10 MG: 10 TABLET ORAL at 19:50

## 2018-03-08 RX ADMIN — TAZOBACTAM SODIUM AND PIPERACILLIN SODIUM 3.38 G: 375; 3 INJECTION, SOLUTION INTRAVENOUS at 04:50

## 2018-03-08 RX ADMIN — FUROSEMIDE 40 MG: 40 TABLET ORAL at 12:20

## 2018-03-08 RX ADMIN — NEBIVOLOL HYDROCHLORIDE 20 MG: 5 TABLET ORAL at 08:58

## 2018-03-08 RX ADMIN — Medication: at 08:58

## 2018-03-08 RX ADMIN — RIVAROXABAN 20 MG: 10 TABLET, FILM COATED ORAL at 08:58

## 2018-03-08 RX ADMIN — DILTIAZEM HYDROCHLORIDE 240 MG: 240 CAPSULE, COATED, EXTENDED RELEASE ORAL at 18:07

## 2018-03-08 RX ADMIN — HYDROCORTISONE: 1 CREAM TOPICAL at 19:50

## 2018-03-08 RX ADMIN — AMIODARONE HYDROCHLORIDE 0.5 MG/MIN: 1.8 INJECTION, SOLUTION INTRAVENOUS at 19:12

## 2018-03-08 RX ADMIN — TAZOBACTAM SODIUM AND PIPERACILLIN SODIUM 3.38 G: 375; 3 INJECTION, SOLUTION INTRAVENOUS at 19:50

## 2018-03-08 RX ADMIN — ASPIRIN 81 MG: 81 TABLET, COATED ORAL at 08:58

## 2018-03-08 RX ADMIN — AMIODARONE HYDROCHLORIDE 1 MG/MIN: 1.8 INJECTION, SOLUTION INTRAVENOUS at 02:19

## 2018-03-08 RX ADMIN — PHENYTOIN SODIUM 100 MG: 100 CAPSULE, EXTENDED RELEASE ORAL at 19:51

## 2018-03-08 RX ADMIN — BACLOFEN 10 MG: 10 TABLET ORAL at 08:58

## 2018-03-08 RX ADMIN — METHYLPREDNISOLONE SODIUM SUCCINATE 60 MG: 125 INJECTION, POWDER, FOR SOLUTION INTRAMUSCULAR; INTRAVENOUS at 08:56

## 2018-03-08 NOTE — PROGRESS NOTES
"   LOS: 3 days   Patient Care Team:  Valerie Marvin MD as PCP - General    Subjective     Subjective:  Symptoms:  (Urine clear and yellow).    Diet:  He reports  nausea.        History taken from: patient chart family    Objective     Vital Signs  Temp:  [97.5 °F (36.4 °C)-98.2 °F (36.8 °C)] 98 °F (36.7 °C)  Heart Rate:  [] 105  Resp:  [16-26] 26  BP: ()/() 113/57    Objective:  General Appearance:  In no acute distress.    Vital signs: (most recent): Blood pressure 113/57, pulse 105, temperature 98 °F (36.7 °C), temperature source Oral, resp. rate 26, height 170.2 cm (67\"), weight 82.3 kg (181 lb 7 oz), SpO2 91 %.  Vital signs are normal.  No fever.    Output: Producing urine (SP Tube, urine clear and yellow).    HEENT: Normal HEENT exam.    Lungs:  Normal respiratory rate and normal effort.  He is not in respiratory distress.  Breath sounds clear to auscultation.    Heart: Normal rate.  S1 normal and S2 normal.    Chest: Symmetric chest wall expansion.   Abdomen: Abdomen is soft and non-distended.  Bowel sounds are normal.     Extremities: Decreased range of motion.  There is deformity and dependent edema (LLE).    Pulses: Distal pulses are intact.    Neurological: Patient is alert and oriented to person, place and time.  GCS score is 15.    Pupils:  Pupils are equal, round, and reactive to light.    Skin:  Warm and dry.  (Erythema and increased warmth LLE)            Results Review:    Lab Results (last 24 hours)     Procedure Component Value Units Date/Time    Blood Gas, Arterial [[id number]]  (Abnormal) Collected:  03/07/18 4519    Specimen:  Arterial Blood Updated:  03/08/18 0000     Site --      Not performed at this site.        Kevin's Test --      Not performed at this site.        pH, Arterial 7.095 (L) pH units      pCO2, Arterial 72.7 (H) mm Hg      pO2, Arterial 103.5 mm Hg      HCO3, Arterial 21.8 (L) mmol/L      Base Excess, Arterial -9.6 (L) mmol/L      O2 Saturation, Arterial " 97.0 %      Hemoglobin, Blood Gas 16.1 g/dL      Hematocrit, Blood Gas 47.0 %      CO2 Content 24.0     Sodium, Arterial 138.9 mmol/L      Potassium, Arterial 4.45 mmol/L      Glucose, Arterial 202 mmol/L      Barometric Pressure for Blood Gas -- mmHg       Not performed at this site.        Modality --      Not performed at this site.        Ionized Calcium 4.80 mg/dL     Blood Gas, Arterial [[id number]]  (Abnormal) Collected:  03/08/18 0112    Specimen:  Arterial Blood Updated:  03/08/18 0114     Site --      Not performed at this site.        Kevin's Test --      Not performed at this site.        pH, Arterial 7.228 (L) pH units      pCO2, Arterial 52.6 (H) mm Hg      pO2, Arterial 59.7 (L) mm Hg      HCO3, Arterial 21.4 (L) mmol/L      Base Excess, Arterial -6.6 (L) mmol/L      O2 Saturation, Arterial 90.0 (L) %      Hemoglobin, Blood Gas 15.3 g/dL      Hematocrit, Blood Gas 45.0 %      CO2 Content 23.0     Sodium, Arterial 137.5 (L) mmol/L      Potassium, Arterial 4.25 mmol/L      Glucose, Arterial 189 mmol/L      Barometric Pressure for Blood Gas -- mmHg       Not performed at this site.        Modality --      Not performed at this site.        Ionized Calcium 4.90 mg/dL     Blood Culture - Blood, [[id number]]  (Normal) Collected:  03/05/18 0136    Specimen:  Blood from Arm, Right Updated:  03/08/18 0203     Blood Culture No growth at 3 days    Blood Culture - Blood, [[id number]]  (Normal) Collected:  03/05/18 0235    Specimen:  Blood from Wrist, Right Updated:  03/08/18 0246     Blood Culture No growth at 3 days    Blood Gas, Arterial [[id number]]  (Abnormal) Collected:  03/08/18 0258    Specimen:  Arterial Blood Updated:  03/08/18 0305     Site --      Not performed at this site.        Kevin's Test --      Not performed at this site.        pH, Arterial 7.269 (L) pH units      pCO2, Arterial 48.0 (H) mm Hg      pO2, Arterial 81.6 mm Hg      HCO3, Arterial 21.5 (L) mmol/L      Base Excess, Arterial -5.7 (L)  mmol/L      O2 Saturation, Arterial 96.1 %      Hemoglobin, Blood Gas 16.5 g/dL      Hematocrit, Blood Gas 49.0 %      CO2 Content 23.0     Sodium, Arterial 138.0 mmol/L      Potassium, Arterial 4.10 mmol/L      Glucose, Arterial 179 mmol/L      Barometric Pressure for Blood Gas -- mmHg       Not performed at this site.        Modality --      Not performed at this site.        Ionized Calcium 4.90 mg/dL     CBC & Differential [[id number]] Collected:  03/08/18 0258    Specimen:  Blood Updated:  03/08/18 0305    Narrative:       The following orders were created for panel order CBC & Differential.  Procedure                               Abnormality         Status                     ---------                               -----------         ------                     CBC Auto Differential[[id number]]        Abnormal            Final result                 Please view results for these tests on the individual orders.    CBC Auto Differential [[id number]]  (Abnormal) Collected:  03/08/18 0258    Specimen:  Blood Updated:  03/08/18 0305     WBC 17.84 (H) 10*3/mm3      RBC 5.04 10*6/mm3      Hemoglobin 15.9 g/dL      Hematocrit 47.1 %      MCV 93.5 fL      MCH 31.5 pg      MCHC 33.8 g/dL      RDW 13.3 %      RDW-SD 45.8 (H) fl      MPV 10.0 fL      Platelets 204 10*3/mm3      Neutrophil % 94.0 (H) %      Lymphocyte % 1.7 (L) %      Monocyte % 3.9 %      Eosinophil % 0.0 %      Basophil % 0.1 %      Immature Grans % 0.3 %      Neutrophils, Absolute 16.78 (H) 10*3/mm3      Lymphocytes, Absolute 0.30 (L) 10*3/mm3      Monocytes, Absolute 0.69 10*3/mm3      Eosinophils, Absolute 0.00 10*3/mm3      Basophils, Absolute 0.01 10*3/mm3      Immature Grans, Absolute 0.06 (H) 10*3/mm3     Comprehensive Metabolic Panel [[id number]]  (Abnormal) Collected:  03/08/18 0258    Specimen:  Blood Updated:  03/08/18 0328     Glucose 174 (H) mg/dL      BUN 15 mg/dL      Creatinine 1.07 mg/dL      Sodium 140 mmol/L      Potassium 4.1 mmol/L       Chloride 106 mmol/L      CO2 21.0 (L) mmol/L      Calcium 9.0 mg/dL      Total Protein 7.7 g/dL      Albumin 3.80 g/dL      ALT (SGPT) 33 U/L      AST (SGOT) 22 U/L      Alkaline Phosphatase 92 U/L      Total Bilirubin 0.3 mg/dL      eGFR Non African Amer 67 mL/min/1.73      Globulin 3.9 (H) gm/dL      A/G Ratio 1.0 (L) g/dL      BUN/Creatinine Ratio 14.0     Anion Gap 13.0 mmol/L     Narrative:       The MDRD GFR formula is only valid for adults with stable renal function between ages 18 and 70.    Blood Gas, Arterial [[id number]]  (Abnormal) Collected:  03/08/18 1638    Specimen:  Arterial Blood Updated:  03/08/18 1641     Site --      Not performed at this site.        Kevin's Test --      Not performed at this site.        pH, Arterial 7.333 (L) pH units      pCO2, Arterial 49.9 (H) mm Hg      pO2, Arterial 72.8 (L) mm Hg      HCO3, Arterial 25.9 mmol/L      Base Excess, Arterial -0.6 mmol/L      O2 Saturation, Arterial 94.4 %      Hemoglobin, Blood Gas 14.3 g/dL      Hematocrit, Blood Gas 42.0 %      CO2 Content 27.4 (H)     Sodium, Arterial 138.3 mmol/L      Potassium, Arterial 3.69 mmol/L      Glucose, Arterial 152 mmol/L      Barometric Pressure for Blood Gas -- mmHg       Not performed at this site.        Modality --      Not performed at this site.        Ionized Calcium 4.70 mg/dL          Imaging Results (last 24 hours)     Procedure Component Value Units Date/Time    XR Chest 1 View [[id number]] Collected:  03/08/18 1037     Updated:  03/08/18 1106    Narrative:       Chest single view     CLINICAL INDICATION: Shortness of breath. Respiratory failure,  fever and sepsis    COMPARISON: Chest March 7, 2018.    FINDINGS: Cardiomegaly. Prominence of pulmonary vascular markings  slightly improved since prior exam. Infiltrative changes,  pneumonitis right lung primarily right upper lobe with partial  clearing, slight improvement since prior exam.      Impression:       CONCLUSION: As above.    Electronically  signed by:  Dennis Gates MD  3/8/2018 11:05 AM CST  Workstation: [id number]           I reviewed the patient's new clinical results.  I reviewed the patient's new imaging results and agree with the interpretation.  I reviewed the patient's other test results and agree with the interpretation      Assessment/Plan     Principal Problem:    Sepsis  Active Problems:    Sacral decubitus ulcer    UTI (urinary tract infection) due to urinary indwelling catheter    Hypotension    Atrial fibrillation      Assessment & Plan    UTI, acute, causing sepsis related to chronic SP tube last changed 4 weeks ago.  -Urine culture Pseudomonas aeruginosa  -WBC 23.46-->22.76-->15.9-->16.3-->17.84  -Zosyn  -Estimated Creatinine Clearance: 61.3 mL/min (by C-G formula based on Cr of 1.07).     Neurogenic bladder  -chronic SP tube changed 3/5/18 and urine recollected was mixed.     BPH  Flomax, Proscar    JEAN Robbins  03/08/18  4:50 PM

## 2018-03-08 NOTE — PLAN OF CARE
Problem: Patient Care Overview (Adult)  Goal: Plan of Care Review  Outcome: Ongoing (interventions implemented as appropriate)   03/07/18 4783   Coping/Psychosocial Response Interventions   Plan Of Care Reviewed With caregiver   Patient Care Overview   Progress no change   Outcome Evaluation   Outcome Summary/Follow up Plan Cardizem gtt changed to PO; amio gtt started; refused bath care et linen change due to fatigue     Goal: Adult Individualization and Mutuality  Outcome: Ongoing (interventions implemented as appropriate)    Goal: Discharge Needs Assessment  Outcome: Ongoing (interventions implemented as appropriate)      Problem: Fall Risk (Adult)  Goal: Identify Related Risk Factors and Signs and Symptoms  Outcome: Ongoing (interventions implemented as appropriate)    Goal: Absence of Falls  Outcome: Ongoing (interventions implemented as appropriate)      Problem: Skin Integrity Impairment, Risk/Actual (Adult)  Goal: Identify Related Risk Factors and Signs and Symptoms  Outcome: Ongoing (interventions implemented as appropriate)    Goal: Skin Integrity/Wound Healing  Outcome: Ongoing (interventions implemented as appropriate)      Problem: Sepsis (Adult)  Goal: Signs and Symptoms of Listed Potential Problems Will be Absent or Manageable (Sepsis)  Outcome: Ongoing (interventions implemented as appropriate)      Problem: Pressure Ulcer (Adult)  Goal: Signs and Symptoms of Listed Potential Problems Will be Absent or Manageable (Pressure Ulcer)  Outcome: Ongoing (interventions implemented as appropriate)      Problem: Pain, Acute (Adult)  Goal: Identify Related Risk Factors and Signs and Symptoms  Outcome: Ongoing (interventions implemented as appropriate)

## 2018-03-08 NOTE — PLAN OF CARE
Problem: Patient Care Overview (Adult)  Goal: Plan of Care Review  Outcome: Ongoing (interventions implemented as appropriate)   03/08/18 0536   Coping/Psychosocial Response Interventions   Plan Of Care Reviewed With patient;significant other   Patient Care Overview   Progress declining   Outcome Evaluation   Outcome Summary/Follow up Plan Pt started out on 3 L NC at the beginning of the night, and he had to be moved to a BiPap machine because of a low pH blood gas level. Since the BiPap treatment has started, his blood gas levels have steadily improved     Goal: Adult Individualization and Mutuality  Outcome: Ongoing (interventions implemented as appropriate)    Goal: Discharge Needs Assessment  Outcome: Ongoing (interventions implemented as appropriate)      Problem: Fall Risk (Adult)  Goal: Identify Related Risk Factors and Signs and Symptoms  Outcome: Ongoing (interventions implemented as appropriate)    Goal: Absence of Falls  Outcome: Ongoing (interventions implemented as appropriate)      Problem: Skin Integrity Impairment, Risk/Actual (Adult)  Goal: Identify Related Risk Factors and Signs and Symptoms  Outcome: Ongoing (interventions implemented as appropriate)    Goal: Skin Integrity/Wound Healing  Outcome: Ongoing (interventions implemented as appropriate)      Problem: Sepsis (Adult)  Goal: Signs and Symptoms of Listed Potential Problems Will be Absent or Manageable (Sepsis)  Outcome: Ongoing (interventions implemented as appropriate)      Problem: Pressure Ulcer (Adult)  Goal: Signs and Symptoms of Listed Potential Problems Will be Absent or Manageable (Pressure Ulcer)  Outcome: Ongoing (interventions implemented as appropriate)      Problem: Pain, Acute (Adult)  Goal: Identify Related Risk Factors and Signs and Symptoms  Outcome: Ongoing (interventions implemented as appropriate)    Goal: Acceptable Pain Control/Comfort Level  Outcome: Ongoing (interventions implemented as appropriate)

## 2018-03-08 NOTE — PLAN OF CARE
Problem: Patient Care Overview (Adult)  Goal: Plan of Care Review  Outcome: Ongoing (interventions implemented as appropriate)   03/08/18 7662   Coping/Psychosocial Response Interventions   Plan Of Care Reviewed With caregiver   Patient Care Overview   Progress improving   Outcome Evaluation   Outcome Summary/Follow up Plan pt. has remained off bipap for this shift since breakfast; continues to remain a&o; v/s wnl     Goal: Adult Individualization and Mutuality  Outcome: Ongoing (interventions implemented as appropriate)    Goal: Discharge Needs Assessment  Outcome: Ongoing (interventions implemented as appropriate)      Problem: Fall Risk (Adult)  Goal: Identify Related Risk Factors and Signs and Symptoms  Outcome: Ongoing (interventions implemented as appropriate)    Goal: Absence of Falls  Outcome: Ongoing (interventions implemented as appropriate)      Problem: Skin Integrity Impairment, Risk/Actual (Adult)  Goal: Identify Related Risk Factors and Signs and Symptoms  Outcome: Ongoing (interventions implemented as appropriate)    Goal: Skin Integrity/Wound Healing  Outcome: Ongoing (interventions implemented as appropriate)      Problem: Sepsis (Adult)  Goal: Signs and Symptoms of Listed Potential Problems Will be Absent or Manageable (Sepsis)  Outcome: Ongoing (interventions implemented as appropriate)      Problem: Pressure Ulcer (Adult)  Goal: Signs and Symptoms of Listed Potential Problems Will be Absent or Manageable (Pressure Ulcer)  Outcome: Ongoing (interventions implemented as appropriate)      Problem: Pain, Acute (Adult)  Goal: Identify Related Risk Factors and Signs and Symptoms  Outcome: Ongoing (interventions implemented as appropriate)    Goal: Acceptable Pain Control/Comfort Level  Outcome: Ongoing (interventions implemented as appropriate)

## 2018-03-08 NOTE — CONSULTS
Cardiology at [facility]  Cardiovascular Consultation Note      Mando Hill  12/A  [id number]  1945    DATE OF ADMISSION: 3/5/2018  DATE OF CONSULTATION:  3/8/2018    Valerie Marvin MD  Treatment Team:   Attending Provider: Yoan Hamilton MD  Consulting Physician: Yoan Hamilton MD  Consulting Physician: Evelio Christensen MD  Consulting Physician: Morgan Al MD  Consulting Physician: Sai Muñoz MD  Admitting Provider: Yoan Hamilton MD    Chief Complaint   Patient presents with   • Nausea       Chief complaint/ Reason for Consultation:Atrial flutter with a counterclockwise mechanism and supraventricular tachycardia on multiple different medication      History of Present Illness73-year-old gentleman admitted on 3/5/2018 for evaluation of nauseous, weakness and low-grade fever who has history of traumatic brain injury and is on a wheelchair since the age of 12 came with cellulitis on the legs and the gluteal area edema atrial fibrillations of nauseous low-grade fever and worsening weakness and was transferred to ICU because of A. Flutter with RVR and he was hypotensive.  Patient also noted to have supraventricular tachycardia.   Currently he is doing okay he never had any cardiac problems.  He has atrial flutter with variable block.  He is on anticoagulation with Xarelto.  He has a suprapubic catheter chronically.  He lives with his girlfriend.  Never had any cardiac problems in the past other than atrial flutter and on anticoagulation.  Patient has good mental status and able to provide information.    Echo 3/7/2018  · Left ventricular systolic function is normal. Estimated EF = 55%.  · Left ventricular diastolic dysfunction (grade I) consistent with impaired relaxation.  · Mild mitral valve regurgitation is present  · Mild tricuspid valve regurgitation is present  Past Medical History:   Diagnosis Date   • Arthritis    • Atherosclerosis    • Atrial fibrillation     • BPH (benign prostatic hyperplasia)    • Brain injury    • Chronic bronchitis    • Hypertension    • Left hemiplegia    • UTI (urinary tract infection) due to urinary indwelling catheter 3/5/2018       Past Surgical History:   Procedure Laterality Date   • CHOLECYSTECTOMY     • EYE SURGERY     • SUPRAPUBIC CATHETER INSERTION         Allergies   Allergen Reactions   • Aleve [Naproxen]    • Ambien [Zolpidem]    • Vancomycin        No current facility-administered medications on file prior to encounter.      Current Outpatient Prescriptions on File Prior to Encounter   Medication Sig Dispense Refill   • aspirin 81 MG EC tablet Take 81 mg by mouth Daily.     • baclofen (LIORESAL) 10 MG tablet Take 10 mg by mouth 2 (Two) Times a Day.     • diltiaZEM (CARDIZEM) 60 MG tablet Take 60 mg by mouth 4 (Four) Times a Day.     • finasteride (PROSCAR) 5 MG tablet Take 5 mg by mouth Daily.     • HYDROcodone-acetaminophen (NORCO)  MG per tablet TAKE 1 TABLET BY MOUTH EVERY SIX HOURS AS NEEDED FOR CHRONIC PAIN  0   • nebivolol (BYSTOLIC) 20 MG tablet Take 20 mg by mouth Daily.     • Omega-3 Fatty Acids (FISH OIL) 1000 MG capsule capsule Take  by mouth Daily With Breakfast.     • phenytoin (DILANTIN) 100 MG ER capsule Take  by mouth 2 (Two) Times a Day.     • rivaroxaban (XARELTO) 20 MG tablet Take 20 mg by mouth Daily.     • tamsulosin (FLOMAX) 0.4 MG capsule 24 hr capsule Take 1 capsule by mouth Every Night.     • vitamin C (ASCORBIC ACID) 500 MG tablet Take 500 mg by mouth Daily.         Social History     Social History   • Marital status:      Spouse name: N/A   • Number of children: N/A   • Years of education: N/A     Occupational History   • Not on file.     Social History Main Topics   • Smoking status: Former Smoker   • Smokeless tobacco: Never Used   • Alcohol use No   • Drug use: No   • Sexual activity: Not Currently     Other Topics Concern   • Not on file     Social History Narrative           REVIEW OF  "SYSTEMS:   ROS  Review of Systems      Constitution: Denies any fatigue, fever or chills     HENT: Denies any headache, hearing impairment     Eyes: Denies any blurring of vision,      Cardivascular - As per history of present illness      Respiratory system-denies any COPD, shortness of breath     Endocrine:  No history of hyperlipidemia,      Musculoskeletal:  Wheelchair bound     Gastrointestinal: No nausea, vomiting, or melena     Genitourinary: Chronic suprapubic Gayle catheter due to previous injury     Neurological:   Traumatic brain injury with left hemiparesis     Psychiatric/Behavioral:        No history of depression     Hematological- no history of easy bruising    Objective:     Vitals:    03/08/18 1300 03/08/18 1400 03/08/18 1500 03/08/18 1530   BP: 113/66 108/56 109/52 113/57   BP Location:       Patient Position:       Pulse: 104 106 105 105   Resp:       Temp:       TempSrc:       SpO2: 94% 94% 93% 91%   Weight:       Height:         Body mass index is 28.42 kg/(m^2).  Flowsheet Rows         First Filed Value    Admission Height  170.2 cm (67\") Documented at 03/05/2018 0023    Admission Weight  81.6 kg (180 lb) Documented at 03/05/2018 0023          Intake/Output Summary (Last 24 hours) at 03/08/18 1619  Last data filed at 03/08/18 0549   Gross per 24 hour   Intake           672.69 ml   Output             1500 ml   Net          -827.31 ml         Physical Exam   Physical Exam  Physical Exam      Constitutional: is oriented to person, place, and time.      Skin-warm and dry    Well developed and nourished in no acute distress       Head: Normocephalic     Eyes: Pupils are equal, round,      Neck: Neck supple. No bruit in the carotids,     Cardiovascular: [place] in the fifth intercostal space, irregular rate, and  Rhythm,  S1 greater than S2,         Pulmonary/Chest:   Air  Entry is equal on both sides  No wheezing or crackles,      Abdominal: Soft.  No hepatosplenomegaly, suprapubic catheter in " situ    Musculoskeletal: No kyphoscoliosis    Neurological: is alert and oriented to person, place, and time.  Left hemiparesis     Extremities- there is erythema of the left leg and the gluteal area       Psychiatric: He has a normal mood and affect.  Radiology Review    XR Chest 1 View   Final Result   CONCLUSION: As above.      Electronically signed by:  Dennis Gates MD  3/8/2018 11:05 AM CST   Workstation: [id number]      US Guided Vascular Access   Final Result      XR Chest Post CVA Port   Final Result   CONCLUSION:          1. Right approach PICC line in standard position.   2. Right upper lobe airspace disease.                                                     Electronically signed by:  ERIC Sibley MD  3/7/2018 1:47 PM   CST Workstation: [id number]      IR PICC wo fluoro guidance   Final Result      XR Chest 1 View   Final Result   No acute disease.      Electronically signed by:  Luke Bustos  3/5/2018 1:58 AM CST   Workstation: [id number]          Lab Results:  Lab Results (last 24 hours)     Procedure Component Value Units Date/Time    Blood Gas, Arterial [[id number]]  (Abnormal) Collected:  03/07/18 2359    Specimen:  Arterial Blood Updated:  03/08/18 0000     Site --      Not performed at this site.        Kevin's Test --      Not performed at this site.        pH, Arterial 7.095 (L) pH units      pCO2, Arterial 72.7 (H) mm Hg      pO2, Arterial 103.5 mm Hg      HCO3, Arterial 21.8 (L) mmol/L      Base Excess, Arterial -9.6 (L) mmol/L      O2 Saturation, Arterial 97.0 %      Hemoglobin, Blood Gas 16.1 g/dL      Hematocrit, Blood Gas 47.0 %      CO2 Content 24.0     Sodium, Arterial 138.9 mmol/L      Potassium, Arterial 4.45 mmol/L      Glucose, Arterial 202 mmol/L      Barometric Pressure for Blood Gas -- mmHg       Not performed at this site.        Modality --      Not performed at this site.        Ionized Calcium 4.80 mg/dL     Blood Gas, Arterial [[id number]]  (Abnormal) Collected:   03/08/18 0112    Specimen:  Arterial Blood Updated:  03/08/18 0114     Site --      Not performed at this site.        Kevin's Test --      Not performed at this site.        pH, Arterial 7.228 (L) pH units      pCO2, Arterial 52.6 (H) mm Hg      pO2, Arterial 59.7 (L) mm Hg      HCO3, Arterial 21.4 (L) mmol/L      Base Excess, Arterial -6.6 (L) mmol/L      O2 Saturation, Arterial 90.0 (L) %      Hemoglobin, Blood Gas 15.3 g/dL      Hematocrit, Blood Gas 45.0 %      CO2 Content 23.0     Sodium, Arterial 137.5 (L) mmol/L      Potassium, Arterial 4.25 mmol/L      Glucose, Arterial 189 mmol/L      Barometric Pressure for Blood Gas -- mmHg       Not performed at this site.        Modality --      Not performed at this site.        Ionized Calcium 4.90 mg/dL     Blood Culture - Blood, [[id number]]  (Normal) Collected:  03/05/18 0136    Specimen:  Blood from Arm, Right Updated:  03/08/18 0203     Blood Culture No growth at 3 days    Blood Culture - Blood, [[id number]]  (Normal) Collected:  03/05/18 0235    Specimen:  Blood from Wrist, Right Updated:  03/08/18 0246     Blood Culture No growth at 3 days    Blood Gas, Arterial [[id number]]  (Abnormal) Collected:  03/08/18 0258    Specimen:  Arterial Blood Updated:  03/08/18 0305     Site --      Not performed at this site.        Kevin's Test --      Not performed at this site.        pH, Arterial 7.269 (L) pH units      pCO2, Arterial 48.0 (H) mm Hg      pO2, Arterial 81.6 mm Hg      HCO3, Arterial 21.5 (L) mmol/L      Base Excess, Arterial -5.7 (L) mmol/L      O2 Saturation, Arterial 96.1 %      Hemoglobin, Blood Gas 16.5 g/dL      Hematocrit, Blood Gas 49.0 %      CO2 Content 23.0     Sodium, Arterial 138.0 mmol/L      Potassium, Arterial 4.10 mmol/L      Glucose, Arterial 179 mmol/L      Barometric Pressure for Blood Gas -- mmHg       Not performed at this site.        Modality --      Not performed at this site.        Ionized Calcium 4.90 mg/dL     CBC & Differential  [[id number]] Collected:  03/08/18 0258    Specimen:  Blood Updated:  03/08/18 0305    Narrative:       The following orders were created for panel order CBC & Differential.  Procedure                               Abnormality         Status                     ---------                               -----------         ------                     CBC Auto Differential[[id number]]        Abnormal            Final result                 Please view results for these tests on the individual orders.    CBC Auto Differential [[id number]]  (Abnormal) Collected:  03/08/18 0258    Specimen:  Blood Updated:  03/08/18 0305     WBC 17.84 (H) 10*3/mm3      RBC 5.04 10*6/mm3      Hemoglobin 15.9 g/dL      Hematocrit 47.1 %      MCV 93.5 fL      MCH 31.5 pg      MCHC 33.8 g/dL      RDW 13.3 %      RDW-SD 45.8 (H) fl      MPV 10.0 fL      Platelets 204 10*3/mm3      Neutrophil % 94.0 (H) %      Lymphocyte % 1.7 (L) %      Monocyte % 3.9 %      Eosinophil % 0.0 %      Basophil % 0.1 %      Immature Grans % 0.3 %      Neutrophils, Absolute 16.78 (H) 10*3/mm3      Lymphocytes, Absolute 0.30 (L) 10*3/mm3      Monocytes, Absolute 0.69 10*3/mm3      Eosinophils, Absolute 0.00 10*3/mm3      Basophils, Absolute 0.01 10*3/mm3      Immature Grans, Absolute 0.06 (H) 10*3/mm3     Comprehensive Metabolic Panel [[id number]]  (Abnormal) Collected:  03/08/18 0258    Specimen:  Blood Updated:  03/08/18 0328     Glucose 174 (H) mg/dL      BUN 15 mg/dL      Creatinine 1.07 mg/dL      Sodium 140 mmol/L      Potassium 4.1 mmol/L      Chloride 106 mmol/L      CO2 21.0 (L) mmol/L      Calcium 9.0 mg/dL      Total Protein 7.7 g/dL      Albumin 3.80 g/dL      ALT (SGPT) 33 U/L      AST (SGOT) 22 U/L      Alkaline Phosphatase 92 U/L      Total Bilirubin 0.3 mg/dL      eGFR Non African Amer 67 mL/min/1.73      Globulin 3.9 (H) gm/dL      A/G Ratio 1.0 (L) g/dL      BUN/Creatinine Ratio 14.0     Anion Gap 13.0 mmol/L     Narrative:       The MDRD GFR formula is  only valid for adults with stable renal function between ages 18 and 70.          I personally viewed and interpreted the patient's EKG/Telemetry data      Assessment/Plan:       #1 atrial flutter with a rapid ventricular response #2 supraventricular tachycardia #3 history of traumatic brain injury #4 admitted with cellulitis and erythema of the leg and Gluteal region area on IV antibiotic    73 years old gentleman who provided information, caregiver also present in the room( with power of ) at the time of evaluations.  Patient admitted for evaluation of weakness and low-grade fever noted of cellulitis.  The patient was admitted to intensive care care unit secondary symptomatic atrial flutter with a rapid ventricular response and he and documented supraventricular tachycardia.  Patient evaluated by Dr. soto started on IV amiodarone along with continuation to calcium channel blocker.  I was requested to evaluate this patient for EP study and ablations.  Discussed   different options with patient's caregiver (power of ) regarding the management of atrial flutter and supraventricular tachycardia.  Option #1 adjusting the present medication and added beta blocker if needed to control the rate and option #2 to proceed with EP study and ablations.  Pros and cons of both option discussed with the patient patient and the caregiver (power of .  Opted for EP study and ablations.  Procedure risks discussed.  Risk included but not limited to infection, bleeding, stroke, hematoma, no pneumothorax, heart block.  Understand willing to proceed forward.  Timing depends on patient clinical condition.  Present patient being treated with baclofen, Cardizem 240 mg to block the AV node, bystolic with history of hypertension, Xarelto to decrease risk of cardiac embolic phenomena and IV amiodarone.  Her rate is better today and patient is hemodynamically stable    Thank you for allowing me to participate in  the care of Mando Hill. Feel free to contact me directly with any further questions or concerns.    Seven Londono MD  03/08/18  4:19 PM.      EMR Dragon/Transcription disclaimer:   Much of this encounter note is an electronic transcription/translation of spoken language to printed text. The electronic translation of spoken language may permit erroneous, or at times, nonsensical words or phrases to be inadvertently transcribed; Although I have reviewed the note for such errors, some may still exist.

## 2018-03-08 NOTE — PLAN OF CARE
Problem: Patient Care Overview (Adult)  Goal: Plan of Care Review  Outcome: Ongoing (interventions implemented as appropriate)   03/08/18 7639   Coping/Psychosocial Response Interventions   Plan Of Care Reviewed With significant other   Patient Care Overview   Progress progress towards functional goals is fair   Outcome Evaluation   Outcome Summary/Follow up Plan Patient had a change in respiratory status throughout evening. Blood gas was obtained and showed respiratory acidosis. Patient placed on BiPAP. Patient tolerated BiPAP well. Blood gases steadily improving. Will continue to monitor.

## 2018-03-08 NOTE — PROGRESS NOTES
[facility] Medicine Services  INPATIENT PROGRESS NOTE     LOS: 3 days   Patient Care Team:  Valerie Marvin MD as PCP - General    Chief Complaint:  Sepsis      Subjective     Interval History:   Patient is in for follow-up today.  He was transferred to the ICU due to uncontrolled atrial fibrillation.  He is now on amiodarone infusion, oral Cardizem and Bystolic  and remains in controlled ventricular response.  He had acute onset of shortness of air last night requiring noninvasive respiratory therapy in the form of BiPAP.  Patient has been off BiPAP, less symptomatic and on supplemental oxygen of 3-4 L nasal prongs and maintaining saturation of 95%.  He is mostly bedbound due to history of stroke but does use a wheelchair as an assistive means of ambulation.   He is tolerating prescribed diet and no other changes have been reported.    Patient Complaints: As above    History taken from: Patient and wife    Review of Systems:    Review of Systems   Constitutional: Positive for activity change and fatigue. Negative for appetite change, chills, diaphoresis and fever.   HENT: Negative for trouble swallowing and voice change.    Eyes: Negative for photophobia and visual disturbance.   Respiratory: Negative for cough, choking, chest tightness, shortness of breath, wheezing and stridor.    Cardiovascular: Negative for chest pain, palpitations and leg swelling.   Gastrointestinal: Negative for abdominal distention, abdominal pain, blood in stool, constipation, diarrhea, nausea and vomiting.   Endocrine: Negative for cold intolerance, heat intolerance, polydipsia, polyphagia and polyuria.   Genitourinary: Negative for decreased urine volume, difficulty urinating, dysuria, enuresis, flank pain, frequency, hematuria and urgency.   Musculoskeletal: Positive for gait problem. Negative for arthralgias, myalgias, neck pain and neck stiffness.   Skin: Positive for color change and  wound. Negative for pallor and rash.   Neurological: Positive for weakness. Negative for dizziness, tremors, seizures, syncope, facial asymmetry, speech difficulty, light-headedness, numbness and headaches.   Hematological: Does not bruise/bleed easily.   Psychiatric/Behavioral: Negative for agitation, behavioral problems and confusion.         Objective     Vital Signs  Temp:  [98.1 °F (36.7 °C)-98.2 °F (36.8 °C)] 98.2 °F (36.8 °C)  Heart Rate:  [] 107  Resp:  [16-25] 24  BP: ()/() 115/57    Physical Exam:   Physical Exam   Constitutional: He is oriented to person, place, and time. He appears well-developed and well-nourished. No distress.   HENT:   Head: Normocephalic and atraumatic.   Eyes: EOM are normal. Pupils are equal, round, and reactive to light. No scleral icterus.   Neck: Normal range of motion. Neck supple. No JVD present. No thyromegaly present.   Cardiovascular: Normal rate and normal heart sounds.  An irregularly irregular rhythm present. Exam reveals no gallop and no friction rub.    No murmur heard.  Pulmonary/Chest: Effort normal. He has no wheezes. He has rhonchi. He has no rales. He exhibits no tenderness.   Abdominal: Soft. Bowel sounds are normal. He exhibits no distension and no mass. There is no tenderness. There is no rebound and no guarding.   There is a suprapubic catheter which was recently changed.   Musculoskeletal: He exhibits deformity. He exhibits no edema or tenderness.   Neurological: He is alert and oriented to person, place, and time. No cranial nerve deficit. He exhibits normal muscle tone.   Skin: Skin is warm and dry. No rash noted. He is not diaphoretic. No erythema. No pallor.   Cellulitis left lower extremity and buttock has essentially resolved.   Psychiatric: He has a normal mood and affect. His behavior is normal. Judgment and thought content normal.   Nursing note and vitals reviewed.         Results Review:         Results from last 7 days  Lab Units  03/08/18  0258 03/08/18  0112 03/07/18  2359 03/07/18  0751 03/06/18  0600 03/05/18  0924 03/05/18  0136   SODIUM mmol/L 140  --   --  142 141 143 142   SODIUM, ARTERIAL mmol/L 138.0 137.5* 138.9  --   --   --   --    POTASSIUM mmol/L 4.1  --   --  4.4 3.9 3.9 3.7   CHLORIDE mmol/L 106  --   --  109 107 106 100   CO2 mmol/L 21.0*  --   --  19.0* 23.0 24.0 26.0   BUN mg/dL 15  --   --  14 19 24* 29*   CREATININE mg/dL 1.07  --   --  0.98 1.07 1.20 1.30   GLUCOSE mg/dL 174*  --   --  126* 123* 128* 125*   GLUCOSE, ARTERIAL mmol/L 179 189 202  --   --   --   --    CALCIUM mg/dL 9.0  --   --  7.9* 7.6* 7.7* 8.6   BILIRUBIN mg/dL 0.3  --   --  0.3 0.4 0.5 0.4   ALK PHOS U/L 92  --   --  79 70 89 122   ALT (SGPT) U/L 33  --   --  28 32 32 31   AST (SGOT) U/L 22  --   --  17 18 23 27         Results from last 7 days  Lab Units 03/05/18  0136   MAGNESIUM mg/dL 1.8         Results from last 7 days  Lab Units 03/08/18  0258 03/07/18  0547 03/06/18  0600 03/05/18  0923 03/05/18  0136   WBC 10*3/mm3 17.84* 16.30* 15.90* 22.76* 23.46*   HEMOGLOBIN g/dL 15.9 14.9 12.7* 14.5 15.6   HEMATOCRIT % 47.1 45.7 38.5* 43.1 45.6   PLATELETS 10*3/mm3 204 152 172 168 197       Lab Results   Component Value Date    CKTOTAL 60 03/05/2018    CKMB 2.6 02/01/2015    TROPONINI 0.015 02/01/2015       pH, Arterial   Date Value Ref Range Status   03/08/2018 7.269 (L) 7.350 - 7.450 pH units Final     CO2   Date Value Ref Range Status   03/08/2018 21.0 (L) 22.0 - 31.0 mmol/L Final              Imaging Results (last 7 days)     Procedure Component Value Units Date/Time    XR Chest 1 View [[id number]] Collected:  03/05/18 0140     Updated:  03/05/18 0200    Narrative:         Chest single view on  3/5/2018     CLINICAL INDICATION: Nausea, fever, tachycardia    COMPARISON: 12/15/2016    FINDINGS: There is mild elevation of the right hemidiaphragm.  Mild chronic interstitial changes are noted. Heart is upper  limits normal for size. Hilar and mediastinal  contours are within  normal limits.      Impression:       No acute disease.    Electronically signed by:  Luke Bustos  3/5/2018 1:58 AM CST  Workstation: [id number]    IR PICC wo fluoro guidance [[id number]] Resulted:  03/07/18 1337     Updated:  03/07/18 1337    Narrative:       This procedure was auto-finalized with no dictation required.    XR Chest Post CVA Port [[id number]] Collected:  03/07/18 1330     Updated:  03/07/18 1348    Narrative:         EXAM:         Radiograph(s), Chest   VIEWS:   Portable ; 1       DATE/TIME:  3/7/2018 1:46 PM CST                INDICATION:   verification, R50.9 Fever, unspecified A41.9  Sepsis, unspecified organism L03.317 Cellulitis of buttock    COMPARISON:  CXR: 3/5/18             FINDINGS:             - lines/tubes:    Right approach PICC line in standard position.      - cardiac:         size within normal limits         - mediastinum: contour within normal limits         - lungs:         Focal airspace disease, right upper lobe. A  second focus of airspace disease may be present in the left base.              - pleura:         no evidence of  fluid                  - osseous:         unremarkable for age                  - misc.:         Impression:       CONCLUSION:        1. Right approach PICC line in standard position.  2. Right upper lobe airspace disease.                                               Electronically signed by:  ERIC Sibley MD  3/7/2018 1:47 PM  CST Workstation: [id number]    US Guided Vascular Access [[id number]] Resulted:  03/07/18 1355     Updated:  03/07/18 1355    Narrative:       This procedure was auto-finalized with no dictation required.    XR Chest 1 View [[id number]] Collected:  03/08/18 1037     Updated:  03/08/18 1106    Narrative:       Chest single view     CLINICAL INDICATION: Shortness of breath. Respiratory failure,  fever and sepsis    COMPARISON: Chest March 7, 2018.    FINDINGS: Cardiomegaly. Prominence of pulmonary  vascular markings  slightly improved since prior exam. Infiltrative changes,  pneumonitis right lung primarily right upper lobe with partial  clearing, slight improvement since prior exam.      Impression:       CONCLUSION: As above.    Electronically signed by:  Dennis Gates MD  3/8/2018 11:05 AM CST  Workstation: [id number]           Blood Culture   Date Value Ref Range Status   03/05/2018 No growth at 2 days  Preliminary                    Urine Culture   Date Value Ref Range Status   03/05/2018 Mixed Tina Isolated  Final           Medication Review:   Current Facility-Administered Medications   Medication Dose Route Frequency Provider Last Rate Last Dose   • 8-hydroxyquinoline sulfate (BAG BALM) ointment   Apply externally Daily Evelio Christensen MD       • albuterol (PROVENTIL) nebulizer solution 0.5% 2.5 mg/0.5mL  2.5 mg Nebulization Q6H PRN Yoan Hamilton MD       • amiodarone (NEXTERONE) 360 mg/200 mL (1.8 mg/mL) infusion  0.5 mg/min Intravenous Continuous Sai Muñoz MD 16.67 mL/hr at 03/08/18 1158 0.5 mg/min at 03/08/18 1158   • aspirin EC tablet 81 mg  81 mg Oral Daily Yoan Hamilton MD   81 mg at 03/08/18 0858   • baclofen (LIORESAL) tablet 10 mg  10 mg Oral Q12H Jordi Marie MD   10 mg at 03/08/18 0858   • diltiaZEM CD (CARDIZEM CD) 24 hr capsule 240 mg  240 mg Oral Daily With Dinner Sai Muñoz MD   240 mg at 03/07/18 1752   • docusate sodium (COLACE) capsule 100 mg  100 mg Oral Daily Jordi Marie MD   100 mg at 03/08/18 0858   • finasteride (PROSCAR) tablet 5 mg  5 mg Oral Daily Yoan Hamilton MD   5 mg at 03/08/18 0858   • furosemide (LASIX) tablet 40 mg  40 mg Oral Daily Hardy Fisher MD       • HYDROcodone-acetaminophen (NORCO)  MG per tablet 1 tablet  1 tablet Oral Q4H PRN Yoan Hamilton MD   1 tablet at 03/06/18 2023   • magic butt ointment   Topical BID Evelio Christensen MD       • nebivolol (BYSTOLIC) tablet 20 mg  20 mg Oral Daily Hardy Fisher MD   20 mg at  03/08/18 0858   • ondansetron (ZOFRAN) injection 4 mg  4 mg Intravenous Q4H PRN Evelio Christensen MD   4 mg at 03/07/18 0134   • phenytoin (DILANTIN) ER capsule 100 mg  100 mg Oral Q12H Yoan Hamilton MD   100 mg at 03/08/18 0858   • piperacillin-tazobactam (ZOSYN) 3.375 g in iso-osmotic dextrose 50 ml (premix)  3.375 g Intravenous Q8H Hardy Fisher MD   3.375 g at 03/08/18 0450   • polyethylene glycol 3350 powder (packet)  17 g Oral Daily PRN Hardy Fisher MD       • predniSONE (DELTASONE) tablet 20 mg  20 mg Oral Daily With Breakfast Hardy Fisher MD       • promethazine (PHENERGAN) injection 12.5 mg  12.5 mg Intravenous Q6H PRN Jordi Marie MD   12.5 mg at 03/07/18 1613   • rivaroxaban (XARELTO) tablet 20 mg  20 mg Oral Daily Yoan Hamilton MD   20 mg at 03/08/18 0858   • sodium chloride (OCEAN) nasal spray 2 spray  2 spray Each Nare PRN Hardy Fisher MD   2 spray at 03/07/18 1750   • sodium chloride 0.9 % flush 1-10 mL  1-10 mL Intravenous PRN Yoan Hamilton MD   10 mL at 03/07/18 1615   • sodium chloride 0.9 % flush 10 mL  10 mL Intravenous PRN Robert Anderson MD       • sodium chloride 0.9 % flush 10 mL  10 mL Intracatheter Q12H Hardy Fisher MD   10 mL at 03/08/18 0916   • sodium chloride 0.9 % flush 10 mL  10 mL Intracatheter PRN Hardy Fisher MD       • sodium chloride 0.9 % flush 10 mL  10 mL Intracatheter PRN Hardy Fisher MD       • sodium chloride 0.9 % flush 10 mL  10 mL Intracatheter PRN Hardy Fisher MD       • tamsulosin (FLOMAX) 24 hr capsule 0.4 mg  0.4 mg Oral Nightly Yoan Hamilton MD   0.4 mg at 03/07/18 2057   • temazepam (RESTORIL) capsule 15 mg  15 mg Oral Nightly PRN Jordi Marie MD   15 mg at 03/07/18 2057   • vitamin C (ASCORBIC ACID) tablet 500 mg  500 mg Oral Daily Yoan Hamilton MD   500 mg at 03/08/18 0858         Assessment/Plan     Principal Problem:    Sepsis  Active Problems:    Sacral decubitus ulcer    UTI (urinary tract  infection) due to urinary indwelling catheter    Hypotension    Atrial fibrillation  - Pseudomonas UTI complicated by sepsis: Continue Zosyn. Leucocytosis is reactive and will be monitored.  - Cellulitis: Essentially resolved.  Continue broad-spectrum antibiotics.   - Right upper lobe pneumonia: Follow-up chest x-ray done this morning shows some improvement.  Continue antibiotics  - History of CHF: Resume diuretic therapy.  - Atrial fibrillation with rapid ventricular response: Continue rate control and anti-coagulation.  Echocardiogram shows normal left ventricular systolic function with ejection fraction of 55%, grade 1 left ventricular diastolic dysfunction and mild mitral and tricuspid valve regurgitation.  Input by cardiologist is appreciated.    - -History of BPH: Continue home medications.  - Continue GI prophylaxis.  - Discharge planning is ongoing.            Hardy Fisher MD  03/08/18      EMR Dragon/Transcription disclaimer:   Much of this encounter note is an electronic transcription/translation of spoken language to printed text. The electronic translation of spoken language may permit erroneous, or at times, nonsensical words or phrases to be inadvertently transcribed; Although I have reviewed the note for such errors, some may still exist.

## 2018-03-08 NOTE — PROGRESS NOTES
Cardiology note      Patient has underlying atrial flutter, and it is difficult to control.  In spite of being on Bystolic Cardizem CD and amiodarone drip.  Patient was possibly in sinus at some point and is anticoagulated.  Talked to the patient and family and will consult Dr. Londono for atrial flutter ablation before he goes home once his cellulitis is clear.

## 2018-03-09 LAB
ALBUMIN SERPL-MCNC: 3.2 G/DL (ref 3.4–4.8)
ALBUMIN/GLOB SERPL: 0.9 G/DL (ref 1.1–1.8)
ALP SERPL-CCNC: 63 U/L (ref 38–126)
ALT SERPL W P-5'-P-CCNC: 21 U/L (ref 21–72)
ANION GAP SERPL CALCULATED.3IONS-SCNC: 11 MMOL/L (ref 5–15)
AST SERPL-CCNC: 15 U/L (ref 17–59)
BASOPHILS # BLD AUTO: 0.02 10*3/MM3 (ref 0–0.2)
BASOPHILS NFR BLD AUTO: 0.1 % (ref 0–2)
BILIRUB SERPL-MCNC: 0.3 MG/DL (ref 0.2–1.3)
BUN BLD-MCNC: 19 MG/DL (ref 7–21)
BUN/CREAT SERPL: 15.7 (ref 7–25)
CALCIUM SPEC-SCNC: 8.6 MG/DL (ref 8.4–10.2)
CHLORIDE SERPL-SCNC: 103 MMOL/L (ref 95–110)
CO2 SERPL-SCNC: 26 MMOL/L (ref 22–31)
CREAT BLD-MCNC: 1.21 MG/DL (ref 0.7–1.3)
DEPRECATED RDW RBC AUTO: 45.5 FL (ref 35.1–43.9)
EOSINOPHIL # BLD AUTO: 0 10*3/MM3 (ref 0–0.7)
EOSINOPHIL NFR BLD AUTO: 0 % (ref 0–7)
ERYTHROCYTE [DISTWIDTH] IN BLOOD BY AUTOMATED COUNT: 13.3 % (ref 11.5–14.5)
GFR SERPL CREATININE-BSD FRML MDRD: 58 ML/MIN/1.73 (ref 60–98)
GLOBULIN UR ELPH-MCNC: 3.4 GM/DL (ref 2.3–3.5)
GLUCOSE BLD-MCNC: 129 MG/DL (ref 60–100)
GLUCOSE BLDC GLUCOMTR-MCNC: 116 MG/DL (ref 70–130)
HCT VFR BLD AUTO: 41.4 % (ref 39–49)
HGB BLD-MCNC: 14.2 G/DL (ref 13.7–17.3)
IMM GRANULOCYTES # BLD: 0.1 10*3/MM3 (ref 0–0.02)
IMM GRANULOCYTES NFR BLD: 0.7 % (ref 0–0.5)
LYMPHOCYTES # BLD AUTO: 0.7 10*3/MM3 (ref 0.6–4.2)
LYMPHOCYTES NFR BLD AUTO: 4.6 % (ref 10–50)
MCH RBC QN AUTO: 31.8 PG (ref 26.5–34)
MCHC RBC AUTO-ENTMCNC: 34.3 G/DL (ref 31.5–36.3)
MCV RBC AUTO: 92.8 FL (ref 80–98)
MONOCYTES # BLD AUTO: 1.11 10*3/MM3 (ref 0–0.9)
MONOCYTES NFR BLD AUTO: 7.3 % (ref 0–12)
NEUTROPHILS # BLD AUTO: 13.24 10*3/MM3 (ref 2–8.6)
NEUTROPHILS NFR BLD AUTO: 87.3 % (ref 37–80)
NRBC BLD MANUAL-RTO: 0 /100 WBC (ref 0–0)
PLATELET # BLD AUTO: 219 10*3/MM3 (ref 150–450)
PMV BLD AUTO: 9.9 FL (ref 8–12)
POTASSIUM BLD-SCNC: 4.1 MMOL/L (ref 3.5–5.1)
PROT SERPL-MCNC: 6.6 G/DL (ref 6.3–8.6)
RBC # BLD AUTO: 4.46 10*6/MM3 (ref 4.37–5.74)
SODIUM BLD-SCNC: 140 MMOL/L (ref 137–145)
WBC NRBC COR # BLD: 15.17 10*3/MM3 (ref 3.2–9.8)

## 2018-03-09 PROCEDURE — 25010000002 AMIODARONE IN DEXTROSE 5% 360-4.14 MG/200ML-% SOLUTION: Performed by: INTERNAL MEDICINE

## 2018-03-09 PROCEDURE — 82962 GLUCOSE BLOOD TEST: CPT

## 2018-03-09 PROCEDURE — 93005 ELECTROCARDIOGRAM TRACING: CPT | Performed by: INTERNAL MEDICINE

## 2018-03-09 PROCEDURE — 94640 AIRWAY INHALATION TREATMENT: CPT

## 2018-03-09 PROCEDURE — 63710000001 PREDNISONE PER 1 MG: Performed by: INTERNAL MEDICINE

## 2018-03-09 PROCEDURE — 85025 COMPLETE CBC W/AUTO DIFF WBC: CPT | Performed by: FAMILY MEDICINE

## 2018-03-09 PROCEDURE — 99232 SBSQ HOSP IP/OBS MODERATE 35: CPT | Performed by: INTERNAL MEDICINE

## 2018-03-09 PROCEDURE — 94762 N-INVAS EAR/PLS OXIMTRY CONT: CPT

## 2018-03-09 PROCEDURE — 94799 UNLISTED PULMONARY SVC/PX: CPT

## 2018-03-09 PROCEDURE — 97530 THERAPEUTIC ACTIVITIES: CPT

## 2018-03-09 PROCEDURE — 80053 COMPREHEN METABOLIC PANEL: CPT | Performed by: FAMILY MEDICINE

## 2018-03-09 PROCEDURE — 97110 THERAPEUTIC EXERCISES: CPT

## 2018-03-09 PROCEDURE — 93010 ELECTROCARDIOGRAM REPORT: CPT | Performed by: INTERNAL MEDICINE

## 2018-03-09 PROCEDURE — 25010000002 PIPERACILLIN SOD-TAZOBACTAM PER 1 G: Performed by: INTERNAL MEDICINE

## 2018-03-09 RX ORDER — IPRATROPIUM BROMIDE AND ALBUTEROL SULFATE 2.5; .5 MG/3ML; MG/3ML
3 SOLUTION RESPIRATORY (INHALATION)
Status: DISCONTINUED | OUTPATIENT
Start: 2018-03-09 | End: 2018-03-16 | Stop reason: HOSPADM

## 2018-03-09 RX ORDER — AMIODARONE HYDROCHLORIDE 200 MG/1
200 TABLET ORAL
Status: DISCONTINUED | OUTPATIENT
Start: 2018-03-09 | End: 2018-03-16 | Stop reason: HOSPADM

## 2018-03-09 RX ORDER — FLUTICASONE PROPIONATE 50 MCG
2 SPRAY, SUSPENSION (ML) NASAL DAILY
Status: DISCONTINUED | OUTPATIENT
Start: 2018-03-09 | End: 2018-03-16 | Stop reason: HOSPADM

## 2018-03-09 RX ORDER — BUDESONIDE AND FORMOTEROL FUMARATE DIHYDRATE 160; 4.5 UG/1; UG/1
2 AEROSOL RESPIRATORY (INHALATION)
Status: DISCONTINUED | OUTPATIENT
Start: 2018-03-09 | End: 2018-03-16 | Stop reason: HOSPADM

## 2018-03-09 RX ADMIN — TAZOBACTAM SODIUM AND PIPERACILLIN SODIUM 3.38 G: 375; 3 INJECTION, SOLUTION INTRAVENOUS at 03:17

## 2018-03-09 RX ADMIN — TEMAZEPAM 15 MG: 15 CAPSULE ORAL at 20:00

## 2018-03-09 RX ADMIN — BUDESONIDE AND FORMOTEROL FUMARATE DIHYDRATE 2 PUFF: 160; 4.5 AEROSOL RESPIRATORY (INHALATION) at 19:34

## 2018-03-09 RX ADMIN — IPRATROPIUM BROMIDE AND ALBUTEROL SULFATE 3 ML: 2.5; .5 SOLUTION RESPIRATORY (INHALATION) at 15:07

## 2018-03-09 RX ADMIN — FLUTICASONE PROPIONATE 2 SPRAY: 50 SPRAY, METERED NASAL at 11:13

## 2018-03-09 RX ADMIN — HYDROCORTISONE: 1 CREAM TOPICAL at 20:01

## 2018-03-09 RX ADMIN — DOCUSATE SODIUM 100 MG: 100 CAPSULE, LIQUID FILLED ORAL at 09:13

## 2018-03-09 RX ADMIN — HYDROCORTISONE: 1 CREAM TOPICAL at 09:18

## 2018-03-09 RX ADMIN — TAZOBACTAM SODIUM AND PIPERACILLIN SODIUM 3.38 G: 375; 3 INJECTION, SOLUTION INTRAVENOUS at 13:00

## 2018-03-09 RX ADMIN — PHENYTOIN SODIUM 100 MG: 100 CAPSULE, EXTENDED RELEASE ORAL at 20:00

## 2018-03-09 RX ADMIN — IPRATROPIUM BROMIDE AND ALBUTEROL SULFATE 3 ML: 2.5; .5 SOLUTION RESPIRATORY (INHALATION) at 19:34

## 2018-03-09 RX ADMIN — RIVAROXABAN 20 MG: 10 TABLET, FILM COATED ORAL at 09:13

## 2018-03-09 RX ADMIN — FUROSEMIDE 40 MG: 40 TABLET ORAL at 09:12

## 2018-03-09 RX ADMIN — FINASTERIDE 5 MG: 5 TABLET, FILM COATED ORAL at 09:13

## 2018-03-09 RX ADMIN — TAMSULOSIN HYDROCHLORIDE 0.4 MG: 0.4 CAPSULE ORAL at 20:00

## 2018-03-09 RX ADMIN — POLYETHYLENE GLYCOL 3350 17 G: 17 POWDER, FOR SOLUTION ORAL at 09:11

## 2018-03-09 RX ADMIN — Medication 10 ML: at 21:57

## 2018-03-09 RX ADMIN — OXYCODONE HYDROCHLORIDE AND ACETAMINOPHEN 500 MG: 500 TABLET ORAL at 09:12

## 2018-03-09 RX ADMIN — TAZOBACTAM SODIUM AND PIPERACILLIN SODIUM 3.38 G: 375; 3 INJECTION, SOLUTION INTRAVENOUS at 20:00

## 2018-03-09 RX ADMIN — PREDNISONE 20 MG: 20 TABLET ORAL at 09:13

## 2018-03-09 RX ADMIN — AMIODARONE HYDROCHLORIDE 200 MG: 200 TABLET ORAL at 09:13

## 2018-03-09 RX ADMIN — AMIODARONE HYDROCHLORIDE 0.5 MG/MIN: 1.8 INJECTION, SOLUTION INTRAVENOUS at 08:04

## 2018-03-09 RX ADMIN — BACLOFEN 10 MG: 10 TABLET ORAL at 21:56

## 2018-03-09 RX ADMIN — PHENYTOIN SODIUM 100 MG: 100 CAPSULE, EXTENDED RELEASE ORAL at 09:13

## 2018-03-09 RX ADMIN — SALINE NASAL SPRAY 2 SPRAY: 1.5 SOLUTION NASAL at 09:20

## 2018-03-09 RX ADMIN — ASPIRIN 81 MG: 81 TABLET, COATED ORAL at 09:11

## 2018-03-09 RX ADMIN — Medication 10 ML: at 09:18

## 2018-03-09 RX ADMIN — NEBIVOLOL HYDROCHLORIDE 20 MG: 5 TABLET ORAL at 09:11

## 2018-03-09 RX ADMIN — IPRATROPIUM BROMIDE AND ALBUTEROL SULFATE 3 ML: 2.5; .5 SOLUTION RESPIRATORY (INHALATION) at 11:24

## 2018-03-09 RX ADMIN — DILTIAZEM HYDROCHLORIDE 240 MG: 240 CAPSULE, COATED, EXTENDED RELEASE ORAL at 17:49

## 2018-03-09 RX ADMIN — BACLOFEN 10 MG: 10 TABLET ORAL at 09:13

## 2018-03-09 RX ADMIN — Medication: at 09:18

## 2018-03-09 NOTE — PROGRESS NOTES
"   LOS: 4 days   Patient Care Team:  Valerie Marvin MD as PCP - General    Subjective     Subjective:  Symptoms:  Stable.  (Urine clear and yellow, no problems at cystostomy site.).    Diet:  He reports  nausea.        History taken from: patient chart family    Objective     Vital Signs  Temp:  [97.8 °F (36.6 °C)-98.2 °F (36.8 °C)] 98.1 °F (36.7 °C)  Heart Rate:  [73-87] 78  Resp:  [17-24] 20  BP: ()/(50-73) 152/72    Objective:  General Appearance:  In no acute distress.    Vital signs: (most recent): Blood pressure 152/72, pulse 78, temperature 98.1 °F (36.7 °C), temperature source Oral, resp. rate 20, height 170.2 cm (67\"), weight 82.3 kg (181 lb 7 oz), SpO2 94 %.  Vital signs are normal.  No fever.    Output: Producing urine (SP Tube, urine clear and yellow).    HEENT: Normal HEENT exam.    Lungs:  Normal respiratory rate and normal effort.  He is not in respiratory distress.  Breath sounds clear to auscultation.    Heart: Normal rate.  S1 normal and S2 normal.    Chest: Symmetric chest wall expansion.   Abdomen: Abdomen is soft and non-distended.  Bowel sounds are normal.     Extremities: Decreased range of motion.  There is deformity and dependent edema (LLE).    Pulses: Distal pulses are intact.    Neurological: Patient is alert and oriented to person, place and time.  GCS score is 15.    Pupils:  Pupils are equal, round, and reactive to light.    Skin:  Warm and dry.  (Erythema and increased warmth LLE)            Results Review:    Lab Results (last 24 hours)     Procedure Component Value Units Date/Time    Blood Culture - Blood, [[id number]]  (Normal) Collected:  03/05/18 0136    Specimen:  Blood from Arm, Right Updated:  03/09/18 0203     Blood Culture No growth at 4 days    Blood Culture - Blood, [[id number]]  (Normal) Collected:  03/05/18 0235    Specimen:  Blood from Wrist, Right Updated:  03/09/18 0246     Blood Culture No growth at 4 days    CBC & Differential [[id number]] Collected:  " 03/09/18 0356    Specimen:  Blood Updated:  03/09/18 0431    Narrative:       The following orders were created for panel order CBC & Differential.  Procedure                               Abnormality         Status                     ---------                               -----------         ------                     CBC Auto Differential[[id number]]        Abnormal            Final result                 Please view results for these tests on the individual orders.    CBC Auto Differential [[id number]]  (Abnormal) Collected:  03/09/18 0356    Specimen:  Blood Updated:  03/09/18 0431     WBC 15.17 (H) 10*3/mm3      RBC 4.46 10*6/mm3      Hemoglobin 14.2 g/dL      Hematocrit 41.4 %      MCV 92.8 fL      MCH 31.8 pg      MCHC 34.3 g/dL      RDW 13.3 %      RDW-SD 45.5 (H) fl      MPV 9.9 fL      Platelets 219 10*3/mm3      Neutrophil % 87.3 (H) %      Lymphocyte % 4.6 (L) %      Monocyte % 7.3 %      Eosinophil % 0.0 %      Basophil % 0.1 %      Immature Grans % 0.7 (H) %      Neutrophils, Absolute 13.24 (H) 10*3/mm3      Lymphocytes, Absolute 0.70 10*3/mm3      Monocytes, Absolute 1.11 (H) 10*3/mm3      Eosinophils, Absolute 0.00 10*3/mm3      Basophils, Absolute 0.02 10*3/mm3      Immature Grans, Absolute 0.10 (H) 10*3/mm3      nRBC 0.0 /100 WBC     POC Glucose Once [[id number]]  (Normal) Collected:  03/09/18 0606    Specimen:  Blood Updated:  03/09/18 0630     Glucose 116 mg/dL       Sliding Scale AdminMeter: [id number]: [id number] [facility]       Comprehensive Metabolic Panel [[id number]]  (Abnormal) Collected:  03/09/18 0834    Specimen:  Blood Updated:  03/09/18 0903     Glucose 129 (H) mg/dL      BUN 19 mg/dL      Creatinine 1.21 mg/dL      Sodium 140 mmol/L      Potassium 4.1 mmol/L      Chloride 103 mmol/L      CO2 26.0 mmol/L      Calcium 8.6 mg/dL      Total Protein 6.6 g/dL      Albumin 3.20 (L) g/dL      ALT (SGPT) 21 U/L      AST (SGOT) 15 (L) U/L      Alkaline Phosphatase 63 U/L      Total  Bilirubin 0.3 mg/dL      eGFR Non African Amer 58 (L) mL/min/1.73      Globulin 3.4 gm/dL      A/G Ratio 0.9 (L) g/dL      BUN/Creatinine Ratio 15.7     Anion Gap 11.0 mmol/L     Narrative:       The MDRD GFR formula is only valid for adults with stable renal function between ages 18 and 70.         Imaging Results (last 24 hours)     ** No results found for the last 24 hours. **           I reviewed the patient's new clinical results.  I reviewed the patient's new imaging results and agree with the interpretation.  I reviewed the patient's other test results and agree with the interpretation      Assessment/Plan     Principal Problem:    Sepsis  Active Problems:    Sacral decubitus ulcer    UTI (urinary tract infection) due to urinary indwelling catheter    Hypotension    Atrial fibrillation      Assessment & Plan    UTI, acute, causing sepsis related to chronic SP tube last changed 4 weeks ago.  -Urine culture Pseudomonas aeruginosa  -WBC 23.46-->15.17  -Zosyn day #5  -Estimated Creatinine Clearance: 54.2 mL/min (by C-G formula based on Cr of 1.21).     Neurogenic bladder  -chronic SP tube changed 3/5/18 and urine recollected was mixed.     BPH  Flomax, Proscar    Marco A Frederick, JEAN  03/09/18  5:09 PM

## 2018-03-09 NOTE — THERAPY EVALUATION
Acute Care - Physical Therapy Initial Evaluation  [facility]     Patient Name: Mando Hill  : 1945  MRN: [id number]  Today's Date: 3/8/2018   Onset of Illness/Injury or Date of Surgery Date: 18  Date of Referral to PT: 18  Referring Physician: Dr. Fisher      Admit Date: 3/5/2018     Visit Dx:    ICD-10-CM ICD-9-CM   1. Fever, unspecified fever cause R50.9 780.60   2. Sepsis, due to unspecified organism A41.9 038.9     995.91   3. Cellulitis of buttock L03.317 682.5   4. Impaired functional mobility, balance, and endurance Z74.09 V49.89     Patient Active Problem List   Diagnosis   • Osteoarthritis of right shoulder   • Sepsis   • Sacral decubitus ulcer   • UTI (urinary tract infection) due to urinary indwelling catheter   • Hypotension   • Atrial fibrillation     Past Medical History:   Diagnosis Date   • Arthritis    • Atherosclerosis    • Atrial fibrillation    • BPH (benign prostatic hyperplasia)    • Brain injury    • Chronic bronchitis    • Hypertension    • Left hemiplegia    • UTI (urinary tract infection) due to urinary indwelling catheter 3/5/2018     Past Surgical History:   Procedure Laterality Date   • CHOLECYSTECTOMY     • EYE SURGERY     • SUPRAPUBIC CATHETER INSERTION            PT ASSESSMENT (last 72 hours)      PT Evaluation       18 1501 18 1505    Rehab Evaluation    Document Type evaluation  -GB     Subjective Information agree to therapy  -GB     Evaluation Not Performed  other (see comments)   OT reports pt not feeling well. CCU wanting pt to rest today. PT will check tomorrow.  -BERTRAM    Patient Effort, Rehab Treatment fair  -GB     General Information    Patient Profile Review yes  -GB     Onset of Illness/Injury or Date of Surgery Date 18  -GB     General Observations alert; significant present;   -GB     Pertinent History Of Current Problem has some use R UE but not L; doesn't want to lose whawt he had strength  -GB      "Precautions/Limitations fall precautions;insensate limb;oxygen therapy device and L/min;spinal precautions   R shoulder pain; back pain;   -GB     Prior Level of Function min assist:;max assist:;transfer;w/c or scooter;dressing;bathing   assist with dressing/grooming; \"slowly but surely losing RUE  -GB     Equipment Currently Used at Home commode;lift device;power chair, (recliner lift);slide board;wheelchair, motorized  -GB     Plans/Goals Discussed With patient and family  -GB     Risks Reviewed patient and family:;LOB;dizziness;increased discomfort;change in vital signs  -GB     Benefits Reviewed patient and family:;other (comment)   prevent loss of current function as much as possible  -GB     Living Environment    Lives With significant other  -GB     Living Arrangements mobile home  -GB     Home Accessibility ramps present at home   elctric hydrolic lift outside as well  -GB     Stair Railings at Home outside, present at both sides  -GB     Transportation Available van, wheelchair accessible;other (see comments)   they use the [employer] bus; have a ramp on van but not as helpful  -GB     Clinical Impression    Date of Referral to PT 03/07/18  -GB     PT Diagnosis impaired functional mobility and endurance   pt has hx of TBI which weakened L side  -GB     Prognosis per provider ;  guarded  PT prognosis  -GB     Functional Level At Time Of Evaluation --   needs full assist for mobility;  -GB     Patient/Family Goals Statement home  -GB     Criteria for Skilled Therapeutic Interventions Met yes;treatment indicated  -GB     Pathology/Pathophysiology Noted (Describe Specifically for Each System) cardiovascular;pulmonary;musculoskeletal  -GB     Impairments Found (describe specific impairments) aerobic capacity/endurance;ergonomics and body mechanics;posture;ventilation and respiration/gas exchange  -GB     Functional Limitations in Following Categories (Describe Specific Limitations) self-care  -GB     Rehab Potential " pantera, will monitor progress closely  -GB     Predicted Duration of Therapy Intervention (days/wks) till goals met, d/c or change in condition  -GB     Vital Signs    Pre Systolic BP Rehab 109  -GB     Pre Treatment Diastolic BP 52  -GB     Intra Systolic BP Rehab 103  -GB     Intra Treatment Diastolic BP 57  -GB     Post Systolic BP Rehab 109  -GB     Post Treatment Diastolic BP 78  -GB     Pretreatment Heart Rate (beats/min) 105  -GB     Intratreatment Heart Rate (beats/min) 103  -GB     Posttreatment Heart Rate (beats/min) 106  -GB     Pre SpO2 (%) 93  -GB     O2 Delivery Pre Treatment supplemental O2  -GB     Intra SpO2 (%) 93  -GB     O2 Delivery Intra Treatment supplemental O2  -GB     Post SpO2 (%) 93  -GB     O2 Delivery Post Treatment supplemental O2  -GB     Pre Patient Position Supine  -GB     Intra Patient Position Sitting  -GB     Post Patient Position Supine  -GB     Recovery Time pt had difficulty recovering from SOA post rolling and sitting EOB w/ mod-max assist of 2   -GB     Pain Assessment    Pain Type Chronic pain  -GB     Pain Location Buttocks  -GB     Pain Intervention(s) Repositioned  -GB     Vision Assessment/Intervention    Visual Impairment WFL with corrective lenses  -GB     Cognitive Assessment/Intervention    Current Cognitive/Communication Assessment functional  -GB     Orientation Status oriented x 4  -GB     Follows Commands/Answers Questions 100% of the time;able to follow multi-step instructions  -GB     Personal Safety WNL/WFL;mild impairment  -GB     Personal Safety Interventions fall prevention program maintained;supervised activity  -GB     ROM (Range of Motion)    General ROM upper extremity range of motion deficits identified;lower extremity range of motion deficits identified  -GB     General ROM Detail R shoulder flx limited to ~45 deg AAROM; supination lacks end range by about 15 deg; L ankle is fixed in PF and edematous, and does not allow heel cord  stretch  -GB      "General LE Assessment    ROM RLE ROM was WFL  -     ROM Detail --   LLE tends to ext rotate at rest; foot in PF; flaccid mm tone  -     MMT (Manual Muscle Testing)    General MMT Assessment upper extremity strength deficits identified;lower extremity strength deficits identified;neck/trunk strength deficits identified  -     Neck Trunk    Neck/Trunk Manual Muscle Testing trunk strength deficit  -     Neck/Trunk Manual Muscle Testing Detail fwd flexion at neck due to trunk ext weakness; reports neck has been \"bad\"  -     Trunk    Trunk Manual Muscle Testing Detail trunk lacks postural control sitting and supine; dependent for rolling; kyphosis in supine & sitting due to poor trunk extensors and core strength; needs max support but does not have trunk ext as abnormal tone  -     Upper Extremity    Upper Ext Manual Muscle Testing Detail R rotator cuff weak w/ shoulder elevation to sub for it; can resist ext rot if humerus supported at neutral; int rot present but humeral head stability weak so needs positioning as well. L UE flaccid w/out AROM  -     Lower Extremity    Lower Ext Manual Muscle Testing Detail --   RLE has AROM w/ resistance of at least 3+-4-/5; DF/PF 4-/5;   -     Bed Mobility, Assessment/Treatment    Bed Mobility, Roll Left, Picher dependent (less than 25% patient effort)  -GB     Bed Mobility, Roll Right, Picher dependent (less than 25% patient effort)  -     Bed Mobility, Scoot/Bridge, [place] dependent (less than 25% patient effort)  -     Bed Mob, Supine to Sit, [place]her dependent (less than 25% patient effort)  -     Bed Mob, Sit to Supine, Picher dependent (less than 25% patient effort)  -     Bed Mobility, Comment sitting required max assist of 1-2  -GB     Transfer Assessment/Treatment    Transfers, Chair-Bed Picher not tested  -     Transfer, Comment further testing and mobility deferred due to pt SOA w/ rolling and sitting ;  -GB     " Sensory Assessment/Intervention    Light Touch LUE;LLE   weaker light touch L side than R per pt; but sensate nilo   -GB     Positioning and Restraints    Pre-Treatment Position in bed  -GB     Post Treatment Position bed  -GB     In Bed notified nsg;supine;call light within reach;encouraged to call for assist;patient within staff view;with family/caregiver;side rails up x3;heels elevated  -GB       03/07/18 1426       Rehab Evaluation    Document Type evaluation  -RW     Subjective Information agree to therapy;no complaints  -RW     Patient Effort, Rehab Treatment fair  -RW     Symptoms Noted During/After Treatment fatigue  -RW     General Information    Patient Profile Review yes  -RW     Onset of Illness/Injury or Date of Surgery Date 03/05/18  -RW     Referring Physician Dr. Fisher  -RW     Precautions/Limitations fall precautions;oxygen therapy device and L/min  -RW     Prior Level of Function dependent:;ADL's;home management;transfer   spongebathes  -RW     Equipment Currently Used at Home wheelchair, motorized  -RW     Plans/Goals Discussed With patient;spouse/S.O.  -RW     Barriers to Rehab previous functional deficit  -RW     Living Environment    Lives With significant other   girlfriend of 30 years  -RW     Living Arrangements mobile home  -RW     Home Accessibility ramps present at home   elevator to enter  -RW     Stair Railings at Home outside, present at both sides  -RW     Type of Financial/Environmental Concern none  -RW     Transportation Available family or friend will provide  -RW     Vital Signs    Pre Systolic BP Rehab 139  -RW     Pre Treatment Diastolic BP 64  -RW     Pretreatment Heart Rate (beats/min) 80  -RW     Posttreatment Heart Rate (beats/min) 83  -RW     Pre SpO2 (%) 96  -RW     O2 Delivery Pre Treatment supplemental O2   3L  -RW     Post SpO2 (%) 98  -RW     O2 Delivery Post Treatment supplemental O2  -RW     Pre Patient Position Supine  -RW     Post Patient Position Supine  -RW      Pain Assessment    Pain Assessment 0-10  -RW     Pain Score 4  -RW     Post Pain Score 4  -RW     Pain Type Chronic pain  -RW     Pain Location Back  -RW     Pain Intervention(s) Declines  -RW     Vision Assessment/Intervention    Visual Impairment Comment reading glasses  -RW     Cognitive Assessment/Intervention    Current Cognitive/Communication Assessment functional  -RW     Orientation Status oriented x 4  -RW     Follows Commands/Answers Questions 100% of the time  -RW     Personal Safety unable/difficult to assess   bed assessment only  -RW     ROM (Range of Motion)    General ROM no range of motion deficits identified   BUE PROM  -RW     MMT (Manual Muscle Testing)    General MMT Assessment upper extremity strength deficits identified  -RW     General MMT Assessment Detail no active motion LUE; RUE: shoulder 2/5, elbow flexion 3-/5, wrist extension 3/5,  3+/5  -RW     Bed Mobility, Assessment/Treatment    Bed Mobility, Comment Pt deferred 2' SOA, reports total(A) required.  -RW     Positioning and Restraints    Pre-Treatment Position in bed  -RW     Post Treatment Position bed  -RW     In Bed notified nsg;fowlers;call light within reach;encouraged to call for assist;with family/caregiver  -RW       User Key  (r) = Recorded By, (t) = Taken By, (c) = Cosigned By    Initials Name Provider Type    VLADIMIR Dickson, PT Physical Therapist    RW Zaina Pickens OTR/L Occupational Therapist    BERTRAM Zuluaga, PT Physical Therapist          Physical Therapy Education     Title: PT OT SLP Therapies (Active)     Topic: Physical Therapy (Active)     Point: Mobility training (Active)    Learning Progress Summary    Learner Readiness Method Response Comment Documented by Status   Patient Acceptance D,E NR POC; mobility  03/08/18 9495 Active               Point: Home exercise program (Active)    Learning Progress Summary    Learner Readiness Method Response Comment Documented by Status   Patient  Acceptance D,E NR POC; mobility  03/08/18 1908 Active               Point: Body mechanics (Active)    Learning Progress Summary    Learner Readiness Method Response Comment Documented by Status   Patient Acceptance D,E NR POC; mobility  03/08/18 1908 Active               Point: Precautions (Active)    Learning Progress Summary    Learner Readiness Method Response Comment Documented by Status   Patient Acceptance D,E NR POC; mobility  03/08/18 1908 Active                      User Key     Initials Effective Dates Name Provider Type Discipline     03/07/18 -  Anisa Dickson, PT Physical Therapist PT                PT Recommendation and Plan  Anticipated Discharge Disposition: home with assist, home with home health, home with 24/7 care, extended care facility, inpatient rehabilitation facility, long term acute care facility, skilled nursing facility  Planned Therapy Interventions: balance training, bed mobility training, home exercise program, transfer training, wheelchair management/propulsion training  Plan of Care Review  Plan Of Care Reviewed With: patient, caregiver  Outcome Summary/Follow up Plan: pt and caregiver have managed transfers at home w/ slide transfer w board to L and sitting transfer to R; their goal is to avoid losing function and strength while here. He can benefit from skilled services; may need to see how chair seat accomodated him; he has to use the old w/chair due to better fit and accepted in [employer] bus, as opposed to the other seat.  Pattern of redness is broad across buttock, but with poor trunk control it may be he rests on back buttock in chair.           IP PT Goals       03/08/18 1909          Transfer Training PT STG    Transfer Training PT STG, Date Established 03/08/18  -GB      Transfer Training PT STG, Time to Achieve 5 days  -GB      Transfer Training PT STG, Activity Type bed to chair /chair to bed  -GB      Transfer Training PT STG, [place] Level dependent  (less than 25% patient effort)  -GB      Transfer Training PT STG, Additional Goal pt will tolerate lift transfer to chair w/out signficant change in VS or skin irritation  -GB      Transfer Training PT STG, Outcome goal ongoing  -GB      Transfer Training PT LTG    Transfer Training PT LTG, Date Established 03/08/18  -GB      Transfer Training PT LTG, Time to Achieve by discharge  -GB      Transfer Training PT LTG, Activity Type bed to chair /chair to bed  -GB      Transfer Training PT LTG, [place] Level other (see comments)   pt and wife will be able to manage R side transfer together  -GB      Transfer Training PT LTG, Additional Goal pt/wife will transfer as PLOF comfortably   -GB      Transfer Training PT LTG, Outcome goal ongoing  -GB      Cardiopulmonary PT STG    Cardiopulmonary PT STG, Date Established 03/08/18  -GB      Cardiopulmonary PT STG, Time to Achieve 3 days  -GB      Cardiopulmonary PT STG, Additional Goal pt will not have dizziness or drop in sats w/ sitting up; piper upright x 30 min  -GB      Cardiopulmonary PT STG, Outcome goal ongoing  -GB      Wheelchair Management PT LTG    Wheelchair Managment Goal PT LTG, Date Established 03/08/18  -GB      Wheelchair Managment Goal PT LTG, Time to Achieve by discharge  -GB      Wheelchair Management Goal PT LTG, Additional Goal pt and wife will be indep in repositioning in w/chair to protect skin buttocks  -GB      Wheelchair Managmentn Goal PT LTG, Outcome goal ongoing  -GB        User Key  (r) = Recorded By, (t) = Taken By, (c) = Cosigned By    Initials Name Provider Type    VLADIMIR Dickson, PT Physical Therapist                Outcome Measures       03/08/18 1900 03/07/18 1426       How much help from another person do you currently need...    Turning from your back to your side while in flat bed without using bedrails? 1  -GB      Moving from lying on back to sitting on the side of a flat bed without bedrails? 1  -GB      Moving to and  from a bed to a chair (including a wheelchair)? 1  -GB      Standing up from a chair using your arms (e.g., wheelchair, bedside chair)? 1  -GB      Climbing 3-5 steps with a railing? 1  -GB      To walk in hospital room? 1  -GB      AM-PAC 6 Clicks Score 6  -GB      How much help from another is currently needed...    Putting on and taking off regular lower body clothing?  1  -RW     Bathing (including washing, rinsing, and drying)  1  -RW     Toileting (which includes using toilet bed pan or urinal)  1  -RW     Putting on and taking off regular upper body clothing  1  -RW     Taking care of personal grooming (such as brushing teeth)  2  -RW     Eating meals  2  -RW     Score  8  -RW     Functional Assessment    Outcome Measure Options AM-PAC 6 Clicks Basic Mobility (PT)  -GB AM-PAC 6 Clicks Daily Activity (OT)  -RW       User Key  (r) = Recorded By, (t) = Taken By, (c) = Cosigned By    Initials Name Provider Type    VALDIMIR Dickson PT Physical Therapist    RW Zaina Pickens OTR/L Occupational Therapist           Time Calculation:         PT Charges       03/08/18 1924          Time Calculation    Start Time 1501  -GB      Stop Time 1550  -GB      Time Calculation (min) 49 min  -GB      PT Received On 03/08/18  -GB      PT Goal Re-Cert Due Date 03/17/18  -GB        User Key  (r) = Recorded By, (t) = Taken By, (c) = Cosigned By    Initials Name Provider Type    VLADIMIR Dickson, PT Physical Therapist          Therapy Charges for Today     Code Description Service Date Service Provider Modifiers Qty    89823324167 HC PT MOBILITY CURRENT 3/8/2018 Anisa Dickson PT GP, CN 1    37404790679 HC PT MOBILITY PROJECTED 3/8/2018 Anisa Dickson, PT GP, CN 1    [id number]  PT THER SUPP EA 15 MIN 3/8/2018 Anisa Dickson PT GP 2    15075895227 HC PT EVAL MOD COMPLEXITY 3 3/8/2018 Anisa Dickson PT GP 1          PT G-Codes  PT Professional Judgement Used?:  Yes  Outcome Measure Options: AM-PAC 6 Clicks Basic Mobility (PT)  Score: 6  Functional Limitation: Changing and maintaining body position, Mobility: Walking and moving around  Mobility: Walking and Moving Around Current Status (): 100 percent impaired, limited or restricted  Mobility: Walking and Moving Around Goal Status (): 100 percent impaired, limited or restricted      Anisa Dickson, PT  3/8/2018

## 2018-03-09 NOTE — PLAN OF CARE
Problem: Patient Care Overview (Adult)  Goal: Plan of Care Review  Outcome: Ongoing (interventions implemented as appropriate)   03/09/18 0332 03/09/18 1616   Coping/Psychosocial Response Interventions   Plan Of Care Reviewed With --  patient   Patient Care Overview   Progress improving --    Outcome Evaluation   Outcome Summary/Follow up Plan --  Pt was initially reluctant to PT services and deferred EOB this date in fear of diz/nausea but was appreciative for ROM and positioning and encouragement. Pt and caregiver is agreeable to assistance with ways of mobility during present weakness.      03/09/18 0332 03/09/18 1616   Coping/Psychosocial Response Interventions   Plan Of Care Reviewed With --  patient   Patient Care Overview   Progress improving --    Outcome Evaluation   Outcome Summary/Follow up Plan --  Pt was initially reluctant to PT services and deferred EOB this date in fear of diz/nausea but was appreciative for ROM and positioning and encouragement. Pt and caregiver is agreeable to assistance with ways of mobility during present weakness.       Problem: Inpatient Physical Therapy  Goal: Transfer Training Goal 1 STG- PT  Outcome: Ongoing (interventions implemented as appropriate)   03/08/18 1909 03/09/18 1616   Transfer Training PT STG   Transfer Training PT STG, Date Established 03/08/18 --    Transfer Training PT STG, Time to Achieve 5 days --    Transfer Training PT STG, Activity Type bed to chair /chair to bed --    Transfer Training PT STG, [place] Level dependent (less than 25% patient effort) --    Transfer Training PT STG, Additional Goal pt will tolerate lift transfer to chair w/out signficant change in VS or skin irritation --    Transfer Training PT STG, Date Goal Reviewed --  03/09/18   Transfer Training PT STG, Outcome --  goal ongoing     Goal: Transfer Training Goal 1 LTG- PT  Outcome: Ongoing (interventions implemented as appropriate)   03/08/18 1909 03/09/18 1616   Transfer Training  PT LTG   Transfer Training PT LTG, Date Established 03/08/18 --    Transfer Training PT LTG, Time to Achieve by discharge --    Transfer Training PT LTG, Activity Type bed to chair /chair to bed --    Transfer Training PT LTG, [place] Level other (see comments)  (pt and wife will be able to manage R side transfer together) --    Transfer Training PT LTG, Additional Goal pt/wife will transfer as PLOF comfortably  --    Transfer Training PT LTG, Date Goal Reviewed --  03/09/18   Transfer Training PT LTG, Outcome --  goal ongoing     Goal: Cardiopulmonary Goal STG- PT  Outcome: Ongoing (interventions implemented as appropriate)   03/08/18 1909 03/09/18 1616   Cardiopulmonary PT STG   Cardiopulmonary PT STG, Date Established 03/08/18 --    Cardiopulmonary PT STG, Time to Achieve 3 days --    Cardiopulmonary PT STG, Additional Goal pt will not have dizziness or drop in sats w/ sitting up; piper upright x 30 min --    Cardiopulmonary PT STG, Date Goal Reviewed --  03/09/18   Cardiopulmonary PT STG, Outcome --  goal ongoing     Goal: Wheelchair Management Goal LTG- PT  Outcome: Ongoing (interventions implemented as appropriate)   03/08/18 1909 03/09/18 1616   Wheelchair Management PT LTG   Wheelchair Managment Goal PT LTG, Date Established 03/08/18 --    Wheelchair Managment Goal PT LTG, Time to Achieve by discharge --    Wheelchair Management Goal PT LTG, Additional Goal pt and wife will be indep in repositioning in w/chair to protect skin buttocks --    Wheelchair Managment Goal PT LTG, Date Goal Reviewed --  03/09/18   Wheelchair Managmentn Goal PT LTG, Outcome --  goal ongoing

## 2018-03-09 NOTE — PLAN OF CARE
Problem: Patient Care Overview (Adult)  Goal: Plan of Care Review  Outcome: Ongoing (interventions implemented as appropriate)   03/08/18 1909   Coping/Psychosocial Response Interventions   Plan Of Care Reviewed With patient;caregiver   Outcome Evaluation   Outcome Summary/Follow up Plan pt and caregiver have managed transfers at home w/ slide transfer w board to L and sitting transfer to R; their goal is to avoid losing function and strength while here. He can benefit from skilled services; may need to see how chair seat accomodated him; he has to use the old w/chair due to better fit and accepted in [facility] bus, as opposed to the other seat. Pattern of redness is broad across buttock, but with poor trunk control it may be due to his need to rest on back of  Buttock against the back of the seat/ chair.        Problem: Inpatient Physical Therapy  Goal: Transfer Training Goal 1 STG- PT  Outcome: Ongoing (interventions implemented as appropriate)   03/08/18 1909   Transfer Training PT STG   Transfer Training PT STG, Date Established 03/08/18   Transfer Training PT STG, Time to Achieve 5 days   Transfer Training PT STG, Activity Type bed to chair /chair to bed   Transfer Training PT STG, [place] Level dependent (less than 25% patient effort)   Transfer Training PT STG, Additional Goal pt will tolerate lift transfer to chair w/out signficant change in VS or skin irritation   Transfer Training PT STG, Outcome goal ongoing     Goal: Transfer Training Goal 1 LTG- PT  Outcome: Ongoing (interventions implemented as appropriate)   03/08/18 1909   Transfer Training PT LTG   Transfer Training PT LTG, Date Established 03/08/18   Transfer Training PT LTG, Time to Achieve by discharge   Transfer Training PT LTG, Activity Type bed to chair /chair to bed   Transfer Training PT LTG, [place] Level other (see comments)  (pt and wife will be able to manage R side transfer together)   Transfer Training PT LTG, Additional Goal  pt/wife will transfer as PLOF comfortably    Transfer Training PT LTG, Outcome goal ongoing     Goal: Cardiopulmonary Goal STG- PT  Outcome: Ongoing (interventions implemented as appropriate)   03/08/18 1909   Cardiopulmonary PT STG   Cardiopulmonary PT STG, Date Established 03/08/18   Cardiopulmonary PT STG, Time to Achieve 3 days   Cardiopulmonary PT STG, Additional Goal pt will not have dizziness or drop in sats w/ sitting up; piper upright x 30 min   Cardiopulmonary PT STG, Outcome goal ongoing     Goal: Wheelchair Management Goal LTG- PT  Outcome: Ongoing (interventions implemented as appropriate)   03/08/18 1909   Wheelchair Management PT LTG   Wheelchair Managment Goal PT LTG, Date Established 03/08/18   Wheelchair Managment Goal PT LTG, Time to Achieve by discharge   Wheelchair Management Goal PT LTG, Additional Goal pt and wife will be indep in repositioning in w/chair to protect skin buttocks   Wheelchair Managmentn Goal PT LTG, Outcome goal ongoing

## 2018-03-09 NOTE — PLAN OF CARE
Problem: Patient Care Overview (Adult)  Goal: Plan of Care Review  Outcome: Ongoing (interventions implemented as appropriate)   03/09/18 0332   Coping/Psychosocial Response Interventions   Plan Of Care Reviewed With patient;significant other   Patient Care Overview   Progress improving   Outcome Evaluation   Outcome Summary/Follow up Plan Pt currently on Amio drip; NSR this shift; UOP adequate; no distress noted throughout shift; vss; will continue to monitor closely.      Goal: Adult Individualization and Mutuality  Outcome: Ongoing (interventions implemented as appropriate)    Goal: Discharge Needs Assessment  Outcome: Ongoing (interventions implemented as appropriate)      Problem: Fall Risk (Adult)  Goal: Identify Related Risk Factors and Signs and Symptoms  Outcome: Ongoing (interventions implemented as appropriate)    Goal: Absence of Falls  Outcome: Ongoing (interventions implemented as appropriate)      Problem: Skin Integrity Impairment, Risk/Actual (Adult)  Goal: Identify Related Risk Factors and Signs and Symptoms  Outcome: Ongoing (interventions implemented as appropriate)    Goal: Skin Integrity/Wound Healing  Outcome: Ongoing (interventions implemented as appropriate)      Problem: Sepsis (Adult)  Goal: Signs and Symptoms of Listed Potential Problems Will be Absent or Manageable (Sepsis)  Outcome: Ongoing (interventions implemented as appropriate)      Problem: Pressure Ulcer (Adult)  Goal: Signs and Symptoms of Listed Potential Problems Will be Absent or Manageable (Pressure Ulcer)  Outcome: Ongoing (interventions implemented as appropriate)      Problem: Pain, Acute (Adult)  Goal: Identify Related Risk Factors and Signs and Symptoms  Outcome: Ongoing (interventions implemented as appropriate)    Goal: Acceptable Pain Control/Comfort Level  Outcome: Ongoing (interventions implemented as appropriate)

## 2018-03-09 NOTE — THERAPY TREATMENT NOTE
Inpatient Rehabilitation - Physical Therapy Treatment Note  [facility]     Patient Name: Mando Hill  : 1945  MRN: [id number]  Today's Date: 3/9/2018  Onset of Illness/Injury or Date of Surgery Date: 18  Date of Referral to PT: 18  Referring Physician: Dr. Fisher    Admit Date: 3/5/2018    Visit Dx:    ICD-10-CM ICD-9-CM   1. Fever, unspecified fever cause R50.9 780.60   2. Sepsis, due to unspecified organism A41.9 038.9     995.91   3. Cellulitis of buttock L03.317 682.5   4. Impaired functional mobility, balance, and endurance Z74.09 V49.89     Patient Active Problem List   Diagnosis   • Osteoarthritis of right shoulder   • Sepsis   • Sacral decubitus ulcer   • UTI (urinary tract infection) due to urinary indwelling catheter   • Hypotension   • Atrial fibrillation               Adult Rehabilitation Note       18 1600          Rehab Assessment/Intervention    Discipline physical therapy assistant  -RW      Document Type therapy note (daily note)  -RW      Subjective Information agree to therapy  -RW      Patient Effort, Rehab Treatment good  -RW      Specific Treatment Considerations treatment started at 1504  -RW      Recorded by [RW] Twan Rosales PTA      Vital Signs    Pre Systolic BP Rehab 144  -RW      Pre Treatment Diastolic BP 67  -RW      Intra Systolic BP Rehab 152  -RW      Intra Treatment Diastolic BP 79  -RW      Post Systolic BP Rehab 158  -RW      Post Treatment Diastolic BP 74  -RW      Pretreatment Heart Rate (beats/min) 77  -RW      Intratreatment Heart Rate (beats/min) 79  -RW      Posttreatment Heart Rate (beats/min) 78  -RW      Pre SpO2 (%) 94  -RW      O2 Delivery Pre Treatment supplemental O2  -RW      Intra SpO2 (%) 95  -RW      O2 Delivery Intra Treatment supplemental O2  -RW      Post SpO2 (%) 95  -RW      O2 Delivery Post Treatment supplemental O2  -RW      Pre Patient Position Supine  -RW      Post Patient Position Supine  -RW      Recovery  Time pt did not want to sit EOB due to dizziness so HOB was raised and FOB was lowered to simulate EOB   -RW      Recorded by [JENA Rosales PTA      Pain Assessment    Pain Assessment 0-10  -RW      Pain Score 0  -RW      Post Pain Score 0  -RW      Recorded by [JENA Rosales PTA      Cognitive Assessment/Intervention    Current Cognitive/Communication Assessment functional  -RW      Orientation Status oriented x 4  -RW      Follows Commands/Answers Questions 100% of the time  -RW      Recorded by [JENA Rosales PTA      Bed Mobility, Assessment/Treatment    Bed Mobility, Roll Left, [place] maximum assist (25% patient effort)  -RW      Bed Mobility, Scoot/Bridge, [place] dependent (less than 25% patient effort)  -RW      Bed Mobility, Comment mobility for pillow positioning, pt deferred EOB due to increased dizziness last tx. raised HOB and lowered FOB to simulate EOB. Pt tolerated this position with no issue  -RW      Recorded by [JENA Rosales PTA      Transfer Assessment/Treatment    Transfers, Bed-Chair [place] --   pt deferred  -RW      Recorded by [JENA Rosales PTA      Therapy Exercises    Bilateral Lower Extremities AAROM:;20 reps;ankle pumps/circles;calf stretch;heel slides;hip abduction/adduction;hip ER   approximation into LEs  -RW      Left Upper Extremity AAROM:;15 reps;supine;elbow flexion/extension;hand pumps;pronation/supination;shoulder ER/IR  -RW      Trunk Exercises --   used bed and manual position for upper back/head control 5m   -RW      Recorded by [JENA Rosales PTA      Positioning and Restraints    Pre-Treatment Position in bed  -RW      Post Treatment Position bed  -RW      In Bed call light within reach;with family/caregiver;with nsg  -RW      Recorded by [JENA Rosales PTA        User Key  (r) = Recorded By, (t) = Taken By, (c) = Cosigned By    Initials Name Effective Dates    HARLEY Rosales PTA 03/07/18 -                 IP  PT Goals       03/09/18 1616 03/08/18 1909       Transfer Training PT STG    Transfer Training PT STG, Date Established  03/08/18  -GB     Transfer Training PT STG, Time to Achieve  5 days  -GB     Transfer Training PT STG, Activity Type  bed to chair /chair to bed  -GB     Transfer Training PT STG, [place] Level  dependent (less than 25% patient effort)  -GB     Transfer Training PT STG, Additional Goal  pt will tolerate lift transfer to chair w/out signficant change in VS or skin irritation  -GB     Transfer Training PT STG, Date Goal Reviewed 03/09/18  -RW      Transfer Training PT STG, Outcome goal ongoing  -RW goal ongoing  -GB     Transfer Training PT LTG    Transfer Training PT LTG, Date Established  03/08/18  -GB     Transfer Training PT LTG, Time to Achieve  by discharge  -GB     Transfer Training PT LTG, Activity Type  bed to chair /chair to bed  -GB     Transfer Training PT LTG, [place] Level  other (see comments)   pt and wife will be able to manage R side transfer together  -GB     Transfer Training PT LTG, Additional Goal  pt/wife will transfer as PLOF comfortably   -GB     Transfer Training PT  LTG, Date Goal Reviewed 03/09/18  -RW      Transfer Training PT LTG, Outcome goal ongoing  -RW goal ongoing  -GB     Cardiopulmonary PT STG    Cardiopulmonary PT STG, Date Established  03/08/18  -GB     Cardiopulmonary PT STG, Time to Achieve  3 days  -GB     Cardiopulmonary PT STG, Additional Goal  pt will not have dizziness or drop in sats w/ sitting up; piper upright x 30 min  -GB     Cardiopulmonary PT STG, Date Goal Reviewed 03/09/18  -RW      Cardiopulmonary PT STG, Outcome goal ongoing  -RW goal ongoing  -GB     Wheelchair Management PT LTG    Wheelchair Managment Goal PT LTG, Date Established  03/08/18  -GB     Wheelchair Managment Goal PT LTG, Time to Achieve  by discharge  -GB     Wheelchair Management Goal PT LTG, Additional Goal  pt and wife will be indep in repositioning in w/chair to  protect skin buttocks  -GB     Wheelchair Managment Goal PT LTG, Date Goal Reviewed 03/09/18  -RW      Wheelchair Managmentn Goal PT LTG, Outcome goal ongoing  -RW goal ongoing  -       User Key  (r) = Recorded By, (t) = Taken By, (c) = Cosigned By    Initials Name Provider Type    VLADIMIR Dickson, PT Physical Therapist    RW Twan Rosales, PTA Physical Therapy Assistant          Physical Therapy Education     Title: PT OT SLP Therapies (Active)     Topic: Physical Therapy (Active)     Point: Mobility training (Active)    Learning Progress Summary    Learner Readiness Method Response Comment Documented by Status   Patient Acceptance D,E NR POC; mobility  03/08/18 1908 Active               Point: Home exercise program (Active)    Learning Progress Summary    Learner Readiness Method Response Comment Documented by Status   Patient Acceptance D,E NR POC; mobility  03/08/18 1908 Active               Point: Body mechanics (Active)    Learning Progress Summary    Learner Readiness Method Response Comment Documented by Status   Patient Acceptance D,E NR POC; mobility  03/08/18 1908 Active               Point: Precautions (Active)    Learning Progress Summary    Learner Readiness Method Response Comment Documented by Status   Patient Acceptance D,E NR POC; mobility  03/08/18 1908 Active                      User Key     Initials Effective Dates Name Provider Type Discipline     03/07/18 -  Anisa Dickson, PT Physical Therapist PT                    PT Recommendation and Plan  Anticipated Discharge Disposition: home with assist, home with home health, home with 24/7 care, extended care facility, inpatient rehabilitation facility, long term acute care facility, skilled nursing facility  Planned Therapy Interventions: balance training, bed mobility training, home exercise program, transfer training, wheelchair management/propulsion training  Plan of Care Review  Plan Of Care Reviewed With:  patient  Outcome Summary/Follow up Plan: Pt was initially reluctant to PT services and deferred EOB this date in fear of diz/nausea but was appreciative for ROM and positioning and encouragement. Pt and caregiver is agreeable to assistance with ways of mobility during present weakness.          Outcome Measures       03/08/18 1900 03/07/18 1426       How much help from another person do you currently need...    Turning from your back to your side while in flat bed without using bedrails? 1  -GB      Moving from lying on back to sitting on the side of a flat bed without bedrails? 1  -GB      Moving to and from a bed to a chair (including a wheelchair)? 1  -GB      Standing up from a chair using your arms (e.g., wheelchair, bedside chair)? 1  -GB      Climbing 3-5 steps with a railing? 1  -GB      To walk in hospital room? 1  -GB      AM-PAC 6 Clicks Score 6  -GB      How much help from another is currently needed...    Putting on and taking off regular lower body clothing?  1  -RW     Bathing (including washing, rinsing, and drying)  1  -RW     Toileting (which includes using toilet bed pan or urinal)  1  -RW     Putting on and taking off regular upper body clothing  1  -RW     Taking care of personal grooming (such as brushing teeth)  2  -RW     Eating meals  2  -RW     Score  8  -RW     Functional Assessment    Outcome Measure Options AM-PAC 6 Clicks Basic Mobility (PT)  -GB AM-PAC 6 Clicks Daily Activity (OT)  -RW       User Key  (r) = Recorded By, (t) = Taken By, (c) = Cosigned By    Initials Name Provider Type    VLADIMIR Dickson, PT Physical Therapist    RW Zaina Pickens, OTR/L Occupational Therapist           Time Calculation:         PT Charges       03/09/18 1622          Time Calculation    Start Time 1504  -RW      Stop Time 1551  -RW      Time Calculation (min) 47 min  -RW      Time Calculation- PT    Total Timed Code Minutes- PT 47 minute(s)  -RW        User Key  (r) = Recorded By, (t) =  Taken By, (c) = Cosigned By    Initials Name Provider Type    RW Twan Rosales PTA Physical Therapy Assistant          Therapy Charges for Today     Code Description Service Date Service Provider Modifiers Qty    48931797964 HC PT THERAPEUTIC ACT EA 15 MIN 3/9/2018 Twan Rosales PTA GP 1    56156149774 HC PT THER PROC EA 15 MIN 3/9/2018 Twan Rosales PTA GP 2          PT G-Codes  PT Professional Judgement Used?: Yes  Outcome Measure Options: AM-PAC 6 Clicks Basic Mobility (PT)  Score: 6  Functional Limitation: Changing and maintaining body position, Mobility: Walking and moving around  Mobility: Walking and Moving Around Current Status (): 100 percent impaired, limited or restricted  Mobility: Walking and Moving Around Goal Status (): 100 percent impaired, limited or restricted    Twan Rosales PTA  3/9/2018

## 2018-03-09 NOTE — PROGRESS NOTES
Discharge Planning Assessment  [facility]     Patient Name: Mando Hill  MRN: [id number]  Today's Date: 3/9/2018    Admit Date: 3/5/2018          Discharge Needs Assessment       03/09/18 1316    Living Environment    Lives With significant other   Significant other of 30 plus years.    Living Arrangements house    Type of Financial/Environmental Concern none    Transportation Available family or friend will provide    Living Environment    Provides Primary Care For no one    Primary Care Provided By spouse/significant other    Quality Of Family Relationships supportive;helpful;involved    Able to Return to Prior Living Arrangements yes    Living Arrangement Comments Pt resides at home with significant other of 30 plus years. pt has good support system that assist with needs.    Discharge Needs Assessment    Concerns To Be Addressed adjustment to diagnosis/illness concerns    Community Agency Name[facility]S) [facility]    Anticipated Changes Related to Illness none    Equipment Currently Used at Home wheelchair, motorized;hospital bed;commode;lift device    Discharge Facility/Level Of Care Needs home with home health    Current Discharge Risk chronically ill;physical impairment    Discharge Disposition still a patient    Discharge Planning Comments Pt plans to return home at d/c and would like for home health to resume services.            Discharge Plan       03/09/18 1319    Case Management/Social Work Plan    Additional Comments LSW assesment complete. pt resides at home with significant other. pt has good support system that assist with needs. pt was receiving services thru [facility]. pt plans to return home at d/c and would like for home health to resume care. Pt and significant other do not anticipate any other needs at this time. LSW awaiting additional recomendations from MD and therapy. LSW/case mgt will follow up as consulted and complete arrangements as ordered.         Discharge Placement     No information found        Expected Discharge Date and Time     Expected Discharge Date Expected Discharge Time    Mar 8, 2018               Demographic Summary       03/09/18 1313    Referral Information    Admission Type inpatient    Referral Source high risk screening    Reason For Consult discharge planning    Record Reviewed medical record    Contact Information    Permission Granted to Share Information With     Primary Care Physician Information    Name No PCP            Functional Status       03/09/18 1314    Functional Status Prior    Ambulation 3-->assistive equipment and person    Transferring 3-->assistive equipment and person    Toileting 3-->assistive equipment and person    Bathing 3-->assistive equipment and person    Dressing 2-->assistive person    Eating 0-->independent    Communication 0-->understands/communicates without difficulty    Swallowing 0-->swallows foods/liquids without difficulty    Prior Functional Level Comment Fair    IADL    Medications assistive person   [place]    Meal Preparation completely dependent    Housekeeping completely dependent    Laundry completely dependent    Shopping completely dependent    Oral Care independent    Activity Tolerance    Current Activity Limitations none    Usual Activity Tolerance fair    Current Activity Tolerance poor    Cognitive/Perceptual/Developmental    Current Mental Status/Cognitive Functioning no deficits noted    Recent Changes in Mental Status/Cognitive Functioning no changes    Developmental Stage (Eriksson's Stages of Development) Stage 8 (65 years-death/Late Adulthood) Integrity vs. Despair            Psychosocial     None            Abuse/Neglect     None            Legal     None            Substance Abuse     None            Patient Forms     None          MAURICE Ayala

## 2018-03-09 NOTE — PROGRESS NOTES
[facility] Medicine Services  INPATIENT PROGRESS NOTE     LOS: 4 days   Patient Care Team:  Valerie Marvin MD as PCP - General    Chief Complaint:  Sepsis      Subjective     Interval History:   Patient is in for follow-up today.  He was transferred to the ICU due to uncontrolled atrial fibrillation.  He remains on amiodarone infusion, oral Cardizem, Bystolic and in controlled ventricular response.  Patient also remains off BiPAP, less symptomatic and now on supplemental oxygen of 2L nasal prongs and maintaining saturation of 94% or more.   He is mostly bedbound due to history of stroke but does use a wheelchair as an assistive means of ambulation.   He is tolerating prescribed diet and no other changes have been reported.    Patient Complaints: As above    History taken from: Patient and wife    Review of Systems:    Review of Systems   Constitutional: Positive for activity change and fatigue. Negative for appetite change, chills, diaphoresis and fever.   HENT: Negative for trouble swallowing and voice change.    Eyes: Negative for photophobia and visual disturbance.   Respiratory: Negative for cough, choking, chest tightness, shortness of breath, wheezing and stridor.    Cardiovascular: Negative for chest pain, palpitations and leg swelling.   Gastrointestinal: Negative for abdominal distention, abdominal pain, blood in stool, constipation, diarrhea, nausea and vomiting.   Endocrine: Negative for cold intolerance, heat intolerance, polydipsia, polyphagia and polyuria.   Genitourinary: Negative for decreased urine volume, difficulty urinating, dysuria, enuresis, flank pain, frequency, hematuria and urgency.   Musculoskeletal: Positive for gait problem. Negative for arthralgias, myalgias, neck pain and neck stiffness.   Skin: Negative for color change, pallor, rash and wound.   Neurological: Positive for weakness. Negative for dizziness, tremors, seizures, syncope,  facial asymmetry, speech difficulty, light-headedness, numbness and headaches.   Hematological: Does not bruise/bleed easily.   Psychiatric/Behavioral: Negative for agitation, behavioral problems and confusion.         Objective     Vital Signs  Temp:  [97.8 °F (36.6 °C)-98.2 °F (36.8 °C)] 97.8 °F (36.6 °C)  Heart Rate:  [] 76  Resp:  [24] 24  BP: ()/(50-73) 141/65    Physical Exam:   Physical Exam   Constitutional: He is oriented to person, place, and time. He appears well-developed and well-nourished. No distress.   HENT:   Head: Normocephalic and atraumatic.   Eyes: EOM are normal. Pupils are equal, round, and reactive to light. No scleral icterus.   Neck: Normal range of motion. Neck supple. No JVD present. No thyromegaly present.   Cardiovascular: Normal rate and normal heart sounds.  An irregularly irregular rhythm present. Exam reveals no gallop and no friction rub.    No murmur heard.  Pulmonary/Chest: Effort normal. He has no wheezes. He has rhonchi. He has no rales. He exhibits no tenderness.   Abdominal: Soft. Bowel sounds are normal. He exhibits no distension and no mass. There is no tenderness. There is no rebound and no guarding.   There is a suprapubic catheter which was recently changed.   Musculoskeletal: He exhibits deformity. He exhibits no edema or tenderness.   Neurological: He is alert and oriented to person, place, and time. No cranial nerve deficit. He exhibits normal muscle tone.   Skin: Skin is warm and dry. No rash noted. He is not diaphoretic. No erythema. No pallor.   Cellulitis left lower extremity and buttock has essentially resolved.   Psychiatric: He has a normal mood and affect. His behavior is normal. Judgment and thought content normal.   Nursing note and vitals reviewed.         Results Review:         Results from last 7 days  Lab Units 03/09/18  0834 03/08/18  1638 03/08/18  0258 03/08/18  0112 03/07/18  2359 03/07/18  0751 03/06/18  0600 03/05/18  0924  03/05/18  0136   SODIUM mmol/L 140  --  140  --   --  142 141 143 142   SODIUM, ARTERIAL mmol/L  --  138.3 138.0 137.5* 138.9  --   --   --   --    POTASSIUM mmol/L 4.1  --  4.1  --   --  4.4 3.9 3.9 3.7   CHLORIDE mmol/L 103  --  106  --   --  109 107 106 100   CO2 mmol/L 26.0  --  21.0*  --   --  19.0* 23.0 24.0 26.0   BUN mg/dL 19  --  15  --   --  14 19 24* 29*   CREATININE mg/dL 1.21  --  1.07  --   --  0.98 1.07 1.20 1.30   GLUCOSE mg/dL 129*  --  174*  --   --  126* 123* 128* 125*   GLUCOSE, ARTERIAL mmol/L  --  152 179 189 202  --   --   --   --    CALCIUM mg/dL 8.6  --  9.0  --   --  7.9* 7.6* 7.7* 8.6   BILIRUBIN mg/dL 0.3  --  0.3  --   --  0.3 0.4 0.5 0.4   ALK PHOS U/L 63  --  92  --   --  79 70 89 122   ALT (SGPT) U/L 21  --  33  --   --  28 32 32 31   AST (SGOT) U/L 15*  --  22  --   --  17 18 23 27         Results from last 7 days  Lab Units 03/05/18  0136   MAGNESIUM mg/dL 1.8         Results from last 7 days  Lab Units 03/09/18  0356 03/08/18  0258 03/07/18  0547 03/06/18  0600 03/05/18  0923 03/05/18  0136   WBC 10*3/mm3 15.17* 17.84* 16.30* 15.90* 22.76* 23.46*   HEMOGLOBIN g/dL 14.2 15.9 14.9 12.7* 14.5 15.6   HEMATOCRIT % 41.4 47.1 45.7 38.5* 43.1 45.6   PLATELETS 10*3/mm3 219 204 152 172 168 197       Lab Results   Component Value Date    CKTOTAL 60 03/05/2018    CKMB 2.6 02/01/2015    TROPONINI 0.015 02/01/2015       pH, Arterial   Date Value Ref Range Status   03/08/2018 7.333 (L) 7.350 - 7.450 pH units Final     CO2   Date Value Ref Range Status   03/09/2018 26.0 22.0 - 31.0 mmol/L Final              Imaging Results (last 7 days)     Procedure Component Value Units Date/Time    XR Chest 1 View [[id number]] Collected:  03/05/18 0140     Updated:  03/05/18 0200    Narrative:         Chest single view on  3/5/2018     CLINICAL INDICATION: Nausea, fever, tachycardia    COMPARISON: 12/15/2016    FINDINGS: There is mild elevation of the right hemidiaphragm.  Mild chronic interstitial changes are  noted. Heart is upper  limits normal for size. Hilar and mediastinal contours are within  normal limits.      Impression:       No acute disease.    Electronically signed by:  Luke Bustos  3/5/2018 1:58 AM CST  Workstation: [id number]    IR PICC wo fluoro guidance [[id number]] Resulted:  03/07/18 1337     Updated:  03/07/18 1337    Narrative:       This procedure was auto-finalized with no dictation required.    XR Chest Post CVA Port [[id number]] Collected:  03/07/18 1330     Updated:  03/07/18 1348    Narrative:         EXAM:         Radiograph(s), Chest   VIEWS:   Portable ; 1       DATE/TIME:  3/7/2018 1:46 PM CST                INDICATION:   verification, R50.9 Fever, unspecified A41.9  Sepsis, unspecified organism L03.317 Cellulitis of buttock    COMPARISON:  CXR: 3/5/18             FINDINGS:             - lines/tubes:    Right approach PICC line in standard position.      - cardiac:         size within normal limits         - mediastinum: contour within normal limits         - lungs:         Focal airspace disease, right upper lobe. A  second focus of airspace disease may be present in the left base.              - pleura:         no evidence of  fluid                  - osseous:         unremarkable for age                  - misc.:         Impression:       CONCLUSION:        1. Right approach PICC line in standard position.  2. Right upper lobe airspace disease.                                               Electronically signed by:  ERIC Sibley MD  3/7/2018 1:47 PM  CST Workstation: [id number]    US Guided Vascular Access [[id number]] Resulted:  03/07/18 1355     Updated:  03/07/18 1355    Narrative:       This procedure was auto-finalized with no dictation required.    XR Chest 1 View [[id number]] Collected:  03/08/18 1037     Updated:  03/08/18 1106    Narrative:       Chest single view     CLINICAL INDICATION: Shortness of breath. Respiratory failure,  fever and sepsis    COMPARISON: Chest  March 7, 2018.    FINDINGS: Cardiomegaly. Prominence of pulmonary vascular markings  slightly improved since prior exam. Infiltrative changes,  pneumonitis right lung primarily right upper lobe with partial  clearing, slight improvement since prior exam.      Impression:       CONCLUSION: As above.    Electronically signed by:  Dennis Gates MD  3/8/2018 11:05 AM CST  Workstation: [id number]           Blood Culture   Date Value Ref Range Status   03/05/2018 No growth at 2 days  Preliminary                    Urine Culture   Date Value Ref Range Status   03/05/2018 Mixed Tina Isolated  Final           Medication Review:   Current Facility-Administered Medications   Medication Dose Route Frequency Provider Last Rate Last Dose   • 8-hydroxyquinoline sulfate (BAG BALM) ointment   Apply externally Daily Evelio Christensen MD       • albuterol (PROVENTIL) nebulizer solution 0.5% 2.5 mg/0.5mL  2.5 mg Nebulization Q6H PRN Yoan Hamilton MD       • amiodarone (PACERONE) tablet 200 mg  200 mg Oral Q24H Sai Muñoz MD   200 mg at 03/09/18 0913   • aspirin EC tablet 81 mg  81 mg Oral Daily Yoan Hamilton MD   81 mg at 03/09/18 0911   • baclofen (LIORESAL) tablet 10 mg  10 mg Oral Q12H Jordi Marie MD   10 mg at 03/09/18 0913   • budesonide-formoterol (SYMBICORT) 160-4.5 MCG/ACT inhaler 2 puff  2 puff Inhalation BID - RT Hardy Fisher MD       • diltiaZEM CD (CARDIZEM CD) 24 hr capsule 240 mg  240 mg Oral Daily With Dinner Sai Muñoz MD   240 mg at 03/08/18 1807   • docusate sodium (COLACE) capsule 100 mg  100 mg Oral Daily Jordi Marie MD   100 mg at 03/09/18 0913   • finasteride (PROSCAR) tablet 5 mg  5 mg Oral Daily Yoan Hamilton MD   5 mg at 03/09/18 0913   • fluticasone (FLONASE) 50 MCG/ACT nasal spray 2 spray  2 spray Each Nare Daily Hardy Fisher MD   2 spray at 03/09/18 1113   • furosemide (LASIX) tablet 40 mg  40 mg Oral Daily Hardy Fisher MD   40 mg at 03/09/18 0912   •  HYDROcodone-acetaminophen (NORCO)  MG per tablet 1 tablet  1 tablet Oral Q4H PRN Yoan Hamilton MD   1 tablet at 03/06/18 2023   • ipratropium-albuterol (DUO-NEB) nebulizer solution 3 mL  3 mL Nebulization 4x Daily - RT Hardy Fisher MD   3 mL at 03/09/18 1124   • magic butt ointment   Topical BID Evelio Christensen MD       • nebivolol (BYSTOLIC) tablet 20 mg  20 mg Oral Daily Hardy Fisher MD   20 mg at 03/09/18 0911   • ondansetron (ZOFRAN) injection 4 mg  4 mg Intravenous Q4H PRN Evelio Christensen MD   4 mg at 03/07/18 0134   • phenytoin (DILANTIN) ER capsule 100 mg  100 mg Oral Q12H Yoan Hamilton MD   100 mg at 03/09/18 0913   • piperacillin-tazobactam (ZOSYN) 3.375 g in iso-osmotic dextrose 50 ml (premix)  3.375 g Intravenous Q8H Hardy Fisher MD   Stopped at 03/09/18 0800   • polyethylene glycol 3350 powder (packet)  17 g Oral Daily PRN Hardy Fisher MD   17 g at 03/09/18 0911   • predniSONE (DELTASONE) tablet 20 mg  20 mg Oral Daily With Breakfast Hardy Fisher MD   20 mg at 03/09/18 0913   • promethazine (PHENERGAN) injection 12.5 mg  12.5 mg Intravenous Q6H PRN Jordi Marie MD   12.5 mg at 03/07/18 1613   • rivaroxaban (XARELTO) tablet 20 mg  20 mg Oral Daily Yoan Hamilton MD   20 mg at 03/09/18 0913   • sodium chloride (OCEAN) nasal spray 2 spray  2 spray Each Nare PRN Hardy Fisher MD   2 spray at 03/09/18 0920   • sodium chloride 0.9 % flush 1-10 mL  1-10 mL Intravenous PRN Yoan Hamilton MD   10 mL at 03/07/18 1615   • sodium chloride 0.9 % flush 10 mL  10 mL Intravenous PRN Robert Anderson MD       • sodium chloride 0.9 % flush 10 mL  10 mL Intracatheter Q12H Hardy Fisher MD   10 mL at 03/09/18 0918   • sodium chloride 0.9 % flush 10 mL  10 mL Intracatheter PRN Hardy Fisher MD       • sodium chloride 0.9 % flush 10 mL  10 mL Intracatheter PRN Hardy Fisher MD   10 mL at 03/08/18 1946   • sodium chloride 0.9 % flush 10 mL  10 mL Intracatheter PRN  Hardy Fisher MD       • tamsulosin (FLOMAX) 24 hr capsule 0.4 mg  0.4 mg Oral Nightly Yoan Hamilton MD   0.4 mg at 03/08/18 1951   • temazepam (RESTORIL) capsule 15 mg  15 mg Oral Nightly PRN Jordi Marie MD   15 mg at 03/08/18 2138   • vitamin C (ASCORBIC ACID) tablet 500 mg  500 mg Oral Daily Yoan Hamilton MD   500 mg at 03/09/18 0912         Assessment/Plan     Principal Problem:    Sepsis  Active Problems:    Sacral decubitus ulcer    UTI (urinary tract infection) due to urinary indwelling catheter    Hypotension    Atrial fibrillation  - Pseudomonas UTI complicated by sepsis: Continue Zosyn. Leucocytosis is reactive and will be monitored.  - Cellulitis: Essentially resolved.  Continue broad-spectrum antibiotics.   - Right upper lobe pneumonia: Follow-up chest x-ray done yesterday shows some improvement.  Continue antibiotics  - History of CHF: Continue diuretic therapy.  - Atrial fibrillation with rapid ventricular response: Continue rate control and anti-coagulation.  Echocardiogram shows normal left ventricular systolic function with ejection fraction of 55%, grade 1 left ventricular diastolic dysfunction and mild mitral and tricuspid valve regurgitation.  Eventual plan is for EP studies with AV ablation.  Input by cardiologist is appreciated.    - -History of BPH: Continue home medications.  - Continue GI prophylaxis.  - Discharge planning is ongoing.            Hardy Fisher MD  03/09/18      EMR Dragon/Transcription disclaimer:   Much of this encounter note is an electronic transcription/translation of spoken language to printed text. The electronic translation of spoken language may permit erroneous, or at times, nonsensical words or phrases to be inadvertently transcribed; Although I have reviewed the note for such errors, some may still exist.

## 2018-03-10 LAB
ALBUMIN SERPL-MCNC: 3.3 G/DL (ref 3.4–4.8)
ALBUMIN/GLOB SERPL: 1 G/DL (ref 1.1–1.8)
ALP SERPL-CCNC: 61 U/L (ref 38–126)
ALT SERPL W P-5'-P-CCNC: 38 U/L (ref 21–72)
ANION GAP SERPL CALCULATED.3IONS-SCNC: 14 MMOL/L (ref 5–15)
AST SERPL-CCNC: 19 U/L (ref 17–59)
BACTERIA SPEC AEROBE CULT: NORMAL
BACTERIA SPEC AEROBE CULT: NORMAL
BASOPHILS # BLD AUTO: 0.01 10*3/MM3 (ref 0–0.2)
BASOPHILS NFR BLD AUTO: 0.1 % (ref 0–2)
BILIRUB SERPL-MCNC: 0.5 MG/DL (ref 0.2–1.3)
BUN BLD-MCNC: 22 MG/DL (ref 7–21)
BUN/CREAT SERPL: 17.9 (ref 7–25)
CALCIUM SPEC-SCNC: 8.3 MG/DL (ref 8.4–10.2)
CHLORIDE SERPL-SCNC: 100 MMOL/L (ref 95–110)
CO2 SERPL-SCNC: 28 MMOL/L (ref 22–31)
CREAT BLD-MCNC: 1.23 MG/DL (ref 0.7–1.3)
DEPRECATED RDW RBC AUTO: 42.3 FL (ref 35.1–43.9)
EOSINOPHIL # BLD AUTO: 0.02 10*3/MM3 (ref 0–0.7)
EOSINOPHIL NFR BLD AUTO: 0.2 % (ref 0–7)
ERYTHROCYTE [DISTWIDTH] IN BLOOD BY AUTOMATED COUNT: 12.9 % (ref 11.5–14.5)
GFR SERPL CREATININE-BSD FRML MDRD: 57 ML/MIN/1.73 (ref 42–98)
GLOBULIN UR ELPH-MCNC: 3.4 GM/DL (ref 2.3–3.5)
GLUCOSE BLD-MCNC: 106 MG/DL (ref 60–100)
HCT VFR BLD AUTO: 42.3 % (ref 39–49)
HGB BLD-MCNC: 14.7 G/DL (ref 13.7–17.3)
IMM GRANULOCYTES # BLD: 0.06 10*3/MM3 (ref 0–0.02)
IMM GRANULOCYTES NFR BLD: 0.6 % (ref 0–0.5)
LYMPHOCYTES # BLD AUTO: 0.7 10*3/MM3 (ref 0.6–4.2)
LYMPHOCYTES NFR BLD AUTO: 7.3 % (ref 10–50)
MCH RBC QN AUTO: 31.1 PG (ref 26.5–34)
MCHC RBC AUTO-ENTMCNC: 34.8 G/DL (ref 31.5–36.3)
MCV RBC AUTO: 89.6 FL (ref 80–98)
MONOCYTES # BLD AUTO: 1.11 10*3/MM3 (ref 0–0.9)
MONOCYTES NFR BLD AUTO: 11.6 % (ref 0–12)
NEUTROPHILS # BLD AUTO: 7.64 10*3/MM3 (ref 2–8.6)
NEUTROPHILS NFR BLD AUTO: 80.2 % (ref 37–80)
PLATELET # BLD AUTO: 205 10*3/MM3 (ref 150–450)
PMV BLD AUTO: 9.8 FL (ref 8–12)
POTASSIUM BLD-SCNC: 2.9 MMOL/L (ref 3.5–5.1)
POTASSIUM BLD-SCNC: 3.5 MMOL/L (ref 3.5–5.1)
PROT SERPL-MCNC: 6.7 G/DL (ref 6.3–8.6)
RBC # BLD AUTO: 4.72 10*6/MM3 (ref 4.37–5.74)
SODIUM BLD-SCNC: 142 MMOL/L (ref 137–145)
WBC NRBC COR # BLD: 9.54 10*3/MM3 (ref 3.2–9.8)
WHOLE BLOOD HOLD SPECIMEN: NORMAL

## 2018-03-10 PROCEDURE — 25010000003 POTASSIUM CHLORIDE 10 MEQ/100ML SOLUTION: Performed by: INTERNAL MEDICINE

## 2018-03-10 PROCEDURE — 85025 COMPLETE CBC W/AUTO DIFF WBC: CPT | Performed by: FAMILY MEDICINE

## 2018-03-10 PROCEDURE — 25010000002 ONDANSETRON PER 1 MG: Performed by: FAMILY MEDICINE

## 2018-03-10 PROCEDURE — 25010000002 PIPERACILLIN SOD-TAZOBACTAM PER 1 G: Performed by: INTERNAL MEDICINE

## 2018-03-10 PROCEDURE — 94760 N-INVAS EAR/PLS OXIMETRY 1: CPT

## 2018-03-10 PROCEDURE — 80053 COMPREHEN METABOLIC PANEL: CPT | Performed by: FAMILY MEDICINE

## 2018-03-10 PROCEDURE — 97110 THERAPEUTIC EXERCISES: CPT

## 2018-03-10 PROCEDURE — 94799 UNLISTED PULMONARY SVC/PX: CPT

## 2018-03-10 PROCEDURE — 63710000001 PREDNISONE PER 1 MG: Performed by: INTERNAL MEDICINE

## 2018-03-10 PROCEDURE — 84132 ASSAY OF SERUM POTASSIUM: CPT | Performed by: INTERNAL MEDICINE

## 2018-03-10 PROCEDURE — 99232 SBSQ HOSP IP/OBS MODERATE 35: CPT | Performed by: INTERNAL MEDICINE

## 2018-03-10 PROCEDURE — 25010000002 PROMETHAZINE PER 50 MG: Performed by: HOSPITALIST

## 2018-03-10 RX ORDER — POTASSIUM CHLORIDE 750 MG/1
40 CAPSULE, EXTENDED RELEASE ORAL AS NEEDED
Status: DISCONTINUED | OUTPATIENT
Start: 2018-03-10 | End: 2018-03-16 | Stop reason: HOSPADM

## 2018-03-10 RX ORDER — POTASSIUM CHLORIDE 7.45 MG/ML
10 INJECTION INTRAVENOUS
Status: DISCONTINUED | OUTPATIENT
Start: 2018-03-10 | End: 2018-03-16 | Stop reason: HOSPADM

## 2018-03-10 RX ORDER — POTASSIUM CHLORIDE 1.5 G/1.77G
40 POWDER, FOR SOLUTION ORAL AS NEEDED
Status: DISCONTINUED | OUTPATIENT
Start: 2018-03-10 | End: 2018-03-16 | Stop reason: HOSPADM

## 2018-03-10 RX ADMIN — AMIODARONE HYDROCHLORIDE 200 MG: 200 TABLET ORAL at 08:51

## 2018-03-10 RX ADMIN — POTASSIUM CHLORIDE 40 MEQ: 750 CAPSULE, EXTENDED RELEASE ORAL at 22:46

## 2018-03-10 RX ADMIN — ASPIRIN 81 MG: 81 TABLET, COATED ORAL at 08:50

## 2018-03-10 RX ADMIN — BACLOFEN 10 MG: 10 TABLET ORAL at 20:57

## 2018-03-10 RX ADMIN — POTASSIUM CHLORIDE 10 MEQ: 10 INJECTION, SOLUTION INTRAVENOUS at 16:53

## 2018-03-10 RX ADMIN — BACLOFEN 10 MG: 10 TABLET ORAL at 08:50

## 2018-03-10 RX ADMIN — IPRATROPIUM BROMIDE AND ALBUTEROL SULFATE 3 ML: 2.5; .5 SOLUTION RESPIRATORY (INHALATION) at 06:44

## 2018-03-10 RX ADMIN — TAZOBACTAM SODIUM AND PIPERACILLIN SODIUM 3.38 G: 375; 3 INJECTION, SOLUTION INTRAVENOUS at 12:20

## 2018-03-10 RX ADMIN — ONDANSETRON 4 MG: 2 INJECTION INTRAMUSCULAR; INTRAVENOUS at 21:04

## 2018-03-10 RX ADMIN — HYDROCORTISONE: 1 CREAM TOPICAL at 21:48

## 2018-03-10 RX ADMIN — FINASTERIDE 5 MG: 5 TABLET, FILM COATED ORAL at 08:50

## 2018-03-10 RX ADMIN — PROMETHAZINE HYDROCHLORIDE 12.5 MG: 25 INJECTION INTRAMUSCULAR; INTRAVENOUS at 15:14

## 2018-03-10 RX ADMIN — TEMAZEPAM 15 MG: 15 CAPSULE ORAL at 20:58

## 2018-03-10 RX ADMIN — HYDROCORTISONE: 1 CREAM TOPICAL at 08:54

## 2018-03-10 RX ADMIN — PHENYTOIN SODIUM 100 MG: 100 CAPSULE, EXTENDED RELEASE ORAL at 20:58

## 2018-03-10 RX ADMIN — HYDROCODONE BITARTRATE AND ACETAMINOPHEN 1 TABLET: 10; 325 TABLET ORAL at 16:22

## 2018-03-10 RX ADMIN — DILTIAZEM HYDROCHLORIDE 240 MG: 240 CAPSULE, COATED, EXTENDED RELEASE ORAL at 18:01

## 2018-03-10 RX ADMIN — TAZOBACTAM SODIUM AND PIPERACILLIN SODIUM 3.38 G: 375; 3 INJECTION, SOLUTION INTRAVENOUS at 03:40

## 2018-03-10 RX ADMIN — Medication 10 ML: at 21:10

## 2018-03-10 RX ADMIN — NEBIVOLOL HYDROCHLORIDE 20 MG: 5 TABLET ORAL at 08:50

## 2018-03-10 RX ADMIN — FLUTICASONE PROPIONATE 2 SPRAY: 50 SPRAY, METERED NASAL at 08:55

## 2018-03-10 RX ADMIN — TAMSULOSIN HYDROCHLORIDE 0.4 MG: 0.4 CAPSULE ORAL at 20:57

## 2018-03-10 RX ADMIN — Medication 10 ML: at 08:54

## 2018-03-10 RX ADMIN — PHENYTOIN SODIUM 100 MG: 100 CAPSULE, EXTENDED RELEASE ORAL at 08:50

## 2018-03-10 RX ADMIN — BUDESONIDE AND FORMOTEROL FUMARATE DIHYDRATE 2 PUFF: 160; 4.5 AEROSOL RESPIRATORY (INHALATION) at 06:44

## 2018-03-10 RX ADMIN — POTASSIUM CHLORIDE 10 MEQ: 10 INJECTION, SOLUTION INTRAVENOUS at 12:00

## 2018-03-10 RX ADMIN — FUROSEMIDE 40 MG: 40 TABLET ORAL at 08:50

## 2018-03-10 RX ADMIN — PREDNISONE 20 MG: 20 TABLET ORAL at 08:50

## 2018-03-10 RX ADMIN — IPRATROPIUM BROMIDE AND ALBUTEROL SULFATE 3 ML: 2.5; .5 SOLUTION RESPIRATORY (INHALATION) at 10:11

## 2018-03-10 RX ADMIN — POTASSIUM CHLORIDE 10 MEQ: 10 INJECTION, SOLUTION INTRAVENOUS at 13:20

## 2018-03-10 RX ADMIN — Medication: at 08:53

## 2018-03-10 RX ADMIN — OXYCODONE HYDROCHLORIDE AND ACETAMINOPHEN 500 MG: 500 TABLET ORAL at 08:50

## 2018-03-10 RX ADMIN — POTASSIUM CHLORIDE 10 MEQ: 10 INJECTION, SOLUTION INTRAVENOUS at 17:53

## 2018-03-10 RX ADMIN — POTASSIUM CHLORIDE 10 MEQ: 10 INJECTION, SOLUTION INTRAVENOUS at 15:45

## 2018-03-10 RX ADMIN — Medication 10 ML: at 21:11

## 2018-03-10 RX ADMIN — RIVAROXABAN 20 MG: 10 TABLET, FILM COATED ORAL at 08:51

## 2018-03-10 RX ADMIN — POTASSIUM CHLORIDE 10 MEQ: 10 INJECTION, SOLUTION INTRAVENOUS at 14:40

## 2018-03-10 RX ADMIN — TAZOBACTAM SODIUM AND PIPERACILLIN SODIUM 3.38 G: 375; 3 INJECTION, SOLUTION INTRAVENOUS at 21:59

## 2018-03-10 RX ADMIN — SALINE NASAL SPRAY 2 SPRAY: 1.5 SOLUTION NASAL at 08:54

## 2018-03-10 NOTE — PLAN OF CARE
Problem: Patient Care Overview (Adult)  Goal: Plan of Care Review  Outcome: Ongoing (interventions implemented as appropriate)   03/09/18 1821   Coping/Psychosocial Response Interventions   Plan Of Care Reviewed With caregiver   Patient Care Overview   Progress improving   Outcome Evaluation   Outcome Summary/Follow up Plan Pt has been cooperative and calm today; v/s wnl; amioderone switched from gtt to PO     Goal: Adult Individualization and Mutuality  Outcome: Ongoing (interventions implemented as appropriate)    Goal: Discharge Needs Assessment  Outcome: Ongoing (interventions implemented as appropriate)      Problem: Fall Risk (Adult)  Goal: Identify Related Risk Factors and Signs and Symptoms  Outcome: Ongoing (interventions implemented as appropriate)    Goal: Absence of Falls  Outcome: Ongoing (interventions implemented as appropriate)      Problem: Skin Integrity Impairment, Risk/Actual (Adult)  Goal: Identify Related Risk Factors and Signs and Symptoms  Outcome: Ongoing (interventions implemented as appropriate)    Goal: Skin Integrity/Wound Healing  Outcome: Ongoing (interventions implemented as appropriate)      Problem: Sepsis (Adult)  Goal: Signs and Symptoms of Listed Potential Problems Will be Absent or Manageable (Sepsis)  Outcome: Ongoing (interventions implemented as appropriate)      Problem: Pressure Ulcer (Adult)  Goal: Signs and Symptoms of Listed Potential Problems Will be Absent or Manageable (Pressure Ulcer)  Outcome: Ongoing (interventions implemented as appropriate)      Problem: Pain, Acute (Adult)  Goal: Identify Related Risk Factors and Signs and Symptoms  Outcome: Ongoing (interventions implemented as appropriate)    Goal: Acceptable Pain Control/Comfort Level  Outcome: Ongoing (interventions implemented as appropriate)

## 2018-03-10 NOTE — PROGRESS NOTES
[facility] Medicine Services  INPATIENT PROGRESS NOTE     LOS: 5 days   Patient Care Team:  Valerie Marvin MD as PCP - General    Chief Complaint:  Sepsis      Subjective     Interval History:   Patient is in for follow-up today.  He was transferred to the ICU due to uncontrolled atrial fibrillation.  He is now on oral amiodarone , oral Cardizem, Bystolic and in controlled ventricular response.  Patient also remains off BiPAP, much less symptomatic and remains on supplemental oxygen of 2L nasal prongs and maintaining saturation of 94% or more.   He is mostly bedbound due to history of stroke but does use a wheelchair as an assistive means of ambulation.   He is tolerating prescribed diet and no other changes have been reported.    Patient Complaints: As above    History taken from: Patient and wife    Review of Systems:    Review of Systems   Constitutional: Positive for activity change. Negative for appetite change, chills, diaphoresis, fatigue and fever.   HENT: Negative for trouble swallowing and voice change.    Eyes: Negative for photophobia and visual disturbance.   Respiratory: Negative for cough, choking, chest tightness, shortness of breath, wheezing and stridor.    Cardiovascular: Negative for chest pain, palpitations and leg swelling.   Gastrointestinal: Negative for abdominal distention, abdominal pain, blood in stool, constipation, diarrhea, nausea and vomiting.   Endocrine: Negative for cold intolerance, heat intolerance, polydipsia, polyphagia and polyuria.   Genitourinary: Negative for decreased urine volume, difficulty urinating, dysuria, enuresis, flank pain, frequency, hematuria and urgency.   Musculoskeletal: Positive for gait problem. Negative for arthralgias, myalgias, neck pain and neck stiffness.   Skin: Negative for color change, pallor, rash and wound.   Neurological: Positive for weakness. Negative for dizziness, tremors, seizures,  syncope, facial asymmetry, speech difficulty, light-headedness, numbness and headaches.   Hematological: Does not bruise/bleed easily.   Psychiatric/Behavioral: Negative for agitation, behavioral problems and confusion.         Objective     Vital Signs  Temp:  [97.9 °F (36.6 °C)-98.1 °F (36.7 °C)] 97.9 °F (36.6 °C)  Heart Rate:  [] 81  Resp:  [16-24] 22  BP: (107-156)/(56-78) 146/71    Physical Exam:   Physical Exam   Constitutional: He is oriented to person, place, and time. He appears well-developed and well-nourished. No distress.   HENT:   Head: Normocephalic and atraumatic.   Eyes: EOM are normal. Pupils are equal, round, and reactive to light. No scleral icterus.   Neck: Normal range of motion. Neck supple. No JVD present. No thyromegaly present.   Cardiovascular: Normal rate and normal heart sounds.  An irregularly irregular rhythm present. Exam reveals no gallop and no friction rub.    No murmur heard.  Pulmonary/Chest: Effort normal. He has no wheezes. He has rhonchi. He has no rales. He exhibits no tenderness.   Abdominal: Soft. Bowel sounds are normal. He exhibits no distension and no mass. There is no tenderness. There is no rebound and no guarding.   There is a suprapubic catheter which was recently changed.   Musculoskeletal: He exhibits deformity. He exhibits no edema or tenderness.   Neurological: He is alert and oriented to person, place, and time. No cranial nerve deficit. He exhibits normal muscle tone.   Skin: Skin is warm and dry. No rash noted. He is not diaphoretic. No erythema. No pallor.   Cellulitis left lower extremity and buttock has essentially resolved.   Psychiatric: He has a normal mood and affect. His behavior is normal. Judgment and thought content normal.   Nursing note and vitals reviewed.         Results Review:         Results from last 7 days  Lab Units 03/10/18  0325 03/09/18  0834 03/08/18  1638 03/08/18  0258 03/08/18  0112 03/07/18  2359 03/07/18  0751 03/06/18  0600  03/05/18  0924 03/05/18  0136   SODIUM mmol/L 142 140  --  140  --   --  142 141 143 142   SODIUM, ARTERIAL mmol/L  --   --  138.3 138.0 137.5* 138.9  --   --   --   --    POTASSIUM mmol/L 2.9* 4.1  --  4.1  --   --  4.4 3.9 3.9 3.7   CHLORIDE mmol/L 100 103  --  106  --   --  109 107 106 100   CO2 mmol/L 28.0 26.0  --  21.0*  --   --  19.0* 23.0 24.0 26.0   BUN mg/dL 22* 19  --  15  --   --  14 19 24* 29*   CREATININE mg/dL 1.23 1.21  --  1.07  --   --  0.98 1.07 1.20 1.30   GLUCOSE mg/dL 106* 129*  --  174*  --   --  126* 123* 128* 125*   GLUCOSE, ARTERIAL mmol/L  --   --  152 179 189 202  --   --   --   --    CALCIUM mg/dL 8.3* 8.6  --  9.0  --   --  7.9* 7.6* 7.7* 8.6   BILIRUBIN mg/dL 0.5 0.3  --  0.3  --   --  0.3 0.4 0.5 0.4   ALK PHOS U/L 61 63  --  92  --   --  79 70 89 122   ALT (SGPT) U/L 38 21  --  33  --   --  28 32 32 31   AST (SGOT) U/L 19 15*  --  22  --   --  17 18 23 27         Results from last 7 days  Lab Units 03/05/18  0136   MAGNESIUM mg/dL 1.8         Results from last 7 days  Lab Units 03/10/18  0325 03/09/18  0356 03/08/18  0258 03/07/18  0547 03/06/18  0600 03/05/18  0923 03/05/18  0136   WBC 10*3/mm3 9.54 15.17* 17.84* 16.30* 15.90* 22.76* 23.46*   HEMOGLOBIN g/dL 14.7 14.2 15.9 14.9 12.7* 14.5 15.6   HEMATOCRIT % 42.3 41.4 47.1 45.7 38.5* 43.1 45.6   PLATELETS 10*3/mm3 205 219 204 152 172 168 197       Lab Results   Component Value Date    CKTOTAL 60 03/05/2018    CKMB 2.6 02/01/2015    TROPONINI 0.015 02/01/2015       pH, Arterial   Date Value Ref Range Status   03/08/2018 7.333 (L) 7.350 - 7.450 pH units Final     CO2   Date Value Ref Range Status   03/10/2018 28.0 22.0 - 31.0 mmol/L Final              Imaging Results (last 7 days)     Procedure Component Value Units Date/Time    XR Chest 1 View [[id number]] Collected:  03/08/18 1037     Updated:  03/08/18 1106    Narrative:       Chest single view     CLINICAL INDICATION: Shortness of breath. Respiratory failure,  fever and  sepsis    COMPARISON: Chest March 7, 2018.    FINDINGS: Cardiomegaly. Prominence of pulmonary vascular markings  slightly improved since prior exam. Infiltrative changes,  pneumonitis right lung primarily right upper lobe with partial  clearing, slight improvement since prior exam.      Impression:       CONCLUSION: As above.    Electronically signed by:  Dennis Gates MD  3/8/2018 11:05 AM CST  Workstation: [id number]    US Guided Vascular Access [[id number]] Resulted:  03/07/18 1355     Updated:  03/07/18 1355    Narrative:       This procedure was auto-finalized with no dictation required.    XR Chest Post CVA Port [[id number]] Collected:  03/07/18 1330     Updated:  03/07/18 1348    Narrative:         EXAM:         Radiograph(s), Chest   VIEWS:   Portable ; 1       DATE/TIME:  3/7/2018 1:46 PM CST                INDICATION:   verification, R50.9 Fever, unspecified A41.9  Sepsis, unspecified organism L03.317 Cellulitis of buttock    COMPARISON:  CXR: 3/5/18             FINDINGS:             - lines/tubes:    Right approach PICC line in standard position.      - cardiac:         size within normal limits         - mediastinum: contour within normal limits         - lungs:         Focal airspace disease, right upper lobe. A  second focus of airspace disease may be present in the left base.              - pleura:         no evidence of  fluid                  - osseous:         unremarkable for age                  - misc.:         Impression:       CONCLUSION:        1. Right approach PICC line in standard position.  2. Right upper lobe airspace disease.                                               Electronically signed by:  ERIC Sibley MD  3/7/2018 1:47 PM  CST Workstation: [id number]    IR PICC wo fluoro guidance [[id number]] Resulted:  03/07/18 1337     Updated:  03/07/18 1337    Narrative:       This procedure was auto-finalized with no dictation required.    XR Chest 1 View [[id number]] Collected:  03/05/18 0140      Updated:  03/05/18 0200    Narrative:         Chest single view on  3/5/2018     CLINICAL INDICATION: Nausea, fever, tachycardia    COMPARISON: 12/15/2016    FINDINGS: There is mild elevation of the right hemidiaphragm.  Mild chronic interstitial changes are noted. Heart is upper  limits normal for size. Hilar and mediastinal contours are within  normal limits.      Impression:       No acute disease.    Electronically signed by:  Luke Bustos  3/5/2018 1:58 AM CST  Workstation: [id number]           Blood Culture   Date Value Ref Range Status   03/05/2018 No growth at 2 days  Preliminary                    Urine Culture   Date Value Ref Range Status   03/05/2018 Mixed Tina Isolated  Final           Medication Review:   Current Facility-Administered Medications   Medication Dose Route Frequency Provider Last Rate Last Dose   • 8-hydroxyquinoline sulfate (BAG BALM) ointment   Apply externally Daily Evelio Christensen MD       • albuterol (PROVENTIL) nebulizer solution 0.5% 2.5 mg/0.5mL  2.5 mg Nebulization Q6H PRN Yoan Hamilton MD       • amiodarone (PACERONE) tablet 200 mg  200 mg Oral Q24H Sai Muñoz MD   200 mg at 03/10/18 0851   • aspirin EC tablet 81 mg  81 mg Oral Daily Yoan Hamilton MD   81 mg at 03/10/18 0850   • baclofen (LIORESAL) tablet 10 mg  10 mg Oral Q12H Jordi Marie MD   10 mg at 03/10/18 0850   • budesonide-formoterol (SYMBICORT) 160-4.5 MCG/ACT inhaler 2 puff  2 puff Inhalation BID - RT Hardy Fisher MD   2 puff at 03/10/18 0644   • diltiaZEM CD (CARDIZEM CD) 24 hr capsule 240 mg  240 mg Oral Daily With Dinner Sai Muñoz MD   240 mg at 03/09/18 1749   • docusate sodium (COLACE) capsule 100 mg  100 mg Oral Daily Jordi Marie MD   100 mg at 03/09/18 0913   • finasteride (PROSCAR) tablet 5 mg  5 mg Oral Daily Yoan Hamilton MD   5 mg at 03/10/18 0850   • fluticasone (FLONASE) 50 MCG/ACT nasal spray 2 spray  2 spray Each Nare Daily Hardy Fisher MD   2  spray at 03/10/18 0855   • furosemide (LASIX) tablet 40 mg  40 mg Oral Daily Hardy Fisher MD   40 mg at 03/10/18 0850   • HYDROcodone-acetaminophen (NORCO)  MG per tablet 1 tablet  1 tablet Oral Q4H PRN Yoan Hamilton MD   1 tablet at 03/06/18 2023   • ipratropium-albuterol (DUO-NEB) nebulizer solution 3 mL  3 mL Nebulization 4x Daily - RT Hardy Fisher MD   3 mL at 03/10/18 1011   • magic butt ointment   Topical BID Evelio Christensen MD       • nebivolol (BYSTOLIC) tablet 20 mg  20 mg Oral Daily Hardy Fisher MD   20 mg at 03/10/18 0850   • ondansetron (ZOFRAN) injection 4 mg  4 mg Intravenous Q4H PRN Evelio Christensen MD   4 mg at 03/07/18 0134   • phenytoin (DILANTIN) ER capsule 100 mg  100 mg Oral Q12H Yoan Hamilton MD   100 mg at 03/10/18 0850   • piperacillin-tazobactam (ZOSYN) 3.375 g in iso-osmotic dextrose 50 ml (premix)  3.375 g Intravenous Q8H Hardy Fisher MD 0 mL/hr at 03/09/18 1745 3.375 g at 03/10/18 0340   • polyethylene glycol 3350 powder (packet)  17 g Oral Daily PRN Hardy Fisher MD   17 g at 03/09/18 0911   • predniSONE (DELTASONE) tablet 20 mg  20 mg Oral Daily With Breakfast Hardy Fisher MD   20 mg at 03/10/18 0850   • promethazine (PHENERGAN) injection 12.5 mg  12.5 mg Intravenous Q6H PRN Jordi Marie MD   12.5 mg at 03/07/18 1613   • rivaroxaban (XARELTO) tablet 20 mg  20 mg Oral Daily Yoan Hamilton MD   20 mg at 03/10/18 0851   • sodium chloride (OCEAN) nasal spray 2 spray  2 spray Each Nare PRN Hardy Fisher MD   2 spray at 03/10/18 0854   • sodium chloride 0.9 % flush 1-10 mL  1-10 mL Intravenous PRN Yoan Hamilton MD   10 mL at 03/07/18 1615   • sodium chloride 0.9 % flush 10 mL  10 mL Intravenous PRN Robert Anderson MD       • sodium chloride 0.9 % flush 10 mL  10 mL Intracatheter Q12H Hardy Fisher MD   10 mL at 03/10/18 0854   • sodium chloride 0.9 % flush 10 mL  10 mL Intracatheter PRN Hardy Fisher MD       • sodium chloride  0.9 % flush 10 mL  10 mL Intracatheter PRN Hardy Fisher MD   10 mL at 03/09/18 2157   • sodium chloride 0.9 % flush 10 mL  10 mL Intracatheter PRN Hardy Fisher MD       • tamsulosin (FLOMAX) 24 hr capsule 0.4 mg  0.4 mg Oral Nightly Yoan Hamilton MD   0.4 mg at 03/09/18 2000   • temazepam (RESTORIL) capsule 15 mg  15 mg Oral Nightly PRN Jordi Marie MD   15 mg at 03/09/18 2000   • vitamin C (ASCORBIC ACID) tablet 500 mg  500 mg Oral Daily Yoan Hamilton MD   500 mg at 03/10/18 0850         Assessment/Plan     Principal Problem:    Sepsis  Active Problems:    Sacral decubitus ulcer    UTI (urinary tract infection) due to urinary indwelling catheter    Hypotension    Atrial fibrillation  - Pseudomonas UTI complicated by sepsis: Continue Zosyn. Leucocytosis is reactive and has resolved.  - Cellulitis: Essentially resolved.  Continue broad-spectrum antibiotics.   - Right upper lobe pneumonia: Follow-up chest x-ray done shows some improvement.  Continue antibiotics  - History of CHF: Continue diuretic therapy.  - Atrial fibrillation with rapid ventricular response: Continue rate control and anti-coagulation.  Echocardiogram shows normal left ventricular systolic function with ejection fraction of 55%, grade 1 left ventricular diastolic dysfunction and mild mitral and tricuspid valve regurgitation.  Eventual plan is for EP studies with AV ablation.  Input by cardiologist is appreciated.    - -History of BPH: Continue home medications.  - Hypokalemia: Replete potassium and monitor levels.  - Continue GI prophylaxis.  - Discharge planning is ongoing.  - Transfer out of the ICU.            Hardy Fisher MD  03/10/18      EMR Dragon/Transcription disclaimer:   Much of this encounter note is an electronic transcription/translation of spoken language to printed text. The electronic translation of spoken language may permit erroneous, or at times, nonsensical words or phrases to be inadvertently  transcribed; Although I have reviewed the note for such errors, some may still exist.

## 2018-03-10 NOTE — NURSING NOTE
Patient and family has asked several times when the bed upstairs would be ready due to the patient wanting to take phenergan for nausea.  The patient said the medication makes him drowsy and he wants to wait until he is transferred to take the medication.  Charge nurse informed, calls made to the house supervisor to request a bed so transfer can be completed and the patient will then take medication.  Still awaiting a bed assignment from the house supervisor.  I apologized several times and offered medication prior to transfer, then want to wait for transfer.

## 2018-03-10 NOTE — THERAPY TREATMENT NOTE
Acute Care - Physical Therapy Treatment Note  [facility]     Patient Name: Mando Hill  : 1945  MRN: [id number]  Today's Date: 3/10/2018     Date of Referral to PT: 18       Admit Date: 3/5/2018    Visit Dx:    ICD-10-CM ICD-9-CM   1. Fever, unspecified fever cause R50.9 780.60   2. Sepsis, due to unspecified organism A41.9 038.9     995.91   3. Cellulitis of buttock L03.317 682.5   4. Impaired functional mobility, balance, and endurance Z74.09 V49.89     Patient Active Problem List   Diagnosis   • Osteoarthritis of right shoulder   • Sepsis   • Sacral decubitus ulcer   • UTI (urinary tract infection) due to urinary indwelling catheter   • Hypotension   • Atrial fibrillation       Therapy Treatment    Therapy Treatment / Health Promotion    Treatment Time/Intention  Discipline: physical therapy assistant  Document Type: therapy note (daily note)  Subjective Information: no complaints  Mode of Treatment: physical therapy  Total Minutes, Physical Therapy Treatment: 16    Vitals/Pain/Safety  Vital Signs  Pre Systolic BP Rehab: 146  Pre Treatment Diastolic BP: 71  Pretreatment Heart Rate (beats/min): 84  Posttreatment Heart Rate (beats/min): 83  Pre SpO2 (%): 93  O2 Delivery Pre Treatment: supplemental O2  Post SpO2 (%): 93  O2 Delivery Post Treatment: supplemental O2  Pre Patient Position: Supine  Intra Patient Position: Supine  Post Patient Position: Supine  Positioning and Restraints  Pre-Treatment Position: in bed  Post Treatment Position: bed  In Bed: supine, call light within reach, encouraged to call for assist, patient within staff view    Mobility,ADL,Motor, Modality  Bed Mobility Assessment/Treatment  Rolling Left Sanderson (Bed Mobility): dependent (less than 25% patient effort)  Rolling Right Sanderson (Bed Mobility): dependent (less than 25% patient effort)     Therapeutic Exercise  Lower Extremity (Therapeutic Exercise): gastroc stretch, bilateral, hamstring sets,  bilateral, heel slides, bilateral, quad sets, right, SAQ (short arc quad), bilateral, SLR (straight leg raise), bilateral, other (see comments) (Abd/Add)  Exercise Type (Therapeutic Exercise): AROM (active range of motion), PROM (passive range of motion) (PROM on LLE)  Position (Therapeutic Exercise): supine  Sets/Reps (Therapeutic Exercise): 20x1           ROM/MMT             Sensory, Edema, Orthotics          Cognition, Communication, Swallow  Cognitive Assessment Intervention- SLP  Cognitive Function (Cognition): WFL  Orientation Status (Cognition): person, place, situation  Speaking Valve  Pretreatment Heart Rate (beats/min): 84  Pre SpO2 (%): 93  Posttreatment Heart Rate (beats/min): 83  Post SpO2 (%): 93  General Eating/Swallowing Observations  Pre SpO2 (%): 93  Post SpO2 (%): 93    Outcome Summary               PT Rehab Goals     Row Name 03/10/18 1100 03/10/18 0800          Transfer Goal 1 (PT)    Activity/Assistive Device (Transfer Goal 1, PT) bed-to-chair/chair-to-bed  -CA bed-to-chair/chair-to-bed  -CA     [place] Level/Cues Needed (Transfer Goal 1, PT) other (see comments)   Dependent  -CA other (see comments)   Dependent  -CA     Time Frame (Transfer Goal 1, PT) 5 days  -CA 5 days  -CA     Barriers (Transfers Goal 1, PT) pt. will tolerated lift transfer to chair without significant change in VS or skin irritation  -CA Pt. will tolerate lift transfer to chair without significant change in VS or skin irritation  -CA     Progress/Outcome (Transfer Goal 1, PT) goal ongoing  -CA goal ongoing  -CA        Transfer Goal 2 (PT)    Activity/Assistive Device (Transfer Goal 2, PT) bed-to-chair/chair-to-bed  -CA bed-to-chair/chair-to-bed  -CA     [place] Level/Cues Needed (Transfer Goal 2, PT) other (see comments)   Pt./Wife will transfer as PLOF comfortably  -CA other (see comments)   Pt./WIfe  will transfer as PLOF comfortably  -CA     Time Frame (Transfer Goal 2, PT) by discharge  -CA by discharge  -CA      Progress/Outcome (Transfer Goal 2, PT) goal ongoing  -CA goal ongoing  -CA        Wheelchair Locomotion Goal 1 (PT)    Activity (Wheelchair Locomotion Goal 1, PT) other (see comments)   Repositioning in W/C  -CA other (see comments)   Repositioning in W/C  -CA     Time Frame (Wheelchair Locomotion Goal 1, PT) by discharge  -CA by discharge  -CA     Progress/Outcome (Wheelchair Locomotion Goal 1, PT) goal ongoing  -CA goal ongoing  -CA        Problem Specific Goal 1 (PT)    Problem Specific Goal 1 (PT) Cardiopulmonary goal  -CA Cardiopulmonary Goal  -CA     Time Frame (Problem Specific Goal 1, PT) 3 days  -CA 3 days  -CA     Barriers (Problem Specific Goal 1, PT) pt. will not have dizziness or drop in sats with sitting upright for 30 mins  -CA pt. will not have dizziness or drop in sats w/ sitting upright x 30 mins  -CA       User Key  (r) = Recorded By, (t) = Taken By, (c) = Cosigned By    Initials Name Provider Type    IVETTE Martinez PTA Physical Therapy Assistant          Physical Therapy Education     Title: PT OT SLP Therapies (Active)     Topic: Physical Therapy (Active)     Point: Mobility training (Active)    Learning Progress Summary     Learner Status Readiness Method Response Comment Documented by    Patient Active Acceptance D,E NR POC; mobility  03/08/18 1908          Point: Home exercise program (Active)    Learning Progress Summary     Learner Status Readiness Method Response Comment Documented by    Patient Active Acceptance D,E NR POC; mobility  03/08/18 1908          Point: Body mechanics (Active)    Learning Progress Summary     Learner Status Readiness Method Response Comment Documented by    Patient Active Acceptance D,E NR POC; mobility  03/08/18 1908          Point: Precautions (Active)    Learning Progress Summary     Learner Status Readiness Method Response Comment Documented by    Patient Active Acceptance D,E NR POC; mobility  03/08/18 1908                      User Key      Initials Effective Dates Name Provider Type Discipline     03/07/18 -  Anisa Dickson, PT Physical Therapist PT                    PT Recommendation and Plan                Outcome Measures     Row Name 03/10/18 1040 03/08/18 1900 03/07/18 1426       How much help from another person do you currently need...    Turning from your back to your side while in flat bed without using bedrails? 1  -CA 1  -GB  --    Moving from lying on back to sitting on the side of a flat bed without bedrails? 1  -CA 1  -GB  --    Moving to and from a bed to a chair (including a wheelchair)? 1  -CA 1  -GB  --    Standing up from a chair using your arms (e.g., wheelchair, bedside chair)? 1  -CA 1  -GB  --    Climbing 3-5 steps with a railing? 1  -CA 1  -GB  --    To walk in hospital room? 1  -CA 1  -GB  --    AM-PAC 6 Clicks Score 6  -CA 6  -GB  --       How much help from another is currently needed...    Putting on and taking off regular lower body clothing?  --  -- 1  -RW    Bathing (including washing, rinsing, and drying)  --  -- 1  -RW    Toileting (which includes using toilet bed pan or urinal)  --  -- 1  -RW    Putting on and taking off regular upper body clothing  --  -- 1  -RW    Taking care of personal grooming (such as brushing teeth)  --  -- 2  -RW    Eating meals  --  -- 2  -RW    Score  --  -- 8  -RW       Functional Assessment    Outcome Measure Options AM-PAC 6 Clicks Basic Mobility (PT)  -CA AM-PAC 6 Clicks Basic Mobility (PT)  -GB AM-PAC 6 Clicks Daily Activity (OT)  -RW      User Key  (r) = Recorded By, (t) = Taken By, (c) = Cosigned By    Initials Name Provider Type    VLADIMIR Dickson, PT Physical Therapist    RW Zaina Pickens, OTR/L Occupational Therapist    IVETTE Martinez PTA Physical Therapy Assistant           Time Calculation:         PT Charges     Row Name 03/10/18 1239             Time Calculation    Start Time 1040  -CA      Stop Time 1056  -CA      Time Calculation (min) 16 min   -CA      PT Received On 03/10/18  -CA         Time Calculation- PT    Total Timed Code Minutes- PT 16 minute(s)  -CA        User Key  (r) = Recorded By, (t) = Taken By, (c) = Cosigned By    Initials Name Provider Type    IVETTE Martinez PTA Physical Therapy Assistant          Therapy Charges for Today     Code Description Service Date Service Provider Modifiers Qty    46956967921 HC PT THER PROC EA 15 MIN 3/10/2018 Rick Martinez PTA GP 1          PT G-Codes  PT Professional Judgement Used?: Yes  Outcome Measure Options: AM-PAC 6 Clicks Basic Mobility (PT)  Score: 6  Functional Limitation: Changing and maintaining body position, Mobility: Walking and moving around  Mobility: Walking and Moving Around Current Status (): 100 percent impaired, limited or restricted  Mobility: Walking and Moving Around Goal Status (): 100 percent impaired, limited or restricted    Rick Martinez PTA  3/10/2018

## 2018-03-10 NOTE — PROGRESS NOTES
"   LOS: 5 days   Patient Care Team:  Valerie Marvin MD as PCP - General    Subjective     Subjective:  Symptoms:  Stable.  (Urine clear and yellow, no problems at cystostomy site.).    Diet:  He reports  nausea.        History taken from: patient chart family    Objective     Vital Signs  Temp:  [97.9 °F (36.6 °C)-98.1 °F (36.7 °C)] 97.9 °F (36.6 °C)  Heart Rate:  [] 81  Resp:  [16-22] 22  BP: (107-156)/(56-78) 125/66    Objective:  General Appearance:  In no acute distress.    Vital signs: (most recent): Blood pressure 125/66, pulse 81, temperature 97.9 °F (36.6 °C), temperature source Oral, resp. rate 22, height 170.2 cm (67.01\"), weight 82.3 kg (181 lb 7 oz), SpO2 92 %.  Vital signs are normal.  No fever.    Output: Producing urine (SP Tube, urine clear and yellow).    HEENT: Normal HEENT exam.    Lungs:  Normal effort and normal respiratory rate.  Breath sounds clear to auscultation.  He is not in respiratory distress.    Heart: Normal rate.  S1 normal and S2 normal.    Chest: Symmetric chest wall expansion.   Abdomen: Abdomen is soft and non-distended.  Bowel sounds are normal.     Extremities: Decreased range of motion.  There is deformity and dependent edema (LLE).    Pulses: Distal pulses are intact.    Neurological: Patient is alert and oriented to person, place and time.  GCS score is 15.    Pupils:  Pupils are equal, round, and reactive to light.    Skin:  Warm and dry.  (Erythema and increased warmth LLE)            Results Review:    Lab Results (last 24 hours)     Procedure Component Value Units Date/Time    CBC & Differential [[id number]] Collected:  03/10/18 0325    Specimen:  Blood Updated:  03/10/18 0425    Narrative:       The following orders were created for panel order CBC & Differential.  Procedure                               Abnormality         Status                     ---------                               -----------         ------                     CBC Auto " Differential[[id number]]        Abnormal            Final result                 Please view results for these tests on the individual orders.    CBC Auto Differential [[id number]]  (Abnormal) Collected:  03/10/18 0325    Specimen:  Blood Updated:  03/10/18 0425     WBC 9.54 10*3/mm3      RBC 4.72 10*6/mm3      Hemoglobin 14.7 g/dL      Hematocrit 42.3 %      MCV 89.6 fL      MCH 31.1 pg      MCHC 34.8 g/dL      RDW 12.9 %      RDW-SD 42.3 fl      MPV 9.8 fL      Platelets 205 10*3/mm3      Neutrophil % 80.2 (H) %      Lymphocyte % 7.3 (L) %      Monocyte % 11.6 %      Eosinophil % 0.2 %      Basophil % 0.1 %      Immature Grans % 0.6 (H) %      Neutrophils, Absolute 7.64 10*3/mm3      Lymphocytes, Absolute 0.70 10*3/mm3      Monocytes, Absolute 1.11 (H) 10*3/mm3      Eosinophils, Absolute 0.02 10*3/mm3      Basophils, Absolute 0.01 10*3/mm3      Immature Grans, Absolute 0.06 (H) 10*3/mm3     Comprehensive Metabolic Panel [[id number]]  (Abnormal) Collected:  03/10/18 0325    Specimen:  Blood Updated:  03/10/18 0417     Glucose 106 (H) mg/dL      BUN 22 (H) mg/dL      Creatinine 1.23 mg/dL      Sodium 142 mmol/L      Potassium 2.9 (L) mmol/L      Chloride 100 mmol/L      CO2 28.0 mmol/L      Calcium 8.3 (L) mg/dL      Total Protein 6.7 g/dL      Albumin 3.30 (L) g/dL      ALT (SGPT) 38 U/L      AST (SGOT) 19 U/L      Alkaline Phosphatase 61 U/L      Total Bilirubin 0.5 mg/dL      eGFR Non African Amer 57 mL/min/1.73      Globulin 3.4 gm/dL      A/G Ratio 1.0 (L) g/dL      BUN/Creatinine Ratio 17.9     Anion Gap 14.0 mmol/L     Narrative:       The MDRD GFR formula is only valid for adults with stable renal function between ages 18 and 70.    Blood Culture - Blood, [[id number]]  (Normal) Collected:  03/05/18 0235    Specimen:  Blood from Wrist, Right Updated:  03/10/18 0246     Blood Culture No growth at 5 days    Blood Culture - Blood, [[id number]]  (Normal) Collected:  03/05/18 0136    Specimen:  Blood from Arm, Right  Updated:  03/10/18 0147     Blood Culture No growth at 5 days         Imaging Results (last 24 hours)     ** No results found for the last 24 hours. **           I reviewed the patient's new clinical results.  I reviewed the patient's new imaging results and agree with the interpretation.  I reviewed the patient's other test results and agree with the interpretation      Assessment/Plan     Principal Problem:    Sepsis  Active Problems:    Sacral decubitus ulcer    UTI (urinary tract infection) due to urinary indwelling catheter    Hypotension    Atrial fibrillation      Assessment & Plan    UTI, acute, causing sepsis related to chronic SP tube last changed 4 weeks ago.  -Urine culture Pseudomonas aeruginosa  -WBC 23.46-->15.17-->9.54  -Zosyn day #6  -Estimated Creatinine Clearance: 53.3 mL/min (by C-G formula based on SCr of 1.23 mg/dL).     Neurogenic bladder  -chronic SP tube changed 3/5/18 and urine recollected was mixed.     BPH  Flomax, Proscar    Plan: no change from  standpoint    JEAN Robbins  03/10/18  12:39 PM

## 2018-03-10 NOTE — PROGRESS NOTES
[facility] Cardiology  INPATIENT PROGRESS NOTE    Name: Mando Hill  Age/Sex: 73 y.o. male  :  1945        PCP: Valerie Marvin MD    Vital Signs  Temp:  [97.9 °F (36.6 °C)-98.1 °F (36.7 °C)] 97.9 °F (36.6 °C)  Heart Rate:  [] 100  Resp:  [16-24] 20  BP: (107-156)/(56-78) 146/65  Body mass index is 28.41 kg/m².      Subjective  admitted with cellulitis and sepsis with the sacral decubitus now doing much better and the redness is much better and on IV antibiotics.  Patient has atrial flutter difficult to control with medications and on anticoagulation.      Patient Active Problem List   Diagnosis   • Osteoarthritis of right shoulder   • Sepsis   • Sacral decubitus ulcer   • UTI (urinary tract infection) due to urinary indwelling catheter   • Hypotension   • Atrial fibrillation       Past Medical History:   Diagnosis Date   • Arthritis    • Atherosclerosis    • Atrial fibrillation    • BPH (benign prostatic hyperplasia)    • Brain injury    • Chronic bronchitis    • Hypertension    • Left hemiplegia    • UTI (urinary tract infection) due to urinary indwelling catheter 3/5/2018       Current Facility-Administered Medications   Medication Dose Route Frequency Provider Last Rate Last Dose   • 8-hydroxyquinoline sulfate (BAG BALM) ointment   Apply externally Daily Evelio Christensen MD       • albuterol (PROVENTIL) nebulizer solution 0.5% 2.5 mg/0.5mL  2.5 mg Nebulization Q6H PRN Yoan Hamilton MD       • amiodarone (PACERONE) tablet 200 mg  200 mg Oral Q24H Sai Muñoz MD   200 mg at 03/10/18 0851   • aspirin EC tablet 81 mg  81 mg Oral Daily Yoan Hamilton MD   81 mg at 03/10/18 0850   • baclofen (LIORESAL) tablet 10 mg  10 mg Oral Q12H Jordi Marie MD   10 mg at 03/10/18 0850   • budesonide-formoterol (SYMBICORT) 160-4.5 MCG/ACT inhaler 2 puff  2 puff Inhalation BID - RT Hardy Fisher MD   2 puff at 03/10/18 0644   • diltiaZEM CD (CARDIZEM CD) 24 hr capsule  240 mg  240 mg Oral Daily With Dinner Sai Muñoz MD   240 mg at 03/09/18 1749   • docusate sodium (COLACE) capsule 100 mg  100 mg Oral Daily Jordi Marie MD   100 mg at 03/09/18 0913   • finasteride (PROSCAR) tablet 5 mg  5 mg Oral Daily Yoan Hamilton MD   5 mg at 03/10/18 0850   • fluticasone (FLONASE) 50 MCG/ACT nasal spray 2 spray  2 spray Each Nare Daily Hardy Fisher MD   2 spray at 03/10/18 0855   • furosemide (LASIX) tablet 40 mg  40 mg Oral Daily Hardy Fisher MD   40 mg at 03/10/18 0850   • HYDROcodone-acetaminophen (NORCO)  MG per tablet 1 tablet  1 tablet Oral Q4H PRN Yoan Hamilton MD   1 tablet at 03/06/18 2023   • ipratropium-albuterol (DUO-NEB) nebulizer solution 3 mL  3 mL Nebulization 4x Daily - RT Hardy Fisher MD   3 mL at 03/10/18 0644   • magic butt ointment   Topical BID Evelio Christensen MD       • nebivolol (BYSTOLIC) tablet 20 mg  20 mg Oral Daily Hardy Fisher MD   20 mg at 03/10/18 0850   • ondansetron (ZOFRAN) injection 4 mg  4 mg Intravenous Q4H PRN Evelio Christensen MD   4 mg at 03/07/18 0134   • phenytoin (DILANTIN) ER capsule 100 mg  100 mg Oral Q12H Yoan Hamilton MD   100 mg at 03/10/18 0850   • piperacillin-tazobactam (ZOSYN) 3.375 g in iso-osmotic dextrose 50 ml (premix)  3.375 g Intravenous Q8H Hardy Fisher MD 0 mL/hr at 03/09/18 1745 3.375 g at 03/10/18 0340   • polyethylene glycol 3350 powder (packet)  17 g Oral Daily PRN Hardy Fisher MD   17 g at 03/09/18 0911   • predniSONE (DELTASONE) tablet 20 mg  20 mg Oral Daily With Breakfast Hardy Fisher MD   20 mg at 03/10/18 0850   • promethazine (PHENERGAN) injection 12.5 mg  12.5 mg Intravenous Q6H PRN Jordi Marie MD   12.5 mg at 03/07/18 1613   • rivaroxaban (XARELTO) tablet 20 mg  20 mg Oral Daily Yoan Hamilton MD   20 mg at 03/10/18 0851   • sodium chloride (OCEAN) nasal spray 2 spray  2 spray Each Nare PRN Hardy Fisher MD   2 spray at 03/10/18 0854   • sodium  chloride 0.9 % flush 1-10 mL  1-10 mL Intravenous PRN Yoan Hamilton MD   10 mL at 03/07/18 1615   • sodium chloride 0.9 % flush 10 mL  10 mL Intravenous PRN Robert Anderson MD       • sodium chloride 0.9 % flush 10 mL  10 mL Intracatheter Q12H Hardy Fisher MD   10 mL at 03/10/18 0854   • sodium chloride 0.9 % flush 10 mL  10 mL Intracatheter PRN Hardy Fisher MD       • sodium chloride 0.9 % flush 10 mL  10 mL Intracatheter PRN Hardy Fisher MD   10 mL at 03/09/18 2157   • sodium chloride 0.9 % flush 10 mL  10 mL Intracatheter PRN Hardy Fisher MD       • tamsulosin (FLOMAX) 24 hr capsule 0.4 mg  0.4 mg Oral Nightly Yoan Hamilton MD   0.4 mg at 03/09/18 2000   • temazepam (RESTORIL) capsule 15 mg  15 mg Oral Nightly PRN Jordi Marie MD   15 mg at 03/09/18 2000   • vitamin C (ASCORBIC ACID) tablet 500 mg  500 mg Oral Daily Yoan Hamilton MD   500 mg at 03/10/18 0850       Past Surgical History:   Procedure Laterality Date   • CHOLECYSTECTOMY     • EYE SURGERY     • SUPRAPUBIC CATHETER INSERTION         Social History     Social History   • Marital status:      Spouse name: N/A   • Number of children: N/A   • Years of education: N/A     Occupational History   • Not on file.     Social History Main Topics   • Smoking status: Former Smoker   • Smokeless tobacco: Never Used   • Alcohol use No   • Drug use: No   • Sexual activity: Not Currently     Other Topics Concern   • Not on file     Social History Narrative   • No narrative on file       O/E    Neck supple  no Jvd , no thyroid enlargement  Chest air entry equal, normal respiration   no rhonchi or creps  Cardiovascular system irgular rate and rythem ,   Abdomen soft no tenderness, bowel sounds present,  CNS - alert , oriented to place and time  Left hemiparesis  Extremeties -1+ pitting edema   pulses equal on both side        Lab Results (last 24 hours)     Procedure Component Value Units Date/Time    CBC & Differential  [[id number]] Collected:  03/10/18 0325    Specimen:  Blood Updated:  03/10/18 0425    Narrative:       The following orders were created for panel order CBC & Differential.  Procedure                               Abnormality         Status                     ---------                               -----------         ------                     CBC Auto Differential[[id number]]        Abnormal            Final result                 Please view results for these tests on the individual orders.    Comprehensive Metabolic Panel [[id number]]  (Abnormal) Collected:  03/10/18 0325    Specimen:  Blood Updated:  03/10/18 0417     Glucose 106 (H) mg/dL      BUN 22 (H) mg/dL      Creatinine 1.23 mg/dL      Sodium 142 mmol/L      Potassium 2.9 (L) mmol/L      Chloride 100 mmol/L      CO2 28.0 mmol/L      Calcium 8.3 (L) mg/dL      Total Protein 6.7 g/dL      Albumin 3.30 (L) g/dL      ALT (SGPT) 38 U/L      AST (SGOT) 19 U/L      Alkaline Phosphatase 61 U/L      Total Bilirubin 0.5 mg/dL      eGFR Non African Amer 57 mL/min/1.73      Globulin 3.4 gm/dL      A/G Ratio 1.0 (L) g/dL      BUN/Creatinine Ratio 17.9     Anion Gap 14.0 mmol/L     Narrative:       The MDRD GFR formula is only valid for adults with stable renal function between ages 18 and 70.    CBC Auto Differential [[id number]]  (Abnormal) Collected:  03/10/18 0325    Specimen:  Blood Updated:  03/10/18 0425     WBC 9.54 10*3/mm3      RBC 4.72 10*6/mm3      Hemoglobin 14.7 g/dL      Hematocrit 42.3 %      MCV 89.6 fL      MCH 31.1 pg      MCHC 34.8 g/dL      RDW 12.9 %      RDW-SD 42.3 fl      MPV 9.8 fL      Platelets 205 10*3/mm3      Neutrophil % 80.2 (H) %      Lymphocyte % 7.3 (L) %      Monocyte % 11.6 %      Eosinophil % 0.2 %      Basophil % 0.1 %      Immature Grans % 0.6 (H) %      Neutrophils, Absolute 7.64 10*3/mm3      Lymphocytes, Absolute 0.70 10*3/mm3      Monocytes, Absolute 1.11 (H) 10*3/mm3      Eosinophils, Absolute 0.02 10*3/mm3      Basophils,  Absolute 0.01 10*3/mm3      Immature Grans, Absolute 0.06 (H) 10*3/mm3             A/P    Atrial flutter-currently rate controlled with amiodarone, Bystolic and Cardizem CD on anticoagulation with xarelto, for possible flutter ablation on Thursday.  Patient seen by Bry and is being scheduled for flutter ablation after the infection is cleared.  In the interim will continue the AV skyler blocking agents and the anticoagulation          This document has been electronically signed by Sai Muñoz MD on March 10, 2018 9:32 AM         EMR Dragon/Transcription disclaimer:   Some of this note may be an electronic transcription/translation of spoken language to printed text. The electronic translation of spoken language may permit erroneous, or at times, nonsensical words or phrases to be inadvertently transcribed; Although I have reviewed the note for such errors, some may still exist.

## 2018-03-10 NOTE — PLAN OF CARE
Problem: Patient Care Overview (Adult)  Goal: Plan of Care Review  Outcome: Ongoing (interventions implemented as appropriate)   03/10/18 1238   Coping/Psychosocial Response Interventions   Plan Of Care Reviewed With patient   Patient Care Overview   Progress improving   Outcome Evaluation   Outcome Summary/Follow up Plan pt. tolerated tx. well and was able to participate well with RLE therex and PROM of LLE

## 2018-03-11 LAB
ALBUMIN SERPL-MCNC: 3.3 G/DL (ref 3.4–4.8)
ALBUMIN/GLOB SERPL: 1 G/DL (ref 1.1–1.8)
ALP SERPL-CCNC: 72 U/L (ref 38–126)
ALT SERPL W P-5'-P-CCNC: 61 U/L (ref 21–72)
ANION GAP SERPL CALCULATED.3IONS-SCNC: 15 MMOL/L (ref 5–15)
AST SERPL-CCNC: 41 U/L (ref 17–59)
BASOPHILS # BLD AUTO: 0.01 10*3/MM3 (ref 0–0.2)
BASOPHILS NFR BLD AUTO: 0.1 % (ref 0–2)
BILIRUB SERPL-MCNC: 0.8 MG/DL (ref 0.2–1.3)
BUN BLD-MCNC: 28 MG/DL (ref 7–21)
BUN/CREAT SERPL: 15.8 (ref 7–25)
CALCIUM SPEC-SCNC: 8.7 MG/DL (ref 8.4–10.2)
CHLORIDE SERPL-SCNC: 95 MMOL/L (ref 95–110)
CO2 SERPL-SCNC: 31 MMOL/L (ref 22–31)
CREAT BLD-MCNC: 1.77 MG/DL (ref 0.7–1.3)
CRP SERPL-MCNC: 3.2 MG/DL (ref 0–1)
DEPRECATED RDW RBC AUTO: 43.1 FL (ref 35.1–43.9)
EOSINOPHIL # BLD AUTO: 0.04 10*3/MM3 (ref 0–0.7)
EOSINOPHIL NFR BLD AUTO: 0.3 % (ref 0–7)
ERYTHROCYTE [DISTWIDTH] IN BLOOD BY AUTOMATED COUNT: 13.2 % (ref 11.5–14.5)
GFR SERPL CREATININE-BSD FRML MDRD: 38 ML/MIN/1.73 (ref 42–98)
GLOBULIN UR ELPH-MCNC: 3.3 GM/DL (ref 2.3–3.5)
GLUCOSE BLD-MCNC: 121 MG/DL (ref 60–100)
GLUCOSE BLDC GLUCOMTR-MCNC: 114 MG/DL (ref 70–130)
GLUCOSE BLDC GLUCOMTR-MCNC: 133 MG/DL (ref 70–130)
GLUCOSE BLDC GLUCOMTR-MCNC: 154 MG/DL (ref 70–130)
HCT VFR BLD AUTO: 44.4 % (ref 39–49)
HGB BLD-MCNC: 15.6 G/DL (ref 13.7–17.3)
IMM GRANULOCYTES # BLD: 0.11 10*3/MM3 (ref 0–0.02)
IMM GRANULOCYTES NFR BLD: 0.8 % (ref 0–0.5)
LYMPHOCYTES # BLD AUTO: 0.69 10*3/MM3 (ref 0.6–4.2)
LYMPHOCYTES NFR BLD AUTO: 5 % (ref 10–50)
MCH RBC QN AUTO: 31.6 PG (ref 26.5–34)
MCHC RBC AUTO-ENTMCNC: 35.1 G/DL (ref 31.5–36.3)
MCV RBC AUTO: 90.1 FL (ref 80–98)
MONOCYTES # BLD AUTO: 1.75 10*3/MM3 (ref 0–0.9)
MONOCYTES NFR BLD AUTO: 12.6 % (ref 0–12)
NEUTROPHILS # BLD AUTO: 11.27 10*3/MM3 (ref 2–8.6)
NEUTROPHILS NFR BLD AUTO: 81.2 % (ref 37–80)
PLATELET # BLD AUTO: 228 10*3/MM3 (ref 150–450)
PMV BLD AUTO: 10 FL (ref 8–12)
POTASSIUM BLD-SCNC: 4.3 MMOL/L (ref 3.5–5.1)
PROT SERPL-MCNC: 6.6 G/DL (ref 6.3–8.6)
RBC # BLD AUTO: 4.93 10*6/MM3 (ref 4.37–5.74)
SODIUM BLD-SCNC: 141 MMOL/L (ref 137–145)
WBC NRBC COR # BLD: 13.87 10*3/MM3 (ref 3.2–9.8)

## 2018-03-11 PROCEDURE — 86140 C-REACTIVE PROTEIN: CPT | Performed by: FAMILY MEDICINE

## 2018-03-11 PROCEDURE — 85025 COMPLETE CBC W/AUTO DIFF WBC: CPT | Performed by: FAMILY MEDICINE

## 2018-03-11 PROCEDURE — 25010000002 PIPERACILLIN SOD-TAZOBACTAM PER 1 G: Performed by: INTERNAL MEDICINE

## 2018-03-11 PROCEDURE — 82962 GLUCOSE BLOOD TEST: CPT

## 2018-03-11 PROCEDURE — 87086 URINE CULTURE/COLONY COUNT: CPT | Performed by: NURSE PRACTITIONER

## 2018-03-11 PROCEDURE — 94760 N-INVAS EAR/PLS OXIMETRY 1: CPT

## 2018-03-11 PROCEDURE — 97110 THERAPEUTIC EXERCISES: CPT

## 2018-03-11 PROCEDURE — 80053 COMPREHEN METABOLIC PANEL: CPT | Performed by: FAMILY MEDICINE

## 2018-03-11 PROCEDURE — 97530 THERAPEUTIC ACTIVITIES: CPT

## 2018-03-11 PROCEDURE — 63710000001 PREDNISONE PER 1 MG: Performed by: INTERNAL MEDICINE

## 2018-03-11 PROCEDURE — 94799 UNLISTED PULMONARY SVC/PX: CPT

## 2018-03-11 RX ORDER — FUROSEMIDE 20 MG/1
20 TABLET ORAL DAILY
Status: DISCONTINUED | OUTPATIENT
Start: 2018-03-12 | End: 2018-03-16 | Stop reason: HOSPADM

## 2018-03-11 RX ADMIN — OXYCODONE HYDROCHLORIDE AND ACETAMINOPHEN 500 MG: 500 TABLET ORAL at 08:47

## 2018-03-11 RX ADMIN — POTASSIUM CHLORIDE 40 MEQ: 750 CAPSULE, EXTENDED RELEASE ORAL at 04:30

## 2018-03-11 RX ADMIN — TAZOBACTAM SODIUM AND PIPERACILLIN SODIUM 3.38 G: 375; 3 INJECTION, SOLUTION INTRAVENOUS at 15:11

## 2018-03-11 RX ADMIN — TAMSULOSIN HYDROCHLORIDE 0.4 MG: 0.4 CAPSULE ORAL at 21:42

## 2018-03-11 RX ADMIN — PHENYTOIN SODIUM 100 MG: 100 CAPSULE, EXTENDED RELEASE ORAL at 08:47

## 2018-03-11 RX ADMIN — Medication 10 ML: at 22:42

## 2018-03-11 RX ADMIN — PREDNISONE 20 MG: 20 TABLET ORAL at 08:48

## 2018-03-11 RX ADMIN — PHENYTOIN SODIUM 100 MG: 100 CAPSULE, EXTENDED RELEASE ORAL at 21:41

## 2018-03-11 RX ADMIN — FINASTERIDE 5 MG: 5 TABLET, FILM COATED ORAL at 08:48

## 2018-03-11 RX ADMIN — FUROSEMIDE 40 MG: 40 TABLET ORAL at 08:47

## 2018-03-11 RX ADMIN — Medication: at 08:49

## 2018-03-11 RX ADMIN — NEBIVOLOL HYDROCHLORIDE 20 MG: 5 TABLET ORAL at 08:48

## 2018-03-11 RX ADMIN — Medication 10 ML: at 08:52

## 2018-03-11 RX ADMIN — RIVAROXABAN 20 MG: 10 TABLET, FILM COATED ORAL at 08:48

## 2018-03-11 RX ADMIN — TAZOBACTAM SODIUM AND PIPERACILLIN SODIUM 3.38 G: 375; 3 INJECTION, SOLUTION INTRAVENOUS at 06:33

## 2018-03-11 RX ADMIN — AMIODARONE HYDROCHLORIDE 200 MG: 200 TABLET ORAL at 08:47

## 2018-03-11 RX ADMIN — HYDROCORTISONE: 1 CREAM TOPICAL at 22:36

## 2018-03-11 RX ADMIN — BACLOFEN 10 MG: 10 TABLET ORAL at 21:41

## 2018-03-11 RX ADMIN — DILTIAZEM HYDROCHLORIDE 240 MG: 240 CAPSULE, COATED, EXTENDED RELEASE ORAL at 17:21

## 2018-03-11 RX ADMIN — HYDROCORTISONE: 1 CREAM TOPICAL at 08:49

## 2018-03-11 RX ADMIN — DOCUSATE SODIUM 100 MG: 100 CAPSULE, LIQUID FILLED ORAL at 08:48

## 2018-03-11 RX ADMIN — TEMAZEPAM 15 MG: 15 CAPSULE ORAL at 21:41

## 2018-03-11 RX ADMIN — ASPIRIN 81 MG: 81 TABLET, COATED ORAL at 08:48

## 2018-03-11 RX ADMIN — BACLOFEN 10 MG: 10 TABLET ORAL at 08:48

## 2018-03-11 RX ADMIN — TAZOBACTAM SODIUM AND PIPERACILLIN SODIUM 3.38 G: 375; 3 INJECTION, SOLUTION INTRAVENOUS at 21:42

## 2018-03-11 NOTE — PROGRESS NOTES
[facility] Medicine Services  INPATIENT PROGRESS NOTE    Length of Stay: 6  Date of Admission: 3/5/2018  Primary Care Physician: Valerie Marvin MD    Subjective     Chief Complaint   Patient presents with   • Nausea     HPI: 73 year old male who was admitted for weakness, mild nausea, low grade fever, worsening weakness. He has felt bad for last several days, has experienced more generalized pain than usual and has had a poorer appetite. Pt was found to have pseudomonas urosepsis.     3/11/18: pt states that he is doing better, had episode of nausea this AM, no vomiting.     Review of Systems   Constitutional: Positive for activity change, appetite change and fatigue. Negative for chills and fever.   HENT: Positive for rhinorrhea, sinus pain and sinus pressure.    Respiratory: Negative for chest tightness, shortness of breath and wheezing.    Cardiovascular: Positive for leg swelling. Negative for chest pain and palpitations.   Gastrointestinal: Positive for nausea. Negative for abdominal pain, constipation, diarrhea and vomiting.   Endocrine: Negative for cold intolerance and heat intolerance.   Genitourinary:        Gayle is present    Neurological: Negative for dizziness, light-headedness and headaches.   Psychiatric/Behavioral: Negative for agitation, behavioral problems and confusion.        All pertinent negatives and positives are as above. All other systems have been reviewed and are negative unless otherwise stated.     Objective      Vital Sign Min/Max for last 24 hours  Temp  Min: 97.6 °F (36.4 °C)  Max: 98.8 °F (37.1 °C)   BP  Min: 130/80  Max: 183/93   Pulse  Min: 75  Max: 91   Resp  Min: 20  Max: 20   SpO2  Min: 91 %  Max: 97 %   Flow (L/min)  Min: 2  Max: 3   No Data Recorded         Physical Exam   Constitutional: He is oriented to person, place, and time. He appears well-developed and well-nourished.   HENT:   Head: Normocephalic and atraumatic.    Eyes: EOM are normal. Pupils are equal, round, and reactive to light.   Neck: Normal range of motion.   Cardiovascular: Normal rate, regular rhythm and normal heart sounds.    Pulmonary/Chest: Effort normal and breath sounds normal.   Abdominal: Soft. Bowel sounds are normal.   Genitourinary:   Genitourinary Comments: Palmer is present.   Red tinged blood noted in palmer    Musculoskeletal: Normal range of motion. He exhibits edema (1+ edema noted ).   Neurological: He is alert and oriented to person, place, and time.   Skin: Skin is warm.   Psychiatric: He has a normal mood and affect. His behavior is normal.   Vitals reviewed.      Current Facility-Administered Medications   Medication Dose Route Frequency Provider Last Rate Last Dose   • 8-hydroxyquinoline sulfate (BAG BALM) ointment   Apply externally Daily Evelio Christensen MD       • albuterol (PROVENTIL) nebulizer solution 0.5% 2.5 mg/0.5mL  2.5 mg Nebulization Q6H PRN Yoan Hamilton MD       • amiodarone (PACERONE) tablet 200 mg  200 mg Oral Q24H Sai Muñoz MD   200 mg at 03/11/18 0847   • aspirin EC tablet 81 mg  81 mg Oral Daily Yoan Hamilton MD   81 mg at 03/11/18 0848   • baclofen (LIORESAL) tablet 10 mg  10 mg Oral Q12H Jordi Marie MD   10 mg at 03/11/18 0848   • budesonide-formoterol (SYMBICORT) 160-4.5 MCG/ACT inhaler 2 puff  2 puff Inhalation BID - RT Hardy Fisher MD   2 puff at 03/10/18 0644   • diltiaZEM CD (CARDIZEM CD) 24 hr capsule 240 mg  240 mg Oral Daily With Dinner aSi Muñoz MD   240 mg at 03/10/18 1801   • docusate sodium (COLACE) capsule 100 mg  100 mg Oral Daily Jordi Marie MD   100 mg at 03/11/18 0848   • finasteride (PROSCAR) tablet 5 mg  5 mg Oral Daily Yoan Hamilton MD   5 mg at 03/11/18 0848   • fluticasone (FLONASE) 50 MCG/ACT nasal spray 2 spray  2 spray Each Nare Daily Hardy Fisher MD   2 spray at 03/10/18 0855   • [START ON 3/12/2018] furosemide (LASIX) tablet 20 mg  20 mg Oral Daily  Luca Deleon MD       • HYDROcodone-acetaminophen (NORCO)  MG per tablet 1 tablet  1 tablet Oral Q4H PRN Yoan Hamilton MD   1 tablet at 03/10/18 1622   • ipratropium-albuterol (DUO-NEB) nebulizer solution 3 mL  3 mL Nebulization 4x Daily - RT Hardy Fisher MD   3 mL at 03/10/18 1011   • magic butt ointment   Topical BID Eveloi Christensen MD       • nebivolol (BYSTOLIC) tablet 20 mg  20 mg Oral Daily Hardy Fisher MD   20 mg at 03/11/18 0848   • ondansetron (ZOFRAN) injection 4 mg  4 mg Intravenous Q4H PRN Evelio Christensen MD   4 mg at 03/10/18 2104   • phenytoin (DILANTIN) ER capsule 100 mg  100 mg Oral Q12H Yoan Hamilton MD   100 mg at 03/11/18 0847   • piperacillin-tazobactam (ZOSYN) 3.375 g in iso-osmotic dextrose 50 ml (premix)  3.375 g Intravenous Q8H Hardy Fisher MD 0 mL/hr at 03/09/18 1745 3.375 g at 03/11/18 0633   • polyethylene glycol 3350 powder (packet)  17 g Oral Daily PRN Hardy Fisher MD   17 g at 03/09/18 0911   • potassium chloride (MICRO-K) CR capsule 40 mEq  40 mEq Oral PRN Hardy Fisher MD   40 mEq at 03/11/18 0430    Or   • potassium chloride (KLOR-CON) packet 40 mEq  40 mEq Oral PRN Hardy Fisher MD        Or   • potassium chloride 10 mEq in 100 mL IVPB  10 mEq Intravenous Q1H PRN Hardy Fisher  mL/hr at 03/10/18 1753 10 mEq at 03/10/18 1753   • predniSONE (DELTASONE) tablet 20 mg  20 mg Oral Daily With Breakfast Hardy Fisher MD   20 mg at 03/11/18 0848   • promethazine (PHENERGAN) injection 12.5 mg  12.5 mg Intravenous Q6H PRN Jordi Marie MD   12.5 mg at 03/10/18 1514   • rivaroxaban (XARELTO) tablet 20 mg  20 mg Oral Daily Yoan Hamilton MD   20 mg at 03/11/18 0848   • sodium chloride (OCEAN) nasal spray 2 spray  2 spray Each Nare PRN Hardy Fisher MD   2 spray at 03/10/18 0854   • sodium chloride 0.9 % flush 1-10 mL  1-10 mL Intravenous PRN Yoan Hamilton MD   10 mL at 03/07/18 1615   • sodium chloride 0.9 % flush 10 mL  10 mL  Intravenous PRN Robert Anderson MD       • sodium chloride 0.9 % flush 10 mL  10 mL Intracatheter Q12H Hardy Fisher MD   10 mL at 03/11/18 0852   • sodium chloride 0.9 % flush 10 mL  10 mL Intracatheter PRN Hardy Fisher MD       • sodium chloride 0.9 % flush 10 mL  10 mL Intracatheter PRN Hardy Fisher MD   10 mL at 03/10/18 2110   • tamsulosin (FLOMAX) 24 hr capsule 0.4 mg  0.4 mg Oral Nightly Yoan Hamilton MD   0.4 mg at 03/10/18 2057   • temazepam (RESTORIL) capsule 15 mg  15 mg Oral Nightly PRN Jordi Marie MD   15 mg at 03/10/18 2058   • vitamin C (ASCORBIC ACID) tablet 500 mg  500 mg Oral Daily Yoan Hamilton MD   500 mg at 03/11/18 0847           Results Review:  I have reviewed the labs, radiology results, and diagnostic studies.    Laboratory Data:     Results from last 7 days  Lab Units 03/11/18  0808 03/10/18  2144 03/10/18  0325 03/09/18  0834   SODIUM mmol/L 141  --  142 140   POTASSIUM mmol/L 4.3 3.5 2.9* 4.1   CHLORIDE mmol/L 95  --  100 103   CO2 mmol/L 31.0  --  28.0 26.0   BUN mg/dL 28*  --  22* 19   CREATININE mg/dL 1.77*  --  1.23 1.21   GLUCOSE mg/dL 121*  --  106* 129*   CALCIUM mg/dL 8.7  --  8.3* 8.6   BILIRUBIN mg/dL 0.8  --  0.5 0.3   ALK PHOS U/L 72  --  61 63   ALT (SGPT) U/L 61  --  38 21   AST (SGOT) U/L 41  --  19 15*   ANION GAP mmol/L 15.0  --  14.0 11.0     Estimated Creatinine Clearance: 37 mL/min (by C-G formula based on SCr of 1.77 mg/dL (H)).    Results from last 7 days  Lab Units 03/05/18  0136   MAGNESIUM mg/dL 1.8           Results from last 7 days  Lab Units 03/11/18  0650 03/10/18  0325 03/09/18  0356 03/08/18  0258 03/07/18  0547   WBC 10*3/mm3 13.87* 9.54 15.17* 17.84* 16.30*   HEMOGLOBIN g/dL 15.6 14.7 14.2 15.9 14.9   HEMATOCRIT % 44.4 42.3 41.4 47.1 45.7   PLATELETS 10*3/mm3 228 205 219 204 152               Culture Data:   No results found for: BLOODCX  No results found for: URINECX  No results found for: RESPCX  No results found for:  WOUNDCX  No results found for: STOOLCX  No components found for: BODYFLD      Radiology Data:   Imaging Results (last 24 hours)     ** No results found for the last 24 hours. **          I have reviewed the patient current medications.     Assessment/Plan     Active Hospital Problems (** Indicates Principal Problem)    Diagnosis Date Noted   • **Sepsis [A41.9] 03/05/2018   • Sacral decubitus ulcer [L89.159] 03/05/2018   • UTI (urinary tract infection) due to urinary indwelling catheter [T83.511A, N39.0] 03/05/2018   • Hypotension [I95.9] 03/05/2018   • Atrial fibrillation [I48.91]       Resolved Hospital Problems    Diagnosis Date Noted Date Resolved   No resolved problems to display.     1. Urosepsis: with pseudomonas sensitive to zosyn, currently on zosyn. Will continue with current treatment. Will repeat UA and urine culture.   2.  AUGIE: baseline Cr. 1.07, now at 1.77. Will lower lasix to 20mg from 40 mg.   3. A. Fib: on amiodarone, cardizem, on xarelto. Dr. Londono to see pt for ablation.   4. Hypotension: resolved.   5. Right upper lobe pneumonia: currently on antibiotics. Doing well.   6. Hypokalemia: resolved.     Discharge Planning: I expect patient to be discharged to home vs NH in 1-2 days.      DVT Prophylaxis: xarelto               This document has been electronically signed by Luca Deleon MD on March 11, 2018 3:08 PM

## 2018-03-11 NOTE — PLAN OF CARE
Problem: Patient Care Overview (Adult)  Goal: Plan of Care Review  Outcome: Ongoing (interventions implemented as appropriate)   03/11/18 0440   Coping/Psychosocial Response Interventions   Plan Of Care Reviewed With patient   Patient Care Overview   Progress no change   Outcome Evaluation   Outcome Summary/Follow up Plan pt not feeling well today but was agreeable to attempts.was not able to get a bp reading. eob as able

## 2018-03-11 NOTE — PROGRESS NOTES
"   LOS: 6 days   Patient Care Team:  Valerie Marvin MD as PCP - General    Subjective     Subjective:  Symptoms:  Stable.  (Urine red with a few clots present. Family and patient feels it is related to transfer from ICU. SP is anchored to thigh. ).    Diet:  He reports  nausea.        History taken from: patient chart family    Objective     Vital Signs  Temp:  [97.6 °F (36.4 °C)-98.8 °F (37.1 °C)] 97.6 °F (36.4 °C)  Heart Rate:  [75-91] 75  Resp:  [20-22] 20  BP: (125-183)/(58-93) 144/87    Objective:  General Appearance:  In no acute distress.    Vital signs: (most recent): Blood pressure 144/87, pulse 75, temperature 97.6 °F (36.4 °C), temperature source Temporal Artery , resp. rate 20, height 170.2 cm (67.01\"), weight 82.3 kg (181 lb 7 oz), SpO2 97 %.  Vital signs are normal.  No fever.    Output: Producing urine (SP Tube, hematuria ).    HEENT: Normal HEENT exam.    Lungs:  Normal effort and normal respiratory rate.  Breath sounds clear to auscultation.  He is not in respiratory distress.    Heart: Normal rate.  S1 normal and S2 normal.    Chest: Symmetric chest wall expansion.   Abdomen: Abdomen is soft and non-distended.  Bowel sounds are normal.     Extremities: Decreased range of motion.  There is deformity and dependent edema (LLE).    Pulses: Distal pulses are intact.    Neurological: Patient is alert and oriented to person, place and time.  GCS score is 15.    Pupils:  Pupils are equal, round, and reactive to light.    Skin:  Warm and dry.  (Erythema and increased warmth LLE)            Results Review:    Lab Results (last 24 hours)     Procedure Component Value Units Date/Time    Comprehensive Metabolic Panel [[id number]]  (Abnormal) Collected:  03/11/18 0808    Specimen:  Blood Updated:  03/11/18 0948     Glucose 121 (H) mg/dL      BUN 28 (H) mg/dL      Creatinine 1.77 (H) mg/dL      Sodium 141 mmol/L      Potassium 4.3 mmol/L      Chloride 95 mmol/L      CO2 31.0 mmol/L      Calcium 8.7 mg/dL     "  Total Protein 6.6 g/dL      Albumin 3.30 (L) g/dL      ALT (SGPT) 61 U/L      AST (SGOT) 41 U/L      Alkaline Phosphatase 72 U/L      Total Bilirubin 0.8 mg/dL      eGFR Non African Amer 38 (L) mL/min/1.73      Globulin 3.3 gm/dL      A/G Ratio 1.0 (L) g/dL      BUN/Creatinine Ratio 15.8     Anion Gap 15.0 mmol/L     Narrative:       The MDRD GFR formula is only valid for adults with stable renal function between ages 18 and 70.    CBC & Differential [[id number]] Collected:  03/11/18 0650    Specimen:  Blood Updated:  03/11/18 0708    Narrative:       The following orders were created for panel order CBC & Differential.  Procedure                               Abnormality         Status                     ---------                               -----------         ------                     CBC Auto Differential[[id number]]        Abnormal            Final result                 Please view results for these tests on the individual orders.    CBC Auto Differential [[id number]]  (Abnormal) Collected:  03/11/18 0650    Specimen:  Blood Updated:  03/11/18 0708     WBC 13.87 (H) 10*3/mm3      RBC 4.93 10*6/mm3      Hemoglobin 15.6 g/dL      Hematocrit 44.4 %      MCV 90.1 fL      MCH 31.6 pg      MCHC 35.1 g/dL      RDW 13.2 %      RDW-SD 43.1 fl      MPV 10.0 fL      Platelets 228 10*3/mm3      Neutrophil % 81.2 (H) %      Lymphocyte % 5.0 (L) %      Monocyte % 12.6 (H) %      Eosinophil % 0.3 %      Basophil % 0.1 %      Immature Grans % 0.8 (H) %      Neutrophils, Absolute 11.27 (H) 10*3/mm3      Lymphocytes, Absolute 0.69 10*3/mm3      Monocytes, Absolute 1.75 (H) 10*3/mm3      Eosinophils, Absolute 0.04 10*3/mm3      Basophils, Absolute 0.01 10*3/mm3      Immature Grans, Absolute 0.11 (H) 10*3/mm3     Extra Tubes [[id number]] Collected:  03/10/18 2144    Specimen:  Blood from Blood, Venous Line Updated:  03/10/18 2246    Narrative:       The following orders were created for panel order Extra Tubes.  Procedure                                Abnormality         Status                     ---------                               -----------         ------                     Light Blue Top[[id number]]                                   Final result                 Please view results for these tests on the individual orders.    Light Blue Top [[id number]] Collected:  03/10/18 2144    Specimen:  Blood Updated:  03/10/18 2246     Extra Tube hold for add-on     Comment: Auto resulted       Potassium [[id number]]  (Normal) Collected:  03/10/18 2144    Specimen:  Blood Updated:  03/10/18 2212     Potassium 3.5 mmol/L          Imaging Results (last 24 hours)     ** No results found for the last 24 hours. **           I reviewed the patient's new clinical results.  I reviewed the patient's new imaging results and agree with the interpretation.  I reviewed the patient's other test results and agree with the interpretation      Assessment/Plan     Principal Problem:    Sepsis  Active Problems:    Sacral decubitus ulcer    UTI (urinary tract infection) due to urinary indwelling catheter    Hypotension    Atrial fibrillation      Assessment & Plan    UTI, acute, causing sepsis related to chronic SP tube last changed 4 weeks ago.  -Urine culture Pseudomonas aeruginosa 3/5/18  -WBC 23.46-->15.17-->9.54-->13.87  -Zosyn day #7  -Estimated Creatinine Clearance: 37 mL/min (by C-G formula based on SCr of 1.77 mg/dL (H)).     Neurogenic bladder  -chronic SP tube changed 3/5/18 and urine recollected was mixed.     BPH  Flomax, Proscar    Hematuria, new  -could be trauma  -rise in WBC    Plan: culture urine    JEAN Robbins  03/11/18  9:53 AM

## 2018-03-11 NOTE — THERAPY TREATMENT NOTE
Inpatient Rehabilitation - Physical Therapy Treatment Note  [facility]     Patient Name: Mando Hill  : 1945  MRN: [id number]  Today's Date: 3/11/2018     Date of Referral to PT: 18       Admit Date: 3/5/2018    Visit Dx:    ICD-10-CM ICD-9-CM   1. Fever, unspecified fever cause R50.9 780.60   2. Sepsis, due to unspecified organism A41.9 038.9     995.91   3. Cellulitis of buttock L03.317 682.5   4. Impaired functional mobility, balance, and endurance Z74.09 V49.89     Patient Active Problem List   Diagnosis   • Osteoarthritis of right shoulder   • Sepsis   • Sacral decubitus ulcer   • UTI (urinary tract infection) due to urinary indwelling catheter   • Hypotension   • Atrial fibrillation       Therapy Treatment    Therapy Treatment / Health Promotion    Treatment Time/Intention  Discipline: physical therapy assistant  Document Type: therapy note (daily note)  Subjective Information: weakness, fatigue  Mode of Treatment: physical therapy  Patient/Family Observations: been feeling bad all day per sig other  Care Plan Review: care plan/treatment goals reviewed  Total Minutes, Physical Therapy Treatment: 47  Patient Effort: adequate  Equipment Issued to Patient: other (see comments) (gtb)    Vitals/Pain/Safety  Vital Signs  Pre Systolic BP Rehab:  (cannot get reading either arm or left leg)  Pretreatment Heart Rate (beats/min): 66  Posttreatment Heart Rate (beats/min): 62  Pre SpO2 (%): 93  O2 Delivery Pre Treatment: room air  Post SpO2 (%): 93  O2 Delivery Post Treatment: room air  Intra Patient Position: Supine  Post Patient Position: Supine  Positioning and Restraints  Pre-Treatment Position: in bed  Post Treatment Position: bed  In Bed: call light within reach, with family/caregiver    Mobility,ADL,Motor, Modality  Bed Mobility Assessment/Treatment  Rolling Left [place] (Bed Mobility): dependent (less than 25% patient effort)  Rolling Right [place] (Bed Mobility): dependent  (less than 25% patient effort)  Transfer Assessment/Treatment  Comment (Transfers): no eob because pt feeling bad  Bed-Chair Transfer  Bed-Chair [place] (Transfers): not tested  Chair-Bed Transfer  Chair-Bed [place] (Transfers): not tested     Motor Skills Assessment/Interventions  Additional Documentation: Therapeutic Exercise (Group)  Therapeutic Exercise  Upper Extremity (Therapeutic Exercise): bicep curl, right  Upper Extremity Range of Motion (Therapeutic Exercise): shoulder flexion/extension, right  Lower Extremity Range of Motion (Therapeutic Exercise): hip flexion/extension, bilateral, knee flexion/extension, bilateral, ankle dorsiflexion/plantar flexion, bilateral, hip internal/external rotation, bilateral  Exercise Type (Therapeutic Exercise): PROM (passive range of motion), AROM (active range of motion)  Sets/Reps (Therapeutic Exercise): 15-20 x 1  Comment (Therapeutic Exercise): all therex prom left           ROM/MMT             Sensory, Edema, Orthotics          Cognition, Communication, Swallow  Cognitive Assessment Intervention- SLP  Cognitive Function (Cognition): WFL  Orientation Status (Cognition): person, place, situation  Speaking Valve  Pretreatment Heart Rate (beats/min): 66  Pre SpO2 (%): 93  Posttreatment Heart Rate (beats/min): 62  Post SpO2 (%): 93  General Eating/Swallowing Observations  Pre SpO2 (%): 93  Post SpO2 (%): 93  Clinical Impression  Equipment Issued to Patient: other (see comments) (gtb)    Outcome Summary  Outcome Summary/Treatment Plan (PT)  Daily Summary of Progress (PT): progress toward functional goals is gradual  Plan for Continued Treatment (PT): continue with current equipment            PT Rehab Goals     Row Name 03/11/18 1500 03/10/18 1100 03/10/18 0800       Transfer Goal 1 (PT)    Activity/Assistive Device (Transfer Goal 1, PT) bed-to-chair/chair-to-bed  -RW bed-to-chair/chair-to-bed  -CA bed-to-chair/chair-to-bed  -CA    [place] Level/Cues Needed  (Transfer Goal 1, PT) other (see comments)   dependant  -RW other (see comments)   Dependent  -CA other (see comments)   Dependent  -CA    Time Frame (Transfer Goal 1, PT) 5 days  -RW 5 days  -CA 5 days  -CA    Barriers (Transfers Goal 1, PT) pt will tolerate lift transfer to chair with out significant change in vs or skin irritation  -RW pt. will tolerated lift transfer to chair without significant change in VS or skin irritation  -CA Pt. will tolerate lift transfer to chair without significant change in VS or skin irritation  -CA    Progress/Outcome (Transfer Goal 1, PT) goal ongoing  -RW goal ongoing  -CA goal ongoing  -CA       Transfer Goal 2 (PT)    Activity/Assistive Device (Transfer Goal 2, PT) bed-to-chair/chair-to-bed  -RW bed-to-chair/chair-to-bed  -CA bed-to-chair/chair-to-bed  -CA    [place] Level/Cues Needed (Transfer Goal 2, PT) other (see comments)   pt wife will transfer as PLOF comfortable  -RW other (see comments)   Pt./Wife will transfer as PLOF comfortably  -CA other (see comments)   Pt./WIfe  will transfer as PLOF comfortably  -CA    Time Frame (Transfer Goal 2, PT) by discharge  -RW by discharge  -CA by discharge  -CA    Progress/Outcome (Transfer Goal 2, PT) goal ongoing  -RW goal ongoing  -CA goal ongoing  -CA       Wheelchair Locomotion Goal 1 (PT)    Activity (Wheelchair Locomotion Goal 1, PT) other (see comments)   repositioning in wc  -RW other (see comments)   Repositioning in W/C  -CA other (see comments)   Repositioning in W/C  -CA    Time Frame (Wheelchair Locomotion Goal 1, PT) by discharge  -RW by discharge  -CA by discharge  -CA    Progress/Outcome (Wheelchair Locomotion Goal 1, PT) goal ongoing  -RW goal ongoing  -CA goal ongoing  -CA       Problem Specific Goal 1 (PT)    Problem Specific Goal 1 (PT) --   cardiopulmonary goal  -RW Cardiopulmonary goal  -CA Cardiopulmonary Goal  -CA    Time Frame (Problem Specific Goal 1, PT) 3 days  -RW 3 days  -CA 3 days  -CA    Barriers  (Problem Specific Goal 1, PT) pt. will not have dizziness or drop in sats w/ sitting upright x 30 mins  -RW pt. will not have dizziness or drop in sats with sitting upright for 30 mins  -CA pt. will not have dizziness or drop in sats w/ sitting upright x 30 mins  -CA      User Key  (r) = Recorded By, (t) = Taken By, (c) = Cosigned By    Initials Name Provider Type    CA Rick Martinez, PTA Physical Therapy Assistant    HARLEY Rosales PTA Physical Therapy Assistant          Physical Therapy Education     Title: PT OT SLP Therapies (Active)     Topic: Physical Therapy (Active)     Point: Mobility training (Active)    Learning Progress Summary     Learner Status Readiness Method Response Comment Documented by    Patient Active Acceptance D,E NR POC; mobility  03/08/18 1908          Point: Home exercise program (Active)    Learning Progress Summary     Learner Status Readiness Method Response Comment Documented by    Patient Active Acceptance D,E NR POC; mobility  03/08/18 1908          Point: Body mechanics (Active)    Learning Progress Summary     Learner Status Readiness Method Response Comment Documented by    Patient Active Acceptance D,E NR POC; mobility  03/08/18 1908          Point: Precautions (Active)    Learning Progress Summary     Learner Status Readiness Method Response Comment Documented by    Patient Active Acceptance D,E NR POC; mobility  03/08/18 1908                      User Key     Initials Effective Dates Name Provider Type [facility] 03/07/18 -  Anisa Dickson, PT Physical Therapist PT                    PT Recommendation and Plan     Daily Summary of Progress (PT): progress toward functional goals is gradual  Plan for Continued Treatment (PT): continue with current equipment          Outcome Measures     Row Name 03/10/18 1040 03/08/18 1900          How much help from another person do you currently need...    Turning from your back to your side while in flat bed without  using bedrails? 1  -CA 1  -GB     Moving from lying on back to sitting on the side of a flat bed without bedrails? 1  -CA 1  -GB     Moving to and from a bed to a chair (including a wheelchair)? 1  -CA 1  -GB     Standing up from a chair using your arms (e.g., wheelchair, bedside chair)? 1  -CA 1  -GB     Climbing 3-5 steps with a railing? 1  -CA 1  -GB     To walk in hospital room? 1  -CA 1  -GB     AM-PAC 6 Clicks Score 6  -CA 6  -GB        Functional Assessment    Outcome Measure Options AM-PAC 6 Clicks Basic Mobility (PT)  -CA AM-PAC 6 Clicks Basic Mobility (PT)  -GB       User Key  (r) = Recorded By, (t) = Taken By, (c) = Cosigned By    Initials Name Provider Type    VLADIMIR Dickson, PT Physical Therapist    IVETTE Martinez PTA Physical Therapy Assistant           Time Calculation:         PT Charges     Row Name 03/11/18 1603             Time Calculation    Start Time 1347  -RW      Stop Time 1434  -RW      Time Calculation (min) 47 min  -RW         Time Calculation- PT    Total Timed Code Minutes- PT 47 minute(s)  -RW        User Key  (r) = Recorded By, (t) = Taken By, (c) = Cosigned By    Initials Name Provider Type     Twan Rosales PTA Physical Therapy Assistant          Therapy Charges for Today     Code Description Service Date Service Provider Modifiers Qty    33319292208 HC PT THERAPEUTIC ACT EA 15 MIN 3/11/2018 Twan Rosales PTA GP 1    92877880672 HC PT THER PROC EA 15 MIN 3/11/2018 Twan Rosales PTA GP 2          PT G-Codes  PT Professional Judgement Used?: Yes  Outcome Measure Options: AM-PAC 6 Clicks Basic Mobility (PT)  Score: 6  Functional Limitation: Changing and maintaining body position, Mobility: Walking and moving around  Mobility: Walking and Moving Around Current Status (): 100 percent impaired, limited or restricted  Mobility: Walking and Moving Around Goal Status (): 100 percent impaired, limited or restricted    Twan Rosales PTA  3/11/2018

## 2018-03-11 NOTE — PLAN OF CARE
Problem: Patient Care Overview (Adult)  Goal: Plan of Care Review  Outcome: Ongoing (interventions implemented as appropriate)    Goal: Adult Individualization and Mutuality  Outcome: Ongoing (interventions implemented as appropriate)    Goal: Discharge Needs Assessment  Outcome: Ongoing (interventions implemented as appropriate)      Problem: Fall Risk (Adult)  Goal: Identify Related Risk Factors and Signs and Symptoms  Outcome: Ongoing (interventions implemented as appropriate)    Goal: Absence of Fall  Outcome: Ongoing (interventions implemented as appropriate)      Problem: Skin Integrity Impairment, Risk/Actual (Adult)  Goal: Skin Integrity/Wound Healing  Outcome: Ongoing (interventions implemented as appropriate)      Problem: Sepsis (Adult)  Goal: Signs and Symptoms of Listed Potential Problems Will be Absent or Manageable (Sepsis)  Outcome: Outcome(s) achieved Date Met: 03/11/18      Problem: Pressure Ulcer (Adult)  Goal: Signs and Symptoms of Listed Potential Problems Will be Absent or Manageable (Pressure Ulcer)  Outcome: Ongoing (interventions implemented as appropriate)      Problem: Pain, Acute (Adult)  Goal: Identify Related Risk Factors and Signs and Symptoms  Outcome: Ongoing (interventions implemented as appropriate)    Goal: Acceptable Pain Control/Comfort Level  Outcome: Ongoing (interventions implemented as appropriate)      Problem: Patient Care Overview  Goal: Individualization and Mutuality  Outcome: Ongoing (interventions implemented as appropriate)      Problem: Skin Injury Risk (Adult)  Goal: Identify Related Risk Factors and Signs and Symptoms  Outcome: Ongoing (interventions implemented as appropriate)    Goal: Skin Health and Integrity  Outcome: Ongoing (interventions implemented as appropriate)

## 2018-03-12 ENCOUNTER — PREP FOR SURGERY (OUTPATIENT)
Dept: OTHER | Facility: HOSPITAL | Age: 73
End: 2018-03-12

## 2018-03-12 LAB
ALBUMIN SERPL-MCNC: 3.5 G/DL (ref 3.4–4.8)
ALBUMIN/GLOB SERPL: 1 G/DL (ref 1.1–1.8)
ALP SERPL-CCNC: 58 U/L (ref 38–126)
ALT SERPL W P-5'-P-CCNC: 45 U/L (ref 21–72)
ANION GAP SERPL CALCULATED.3IONS-SCNC: 10 MMOL/L (ref 5–15)
ANION GAP SERPL CALCULATED.3IONS-SCNC: 12 MMOL/L (ref 5–15)
AST SERPL-CCNC: 22 U/L (ref 17–59)
BASOPHILS # BLD AUTO: 0.02 10*3/MM3 (ref 0–0.2)
BASOPHILS NFR BLD AUTO: 0.2 % (ref 0–2)
BILIRUB SERPL-MCNC: 0.7 MG/DL (ref 0.2–1.3)
BUN BLD-MCNC: 31 MG/DL (ref 7–21)
BUN BLD-MCNC: 32 MG/DL (ref 7–21)
BUN/CREAT SERPL: 22.3 (ref 7–25)
BUN/CREAT SERPL: 23.5 (ref 7–25)
CALCIUM SPEC-SCNC: 8.1 MG/DL (ref 8.4–10.2)
CALCIUM SPEC-SCNC: 8.9 MG/DL (ref 8.4–10.2)
CHLORIDE SERPL-SCNC: 91 MMOL/L (ref 95–110)
CHLORIDE SERPL-SCNC: 96 MMOL/L (ref 95–110)
CO2 SERPL-SCNC: 35 MMOL/L (ref 22–31)
CO2 SERPL-SCNC: 36 MMOL/L (ref 22–31)
CREAT BLD-MCNC: 1.36 MG/DL (ref 0.7–1.3)
CREAT BLD-MCNC: 1.39 MG/DL (ref 0.7–1.3)
DEPRECATED RDW RBC AUTO: 46.3 FL (ref 35.1–43.9)
DEPRECATED RDW RBC AUTO: 46.5 FL (ref 35.1–43.9)
EOSINOPHIL # BLD AUTO: 0.16 10*3/MM3 (ref 0–0.7)
EOSINOPHIL NFR BLD AUTO: 1.6 % (ref 0–7)
ERYTHROCYTE [DISTWIDTH] IN BLOOD BY AUTOMATED COUNT: 13.5 % (ref 11.5–14.5)
ERYTHROCYTE [DISTWIDTH] IN BLOOD BY AUTOMATED COUNT: 13.5 % (ref 11.5–14.5)
GFR SERPL CREATININE-BSD FRML MDRD: 50 ML/MIN/1.73 (ref 60–98)
GFR SERPL CREATININE-BSD FRML MDRD: 51 ML/MIN/1.73 (ref 42–98)
GLOBULIN UR ELPH-MCNC: 3.6 GM/DL (ref 2.3–3.5)
GLUCOSE BLD-MCNC: 146 MG/DL (ref 60–100)
GLUCOSE BLD-MCNC: 97 MG/DL (ref 60–100)
HCT VFR BLD AUTO: 42.3 % (ref 39–49)
HCT VFR BLD AUTO: 44.8 % (ref 39–49)
HGB BLD-MCNC: 14.4 G/DL (ref 13.7–17.3)
HGB BLD-MCNC: 15.4 G/DL (ref 13.7–17.3)
IMM GRANULOCYTES # BLD: 0.14 10*3/MM3 (ref 0–0.02)
IMM GRANULOCYTES NFR BLD: 1.4 % (ref 0–0.5)
INR PPP: 2.48 (ref 0.8–1.2)
LYMPHOCYTES # BLD AUTO: 1.12 10*3/MM3 (ref 0.6–4.2)
LYMPHOCYTES NFR BLD AUTO: 10.9 % (ref 10–50)
MCH RBC QN AUTO: 31.9 PG (ref 26.5–34)
MCH RBC QN AUTO: 32 PG (ref 26.5–34)
MCHC RBC AUTO-ENTMCNC: 34 G/DL (ref 31.5–36.3)
MCHC RBC AUTO-ENTMCNC: 34.4 G/DL (ref 31.5–36.3)
MCV RBC AUTO: 93.1 FL (ref 80–98)
MCV RBC AUTO: 93.6 FL (ref 80–98)
MONOCYTES # BLD AUTO: 0.99 10*3/MM3 (ref 0–0.9)
MONOCYTES NFR BLD AUTO: 9.7 % (ref 0–12)
NEUTROPHILS # BLD AUTO: 7.81 10*3/MM3 (ref 2–8.6)
NEUTROPHILS NFR BLD AUTO: 76.2 % (ref 37–80)
NRBC BLD MANUAL-RTO: 0 /100 WBC (ref 0–0)
PLATELET # BLD AUTO: 227 10*3/MM3 (ref 150–450)
PLATELET # BLD AUTO: 233 10*3/MM3 (ref 150–450)
PMV BLD AUTO: 9.3 FL (ref 8–12)
PMV BLD AUTO: 9.8 FL (ref 8–12)
POTASSIUM BLD-SCNC: 3.2 MMOL/L (ref 3.5–5.1)
POTASSIUM BLD-SCNC: 4.1 MMOL/L (ref 3.5–5.1)
PROT SERPL-MCNC: 7.1 G/DL (ref 6.3–8.6)
PROTHROMBIN TIME: 25.7 SECONDS (ref 11.1–15.3)
RBC # BLD AUTO: 4.52 10*6/MM3 (ref 4.37–5.74)
RBC # BLD AUTO: 4.81 10*6/MM3 (ref 4.37–5.74)
SODIUM BLD-SCNC: 139 MMOL/L (ref 137–145)
SODIUM BLD-SCNC: 141 MMOL/L (ref 137–145)
WBC NRBC COR # BLD: 10.24 10*3/MM3 (ref 3.2–9.8)
WBC NRBC COR # BLD: 10.58 10*3/MM3 (ref 3.2–9.8)

## 2018-03-12 PROCEDURE — 80053 COMPREHEN METABOLIC PANEL: CPT | Performed by: FAMILY MEDICINE

## 2018-03-12 PROCEDURE — 85025 COMPLETE CBC W/AUTO DIFF WBC: CPT | Performed by: FAMILY MEDICINE

## 2018-03-12 PROCEDURE — 85027 COMPLETE CBC AUTOMATED: CPT | Performed by: INTERNAL MEDICINE

## 2018-03-12 PROCEDURE — 97110 THERAPEUTIC EXERCISES: CPT

## 2018-03-12 PROCEDURE — 97530 THERAPEUTIC ACTIVITIES: CPT

## 2018-03-12 PROCEDURE — 25010000002 PIPERACILLIN SOD-TAZOBACTAM PER 1 G: Performed by: INTERNAL MEDICINE

## 2018-03-12 PROCEDURE — 85610 PROTHROMBIN TIME: CPT | Performed by: INTERNAL MEDICINE

## 2018-03-12 PROCEDURE — 99232 SBSQ HOSP IP/OBS MODERATE 35: CPT | Performed by: INTERNAL MEDICINE

## 2018-03-12 PROCEDURE — 94799 UNLISTED PULMONARY SVC/PX: CPT

## 2018-03-12 RX ADMIN — FUROSEMIDE 20 MG: 20 TABLET ORAL at 08:24

## 2018-03-12 RX ADMIN — HYDROCORTISONE: 1 CREAM TOPICAL at 08:25

## 2018-03-12 RX ADMIN — PHENYTOIN SODIUM 100 MG: 100 CAPSULE, EXTENDED RELEASE ORAL at 20:55

## 2018-03-12 RX ADMIN — TAZOBACTAM SODIUM AND PIPERACILLIN SODIUM 3.38 G: 375; 3 INJECTION, SOLUTION INTRAVENOUS at 05:25

## 2018-03-12 RX ADMIN — FLUTICASONE PROPIONATE 2 SPRAY: 50 SPRAY, METERED NASAL at 08:26

## 2018-03-12 RX ADMIN — RIVAROXABAN 20 MG: 10 TABLET, FILM COATED ORAL at 08:24

## 2018-03-12 RX ADMIN — ASPIRIN 81 MG: 81 TABLET, COATED ORAL at 08:24

## 2018-03-12 RX ADMIN — BACLOFEN 10 MG: 10 TABLET ORAL at 20:54

## 2018-03-12 RX ADMIN — TAZOBACTAM SODIUM AND PIPERACILLIN SODIUM 3.38 G: 375; 3 INJECTION, SOLUTION INTRAVENOUS at 20:56

## 2018-03-12 RX ADMIN — HYDROCODONE BITARTRATE AND ACETAMINOPHEN 1 TABLET: 10; 325 TABLET ORAL at 20:54

## 2018-03-12 RX ADMIN — PHENYTOIN SODIUM 100 MG: 100 CAPSULE, EXTENDED RELEASE ORAL at 08:24

## 2018-03-12 RX ADMIN — HYDROCODONE BITARTRATE AND ACETAMINOPHEN 1 TABLET: 10; 325 TABLET ORAL at 08:29

## 2018-03-12 RX ADMIN — OXYCODONE HYDROCHLORIDE AND ACETAMINOPHEN 500 MG: 500 TABLET ORAL at 08:24

## 2018-03-12 RX ADMIN — TEMAZEPAM 15 MG: 15 CAPSULE ORAL at 20:54

## 2018-03-12 RX ADMIN — Medication: at 08:25

## 2018-03-12 RX ADMIN — TAZOBACTAM SODIUM AND PIPERACILLIN SODIUM 3.38 G: 375; 3 INJECTION, SOLUTION INTRAVENOUS at 13:40

## 2018-03-12 RX ADMIN — DILTIAZEM HYDROCHLORIDE 240 MG: 240 CAPSULE, COATED, EXTENDED RELEASE ORAL at 17:23

## 2018-03-12 RX ADMIN — BACLOFEN 10 MG: 10 TABLET ORAL at 08:24

## 2018-03-12 RX ADMIN — DOCUSATE SODIUM 100 MG: 100 CAPSULE, LIQUID FILLED ORAL at 08:24

## 2018-03-12 RX ADMIN — HYDROCORTISONE: 1 CREAM TOPICAL at 20:55

## 2018-03-12 RX ADMIN — AMIODARONE HYDROCHLORIDE 200 MG: 200 TABLET ORAL at 08:24

## 2018-03-12 RX ADMIN — Medication 10 ML: at 20:56

## 2018-03-12 RX ADMIN — TAMSULOSIN HYDROCHLORIDE 0.4 MG: 0.4 CAPSULE ORAL at 20:55

## 2018-03-12 RX ADMIN — FINASTERIDE 5 MG: 5 TABLET, FILM COATED ORAL at 08:24

## 2018-03-12 RX ADMIN — NEBIVOLOL HYDROCHLORIDE 20 MG: 5 TABLET ORAL at 08:24

## 2018-03-12 NOTE — PLAN OF CARE
Problem: Fall Risk (Adult)  Goal: Identify Related Risk Factors and Signs and Symptoms  Outcome: Ongoing (interventions implemented as appropriate)    Goal: Absence of Fall  Outcome: Ongoing (interventions implemented as appropriate)      Problem: Skin Integrity Impairment, Risk/Actual (Adult)  Goal: Identify Related Risk Factors and Signs and Symptoms  Outcome: Outcome(s) achieved Date Met: 03/12/18      Problem: Pressure Ulcer (Adult)  Goal: Signs and Symptoms of Listed Potential Problems Will be Absent or Manageable (Pressure Ulcer)  Outcome: Ongoing (interventions implemented as appropriate)      Problem: Pain, Acute (Adult)  Goal: Identify Related Risk Factors and Signs and Symptoms  Outcome: Ongoing (interventions implemented as appropriate)      Problem: Patient Care Overview  Goal: Plan of Care Review   03/12/18 0925   Plan of Care Review   Progress improving   Coping/Psychosocial   Plan of Care Reviewed With patient;spouse   OTHER   Outcome Summary no changes in pt condition or new complaints- pt awaiting to have a EP study on Thursday       Problem: Skin Injury Risk (Adult)  Goal: Identify Related Risk Factors and Signs and Symptoms  Outcome: Ongoing (interventions implemented as appropriate)    Goal: Skin Health and Integrity  Outcome: Ongoing (interventions implemented as appropriate)

## 2018-03-12 NOTE — THERAPY TREATMENT NOTE
Acute Care - Physical Therapy Progress Note  [facility]     Patient Name: Mando Hill  : 1945  MRN: [id number]  Today's Date: 3/12/2018     Date of Referral to PT: 18       Admit Date: 3/5/2018    Visit Dx:    ICD-10-CM ICD-9-CM   1. Fever, unspecified fever cause R50.9 780.60   2. Sepsis, due to unspecified organism A41.9 038.9     995.91   3. Cellulitis of buttock L03.317 682.5   4. Impaired functional mobility, balance, and endurance Z74.09 V49.89     Patient Active Problem List   Diagnosis   • Osteoarthritis of right shoulder   • Sepsis   • Sacral decubitus ulcer   • UTI (urinary tract infection) due to urinary indwelling catheter   • Hypotension   • Atrial fibrillation       Therapy Treatment    Therapy Treatment / Health Promotion    Treatment Time/Intention  Discipline: physical therapy assistant  Document Type: therapy note (daily note)  Mode of Treatment: physical therapy  Total Minutes, Physical Therapy Treatment: 86  Patient Effort: adequate  Plan of Care Review  Plan of Care Reviewed With: patient    Vitals/Pain/Safety  Vital Signs  Pre Systolic BP Rehab: 130  Pre Treatment Diastolic BP: 80  Post Systolic BP Rehab: 104  Post Treatment Diastolic BP: 71  Pretreatment Heart Rate (beats/min): 80  Intratreatment Heart Rate (beats/min): 94  Posttreatment Heart Rate (beats/min): 83  Pre SpO2 (%): 94  O2 Delivery Pre Treatment: supplemental O2  Intra SpO2 (%): 94  O2 Delivery Intra Treatment: room air  Pre Patient Position: Supine  Intra Patient Position: Sitting  Post Patient Position: Supine  Pain Assessment  Additional Documentation: Pain Scale: Numbers Pre/Post-Treatment (Group)  Pain Scale: Numbers Pre/Post-Treatment  Pain Scale: Numbers, Pretreatment: 5/10  Pain Location:  (generalized)  Pain Scale: Word Pre/Post-Treatment  Pain Location:  (generalized)  Pain Scale: FACES Pre/Post-Treatment  Pain Location:  (generalized)  Positioning and Restraints  Pre-Treatment Position: in  bed  Post Treatment Position: bed  In Bed: supine, call light within reach, encouraged to call for assist    Mobility,ADL,Motor, Modality  Bed Mobility Assessment/Treatment  Rolling Left [place] (Bed Mobility): dependent (less than 25% patient effort), 2 person assist  Rolling Right Opal (Bed Mobility): dependent (less than 25% patient effort), 2 person assist  Supine-Sit [place] (Bed Mobility): dependent (less than 25% patient effort), 2 person assist  Sit-Supine [place] (Bed Mobility): dependent (less than 25% patient effort), 2 person assist  Assistive Device (Bed Mobility): draw sheet, bed rails  Comment (Bed Mobility): sat eob x 30 min with max assist to maintain   Bed-Chair Transfer  Bed-Chair Opal (Transfers): not tested  Chair-Bed Transfer  Chair-Bed Opal (Transfers): not tested     Therapeutic Exercise  Upper Extremity Range of Motion (Therapeutic Exercise): shoulder flexion/extension, left, elbow flexion/extension, left, forearm supination/pronation, left (seated pec stretch x 4x 30sec)  Lower Extremity (Therapeutic Exercise): gastroc stretch, bilateral, hamstring sets, right, heel slides, right  Lower Extremity Range of Motion (Therapeutic Exercise): hip flexion/extension, left  Exercise Type (Therapeutic Exercise): AAROM (active assistive range of motion), AROM (active range of motion), PROM (passive range of motion)  Position (Therapeutic Exercise): supine  Sets/Reps (Therapeutic Exercise): 1-2/10-20  Comment (Therapeutic Exercise): pt prom on left side           ROM/MMT             Sensory, Edema, Orthotics          Cognition, Communication, Swallow  Cognitive Assessment Intervention- SLP  Cognitive Function (Cognition): WFL  Orientation Status (Cognition): person, place, situation  Speaking Valve  Pretreatment Heart Rate (beats/min): 80  Pre SpO2 (%): 94  Posttreatment Heart Rate (beats/min): 83  General Eating/Swallowing Observations  Pre SpO2 (%): 94    Outcome  Summary  Outcome Summary/Treatment Plan (PT)  Daily Summary of Progress (PT): progress toward functional goals is gradual  Barriers to Overall Progress (PT): poor postural stability and overall deconditioned.  Plan for Continued Treatment (PT): mobilize as able  Patient/Family Concerns, Equipment Needs at Discharge (PT): has all dme needed  Anticipated Discharge Disposition (PT): home with home health care            PT Rehab Goals     Row Name 03/12/18 1139 03/11/18 1500 03/10/18 1100       Transfer Goal 1 (PT)    Activity/Assistive Device (Transfer Goal 1, PT) bed-to-chair/chair-to-bed  -RW bed-to-chair/chair-to-bed  -RW bed-to-chair/chair-to-bed  -CA    [place] Level/Cues Needed (Transfer Goal 1, PT) other (see comments)   dependant  -RW other (see comments)   dependant  -RW other (see comments)   Dependent  -CA    Time Frame (Transfer Goal 1, PT) 5 days  -RW 5 days  -RW 5 days  -CA    Barriers (Transfers Goal 1, PT) pt will tolerate lift transfer to chair with out significant change in vs or skin irritation  -RW pt will tolerate lift transfer to chair with out significant change in vs or skin irritation  -RW pt. will tolerated lift transfer to chair without significant change in VS or skin irritation  -CA    Progress/Outcome (Transfer Goal 1, PT) goal ongoing  -RW goal ongoing  -RW goal ongoing  -CA       Transfer Goal 2 (PT)    Activity/Assistive Device (Transfer Goal 2, PT) bed-to-chair/chair-to-bed  -RW bed-to-chair/chair-to-bed  -RW bed-to-chair/chair-to-bed  -CA    [place] Level/Cues Needed (Transfer Goal 2, PT) other (see comments)   pt wife will transfer as plof comfortably  -RW other (see comments)   pt wife will transfer as PLOF comfortable  -RW other (see comments)   Pt./Wife will transfer as PLOF comfortably  -CA    Time Frame (Transfer Goal 2, PT) by discharge  -RW by discharge  -RW by discharge  -CA    Progress/Outcome (Transfer Goal 2, PT) goal ongoing  -RW goal ongoing  -RW goal ongoing   -CA       Wheelchair Locomotion Goal 1 (PT)    Activity (Wheelchair Locomotion Goal 1, PT) other (see comments)   repositioning in wc  -RW other (see comments)   repositioning in wc  -RW other (see comments)   Repositioning in W/C  -CA    Time Frame (Wheelchair Locomotion Goal 1, PT) by discharge  -RW by discharge  -RW by discharge  -CA    Progress/Outcome (Wheelchair Locomotion Goal 1, PT) goal ongoing  -RW goal ongoing  -RW goal ongoing  -CA       Problem Specific Goal 1 (PT)    Problem Specific Goal 1 (PT) cardiopulmonary  -RW --   cardiopulmonary goal  -RW Cardiopulmonary goal  -CA    Time Frame (Problem Specific Goal 1, PT) 3 days  -RW 3 days  -RW 3 days  -CA    Barriers (Problem Specific Goal 1, PT) pt will not have dizziness or drop in sats with sitting upright x 30 min  -RW pt. will not have dizziness or drop in sats w/ sitting upright x 30 mins  -RW pt. will not have dizziness or drop in sats with sitting upright for 30 mins  -CA    Progress/Outcome (Problem Specific Goal 1, PT) goal met  -RW goal ongoing  -RW  --    Row Name 03/10/18 0800             Transfer Goal 1 (PT)    Activity/Assistive Device (Transfer Goal 1, PT) bed-to-chair/chair-to-bed  -CA      [place] Level/Cues Needed (Transfer Goal 1, PT) other (see comments)   Dependent  -CA      Time Frame (Transfer Goal 1, PT) 5 days  -CA      Barriers (Transfers Goal 1, PT) Pt. will tolerate lift transfer to chair without significant change in VS or skin irritation  -CA      Progress/Outcome (Transfer Goal 1, PT) goal ongoing  -CA         Transfer Goal 2 (PT)    Activity/Assistive Device (Transfer Goal 2, PT) bed-to-chair/chair-to-bed  -CA      [place] Level/Cues Needed (Transfer Goal 2, PT) other (see comments)   Pt./WIfe  will transfer as PLOF comfortably  -CA      Time Frame (Transfer Goal 2, PT) by discharge  -CA      Progress/Outcome (Transfer Goal 2, PT) goal ongoing  -CA         Wheelchair Locomotion Goal 1 (PT)    Activity (Wheelchair  Locomotion Goal 1, PT) other (see comments)   Repositioning in W/C  -CA      Time Frame (Wheelchair Locomotion Goal 1, PT) by discharge  -CA      Progress/Outcome (Wheelchair Locomotion Goal 1, PT) goal ongoing  -CA         Problem Specific Goal 1 (PT)    Problem Specific Goal 1 (PT) Cardiopulmonary Goal  -CA      Time Frame (Problem Specific Goal 1, PT) 3 days  -CA      Barriers (Problem Specific Goal 1, PT) pt. will not have dizziness or drop in sats w/ sitting upright x 30 mins  -CA        User Key  (r) = Recorded By, (t) = Taken By, (c) = Cosigned By    Initials Name Provider Type    CA Rick Martinez, PTA Physical Therapy Assistant    HARLEY Rosales PTA Physical Therapy Assistant          Physical Therapy Education     Title: PT OT SLP Therapies (Active)     Topic: Physical Therapy (Active)     Point: Mobility training (Active)    Learning Progress Summary     Learner Status Readiness Method Response Comment Documented by    Patient Active Acceptance D,E NR POC; mobility  03/08/18 1908          Point: Home exercise program (Active)    Learning Progress Summary     Learner Status Readiness Method Response Comment Documented by    Patient Active Acceptance D,E NR POC; mobility  03/08/18 1908          Point: Body mechanics (Active)    Learning Progress Summary     Learner Status Readiness Method Response Comment Documented by    Patient Active Acceptance D,E NR POC; mobility  03/08/18 1908          Point: Precautions (Active)    Learning Progress Summary     Learner Status Readiness Method Response Comment Documented by    Patient Active Acceptance D,E NR POC; mobility  03/08/18 1908                      User Key     Initials Effective Dates Name Provider Type Discipline     03/07/18 -  Anisa Dickson, PT Physical Therapist PT                    PT Recommendation and Plan  Anticipated Discharge Disposition (PT): home with home health care  Daily Summary of Progress (PT): progress toward  functional goals is gradual  Plan for Continued Treatment (PT): mobilize as able  Plan of Care Reviewed With: patient  Outcome Summary: pt did tolerate eob x 30 min with dep assist to get eob then max assist to maintain. oob to chair as able          Outcome Measures     Row Name 03/10/18 1040             How much help from another person do you currently need...    Turning from your back to your side while in flat bed without using bedrails? 1  -CA      Moving from lying on back to sitting on the side of a flat bed without bedrails? 1  -CA      Moving to and from a bed to a chair (including a wheelchair)? 1  -CA      Standing up from a chair using your arms (e.g., wheelchair, bedside chair)? 1  -CA      Climbing 3-5 steps with a railing? 1  -CA      To walk in hospital room? 1  -CA      AM-PAC 6 Clicks Score 6  -CA         Functional Assessment    Outcome Measure Options AM-PAC 6 Clicks Basic Mobility (PT)  -CA        User Key  (r) = Recorded By, (t) = Taken By, (c) = Cosigned By    Initials Name Provider Type    IVETTE Martinez PTA Physical Therapy Assistant           Time Calculation:         PT Charges     Row Name 03/12/18 1310             Time Calculation    Start Time 1013  -RW      Stop Time 1139  -RW      Time Calculation (min) 86 min  -RW         Time Calculation- PT    Total Timed Code Minutes- PT 86 minute(s)  -RW        User Key  (r) = Recorded By, (t) = Taken By, (c) = Cosigned By    Initials Name Provider Type    HARLEY Rosales PTA Physical Therapy Assistant          Therapy Charges for Today     Code Description Service Date Service Provider Modifiers Qty    41119277096 HC PT THERAPEUTIC ACT EA 15 MIN 3/11/2018 Twan Rosales PTA GP 1    00240181312 HC PT THER PROC EA 15 MIN 3/11/2018 Twan Rosales PTA GP 2    54715509593 HC PT THERAPEUTIC ACT EA 15 MIN 3/12/2018 Twan Rosales PTA GP 3    81445792791 HC PT THER PROC EA 15 MIN 3/12/2018 Twan Rosales PTA GP 3          PT G-Codes  PT  Professional Judgement Used?: Yes  Outcome Measure Options: AM-PAC 6 Clicks Basic Mobility (PT)  Score: 6  Functional Limitation: Changing and maintaining body position, Mobility: Walking and moving around  Mobility: Walking and Moving Around Current Status (): 100 percent impaired, limited or restricted  Mobility: Walking and Moving Around Goal Status (): 100 percent impaired, limited or restricted    Twan Rosales, PTA  3/12/2018

## 2018-03-12 NOTE — PLAN OF CARE
Problem: Patient Care Overview  Goal: Plan of Care Review  Outcome: Ongoing (interventions implemented as appropriate)   03/12/18 7599   Coping/Psychosocial   Plan of Care Reviewed With patient   OTHER   Outcome Summary pt did tolerate eob x 30 min with dep assist to get eob then max assist to maintain. oob to chair as able

## 2018-03-12 NOTE — PLAN OF CARE
Problem: Fall Risk (Adult)  Goal: Identify Related Risk Factors and Signs and Symptoms  Outcome: Ongoing (interventions implemented as appropriate)    Goal: Absence of Fall  Outcome: Ongoing (interventions implemented as appropriate)      Problem: Skin Integrity Impairment, Risk/Actual (Adult)  Goal: Identify Related Risk Factors and Signs and Symptoms  Outcome: Ongoing (interventions implemented as appropriate)    Goal: Skin Integrity/Wound Healing  Outcome: Ongoing (interventions implemented as appropriate)      Problem: Pressure Ulcer (Adult)  Goal: Signs and Symptoms of Listed Potential Problems Will be Absent or Manageable (Pressure Ulcer)  Outcome: Ongoing (interventions implemented as appropriate)      Problem: Pain, Acute (Adult)  Goal: Identify Related Risk Factors and Signs and Symptoms  Outcome: Ongoing (interventions implemented as appropriate)    Goal: Acceptable Pain Control/Comfort Level  Outcome: Ongoing (interventions implemented as appropriate)      Problem: Patient Care Overview  Goal: Plan of Care Review  Outcome: Ongoing (interventions implemented as appropriate)    Goal: Individualization and Mutuality  Outcome: Ongoing (interventions implemented as appropriate)      Problem: Skin Injury Risk (Adult)  Goal: Skin Health and Integrity  Outcome: Ongoing (interventions implemented as appropriate)

## 2018-03-12 NOTE — PROGRESS NOTES
[facility] Medicine Services  INPATIENT PROGRESS NOTE    Length of Stay: 7  Date of Admission: 3/5/2018  Primary Care Physician: Valerie Marvin MD    Subjective     Chief Complaint   Patient presents with   • Nausea     HPI: 73 year old male who was admitted for weakness, mild nausea, low grade fever, worsening weakness. He has felt bad for last several days, has experienced more generalized pain than usual and has had a poorer appetite. Pt was found to have pseudomonas urosepsis.     3/11/18: pt states that he is doing better, had episode of nausea this AM, no vomiting.     3/12/2018: pt is doing better today, no more nausea. Urine is clearing up. Cultures pending.     Review of Systems   Constitutional: Positive for activity change, appetite change and fatigue. Negative for chills and fever.   HENT: Positive for rhinorrhea, sinus pain and sinus pressure.    Respiratory: Negative for chest tightness, shortness of breath and wheezing.    Cardiovascular: Positive for leg swelling. Negative for chest pain and palpitations.   Gastrointestinal: Positive for nausea. Negative for abdominal pain, constipation, diarrhea and vomiting.   Endocrine: Negative for cold intolerance and heat intolerance.   Genitourinary:        Gayle is present    Neurological: Negative for dizziness, light-headedness and headaches.   Psychiatric/Behavioral: Negative for agitation, behavioral problems and confusion.        All pertinent negatives and positives are as above. All other systems have been reviewed and are negative unless otherwise stated.     Objective      Vital Sign Min/Max for last 24 hours  Temp  Min: 97.1 °F (36.2 °C)  Max: 97.4 °F (36.3 °C)   BP  Min: 104/71  Max: 126/79   Pulse  Min: 76  Max: 93   Resp  Min: 18  Max: 20   SpO2  Min: 95 %  Max: 97 %   Flow (L/min)  Min: 3  Max: 3   No Data Recorded         Physical Exam   Constitutional: He is oriented to person, place, and time.  He appears well-developed and well-nourished.   HENT:   Head: Normocephalic and atraumatic.   Eyes: EOM are normal. Pupils are equal, round, and reactive to light.   Neck: Normal range of motion.   Cardiovascular: Normal rate, regular rhythm and normal heart sounds.    Pulmonary/Chest: Effort normal and breath sounds normal.   Abdominal: Soft. Bowel sounds are normal.   Genitourinary:   Genitourinary Comments: Gayle is present.      Musculoskeletal: Normal range of motion. He exhibits edema (1+ edema noted ).   Neurological: He is alert and oriented to person, place, and time.   Skin: Skin is warm.   Psychiatric: He has a normal mood and affect. His behavior is normal.   Vitals reviewed.      Current Facility-Administered Medications   Medication Dose Route Frequency Provider Last Rate Last Dose   • 8-hydroxyquinoline sulfate (BAG BALM) ointment   Apply externally Daily Evelio Christensen MD       • albuterol (PROVENTIL) nebulizer solution 0.5% 2.5 mg/0.5mL  2.5 mg Nebulization Q6H PRN Yoan Hamilton MD       • amiodarone (PACERONE) tablet 200 mg  200 mg Oral Q24H Sai Muñoz MD   200 mg at 03/12/18 0824   • aspirin EC tablet 81 mg  81 mg Oral Daily Yoan Hamilton MD   81 mg at 03/12/18 0824   • baclofen (LIORESAL) tablet 10 mg  10 mg Oral Q12H Jordi Marie MD   10 mg at 03/12/18 0824   • budesonide-formoterol (SYMBICORT) 160-4.5 MCG/ACT inhaler 2 puff  2 puff Inhalation BID - RT Hardy Fisher MD   2 puff at 03/10/18 0644   • diltiaZEM CD (CARDIZEM CD) 24 hr capsule 240 mg  240 mg Oral Daily With Dinner Sai Muñoz MD   240 mg at 03/11/18 1721   • docusate sodium (COLACE) capsule 100 mg  100 mg Oral Daily Jordi Marie MD   100 mg at 03/12/18 0824   • finasteride (PROSCAR) tablet 5 mg  5 mg Oral Daily Yoan Hamilton MD   5 mg at 03/12/18 0824   • fluticasone (FLONASE) 50 MCG/ACT nasal spray 2 spray  2 spray Each Nare Daily Hardy Fisher MD   2 spray at 03/12/18 5266   • furosemide  (LASIX) tablet 20 mg  20 mg Oral Daily Luca Deleon MD   20 mg at 03/12/18 0824   • HYDROcodone-acetaminophen (NORCO)  MG per tablet 1 tablet  1 tablet Oral Q4H PRN Yoan Hamilton MD   1 tablet at 03/12/18 0829   • ipratropium-albuterol (DUO-NEB) nebulizer solution 3 mL  3 mL Nebulization 4x Daily - RT Hardy Fisher MD   3 mL at 03/10/18 1011   • magic butt ointment   Topical BID Evelio Christensen MD       • nebivolol (BYSTOLIC) tablet 20 mg  20 mg Oral Daily Hardy Fisher MD   20 mg at 03/12/18 0824   • ondansetron (ZOFRAN) injection 4 mg  4 mg Intravenous Q4H PRN Evelio Christensen MD   4 mg at 03/10/18 2104   • phenytoin (DILANTIN) ER capsule 100 mg  100 mg Oral Q12H Yoan Hamilton MD   100 mg at 03/12/18 0824   • piperacillin-tazobactam (ZOSYN) 3.375 g in iso-osmotic dextrose 50 ml (premix)  3.375 g Intravenous Q8H Hardy Fisher MD 0 mL/hr at 03/09/18 1745 3.375 g at 03/12/18 1340   • polyethylene glycol 3350 powder (packet)  17 g Oral Daily PRN Hardy Fisher MD   17 g at 03/09/18 0911   • potassium chloride (MICRO-K) CR capsule 40 mEq  40 mEq Oral PRN Hardy Fisher MD   40 mEq at 03/11/18 0430    Or   • potassium chloride (KLOR-CON) packet 40 mEq  40 mEq Oral PRN Hardy Fisher MD        Or   • potassium chloride 10 mEq in 100 mL IVPB  10 mEq Intravenous Q1H PRN Hardy Fisher  mL/hr at 03/10/18 1753 10 mEq at 03/10/18 1753   • promethazine (PHENERGAN) injection 12.5 mg  12.5 mg Intravenous Q6H PRN Jordi Marie MD   12.5 mg at 03/10/18 1514   • rivaroxaban (XARELTO) tablet 20 mg  20 mg Oral Daily Yoan Hamilton MD   20 mg at 03/12/18 0824   • sodium chloride (OCEAN) nasal spray 2 spray  2 spray Each Nare PRN Hardy Fisher MD   2 spray at 03/10/18 0854   • sodium chloride 0.9 % flush 1-10 mL  1-10 mL Intravenous PRN Yoan Hamilton MD   10 mL at 03/07/18 1615   • sodium chloride 0.9 % flush 10 mL  10 mL Intravenous PRN Robert Anderson MD       • sodium chloride  0.9 % flush 10 mL  10 mL Intracatheter Q12H Hardy Fisher MD   10 mL at 03/11/18 2242   • sodium chloride 0.9 % flush 10 mL  10 mL Intracatheter PRN Hardy Fisher MD       • sodium chloride 0.9 % flush 10 mL  10 mL Intracatheter PRN Hardy Fisher MD   10 mL at 03/10/18 2110   • tamsulosin (FLOMAX) 24 hr capsule 0.4 mg  0.4 mg Oral Nightly Yoan Hamilton MD   0.4 mg at 03/11/18 2142   • temazepam (RESTORIL) capsule 15 mg  15 mg Oral Nightly PRN Jordi Marie MD   15 mg at 03/11/18 2141   • vitamin C (ASCORBIC ACID) tablet 500 mg  500 mg Oral Daily Yoan Hamilton MD   500 mg at 03/12/18 0824           Results Review:  I have reviewed the labs, radiology results, and diagnostic studies.    Laboratory Data:     Results from last 7 days  Lab Units 03/12/18  1447 03/12/18  0650 03/11/18  0808  03/10/18  0325   SODIUM mmol/L 139 141 141  --  142   POTASSIUM mmol/L 3.2* 4.1 4.3  < > 2.9*   CHLORIDE mmol/L 91* 96 95  --  100   CO2 mmol/L 36.0* 35.0* 31.0  --  28.0   BUN mg/dL 32* 31* 28*  --  22*   CREATININE mg/dL 1.36* 1.39* 1.77*  --  1.23   GLUCOSE mg/dL 146* 97 121*  --  106*   CALCIUM mg/dL 8.1* 8.9 8.7  --  8.3*   BILIRUBIN mg/dL  --  0.7 0.8  --  0.5   ALK PHOS U/L  --  58 72  --  61   ALT (SGPT) U/L  --  45 61  --  38   AST (SGOT) U/L  --  22 41  --  19   ANION GAP mmol/L 12.0 10.0 15.0  --  14.0   < > = values in this interval not displayed.  Estimated Creatinine Clearance: 48.2 mL/min (by C-G formula based on SCr of 1.36 mg/dL (H)).            Results from last 7 days  Lab Units 03/12/18  1447 03/12/18  0650 03/11/18  0650 03/10/18  0325 03/09/18  0356   WBC 10*3/mm3 10.58* 10.24* 13.87* 9.54 15.17*   HEMOGLOBIN g/dL 14.4 15.4 15.6 14.7 14.2   HEMATOCRIT % 42.3 44.8 44.4 42.3 41.4   PLATELETS 10*3/mm3 227 233 228 205 219       Results from last 7 days  Lab Units 03/12/18  1447   INR  2.48*       Results from last 7 days  Lab Units 03/11/18  0808   CRP mg/dL 3.20*       Culture Data:   No results  found for: BLOODCX  Urine Culture   Date Value Ref Range Status   03/11/2018 Culture in progress  Preliminary     No results found for: RESPCX  No results found for: WOUNDCX  No results found for: STOOLCX  No components found for: BODYFLD      Radiology Data:   Imaging Results (last 24 hours)     ** No results found for the last 24 hours. **          I have reviewed the patient current medications.     Assessment/Plan     Active Hospital Problems (** Indicates Principal Problem)    Diagnosis Date Noted   • **Sepsis [A41.9] 03/05/2018   • Sacral decubitus ulcer [L89.159] 03/05/2018   • UTI (urinary tract infection) due to urinary indwelling catheter [T83.511A, N39.0] 03/05/2018   • Hypotension [I95.9] 03/05/2018   • Atrial fibrillation [I48.91]       Resolved Hospital Problems    Diagnosis Date Noted Date Resolved   No resolved problems to display.     1. Urosepsis: with pseudomonas sensitive to zosyn, currently on zosyn. Will continue with current treatment. Will repeat UA and urine culture still pending.   2.  AUGIE: baseline Cr. 1.07, now at 1.36. Will lower lasix to 20mg from 40 mg.   3. A. Fib: on amiodarone, cardizem, on xarelto. Dr. Londono for ablation on Thursday.    4. Hypotension: resolved.   5. Right upper lobe pneumonia: currently on antibiotics. Doing well.   6. Hypokalemia: resolved.     Discharge Planning: I expect patient to be discharged to home vs NH in 1-2 days.      DVT Prophylaxis: xarelto               This document has been electronically signed by Luca Deleon MD on March 12, 2018 4:52 PM

## 2018-03-12 NOTE — PROGRESS NOTES
"   LOS: 7 days   Patient Care Team:  Valerie Marvin MD as PCP - General    Subjective     Subjective:  Symptoms:  Stable.  (Urine is yellow today. ).    Diet:  No nausea.        History taken from: patient chart family    Objective     Vital Signs  Temp:  [97 °F (36.1 °C)-97.4 °F (36.3 °C)] 97.1 °F (36.2 °C)  Heart Rate:  [76-93] 77  Resp:  [18-20] 18  BP: (104-126)/(64-79) 105/64    Objective:  General Appearance:  In no acute distress.    Vital signs: (most recent): Blood pressure 105/64, pulse 77, temperature 97.1 °F (36.2 °C), temperature source Tympanic, resp. rate 18, height 170.2 cm (67.01\"), weight 82.3 kg (181 lb 7 oz), SpO2 97 %.  Vital signs are normal.  No fever.    Output: Producing urine (Yellow urine is SP tubes gravity bag).    HEENT: Normal HEENT exam.    Lungs:  Normal effort and normal respiratory rate.  Breath sounds clear to auscultation.  He is not in respiratory distress.    Heart: Normal rate.  S1 normal and S2 normal.    Chest: Symmetric chest wall expansion.   Abdomen: Abdomen is soft and non-distended.  Bowel sounds are normal.     Extremities: Decreased range of motion.  There is deformity and dependent edema (LLE).    Pulses: Distal pulses are intact.    Neurological: Patient is alert and oriented to person, place and time.  GCS score is 15.    Pupils:  Pupils are equal, round, and reactive to light.    Skin:  Warm and dry.  (Erythema and increased warmth LLE)            Results Review:    Lab Results (last 24 hours)     Procedure Component Value Units Date/Time    Basic Metabolic Panel [[id number]]  (Abnormal) Collected:  03/12/18 1447    Specimen:  Blood Updated:  03/12/18 1528     Glucose 146 (H) mg/dL      BUN 32 (H) mg/dL      Creatinine 1.36 (H) mg/dL      Sodium 139 mmol/L      Potassium 3.2 (L) mmol/L      Chloride 91 (L) mmol/L      CO2 36.0 (H) mmol/L      Calcium 8.1 (L) mg/dL      eGFR Non African Amer 51 mL/min/1.73      BUN/Creatinine Ratio 23.5     Anion Gap 12.0 " mmol/L     Narrative:       The MDRD GFR formula is only valid for adults with stable renal function between ages 18 and 70.    Protime-INR [[id number]]  (Abnormal) Collected:  03/12/18 1447    Specimen:  Blood Updated:  03/12/18 1522     Protime 25.7 (H) Seconds      INR 2.48 (H)    Narrative:       Therapeutic range for most indications is 2.0-3.0 INR,  or 2.5-3.5 for mechanical heart valves.    CBC (No Diff) [[id number]]  (Abnormal) Collected:  03/12/18 1447    Specimen:  Blood Updated:  03/12/18 1454     WBC 10.58 (H) 10*3/mm3      RBC 4.52 10*6/mm3      Hemoglobin 14.4 g/dL      Hematocrit 42.3 %      MCV 93.6 fL      MCH 31.9 pg      MCHC 34.0 g/dL      RDW 13.5 %      RDW-SD 46.5 (H) fl      MPV 9.3 fL      Platelets 227 10*3/mm3     CBC & Differential [[id number]] Collected:  03/12/18 0650    Specimen:  Blood Updated:  03/12/18 0758    Narrative:       The following orders were created for panel order CBC & Differential.  Procedure                               Abnormality         Status                     ---------                               -----------         ------                     CBC Auto Differential[[id number]]        Abnormal            Final result                 Please view results for these tests on the individual orders.    CBC Auto Differential [[id number]]  (Abnormal) Collected:  03/12/18 0650    Specimen:  Blood Updated:  03/12/18 0758     WBC 10.24 (H) 10*3/mm3      RBC 4.81 10*6/mm3      Hemoglobin 15.4 g/dL      Hematocrit 44.8 %      MCV 93.1 fL      MCH 32.0 pg      MCHC 34.4 g/dL      RDW 13.5 %      RDW-SD 46.3 (H) fl      MPV 9.8 fL      Platelets 233 10*3/mm3      Neutrophil % 76.2 %      Lymphocyte % 10.9 %      Monocyte % 9.7 %      Eosinophil % 1.6 %      Basophil % 0.2 %      Immature Grans % 1.4 (H) %      Neutrophils, Absolute 7.81 10*3/mm3      Lymphocytes, Absolute 1.12 10*3/mm3      Monocytes, Absolute 0.99 (H) 10*3/mm3      Eosinophils, Absolute 0.16 10*3/mm3       Basophils, Absolute 0.02 10*3/mm3      Immature Grans, Absolute 0.14 (H) 10*3/mm3      nRBC 0.0 /100 WBC     Comprehensive Metabolic Panel [[id number]]  (Abnormal) Collected:  03/12/18 0650    Specimen:  Blood Updated:  03/12/18 0731     Glucose 97 mg/dL      BUN 31 (H) mg/dL      Creatinine 1.39 (H) mg/dL      Sodium 141 mmol/L      Potassium 4.1 mmol/L      Chloride 96 mmol/L      CO2 35.0 (H) mmol/L      Calcium 8.9 mg/dL      Total Protein 7.1 g/dL      Albumin 3.50 g/dL      ALT (SGPT) 45 U/L      AST (SGOT) 22 U/L      Alkaline Phosphatase 58 U/L      Total Bilirubin 0.7 mg/dL      eGFR Non African Amer 50 (L) mL/min/1.73      Globulin 3.6 (H) gm/dL      A/G Ratio 1.0 (L) g/dL      BUN/Creatinine Ratio 22.3     Anion Gap 10.0 mmol/L     Narrative:       The MDRD GFR formula is only valid for adults with stable renal function between ages 18 and 70.    Urine Culture - Urine, Urine, Clean Catch [[id number]]  (Normal) Collected:  03/11/18 1226    Specimen:  Urine from Urine, Clean Catch Updated:  03/12/18 0611     Urine Culture Culture in progress    POC Glucose Once [[id number]]  (Abnormal) Collected:  03/11/18 2050    Specimen:  Blood Updated:  03/11/18 2106     Glucose 133 (H) mg/dL      Comment: Meter: [id number]: [id number] [facility]       POC Glucose Once [[id number]]  (Abnormal) Collected:  03/11/18 1655    Specimen:  Blood Updated:  03/11/18 1728     Glucose 154 (H) mg/dL      Comment: RN NotifiedMeter: [id number]: [id number] MARC DONOVAN            Imaging Results (last 24 hours)     ** No results found for the last 24 hours. **           I reviewed the patient's new clinical results.  I reviewed the patient's new imaging results and agree with the interpretation.  I reviewed the patient's other test results and agree with the interpretation      Assessment/Plan     Principal Problem:    Sepsis  Active Problems:    Sacral decubitus ulcer    UTI (urinary tract infection) due to  urinary indwelling catheter    Hypotension    Atrial fibrillation      Assessment & Plan    UTI, acute, causing sepsis related to chronic SP tube last changed 3/5/18  -Urine culture Pseudomonas aeruginosa 3/5/18  -WBC 23.46-->15.17-->9.54-->13.87-->10.58  -Zosyn day #8  -Estimated Creatinine Clearance: 48.2 mL/min (by C-G formula based on SCr of 1.36 mg/dL (H)).     Neurogenic bladder  -chronic SP tube changed 3/5/18     BPH  Flomax, Proscar    Hematuria, new, since resolved  -probable trauma   -new urine culture in progress    Plan: culture urine in progress.     JEAN Robbins  03/12/18  4:27 PM

## 2018-03-12 NOTE — PROGRESS NOTES
LOS: 7 days   Patient Care Team:  Valerie Marvin MD as PCP - General    Chief Complaint:  Atrial flutter with variable ventricular response       Review of Systems:   ROS  Constitutional: Positive for activity change, appetite change and fatigue. Negative for chills and fever.   HENT: Positive for rhinorrhea, sinus pain and sinus pressure.    Respiratory: Negative for chest tightness, shortness of breath and wheezing.    Cardiovascular: Positive for leg swelling. Negative for chest pain and palpitations.   Gastrointestinal: Positive for nausea. Negative for abdominal pain, constipation, diarrhea and vomiting.   Endocrine: Negative for cold intolerance and heat intolerance.   Genitourinary:        Gayle is present    Neurological: Negative for dizziness, light-headedness and headaches.   Psychiatric/Behavioral: Negative for agitation, behavioral problems and confusion.  Objective     Current Facility-Administered Medications   Medication Dose Route Frequency Provider Last Rate Last Dose   • 8-hydroxyquinoline sulfate (BAG BALM) ointment   Apply externally Daily Evelio Christensen MD       • albuterol (PROVENTIL) nebulizer solution 0.5% 2.5 mg/0.5mL  2.5 mg Nebulization Q6H PRN Yoan Hamilton MD       • amiodarone (PACERONE) tablet 200 mg  200 mg Oral Q24H Sai Muñoz MD   200 mg at 03/12/18 0824   • aspirin EC tablet 81 mg  81 mg Oral Daily Yoan Hamilton MD   81 mg at 03/12/18 0824   • baclofen (LIORESAL) tablet 10 mg  10 mg Oral Q12H Jordi Marie MD   10 mg at 03/12/18 0824   • budesonide-formoterol (SYMBICORT) 160-4.5 MCG/ACT inhaler 2 puff  2 puff Inhalation BID - RT Hardy Fisher MD   2 puff at 03/10/18 0644   • diltiaZEM CD (CARDIZEM CD) 24 hr capsule 240 mg  240 mg Oral Daily With Dinner Sai Muñoz MD   240 mg at 03/11/18 1721   • docusate sodium (COLACE) capsule 100 mg  100 mg Oral Daily Jordi Marie MD   100 mg at 03/12/18 0824   • finasteride (PROSCAR) tablet 5 mg  5 mg  Oral Daily Yoan Hamilton MD   5 mg at 03/12/18 0824   • fluticasone (FLONASE) 50 MCG/ACT nasal spray 2 spray  2 spray Each Nare Daily Hardy Fisher MD   2 spray at 03/12/18 0826   • furosemide (LASIX) tablet 20 mg  20 mg Oral Daily Luca Deleon MD   20 mg at 03/12/18 0824   • HYDROcodone-acetaminophen (NORCO)  MG per tablet 1 tablet  1 tablet Oral Q4H PRN Yoan Hamilton MD   1 tablet at 03/12/18 0829   • ipratropium-albuterol (DUO-NEB) nebulizer solution 3 mL  3 mL Nebulization 4x Daily - RT Hardy Fisher MD   3 mL at 03/10/18 1011   • magic butt ointment   Topical BID Evelio Christensen MD       • nebivolol (BYSTOLIC) tablet 20 mg  20 mg Oral Daily Hardy Fisher MD   20 mg at 03/12/18 0824   • ondansetron (ZOFRAN) injection 4 mg  4 mg Intravenous Q4H PRN Evelio Christensen MD   4 mg at 03/10/18 2104   • phenytoin (DILANTIN) ER capsule 100 mg  100 mg Oral Q12H Yoan Hamilton MD   100 mg at 03/12/18 0824   • piperacillin-tazobactam (ZOSYN) 3.375 g in iso-osmotic dextrose 50 ml (premix)  3.375 g Intravenous Q8H Hardy Fisher MD 0 mL/hr at 03/09/18 1745 3.375 g at 03/12/18 0525   • polyethylene glycol 3350 powder (packet)  17 g Oral Daily PRN Hardy Fisher MD   17 g at 03/09/18 0911   • potassium chloride (MICRO-K) CR capsule 40 mEq  40 mEq Oral PRN Hardy Fisher MD   40 mEq at 03/11/18 0430    Or   • potassium chloride (KLOR-CON) packet 40 mEq  40 mEq Oral PRN Hardy Fisher MD        Or   • potassium chloride 10 mEq in 100 mL IVPB  10 mEq Intravenous Q1H PRN Hardy Fisher  mL/hr at 03/10/18 1753 10 mEq at 03/10/18 1753   • promethazine (PHENERGAN) injection 12.5 mg  12.5 mg Intravenous Q6H PRN Jordi Marie MD   12.5 mg at 03/10/18 1514   • rivaroxaban (XARELTO) tablet 20 mg  20 mg Oral Daily Yoan Hamilton MD   20 mg at 03/12/18 0824   • sodium chloride (OCEAN) nasal spray 2 spray  2 spray Each Nare PRN Hardy Fisher MD   2 spray at 03/10/18 0854   • sodium  "chloride 0.9 % flush 1-10 mL  1-10 mL Intravenous PRN Yoan Hamilton MD   10 mL at 03/07/18 1615   • sodium chloride 0.9 % flush 10 mL  10 mL Intravenous PRN Robert Anderson MD       • sodium chloride 0.9 % flush 10 mL  10 mL Intracatheter Q12H Hardy Fisher MD   10 mL at 03/11/18 2242   • sodium chloride 0.9 % flush 10 mL  10 mL Intracatheter PRN Hardy Fisher MD       • sodium chloride 0.9 % flush 10 mL  10 mL Intracatheter PRN Hardy Fisher MD   10 mL at 03/10/18 2110   • tamsulosin (FLOMAX) 24 hr capsule 0.4 mg  0.4 mg Oral Nightly Yoan Hamilton MD   0.4 mg at 03/11/18 2142   • temazepam (RESTORIL) capsule 15 mg  15 mg Oral Nightly PRN Jordi Marie MD   15 mg at 03/11/18 2141   • vitamin C (ASCORBIC ACID) tablet 500 mg  500 mg Oral Daily Yoan Hamilton MD   500 mg at 03/12/18 0824       Vital Sign Min/Max for last 24 hours  Temp  Min: 97 °F (36.1 °C)  Max: 97.4 °F (36.3 °C)   BP  Min: 119/67  Max: 126/79   Pulse  Min: 79  Max: 93   Resp  Min: 18  Max: 20   SpO2  Min: 95 %  Max: 96 %   Flow (L/min)  Min: 3  Max: 3   No Data Recorded     Flowsheet Rows    Flowsheet Row First Filed Value   Admission Height 170.2 cm (67\") Documented at 03/05/2018 0023   Admission Weight 81.6 kg (180 lb) Documented at 03/05/2018 0023            Intake/Output Summary (Last 24 hours) at 03/12/18 0857  Last data filed at 03/11/18 2000   Gross per 24 hour   Intake              240 ml   Output             1950 ml   Net            -1710 ml       Physical Exam:     General Appearance:    Alert, cooperative, in no acute distress   Lungs:     Clear to auscultation,respirations regular, even and                  unlabored    Heart:    IRRegular rhythm and normal rate, normal S1 and S2, no            murmur, no gallop, no rub, no click   Chest Wall:    No abnormalities observed   Abdomen:     Normal bowel sounds, no masses, no organomegaly, soft        non-tender, non-distended, no guarding, no rebound                " tenderness   Extremities:   Moves all extremities well, no edema, no cyanosis   Pulses:   Pulses palpable and equal bilaterally   Skin:   No bleeding, bruising or rash        Results Review:   I reviewed the patient's new clinical results.  Lab Results   Component Value Date    WBC 10.24 (H) 03/12/2018    HGB 15.4 03/12/2018    HCT 44.8 03/12/2018    MCV 93.1 03/12/2018     03/12/2018     Lab Results   Component Value Date    GLUCOSE 97 03/12/2018    BUN 31 (H) 03/12/2018    CREATININE 1.39 (H) 03/12/2018    EGFRIFNONA 50 (L) 03/12/2018    BCR 22.3 03/12/2018    CO2 35.0 (H) 03/12/2018    CALCIUM 8.9 03/12/2018    ALBUMIN 3.50 03/12/2018    LABIL2 1.0 (L) 03/12/2018    AST 22 03/12/2018    ALT 45 03/12/2018     No results found for: TSH  Lab Results   Component Value Date    INR 1.4 (H) 12/13/2016    INR 1.1 02/01/2015    PROTIME 16.6 (H) 12/13/2016    PROTIME 13.8 02/01/2015     Lab Results   Component Value Date    PTT 34.9 02/01/2015       EKG:     Telemetry:    Ejection Fraction  No results found for: EF    Echo EF Estimated  Lab Results   Component Value Date    ECHOEFEST 55 03/07/2018         Present on Admission:  • Sepsis  • Sacral decubitus ulcer  • UTI (urinary tract infection) due to urinary indwelling catheter  • Hypotension  • Atrial fibrillation    Assessment/Plan   Atrial flutter #2 supraventricular tachycardia  Patient resting comfortably in the bed.  Still in atrial flutter with controlled ventricular rate.  Recommend to continue amiodarone, calcium channel blocker, oral anticoagulation..  The patient admitted with a cellulitis.  Based on the clinical condition will schedule on Thursday this week for EP study and flutter ablation.  Procedure risk already discussed.  We'll hold the Xarelto 2 days prior to the procedure.    Seven Londono MD  03/12/18  8:57 AM      EMR Dragon/Transcription disclaimer:   Much of this encounter note is an electronic transcription/translation of spoken language  to printed text. The electronic translation of spoken language may permit erroneous, or at times, nonsensical words or phrases to be inadvertently transcribed; Although I have reviewed the note for such errors, some may still exist.

## 2018-03-13 LAB
ALBUMIN SERPL-MCNC: 3.1 G/DL (ref 3.4–4.8)
ALBUMIN/GLOB SERPL: 1 G/DL (ref 1.1–1.8)
ALP SERPL-CCNC: 53 U/L (ref 38–126)
ALT SERPL W P-5'-P-CCNC: 37 U/L (ref 21–72)
ANION GAP SERPL CALCULATED.3IONS-SCNC: 11 MMOL/L (ref 5–15)
AST SERPL-CCNC: 19 U/L (ref 17–59)
BACTERIA SPEC AEROBE CULT: NORMAL
BACTERIA SPEC AEROBE CULT: NORMAL
BASOPHILS # BLD AUTO: 0.02 10*3/MM3 (ref 0–0.2)
BASOPHILS NFR BLD AUTO: 0.2 % (ref 0–2)
BILIRUB SERPL-MCNC: 0.2 MG/DL (ref 0.2–1.3)
BUN BLD-MCNC: 34 MG/DL (ref 7–21)
BUN/CREAT SERPL: 25.4 (ref 7–25)
CALCIUM SPEC-SCNC: 8.1 MG/DL (ref 8.4–10.2)
CHLORIDE SERPL-SCNC: 93 MMOL/L (ref 95–110)
CO2 SERPL-SCNC: 35 MMOL/L (ref 22–31)
CREAT BLD-MCNC: 1.34 MG/DL (ref 0.7–1.3)
CRP SERPL-MCNC: 2.8 MG/DL (ref 0–1)
DEPRECATED RDW RBC AUTO: 47.5 FL (ref 35.1–43.9)
EOSINOPHIL # BLD AUTO: 0.5 10*3/MM3 (ref 0–0.7)
EOSINOPHIL NFR BLD AUTO: 4.9 % (ref 0–7)
ERYTHROCYTE [DISTWIDTH] IN BLOOD BY AUTOMATED COUNT: 13.7 % (ref 11.5–14.5)
GFR SERPL CREATININE-BSD FRML MDRD: 52 ML/MIN/1.73 (ref 42–98)
GLOBULIN UR ELPH-MCNC: 3.1 GM/DL (ref 2.3–3.5)
GLUCOSE BLD-MCNC: 180 MG/DL (ref 60–100)
HCT VFR BLD AUTO: 42.6 % (ref 39–49)
HGB BLD-MCNC: 14.4 G/DL (ref 13.7–17.3)
IMM GRANULOCYTES # BLD: 0.22 10*3/MM3 (ref 0–0.02)
IMM GRANULOCYTES NFR BLD: 2.2 % (ref 0–0.5)
LYMPHOCYTES # BLD AUTO: 1.12 10*3/MM3 (ref 0.6–4.2)
LYMPHOCYTES NFR BLD AUTO: 11 % (ref 10–50)
MAGNESIUM SERPL-MCNC: 2 MG/DL (ref 1.6–2.3)
MCH RBC QN AUTO: 31.9 PG (ref 26.5–34)
MCHC RBC AUTO-ENTMCNC: 33.8 G/DL (ref 31.5–36.3)
MCV RBC AUTO: 94.2 FL (ref 80–98)
MONOCYTES # BLD AUTO: 0.91 10*3/MM3 (ref 0–0.9)
MONOCYTES NFR BLD AUTO: 8.9 % (ref 0–12)
NEUTROPHILS # BLD AUTO: 7.4 10*3/MM3 (ref 2–8.6)
NEUTROPHILS NFR BLD AUTO: 72.8 % (ref 37–80)
NRBC BLD MANUAL-RTO: 0 /100 WBC (ref 0–0)
PLATELET # BLD AUTO: 227 10*3/MM3 (ref 150–450)
PMV BLD AUTO: 9.4 FL (ref 8–12)
POTASSIUM BLD-SCNC: 3 MMOL/L (ref 3.5–5.1)
PROT SERPL-MCNC: 6.2 G/DL (ref 6.3–8.6)
RBC # BLD AUTO: 4.52 10*6/MM3 (ref 4.37–5.74)
SODIUM BLD-SCNC: 139 MMOL/L (ref 137–145)
WBC NRBC COR # BLD: 10.17 10*3/MM3 (ref 3.2–9.8)

## 2018-03-13 PROCEDURE — 80053 COMPREHEN METABOLIC PANEL: CPT | Performed by: FAMILY MEDICINE

## 2018-03-13 PROCEDURE — 94799 UNLISTED PULMONARY SVC/PX: CPT

## 2018-03-13 PROCEDURE — 94760 N-INVAS EAR/PLS OXIMETRY 1: CPT

## 2018-03-13 PROCEDURE — 97530 THERAPEUTIC ACTIVITIES: CPT

## 2018-03-13 PROCEDURE — 25010000002 PIPERACILLIN SOD-TAZOBACTAM PER 1 G: Performed by: INTERNAL MEDICINE

## 2018-03-13 PROCEDURE — 83735 ASSAY OF MAGNESIUM: CPT | Performed by: FAMILY MEDICINE

## 2018-03-13 PROCEDURE — 25010000002 ONDANSETRON PER 1 MG: Performed by: FAMILY MEDICINE

## 2018-03-13 PROCEDURE — 85025 COMPLETE CBC W/AUTO DIFF WBC: CPT | Performed by: FAMILY MEDICINE

## 2018-03-13 PROCEDURE — 86140 C-REACTIVE PROTEIN: CPT | Performed by: FAMILY MEDICINE

## 2018-03-13 RX ADMIN — PHENYTOIN SODIUM 100 MG: 100 CAPSULE, EXTENDED RELEASE ORAL at 07:41

## 2018-03-13 RX ADMIN — AMIODARONE HYDROCHLORIDE 200 MG: 200 TABLET ORAL at 07:42

## 2018-03-13 RX ADMIN — HYDROCORTISONE: 1 CREAM TOPICAL at 21:12

## 2018-03-13 RX ADMIN — RIVAROXABAN 20 MG: 10 TABLET, FILM COATED ORAL at 07:41

## 2018-03-13 RX ADMIN — BACLOFEN 10 MG: 10 TABLET ORAL at 21:11

## 2018-03-13 RX ADMIN — ASPIRIN 81 MG: 81 TABLET, COATED ORAL at 07:41

## 2018-03-13 RX ADMIN — NEBIVOLOL HYDROCHLORIDE 20 MG: 5 TABLET ORAL at 07:42

## 2018-03-13 RX ADMIN — DILTIAZEM HYDROCHLORIDE 240 MG: 240 CAPSULE, COATED, EXTENDED RELEASE ORAL at 17:49

## 2018-03-13 RX ADMIN — FUROSEMIDE 20 MG: 20 TABLET ORAL at 07:42

## 2018-03-13 RX ADMIN — TAZOBACTAM SODIUM AND PIPERACILLIN SODIUM 3.38 G: 375; 3 INJECTION, SOLUTION INTRAVENOUS at 14:56

## 2018-03-13 RX ADMIN — TAZOBACTAM SODIUM AND PIPERACILLIN SODIUM 3.38 G: 375; 3 INJECTION, SOLUTION INTRAVENOUS at 05:51

## 2018-03-13 RX ADMIN — HYDROCODONE BITARTRATE AND ACETAMINOPHEN 1 TABLET: 10; 325 TABLET ORAL at 07:56

## 2018-03-13 RX ADMIN — BACLOFEN 10 MG: 10 TABLET ORAL at 07:41

## 2018-03-13 RX ADMIN — Medication: at 07:45

## 2018-03-13 RX ADMIN — TAZOBACTAM SODIUM AND PIPERACILLIN SODIUM 3.38 G: 375; 3 INJECTION, SOLUTION INTRAVENOUS at 21:11

## 2018-03-13 RX ADMIN — FLUTICASONE PROPIONATE 2 SPRAY: 50 SPRAY, METERED NASAL at 07:45

## 2018-03-13 RX ADMIN — OXYCODONE HYDROCHLORIDE AND ACETAMINOPHEN 500 MG: 500 TABLET ORAL at 07:41

## 2018-03-13 RX ADMIN — TAMSULOSIN HYDROCHLORIDE 0.4 MG: 0.4 CAPSULE ORAL at 21:11

## 2018-03-13 RX ADMIN — HYDROCORTISONE: 1 CREAM TOPICAL at 07:45

## 2018-03-13 RX ADMIN — POTASSIUM CHLORIDE 40 MEQ: 750 CAPSULE, EXTENDED RELEASE ORAL at 21:11

## 2018-03-13 RX ADMIN — Medication 10 ML: at 07:56

## 2018-03-13 RX ADMIN — TEMAZEPAM 15 MG: 15 CAPSULE ORAL at 21:11

## 2018-03-13 RX ADMIN — DOCUSATE SODIUM 100 MG: 100 CAPSULE, LIQUID FILLED ORAL at 07:42

## 2018-03-13 RX ADMIN — PHENYTOIN SODIUM 100 MG: 100 CAPSULE, EXTENDED RELEASE ORAL at 21:11

## 2018-03-13 RX ADMIN — ONDANSETRON 4 MG: 2 INJECTION INTRAMUSCULAR; INTRAVENOUS at 11:41

## 2018-03-13 RX ADMIN — FINASTERIDE 5 MG: 5 TABLET, FILM COATED ORAL at 07:41

## 2018-03-13 NOTE — CONSULTS
Adult Nutrition  Assessment    Patient Name:  Mando Hill  YOB: 1945  MRN: [id number]  Admit Date:  3/5/2018    Assessment Date:  3/13/2018    Comments:  Pt indicates his appetite has improved.  Pt suspects he has lost wt the past 4-5 days due to being sick and not eating.  Willing to receive Ensure.  Intake 50% - 1x.  Reports hx nausea.  PRN Zofran, PRN Phenergan prescribed.  Bag Balm, Magic Butt, VIt C prescribed due to wound.  K+ is low.  PRN KCl prescribed.          Adult Nutrition Assessment     Row Name 03/13/18 1156       Nutrition Prescription PO    Current PO Diet Regular       PO Evaluation    Number of Meals 1    % PO Intake 50    Row Name 03/13/18 1153       Calculation Measurements    Weight Used For Calculations 82.3 kg (181 lb 7 oz)       Estimated/Assessed Needs    Additional Documentation Fluid Requirements (Group);KCAL/KG (Group);Protein Requirements (Group)       KCAL/KG    14 Kcal/Kg (kcal) 1152.2    15 Kcal/Kg (kcal) 1234.5    18 Kcal/Kg (kcal) 1481.4    20 Kcal/Kg (kcal) 1646    25 Kcal/Kg (kcal) 2057.5    30 Kcal/Kg (kcal) 2469    35 Kcal/Kg (kcal) 2880.5    40 Kcal/Kg (kcal) 3292    45 Kcal/Kg (kcal) 3703.5    50 Kcal/Kg (kcal) 4115       [place]-St. Jeor Equation    RMR ([place]-St. Jeor Equation) 1516.75       Protein Requirements    Est Protein Requirement Amount (gms/kg) 1.2 gm protein    Estimated Protein Requirements (gms/day) 98.76       Fluid Requirements    Estimated Fluid Requirements (mL/day) 2057    RDA Method (mL) 2057    Brennen-Segar Method (over 20 kg) 3146    Row Name 03/13/18 1152       Physical Findings    Skin other (see comments)   wound right upper gluteal    Row Name 03/13/18 1150       Reason for Assessment    Reason For Assessment per organizational policy;other (see comments)   length of stay       Labs/Procedures/Meds    Lab Results Reviewed reviewed          Electronically signed by:  Fidelina Hoff RD  03/13/18 12:15 PM

## 2018-03-13 NOTE — THERAPY TREATMENT NOTE
Acute Care - Physical Therapy Treatment Note  [facility]     Patient Name: Mando Hill  : 1945  MRN: [id number]  Today's Date: 3/13/2018     Date of Referral to PT: 18       Admit Date: 3/5/2018    Visit Dx:    ICD-10-CM ICD-9-CM   1. Fever, unspecified fever cause R50.9 780.60   2. Sepsis, due to unspecified organism A41.9 038.9     995.91   3. Cellulitis of buttock L03.317 682.5   4. Impaired functional mobility, balance, and endurance Z74.09 V49.89     Patient Active Problem List   Diagnosis   • Osteoarthritis of right shoulder   • Sepsis   • Sacral decubitus ulcer   • UTI (urinary tract infection) due to urinary indwelling catheter   • Hypotension   • Atrial fibrillation       Therapy Treatment    Therapy Treatment / Health Promotion    Treatment Time/Intention  Discipline: physical therapy assistant  Document Type: therapy note (daily note)  Subjective Information: complains of, weakness  Mode of Treatment: physical therapy  Patient Effort: adequate  Existing Precautions/Restrictions: fall, oxygen therapy device and L/min, other (see comments) (R shoulder pain and back pain)  Treatment Considerations/Comments: pt/spouse states pt can not sit upright secondary to making pt SOA, must be able to recline some    Vitals/Pain/Safety  Vital Signs  Pre Systolic BP Rehab: 99  Pre Treatment Diastolic BP: 55  Post Systolic BP Rehab: 98  Post Treatment Diastolic BP: 54  Pretreatment Heart Rate (beats/min): 76  Posttreatment Heart Rate (beats/min): 77  Pre SpO2 (%): 93  O2 Delivery Pre Treatment: supplemental O2  Post SpO2 (%): 94  O2 Delivery Post Treatment: supplemental O2  Positioning and Restraints  Pre-Treatment Position: in bed  Post Treatment Position: bed  In Bed: side lying right, call light within reach, encouraged to call for assist, exit alarm on, with family/caregiver    Mobility,ADL,Motor, Modality  Bed Mobility Assessment/Treatment  Rolling Right [place] (Bed Mobility):  dependent (less than 25% patient effort)  Supine-Sit [place] (Bed Mobility): maximum assist (25% patient effort), 2 person assist  Sit-Supine [place] (Bed Mobility): maximum assist (25% patient effort), 2 person assist  Assistive Device (Bed Mobility): draw sheet, bed rails  Comment (Bed Mobility): pt sat EOB ~10', initially felt dizzy and nauseated, once pt reclined some he had better tolerance, pt/spouse state pt can't sit straight up - it makes him SOA     Therapeutic Exercise  Lower Extremity (Therapeutic Exercise): hamstring stretch, bilateral (AP)  Exercise Type (Therapeutic Exercise): AROM (active range of motion), PROM (passive range of motion) (AROM on R for AP, PROM on L )  Position (Therapeutic Exercise): supine  Sets/Reps (Therapeutic Exercise): 20 AP, 3 x 30' for stretches           ROM/MMT             Sensory, Edema, Orthotics          Cognition, Communication, Swallow  Cognitive Assessment/Intervention- PT/OT  Orientation Status (Cognition): oriented to, person, place ()  Speaking Valve  Pretreatment Heart Rate (beats/min): 76  Pre SpO2 (%): 93  Posttreatment Heart Rate (beats/min): 77  Post SpO2 (%): 94  General Eating/Swallowing Observations  Pre SpO2 (%): 93  Post SpO2 (%): 94    Outcome Summary               PT Rehab Goals     Row Name 18 1050 18 1139 18 1500       Transfer Goal 1 (PT)    Activity/Assistive Device (Transfer Goal 1, PT) bed-to-chair/chair-to-bed  -JW bed-to-chair/chair-to-bed  -RW bed-to-chair/chair-to-bed  -RW    [place] Level/Cues Needed (Transfer Goal 1, PT) --   dependent  -JW other (see comments)   dependant  -RW other (see comments)   dependant  -RW    Time Frame (Transfer Goal 1, PT) 5 days  -JW 5 days  -RW 5 days  -RW    Barriers (Transfers Goal 1, PT) pt will tolerate lift t/alec to chair w/out significant change in VS or skin irritation  -JW pt will tolerate lift transfer to chair with out significant change in vs or skin irritation  -RW  pt will tolerate lift transfer to chair with out significant change in vs or skin irritation  -RW    Progress/Outcome (Transfer Goal 1, PT) goal not met  -JW goal ongoing  -RW goal ongoing  -RW       Transfer Goal 2 (PT)    Activity/Assistive Device (Transfer Goal 2, PT) bed-to-chair/chair-to-bed  -JW bed-to-chair/chair-to-bed  -RW bed-to-chair/chair-to-bed  -RW    [place] Level/Cues Needed (Transfer Goal 2, PT) other (see comments)   pts wife will t/alec as PLOF comfortably  -JW other (see comments)   pt wife will transfer as plof comfortably  -RW other (see comments)   pt wife will transfer as PLOF comfortable  -RW    Time Frame (Transfer Goal 2, PT) by discharge  -JW by discharge  -RW by discharge  -RW    Progress/Outcome (Transfer Goal 2, PT) goal not met  -JW goal ongoing  -RW goal ongoing  -RW       Wheelchair Locomotion Goal 1 (PT)    Activity (Wheelchair Locomotion Goal 1, PT) other (see comments)   repositioning in w/c  -JW other (see comments)   repositioning in wc  -RW other (see comments)   repositioning in wc  -RW    Time Frame (Wheelchair Locomotion Goal 1, PT) by discharge  -JW by discharge  -RW by discharge  -RW    Progress/Outcome (Wheelchair Locomotion Goal 1, PT) goal not met  -JW goal ongoing  -RW goal ongoing  -RW       Problem Specific Goal 1 (PT)    Problem Specific Goal 1 (PT) cardiopulmonary goal  -JW cardiopulmonary  -RW --   cardiopulmonary goal  -RW    Time Frame (Problem Specific Goal 1, PT) 3 days  -JW 3 days  -RW 3 days  -RW    Barriers (Problem Specific Goal 1, PT) pt will not have dizziness or drop in sats w/ sitting upright x 30 mins  -JW pt will not have dizziness or drop in sats with sitting upright x 30 min  -RW pt. will not have dizziness or drop in sats w/ sitting upright x 30 mins  -RW    Progress/Outcome (Problem Specific Goal 1, PT) goal not met  -JW goal met  -RW goal ongoing  -RW    Row Name 03/10/18 1100 03/10/18 0800          Transfer Goal 1 (PT)     Activity/Assistive Device (Transfer Goal 1, PT) bed-to-chair/chair-to-bed  -CA bed-to-chair/chair-to-bed  -CA     [place] Level/Cues Needed (Transfer Goal 1, PT) other (see comments)   Dependent  -CA other (see comments)   Dependent  -CA     Time Frame (Transfer Goal 1, PT) 5 days  -CA 5 days  -CA     Barriers (Transfers Goal 1, PT) pt. will tolerated lift transfer to chair without significant change in VS or skin irritation  -CA Pt. will tolerate lift transfer to chair without significant change in VS or skin irritation  -CA     Progress/Outcome (Transfer Goal 1, PT) goal ongoing  -CA goal ongoing  -CA        Transfer Goal 2 (PT)    Activity/Assistive Device (Transfer Goal 2, PT) bed-to-chair/chair-to-bed  -CA bed-to-chair/chair-to-bed  -CA     [place] Level/Cues Needed (Transfer Goal 2, PT) other (see comments)   Pt./Wife will transfer as PLOF comfortably  -CA other (see comments)   Pt./WIfe  will transfer as PLOF comfortably  -CA     Time Frame (Transfer Goal 2, PT) by discharge  -CA by discharge  -CA     Progress/Outcome (Transfer Goal 2, PT) goal ongoing  -CA goal ongoing  -CA        Wheelchair Locomotion Goal 1 (PT)    Activity (Wheelchair Locomotion Goal 1, PT) other (see comments)   Repositioning in W/C  -CA other (see comments)   Repositioning in W/C  -CA     Time Frame (Wheelchair Locomotion Goal 1, PT) by discharge  -CA by discharge  -CA     Progress/Outcome (Wheelchair Locomotion Goal 1, PT) goal ongoing  -CA goal ongoing  -CA        Problem Specific Goal 1 (PT)    Problem Specific Goal 1 (PT) Cardiopulmonary goal  -CA Cardiopulmonary Goal  -CA     Time Frame (Problem Specific Goal 1, PT) 3 days  -CA 3 days  -CA     Barriers (Problem Specific Goal 1, PT) pt. will not have dizziness or drop in sats with sitting upright for 30 mins  -CA pt. will not have dizziness or drop in sats w/ sitting upright x 30 mins  -CA       User Key  (r) = Recorded By, (t) = Taken By, (c) = Cosigned By    Initials  Name Provider Type     Patricia Nunez, PTA Physical Therapy Assistant    IVETTE Martinez, PTA Physical Therapy Assistant    HARLEY Rosales, PTA Physical Therapy Assistant          Physical Therapy Education     Title: PT OT SLP Therapies (Active)     Topic: Physical Therapy (Active)     Point: Mobility training (Active)    Learning Progress Summary     Learner Status Readiness Method Response Comment Documented by    Patient Active Acceptance D,E NR POC; mobility  03/08/18 1908          Point: Home exercise program (Active)    Learning Progress Summary     Learner Status Readiness Method Response Comment Documented by    Patient Active Acceptance D,E NR POC; mobility  03/08/18 1908          Point: Body mechanics (Active)    Learning Progress Summary     Learner Status Readiness Method Response Comment Documented by    Patient Active Acceptance D,E NR POC; mobility  03/08/18 1908          Point: Precautions (Active)    Learning Progress Summary     Learner Status Readiness Method Response Comment Documented by    Patient Active Acceptance D,E NR POC; mobility  03/08/18 1908                      User Key     Initials Effective Dates Name Provider Type Discipline     03/07/18 -  Anisa Dickson, PT Physical Therapist PT                    PT Recommendation and Plan  Anticipated Discharge Disposition (PT): home with home health care             Outcome Measures     Row Name 03/13/18 1050             How much help from another person do you currently need...    Turning from your back to your side while in flat bed without using bedrails? 1  -JW      Moving from lying on back to sitting on the side of a flat bed without bedrails? 1  -JW      Moving to and from a bed to a chair (including a wheelchair)? 1  -JW      Standing up from a chair using your arms (e.g., wheelchair, bedside chair)? 1  -JW      Climbing 3-5 steps with a railing? 1  -JW      To walk in hospital room? 1  -JW      AM-PAC 6  Clicks Score 6  -         Functional Assessment    Outcome Measure Options AM-[facility] 6 Clicks Basic Mobility (PT)  -        User Key  (r) = Recorded By, (t) = Taken By, (c) = Cosigned By    Initials Name Provider Type    TROY Nunez PTA Physical Therapy Assistant           Time Calculation:         PT Charges     Row Name 03/13/18 1050             Time Calculation    Start Time 1050  -      Stop Time 1120  -      Time Calculation (min) 30 min  -      PT Received On 03/13/18  -         Time Calculation- PT    Total Timed Code Minutes- PT 30 minute(s)  -        User Key  (r) = Recorded By, (t) = Taken By, (c) = Cosigned By    Initials Name Provider Type    TROY Nunez PTA Physical Therapy Assistant          Therapy Charges for Today     Code Description Service Date Service Provider Modifiers Qty    [id number]  PT THERAPEUTIC ACT EA 15 MIN 3/13/2018 Patricia Nunez PTA GP 2          PT G-Codes  PT Professional Judgement Used?: Yes  Outcome Measure Options: AM-[facility] 6 Clicks Basic Mobility (PT)  Score: 6  Functional Limitation: Changing and maintaining body position, Mobility: Walking and moving around  Mobility: Walking and Moving Around Current Status (): 100 percent impaired, limited or restricted  Mobility: Walking and Moving Around Goal Status (): 100 percent impaired, limited or restricted    Patricia Nunez PTA  3/13/2018

## 2018-03-13 NOTE — PLAN OF CARE
Problem: Patient Care Overview (Adult)  Goal: Plan of Care Review   03/13/18 0607   Coping/Psychosocial Response Interventions   Plan Of Care Reviewed With patient   Patient Care Overview   Progress no change   Outcome Evaluation   Outcome Summary/Follow up Plan pt states that he was restless through the night.      Goal: Adult Individualization and Mutuality  Outcome: Ongoing (interventions implemented as appropriate)    Goal: Discharge Needs Assessment  Outcome: Ongoing (interventions implemented as appropriate)      Problem: Fall Risk (Adult)  Goal: Identify Related Risk Factors and Signs and Symptoms  Outcome: Outcome(s) achieved Date Met: 03/13/18    Goal: Absence of Fall  Outcome: Ongoing (interventions implemented as appropriate)      Problem: Skin Integrity Impairment, Risk/Actual (Adult)  Goal: Skin Integrity/Wound Healing  Outcome: Ongoing (interventions implemented as appropriate)      Problem: Pressure Ulcer (Adult)  Goal: Signs and Symptoms of Listed Potential Problems Will be Absent or Manageable (Pressure Ulcer)  Outcome: Ongoing (interventions implemented as appropriate)      Problem: Pain, Acute (Adult)  Goal: Identify Related Risk Factors and Signs and Symptoms  Outcome: Outcome(s) achieved Date Met: 03/13/18    Goal: Acceptable Pain Control/Comfort Level  Outcome: Ongoing (interventions implemented as appropriate)      Problem: Skin Injury Risk (Adult)  Goal: Identify Related Risk Factors and Signs and Symptoms  Outcome: Outcome(s) achieved Date Met: 03/13/18    Goal: Skin Health and Integrity  Outcome: Ongoing (interventions implemented as appropriate)

## 2018-03-13 NOTE — PROGRESS NOTES
[facility] Medicine Services  INPATIENT PROGRESS NOTE    Length of Stay: 8  Date of Admission: 3/5/2018  Primary Care Physician: Valerie Marvin MD    Subjective     Chief Complaint   Patient presents with   • Nausea     HPI: 73 year old male who was admitted for weakness, mild nausea, low grade fever, worsening weakness. He has felt bad for last several days, has experienced more generalized pain than usual and has had a poorer appetite. Pt was found to have pseudomonas urosepsis.     3/11/18: pt states that he is doing better, had episode of nausea this AM, no vomiting.     3/12/2018: pt is doing better today, no more nausea. Urine is clearing up. Cultures pending.     3/13/18: Patient is doing much better today, he was able to keep his food down, his urine is clearing up, repeat urine culture shows mixed culture likely consistent with contamination.    Review of Systems   Constitutional: Positive for activity change, appetite change and fatigue. Negative for chills and fever.   HENT: Negative for rhinorrhea, sinus pain and sinus pressure.    Respiratory: Negative for chest tightness, shortness of breath and wheezing.    Cardiovascular: Positive for leg swelling. Negative for chest pain and palpitations.   Gastrointestinal: Positive for nausea. Negative for abdominal pain, constipation, diarrhea and vomiting.   Endocrine: Negative for cold intolerance and heat intolerance.   Genitourinary:        Gayle is present    Neurological: Negative for dizziness, light-headedness and headaches.   Psychiatric/Behavioral: Negative for agitation, behavioral problems and confusion.        All pertinent negatives and positives are as above. All other systems have been reviewed and are negative unless otherwise stated.     Objective      Vital Sign Min/Max for last 24 hours  Temp  Min: 96.6 °F (35.9 °C)  Max: 97.5 °F (36.4 °C)   BP  Min: 103/62  Max: 126/73   Pulse  Min: 70  Max: 94    Resp  Min: 16  Max: 18   SpO2  Min: 92 %  Max: 99 %   Flow (L/min)  Min: 3  Max: 3   No Data Recorded         Physical Exam   Constitutional: He is oriented to person, place, and time. He appears well-developed and well-nourished.   HENT:   Head: Normocephalic and atraumatic.   Eyes: EOM are normal. Pupils are equal, round, and reactive to light.   Neck: Normal range of motion.   Cardiovascular: Normal rate, regular rhythm and normal heart sounds.    Pulmonary/Chest: Effort normal and breath sounds normal.   Abdominal: Soft. Bowel sounds are normal.   Genitourinary:   Genitourinary Comments: Gayle is present.      Musculoskeletal: Normal range of motion. He exhibits edema (1+ edema noted ).   Neurological: He is alert and oriented to person, place, and time.   Skin: Skin is warm.   Psychiatric: He has a normal mood and affect. His behavior is normal.   Vitals reviewed.      Current Facility-Administered Medications   Medication Dose Route Frequency Provider Last Rate Last Dose   • 8-hydroxyquinoline sulfate (BAG BALM) ointment   Apply externally Daily Evelio Christensen MD       • albuterol (PROVENTIL) nebulizer solution 0.5% 2.5 mg/0.5mL  2.5 mg Nebulization Q6H PRN Yoan Hamilton MD       • amiodarone (PACERONE) tablet 200 mg  200 mg Oral Q24H Sai Muñoz MD   200 mg at 03/13/18 0742   • aspirin EC tablet 81 mg  81 mg Oral Daily Yoan Hamilton MD   81 mg at 03/13/18 0741   • baclofen (LIORESAL) tablet 10 mg  10 mg Oral Q12H Jordi Marie MD   10 mg at 03/13/18 0741   • budesonide-formoterol (SYMBICORT) 160-4.5 MCG/ACT inhaler 2 puff  2 puff Inhalation BID - RT Hardy Fisher MD   2 puff at 03/10/18 0644   • diltiaZEM CD (CARDIZEM CD) 24 hr capsule 240 mg  240 mg Oral Daily With Dinner Sai Muñoz MD   240 mg at 03/13/18 7850   • docusate sodium (COLACE) capsule 100 mg  100 mg Oral Daily Jordi Marie MD   100 mg at 03/13/18 0742   • finasteride (PROSCAR) tablet 5 mg  5 mg Oral Daily  Yoan Hamilton MD   5 mg at 03/13/18 0741   • fluticasone (FLONASE) 50 MCG/ACT nasal spray 2 spray  2 spray Each Nare Daily Hardy Fisher MD   2 spray at 03/13/18 0745   • furosemide (LASIX) tablet 20 mg  20 mg Oral Daily Luca Deleon MD   20 mg at 03/13/18 0742   • HYDROcodone-acetaminophen (NORCO)  MG per tablet 1 tablet  1 tablet Oral Q4H PRN Yoan Hamilton MD   1 tablet at 03/13/18 0756   • ipratropium-albuterol (DUO-NEB) nebulizer solution 3 mL  3 mL Nebulization 4x Daily - RT Hardy Fisher MD   3 mL at 03/10/18 1011   • magic butt ointment   Topical BID Evelio Christensen MD       • nebivolol (BYSTOLIC) tablet 20 mg  20 mg Oral Daily Hardy Fisher MD   20 mg at 03/13/18 0742   • ondansetron (ZOFRAN) injection 4 mg  4 mg Intravenous Q4H PRN Evelio Christensen MD   4 mg at 03/13/18 1141   • phenytoin (DILANTIN) ER capsule 100 mg  100 mg Oral Q12H Yoan Hamilton MD   100 mg at 03/13/18 0741   • piperacillin-tazobactam (ZOSYN) 3.375 g in iso-osmotic dextrose 50 ml (premix)  3.375 g Intravenous Q8H Hardy Fisher MD 0 mL/hr at 03/09/18 1745 3.375 g at 03/13/18 1456   • polyethylene glycol 3350 powder (packet)  17 g Oral Daily PRN Hardy Fisher MD   17 g at 03/09/18 0911   • potassium chloride (MICRO-K) CR capsule 40 mEq  40 mEq Oral PRN Hardy Fisher MD   40 mEq at 03/11/18 0430    Or   • potassium chloride (KLOR-CON) packet 40 mEq  40 mEq Oral PRN Hardy Fisher MD        Or   • potassium chloride 10 mEq in 100 mL IVPB  10 mEq Intravenous Q1H PRN Hardy Fisher  mL/hr at 03/10/18 1753 10 mEq at 03/10/18 1753   • promethazine (PHENERGAN) injection 12.5 mg  12.5 mg Intravenous Q6H PRN Jordi Marie MD   12.5 mg at 03/10/18 1514   • sodium chloride (OCEAN) nasal spray 2 spray  2 spray Each Nare PRN Hardy Fisher MD   2 spray at 03/10/18 0854   • sodium chloride 0.9 % flush 1-10 mL  1-10 mL Intravenous PRN Yoan Hamilton MD   10 mL at 03/07/18 1615   • sodium chloride  0.9 % flush 10 mL  10 mL Intravenous PRN Robert Anderson MD       • sodium chloride 0.9 % flush 10 mL  10 mL Intracatheter Q12H Hardy Fisher MD   10 mL at 03/13/18 0756   • sodium chloride 0.9 % flush 10 mL  10 mL Intracatheter PRN Hardy Fisher MD       • sodium chloride 0.9 % flush 10 mL  10 mL Intracatheter PRN Hardy Fisher MD   10 mL at 03/10/18 2110   • tamsulosin (FLOMAX) 24 hr capsule 0.4 mg  0.4 mg Oral Nightly Yoan Hamilton MD   0.4 mg at 03/12/18 2055   • temazepam (RESTORIL) capsule 15 mg  15 mg Oral Nightly PRN Jordi Marie MD   15 mg at 03/12/18 2054   • vitamin C (ASCORBIC ACID) tablet 500 mg  500 mg Oral Daily Yoan Hamilton MD   500 mg at 03/13/18 0741           Results Review:  I have reviewed the labs, radiology results, and diagnostic studies.    Laboratory Data:     Results from last 7 days  Lab Units 03/13/18  1007 03/12/18  1447 03/12/18  0650 03/11/18  0808   SODIUM mmol/L 139 139 141 141   POTASSIUM mmol/L 3.0* 3.2* 4.1 4.3   CHLORIDE mmol/L 93* 91* 96 95   CO2 mmol/L 35.0* 36.0* 35.0* 31.0   BUN mg/dL 34* 32* 31* 28*   CREATININE mg/dL 1.34* 1.36* 1.39* 1.77*   GLUCOSE mg/dL 180* 146* 97 121*   CALCIUM mg/dL 8.1* 8.1* 8.9 8.7   BILIRUBIN mg/dL 0.2  --  0.7 0.8   ALK PHOS U/L 53  --  58 72   ALT (SGPT) U/L 37  --  45 61   AST (SGOT) U/L 19  --  22 41   ANION GAP mmol/L 11.0 12.0 10.0 15.0     Estimated Creatinine Clearance: 48.9 mL/min (by C-G formula based on SCr of 1.34 mg/dL (H)).            Results from last 7 days  Lab Units 03/13/18  0604 03/12/18  1447 03/12/18  0650 03/11/18  0650 03/10/18  0325   WBC 10*3/mm3 10.17* 10.58* 10.24* 13.87* 9.54   HEMOGLOBIN g/dL 14.4 14.4 15.4 15.6 14.7   HEMATOCRIT % 42.6 42.3 44.8 44.4 42.3   PLATELETS 10*3/mm3 227 227 233 228 205       Results from last 7 days  Lab Units 03/12/18  1447   INR  2.48*       Results from last 7 days  Lab Units 03/13/18  1515 03/11/18  0808   CRP mg/dL 2.80* 3.20*       Culture Data:   No  results found for: BLOODCX  Urine Culture   Date Value Ref Range Status   03/11/2018 Mixed [place] Isolated  Final     No results found for: RESPCX  No results found for: WOUNDCX  No results found for: STOOLCX  No components found for: BODYFLD      Radiology Data:   Imaging Results (last 24 hours)     ** No results found for the last 24 hours. **          I have reviewed the patient current medications.     Assessment/Plan     Active Hospital Problems (** Indicates Principal Problem)    Diagnosis Date Noted   • **Sepsis [A41.9] 03/05/2018   • Sacral decubitus ulcer [L89.159] 03/05/2018   • UTI (urinary tract infection) due to urinary indwelling catheter [T83.511A, N39.0] 03/05/2018   • Hypotension [I95.9] 03/05/2018   • Atrial fibrillation [I48.91]       Resolved Hospital Problems    Diagnosis Date Noted Date Resolved   No resolved problems to display.     1. Urosepsis: with pseudomonas sensitive to zosyn, currently on zosyn. Will continue with current treatment. Repeat urine culture shows mixed annamarie likely contamination.  Will complete the course of Zosyn.   2.  AUGIE: baseline Cr. 1.07, now at 1.36. Will lower lasix to 20mg from 40 mg.   3. A. Fib: on amiodarone, cardizem, on xarelto. Dr. Londono for ablation on Thursday.  We will discontinue Xarelto, will have ablation on Thursday will restart as per cardiology.  4. Hypotension: resolved.   5. Right upper lobe pneumonia: currently on antibiotics. Doing well.   6. Hypokalemia: replace potassium.  Check mag    Discharge Planning: I expect patient to be discharged to home vs NH in 1-2 days.      DVT Prophylaxis: xarelto               This document has been electronically signed by Luca Deleon MD on March 13, 2018 6:18 PM

## 2018-03-13 NOTE — PLAN OF CARE
Problem: Patient Care Overview (Adult)  Goal: Plan of Care Review  Outcome: Ongoing (interventions implemented as appropriate)   03/13/18 1050   Coping/Psychosocial Response Interventions   Plan Of Care Reviewed With patient   Patient Care Overview   Progress progress towards functional goals is fair   Outcome Evaluation   Outcome Summary/Follow up Plan pt t/alec Max x2 sup to sit to sup, sat EOB ~10' w/ Max x 1, pt w/ dizziness when EOB, pt can't sit straight up w/out getting SOA

## 2018-03-13 NOTE — PROGRESS NOTES
"   LOS: 8 days   Patient Care Team:  Valerie Marvin MD as PCP - General    Subjective     Subjective:  Symptoms:  Stable.  (Urine is yellow today. Cystostomy site is normal. ).    Diet:  No nausea.        History taken from: patient chart family    Objective     Vital Signs  Temp:  [97.1 °F (36.2 °C)-97.5 °F (36.4 °C)] 97.1 °F (36.2 °C)  Heart Rate:  [76-94] 81  Resp:  [16-20] 16  BP: (104-126)/(64-79) 109/77    Objective:  General Appearance:  In no acute distress.    Vital signs: (most recent): Blood pressure 109/77, pulse 81, temperature 97.1 °F (36.2 °C), temperature source Temporal Artery , resp. rate 16, height 170.2 cm (67.01\"), weight 82.3 kg (181 lb 7 oz), SpO2 97 %.  Vital signs are normal.  No fever.    Output: Producing urine (Yellow urine is SP tubes gravity bag).    HEENT: Normal HEENT exam.    Lungs:  Normal effort and normal respiratory rate.  Breath sounds clear to auscultation.  He is not in respiratory distress.    Heart: Normal rate.  S1 normal and S2 normal.    Chest: Symmetric chest wall expansion.   Abdomen: Abdomen is soft and non-distended.  Bowel sounds are normal.     Extremities: Decreased range of motion.  There is deformity and dependent edema (LLE).    Pulses: Distal pulses are intact.    Neurological: Patient is alert and oriented to person, place and time.  GCS score is 15.    Pupils:  Pupils are equal, round, and reactive to light.    Skin:  Warm and dry.  (Erythema and increased warmth LLE)            Results Review:    Lab Results (last 24 hours)     Procedure Component Value Units Date/Time    Comprehensive Metabolic Panel [[id number]] Updated:  03/13/18 0821    Specimen:  Blood     CBC & Differential [[id number]] Collected:  03/13/18 0604    Specimen:  Blood Updated:  03/13/18 0625    Narrative:       The following orders were created for panel order CBC & Differential.  Procedure                               Abnormality         Status                     ---------          "                      -----------         ------                     CBC Auto Differential[[id number]]        Abnormal            Final result                 Please view results for these tests on the individual orders.    CBC Auto Differential [[id number]]  (Abnormal) Collected:  03/13/18 0604    Specimen:  Blood Updated:  03/13/18 0625     WBC 10.17 (H) 10*3/mm3      RBC 4.52 10*6/mm3      Hemoglobin 14.4 g/dL      Hematocrit 42.6 %      MCV 94.2 fL      MCH 31.9 pg      MCHC 33.8 g/dL      RDW 13.7 %      RDW-SD 47.5 (H) fl      MPV 9.4 fL      Platelets 227 10*3/mm3      Neutrophil % 72.8 %      Lymphocyte % 11.0 %      Monocyte % 8.9 %      Eosinophil % 4.9 %      Basophil % 0.2 %      Immature Grans % 2.2 (H) %      Neutrophils, Absolute 7.40 10*3/mm3      Lymphocytes, Absolute 1.12 10*3/mm3      Monocytes, Absolute 0.91 (H) 10*3/mm3      Eosinophils, Absolute 0.50 10*3/mm3      Basophils, Absolute 0.02 10*3/mm3      Immature Grans, Absolute 0.22 (H) 10*3/mm3      nRBC 0.0 /100 WBC     Urine Culture - Urine, Urine, Clean Catch [[id number]] Collected:  03/11/18 1226    Specimen:  Urine from Urine, Clean Catch Updated:  03/13/18 0612     Urine Culture --      Mixed Annamarie Isolated    Narrative:         Specimen contains mixed organisms of questionable pathogenicity which indicates contamination with commensal annamarie.  Further identification is unlikely to provide clinically useful information.  Suggest recollection.    Basic Metabolic Panel [[id number]]  (Abnormal) Collected:  03/12/18 1447    Specimen:  Blood Updated:  03/12/18 1528     Glucose 146 (H) mg/dL      BUN 32 (H) mg/dL      Creatinine 1.36 (H) mg/dL      Sodium 139 mmol/L      Potassium 3.2 (L) mmol/L      Chloride 91 (L) mmol/L      CO2 36.0 (H) mmol/L      Calcium 8.1 (L) mg/dL      eGFR Non African Amer 51 mL/min/1.73      BUN/Creatinine Ratio 23.5     Anion Gap 12.0 mmol/L     Narrative:       The MDRD GFR formula is only valid for adults with stable  renal function between ages 18 and 70.    Protime-INR [[id number]]  (Abnormal) Collected:  03/12/18 1447    Specimen:  Blood Updated:  03/12/18 1522     Protime 25.7 (H) Seconds      INR 2.48 (H)    Narrative:       Therapeutic range for most indications is 2.0-3.0 INR,  or 2.5-3.5 for mechanical heart valves.    CBC (No Diff) [[id number]]  (Abnormal) Collected:  03/12/18 1447    Specimen:  Blood Updated:  03/12/18 1454     WBC 10.58 (H) 10*3/mm3      RBC 4.52 10*6/mm3      Hemoglobin 14.4 g/dL      Hematocrit 42.3 %      MCV 93.6 fL      MCH 31.9 pg      MCHC 34.0 g/dL      RDW 13.5 %      RDW-SD 46.5 (H) fl      MPV 9.3 fL      Platelets 227 10*3/mm3          Imaging Results (last 24 hours)     ** No results found for the last 24 hours. **           I reviewed the patient's new clinical results.  I reviewed the patient's new imaging results and agree with the interpretation.  I reviewed the patient's other test results and agree with the interpretation      Assessment/Plan     Principal Problem:    Sepsis  Active Problems:    Sacral decubitus ulcer    UTI (urinary tract infection) due to urinary indwelling catheter    Hypotension    Atrial fibrillation      Assessment & Plan    UTI, acute, causing sepsis related to chronic SP tube last changed 3/5/18  -Urine culture Pseudomonas aeruginosa 3/5/18  3/11/18 urine culture mixed annamarie.   -WBC 23.46-->15.17-->9.54-->13.87-->10.58-->10.17  -Zosyn day #9  -Estimated Creatinine Clearance: 48.2 mL/min (by C-G formula based on SCr of 1.36 mg/dL (H)).     Neurogenic bladder  -chronic SP tube changed 3/5/18     BPH  Flomax, Proscar    Hematuria, new, since resolved  -probable trauma   -new urine culture shows mixed annamarie    Plan: Due for SP tube change 4/2/18.     JEAN Robbins  03/13/18  8:37 AM

## 2018-03-14 ENCOUNTER — ANESTHESIA EVENT (OUTPATIENT)
Dept: CARDIOLOGY | Facility: HOSPITAL | Age: 73
End: 2018-03-14

## 2018-03-14 LAB
ALBUMIN SERPL-MCNC: 3.3 G/DL (ref 3.4–4.8)
ALBUMIN/GLOB SERPL: 1 G/DL (ref 1.1–1.8)
ALP SERPL-CCNC: 55 U/L (ref 38–126)
ALT SERPL W P-5'-P-CCNC: 36 U/L (ref 21–72)
ANION GAP SERPL CALCULATED.3IONS-SCNC: 9 MMOL/L (ref 5–15)
AST SERPL-CCNC: 17 U/L (ref 17–59)
BASOPHILS # BLD AUTO: 0.02 10*3/MM3 (ref 0–0.2)
BASOPHILS NFR BLD AUTO: 0.2 % (ref 0–2)
BILIRUB SERPL-MCNC: 0.4 MG/DL (ref 0.2–1.3)
BUN BLD-MCNC: 29 MG/DL (ref 7–21)
BUN/CREAT SERPL: 22.7 (ref 7–25)
CALCIUM SPEC-SCNC: 8.4 MG/DL (ref 8.4–10.2)
CHLORIDE SERPL-SCNC: 98 MMOL/L (ref 95–110)
CO2 SERPL-SCNC: 35 MMOL/L (ref 22–31)
CREAT BLD-MCNC: 1.28 MG/DL (ref 0.7–1.3)
DEPRECATED RDW RBC AUTO: 47.5 FL (ref 35.1–43.9)
EOSINOPHIL # BLD AUTO: 0.47 10*3/MM3 (ref 0–0.7)
EOSINOPHIL NFR BLD AUTO: 4.2 % (ref 0–7)
ERYTHROCYTE [DISTWIDTH] IN BLOOD BY AUTOMATED COUNT: 13.7 % (ref 11.5–14.5)
GFR SERPL CREATININE-BSD FRML MDRD: 55 ML/MIN/1.73 (ref 42–98)
GLOBULIN UR ELPH-MCNC: 3.4 GM/DL (ref 2.3–3.5)
GLUCOSE BLD-MCNC: 113 MG/DL (ref 60–100)
HCT VFR BLD AUTO: 40.2 % (ref 39–49)
HGB BLD-MCNC: 13.6 G/DL (ref 13.7–17.3)
IMM GRANULOCYTES # BLD: 0.21 10*3/MM3 (ref 0–0.02)
IMM GRANULOCYTES NFR BLD: 1.9 % (ref 0–0.5)
LYMPHOCYTES # BLD AUTO: 0.76 10*3/MM3 (ref 0.6–4.2)
LYMPHOCYTES NFR BLD AUTO: 6.7 % (ref 10–50)
MCH RBC QN AUTO: 32 PG (ref 26.5–34)
MCHC RBC AUTO-ENTMCNC: 33.8 G/DL (ref 31.5–36.3)
MCV RBC AUTO: 94.6 FL (ref 80–98)
MONOCYTES # BLD AUTO: 0.79 10*3/MM3 (ref 0–0.9)
MONOCYTES NFR BLD AUTO: 7 % (ref 0–12)
NEUTROPHILS # BLD AUTO: 9.01 10*3/MM3 (ref 2–8.6)
NEUTROPHILS NFR BLD AUTO: 80 % (ref 37–80)
NRBC BLD MANUAL-RTO: 0 /100 WBC (ref 0–0)
PLATELET # BLD AUTO: 255 10*3/MM3 (ref 150–450)
PMV BLD AUTO: 9.8 FL (ref 8–12)
POTASSIUM BLD-SCNC: 4.7 MMOL/L (ref 3.5–5.1)
POTASSIUM BLD-SCNC: 5 MMOL/L (ref 3.5–5.1)
PROT SERPL-MCNC: 6.7 G/DL (ref 6.3–8.6)
RBC # BLD AUTO: 4.25 10*6/MM3 (ref 4.37–5.74)
SODIUM BLD-SCNC: 142 MMOL/L (ref 137–145)
WBC NRBC COR # BLD: 11.26 10*3/MM3 (ref 3.2–9.8)

## 2018-03-14 PROCEDURE — 80053 COMPREHEN METABOLIC PANEL: CPT | Performed by: FAMILY MEDICINE

## 2018-03-14 PROCEDURE — 97530 THERAPEUTIC ACTIVITIES: CPT

## 2018-03-14 PROCEDURE — 25010000002 PIPERACILLIN SOD-TAZOBACTAM PER 1 G: Performed by: INTERNAL MEDICINE

## 2018-03-14 PROCEDURE — 97110 THERAPEUTIC EXERCISES: CPT

## 2018-03-14 PROCEDURE — 99232 SBSQ HOSP IP/OBS MODERATE 35: CPT | Performed by: INTERNAL MEDICINE

## 2018-03-14 PROCEDURE — 85025 COMPLETE CBC W/AUTO DIFF WBC: CPT | Performed by: FAMILY MEDICINE

## 2018-03-14 PROCEDURE — 84132 ASSAY OF SERUM POTASSIUM: CPT | Performed by: INTERNAL MEDICINE

## 2018-03-14 RX ADMIN — FLUTICASONE PROPIONATE 2 SPRAY: 50 SPRAY, METERED NASAL at 09:28

## 2018-03-14 RX ADMIN — Medication 10 ML: at 21:35

## 2018-03-14 RX ADMIN — POTASSIUM CHLORIDE 40 MEQ: 750 CAPSULE, EXTENDED RELEASE ORAL at 02:08

## 2018-03-14 RX ADMIN — AMIODARONE HYDROCHLORIDE 200 MG: 200 TABLET ORAL at 09:23

## 2018-03-14 RX ADMIN — FINASTERIDE 5 MG: 5 TABLET, FILM COATED ORAL at 09:23

## 2018-03-14 RX ADMIN — ASPIRIN 81 MG: 81 TABLET, COATED ORAL at 09:23

## 2018-03-14 RX ADMIN — OXYCODONE HYDROCHLORIDE AND ACETAMINOPHEN 500 MG: 500 TABLET ORAL at 09:24

## 2018-03-14 RX ADMIN — TAZOBACTAM SODIUM AND PIPERACILLIN SODIUM 3.38 G: 375; 3 INJECTION, SOLUTION INTRAVENOUS at 14:01

## 2018-03-14 RX ADMIN — POTASSIUM CHLORIDE 40 MEQ: 750 CAPSULE, EXTENDED RELEASE ORAL at 06:14

## 2018-03-14 RX ADMIN — HYDROCORTISONE: 1 CREAM TOPICAL at 21:01

## 2018-03-14 RX ADMIN — Medication 10 ML: at 09:25

## 2018-03-14 RX ADMIN — BACLOFEN 10 MG: 10 TABLET ORAL at 21:01

## 2018-03-14 RX ADMIN — BACLOFEN 10 MG: 10 TABLET ORAL at 09:24

## 2018-03-14 RX ADMIN — TEMAZEPAM 15 MG: 15 CAPSULE ORAL at 21:30

## 2018-03-14 RX ADMIN — HYDROCORTISONE: 1 CREAM TOPICAL at 09:28

## 2018-03-14 RX ADMIN — TAMSULOSIN HYDROCHLORIDE 0.4 MG: 0.4 CAPSULE ORAL at 21:01

## 2018-03-14 RX ADMIN — NEBIVOLOL HYDROCHLORIDE 20 MG: 5 TABLET ORAL at 09:24

## 2018-03-14 RX ADMIN — TAZOBACTAM SODIUM AND PIPERACILLIN SODIUM 3.38 G: 375; 3 INJECTION, SOLUTION INTRAVENOUS at 06:15

## 2018-03-14 RX ADMIN — FUROSEMIDE 20 MG: 20 TABLET ORAL at 09:24

## 2018-03-14 RX ADMIN — Medication: at 09:28

## 2018-03-14 RX ADMIN — PHENYTOIN SODIUM 100 MG: 100 CAPSULE, EXTENDED RELEASE ORAL at 09:23

## 2018-03-14 RX ADMIN — PHENYTOIN SODIUM 100 MG: 100 CAPSULE, EXTENDED RELEASE ORAL at 21:01

## 2018-03-14 RX ADMIN — DOCUSATE SODIUM 100 MG: 100 CAPSULE, LIQUID FILLED ORAL at 09:24

## 2018-03-14 RX ADMIN — DILTIAZEM HYDROCHLORIDE 240 MG: 240 CAPSULE, COATED, EXTENDED RELEASE ORAL at 18:03

## 2018-03-14 NOTE — PLAN OF CARE
Problem: Patient Care Overview  Goal: Plan of Care Review  Outcome: Ongoing (interventions implemented as appropriate)   03/14/18 1045   Plan of Care Review   Progress improving   Coping/Psychosocial   Plan of Care Reviewed With patient;spouse   OTHER   Outcome Summary Pt able to show improvment in sitting tolerance this date. Pt cont to show weakness in core strength but doing better today with sitting EOB.

## 2018-03-14 NOTE — PROGRESS NOTES
[facility]  INPATIENT PROGRESS NOTE    Length of Stay: 9  Date of Admission: 3/5/2018  Primary Care Physician: Valerie Marvin MD    Subjective     Chief Complaint   Patient presents with   • Nausea     HPI: 73 year old male who was admitted for weakness, mild nausea, low grade fever, worsening weakness. He has felt bad for last several days, has experienced more generalized pain than usual and has had a poorer appetite. Pt was found to have pseudomonas urosepsis.     3/11/18: pt states that he is doing better, had episode of nausea this AM, no vomiting.     3/12/2018: pt is doing better today, no more nausea. Urine is clearing up. Cultures pending.     3/13/18: Patient is doing much better today, he was able to keep his food down, his urine is clearing up, repeat urine culture shows mixed culture likely consistent with contamination.    3/14/18: Pt is doing well today, plan for ablation tomorrow.     Review of Systems   Constitutional: Positive for activity change, appetite change and fatigue. Negative for chills and fever.   HENT: Negative for rhinorrhea, sinus pain and sinus pressure.    Respiratory: Negative for chest tightness, shortness of breath and wheezing.    Cardiovascular: Positive for leg swelling. Negative for chest pain and palpitations.   Gastrointestinal: Positive for nausea. Negative for abdominal pain, constipation, diarrhea and vomiting.   Endocrine: Negative for cold intolerance and heat intolerance.   Genitourinary:        Gayle is present    Neurological: Negative for dizziness, light-headedness and headaches.   Psychiatric/Behavioral: Negative for agitation, behavioral problems and confusion.        All pertinent negatives and positives are as above. All other systems have been reviewed and are negative unless otherwise stated.     Objective      Vital Sign Min/Max for last 24 hours  Temp  Min: 97.4 °F (36.3 °C)  Max: 98.1 °F  (36.7 °C)   BP  Min: 99/69  Max: 137/67   Pulse  Min: 75  Max: 117   Resp  Min: 18  Max: 18   SpO2  Min: 91 %  Max: 98 %   Flow (L/min)  Min: 2  Max: 3   Weight  Min: 81.7 kg (180 lb 3.2 oz)  Max: 81.7 kg (180 lb 3.2 oz)         Physical Exam   Constitutional: He is oriented to person, place, and time. He appears well-developed and well-nourished.   HENT:   Head: Normocephalic and atraumatic.   Eyes: EOM are normal. Pupils are equal, round, and reactive to light.   Neck: Normal range of motion.   Cardiovascular: Normal rate, regular rhythm and normal heart sounds.    Pulmonary/Chest: Effort normal and breath sounds normal.   Abdominal: Soft. Bowel sounds are normal.   Genitourinary:   Genitourinary Comments: Gayle is present.      Musculoskeletal: Normal range of motion. He exhibits edema (1+ edema noted ).   Neurological: He is alert and oriented to person, place, and time.   Skin: Skin is warm.   Psychiatric: He has a normal mood and affect. His behavior is normal.   Vitals reviewed.      Current Facility-Administered Medications   Medication Dose Route Frequency Provider Last Rate Last Dose   • 8-hydroxyquinoline sulfate (BAG BALM) ointment   Apply externally Daily Evelio Christensen MD       • albuterol (PROVENTIL) nebulizer solution 0.5% 2.5 mg/0.5mL  2.5 mg Nebulization Q6H PRN Yoan Hamilton MD       • amiodarone (PACERONE) tablet 200 mg  200 mg Oral Q24H Sai Muñoz MD   200 mg at 03/14/18 0923   • aspirin EC tablet 81 mg  81 mg Oral Daily Yoan Hamilton MD   81 mg at 03/14/18 0923   • baclofen (LIORESAL) tablet 10 mg  10 mg Oral Q12H Jordi Marie MD   10 mg at 03/14/18 0924   • budesonide-formoterol (SYMBICORT) 160-4.5 MCG/ACT inhaler 2 puff  2 puff Inhalation BID - RT Hardy Fisher MD   2 puff at 03/10/18 0644   • diltiaZEM CD (CARDIZEM CD) 24 hr capsule 240 mg  240 mg Oral Daily With Dinner Sai Muñoz MD   240 mg at 03/14/18 1809   • docusate sodium (COLACE) capsule 100 mg  100 mg  Oral Daily Jordi Marie MD   100 mg at 03/14/18 0924   • finasteride (PROSCAR) tablet 5 mg  5 mg Oral Daily Yoan Hamilton MD   5 mg at 03/14/18 0923   • fluticasone (FLONASE) 50 MCG/ACT nasal spray 2 spray  2 spray Each Nare Daily Hardy Fisher MD   2 spray at 03/14/18 0928   • furosemide (LASIX) tablet 20 mg  20 mg Oral Daily Luca Deleon MD   20 mg at 03/14/18 0924   • HYDROcodone-acetaminophen (NORCO)  MG per tablet 1 tablet  1 tablet Oral Q4H PRN Yoan Hamilton MD   1 tablet at 03/13/18 0756   • ipratropium-albuterol (DUO-NEB) nebulizer solution 3 mL  3 mL Nebulization 4x Daily - RT Hardy Fisher MD   3 mL at 03/10/18 1011   • magic butt ointment   Topical BID Evelio Christensen MD       • nebivolol (BYSTOLIC) tablet 20 mg  20 mg Oral Daily Hardy Fisher MD   20 mg at 03/14/18 0924   • ondansetron (ZOFRAN) injection 4 mg  4 mg Intravenous Q4H PRN Evelio Christensen MD   4 mg at 03/13/18 1141   • phenytoin (DILANTIN) ER capsule 100 mg  100 mg Oral Q12H Yoan Hamilton MD   100 mg at 03/14/18 0923   • polyethylene glycol 3350 powder (packet)  17 g Oral Daily PRN Hardy Fisher MD   17 g at 03/09/18 0911   • potassium chloride (MICRO-K) CR capsule 40 mEq  40 mEq Oral PRN Hardy Fisher MD   40 mEq at 03/14/18 0614    Or   • potassium chloride (KLOR-CON) packet 40 mEq  40 mEq Oral PRN Hardy Fisher MD        Or   • potassium chloride 10 mEq in 100 mL IVPB  10 mEq Intravenous Q1H PRN Hardy Fisher  mL/hr at 03/10/18 1753 10 mEq at 03/10/18 1753   • promethazine (PHENERGAN) injection 12.5 mg  12.5 mg Intravenous Q6H PRN Jordi Marie MD   12.5 mg at 03/10/18 1514   • sodium chloride (OCEAN) nasal spray 2 spray  2 spray Each Nare PRN Hardy Fisher MD   2 spray at 03/10/18 0854   • sodium chloride 0.9 % flush 1-10 mL  1-10 mL Intravenous PRN Yoan Hamilton MD   10 mL at 03/07/18 1615   • sodium chloride 0.9 % flush 10 mL  10 mL Intravenous PRN Robert Anderson MD        • sodium chloride 0.9 % flush 10 mL  10 mL Intracatheter Q12H Hardy Fisher MD   10 mL at 03/14/18 0925   • sodium chloride 0.9 % flush 10 mL  10 mL Intracatheter PRN Hardy Fisher MD       • sodium chloride 0.9 % flush 10 mL  10 mL Intracatheter PRN Hardy Fisher MD   10 mL at 03/10/18 2110   • tamsulosin (FLOMAX) 24 hr capsule 0.4 mg  0.4 mg Oral Nightly Yoan Hamilton MD   0.4 mg at 03/13/18 2111   • temazepam (RESTORIL) capsule 15 mg  15 mg Oral Nightly PRN Jordi Marie MD   15 mg at 03/13/18 2111   • vitamin C (ASCORBIC ACID) tablet 500 mg  500 mg Oral Daily Yoan Hamilton MD   500 mg at 03/14/18 0924           Results Review:  I have reviewed the labs, radiology results, and diagnostic studies.    Laboratory Data:     Results from last 7 days  Lab Units 03/14/18  0959 03/14/18  0751 03/13/18  1007 03/12/18  1447 03/12/18  0650   SODIUM mmol/L  --  142 139 139 141   POTASSIUM mmol/L 4.7 5.0 3.0* 3.2* 4.1   CHLORIDE mmol/L  --  98 93* 91* 96   CO2 mmol/L  --  35.0* 35.0* 36.0* 35.0*   BUN mg/dL  --  29* 34* 32* 31*   CREATININE mg/dL  --  1.28 1.34* 1.36* 1.39*   GLUCOSE mg/dL  --  113* 180* 146* 97   CALCIUM mg/dL  --  8.4 8.1* 8.1* 8.9   BILIRUBIN mg/dL  --  0.4 0.2  --  0.7   ALK PHOS U/L  --  55 53  --  58   ALT (SGPT) U/L  --  36 37  --  45   AST (SGOT) U/L  --  17 19  --  22   ANION GAP mmol/L  --  9.0 11.0 12.0 10.0     Estimated Creatinine Clearance: 51 mL/min (by C-G formula based on SCr of 1.28 mg/dL).    Results from last 7 days  Lab Units 03/13/18  1515   MAGNESIUM mg/dL 2.0           Results from last 7 days  Lab Units 03/14/18  0532 03/13/18  0604 03/12/18  1447 03/12/18  0650 03/11/18  0650   WBC 10*3/mm3 11.26* 10.17* 10.58* 10.24* 13.87*   HEMOGLOBIN g/dL 13.6* 14.4 14.4 15.4 15.6   HEMATOCRIT % 40.2 42.6 42.3 44.8 44.4   PLATELETS 10*3/mm3 255 227 227 233 228       Results from last 7 days  Lab Units 03/12/18  1447   INR  2.48*       Results from last 7 days  Lab Units  03/13/18  1515 03/11/18  0808   CRP mg/dL 2.80* 3.20*       Culture Data:   No results found for: BLOODCX  No results found for: URINECX  No results found for: RESPCX  No results found for: WOUNDCX  No results found for: STOOLCX  No components found for: BODYFLD      Radiology Data:   Imaging Results (last 24 hours)     ** No results found for the last 24 hours. **          I have reviewed the patient current medications.     Assessment/Plan     Active Hospital Problems (** Indicates Principal Problem)    Diagnosis Date Noted   • **Sepsis [A41.9] 03/05/2018   • Sacral decubitus ulcer [L89.159] 03/05/2018   • UTI (urinary tract infection) due to urinary indwelling catheter [T83.511A, N39.0] 03/05/2018   • Hypotension [I95.9] 03/05/2018   • Atrial fibrillation [I48.91]       Resolved Hospital Problems    Diagnosis Date Noted Date Resolved   No resolved problems to display.     1. Urosepsis: with pseudomonas sensitive to zosyn, completed the course, repeat cultures negative.   2.  AUGIE: baseline Cr. 1.07, now at 1.36. Will lower lasix to 20mg from 40 mg.   3. A. Fib: on amiodarone, cardizem, on xarelto. Dr. Londono for ablation on Thursday.  We will discontinue Xarelto, will have ablation on Thursday will restart as per cardiology.  4. Hypotension: resolved.   5. Right upper lobe pneumonia: completed the course of antibiotics.    6. Hypokalemia: replace potassium.  Check mag    Discharge Planning: I expect patient to be discharged to home vs NH in 1-2 days.      DVT Prophylaxis: xarelto               This document has been electronically signed by Luca Deleon MD on March 14, 2018 6:39 PM

## 2018-03-14 NOTE — PROGRESS NOTES
"   LOS: 9 days   Patient Care Team:  Valerie Marvin MD as PCP - General    Subjective     Subjective:  Symptoms:  Stable.  (Urine is clear and yellow, no penoscrotal edema. Cystostomy site is normal. States he is ready to go home. ).    Diet:  No nausea.        History taken from: patient chart family    Objective     Vital Signs  Temp:  [96.6 °F (35.9 °C)-98.1 °F (36.7 °C)] 97.5 °F (36.4 °C)  Heart Rate:  [] 116  Resp:  [18] 18  BP: ()/(62-85) 114/85    Objective:  General Appearance:  In no acute distress.    Vital signs: (most recent): Blood pressure 114/85, pulse 116, temperature 97.5 °F (36.4 °C), temperature source Temporal Artery , resp. rate 18, height 170.2 cm (67.01\"), weight 81.7 kg (180 lb 3.2 oz), SpO2 97 %.  Vital signs are normal.  No fever.    Output: Producing urine (Yellow urine is SP tubes gravity bag).    HEENT: Normal HEENT exam.    Lungs:  Normal effort and normal respiratory rate.  He is not in respiratory distress.    Chest: Symmetric chest wall expansion.   Abdomen: Abdomen is soft and non-distended.  There is no abdominal tenderness.     Extremities: Decreased range of motion.  There is deformity.  There is no dependent edema (LLE).    Pulses: Distal pulses are intact.    Neurological: Patient is alert and oriented to person, place and time.  GCS score is 15.    Pupils:  Pupils are equal, round, and reactive to light.    Skin:  Warm and dry.  No ecchymosis or cyanosis.             Results Review:    Lab Results (last 24 hours)     Procedure Component Value Units Date/Time    Potassium [[id number]] Collected:  03/14/18 0959    Specimen:  Blood Updated:  03/14/18 1002    Comprehensive Metabolic Panel [[id number]]  (Abnormal) Collected:  03/14/18 0751    Specimen:  Blood Updated:  03/14/18 0820     Glucose 113 (H) mg/dL      BUN 29 (H) mg/dL      Creatinine 1.28 mg/dL      Sodium 142 mmol/L      Potassium 5.0 mmol/L      Chloride 98 mmol/L      CO2 35.0 (H) mmol/L      Calcium " 8.4 mg/dL      Total Protein 6.7 g/dL      Albumin 3.30 (L) g/dL      ALT (SGPT) 36 U/L      AST (SGOT) 17 U/L      Alkaline Phosphatase 55 U/L      Total Bilirubin 0.4 mg/dL      eGFR Non African Amer 55 mL/min/1.73      Globulin 3.4 gm/dL      A/G Ratio 1.0 (L) g/dL      BUN/Creatinine Ratio 22.7     Anion Gap 9.0 mmol/L     Narrative:       The MDRD GFR formula is only valid for adults with stable renal function between ages 18 and 70.    CBC & Differential [[id number]] Collected:  03/14/18 0532    Specimen:  Blood Updated:  03/14/18 0631    Narrative:       The following orders were created for panel order CBC & Differential.  Procedure                               Abnormality         Status                     ---------                               -----------         ------                     CBC Auto Differential[[id number]]        Abnormal            Final result                 Please view results for these tests on the individual orders.    CBC Auto Differential [[id number]]  (Abnormal) Collected:  03/14/18 0532    Specimen:  Blood Updated:  03/14/18 0631     WBC 11.26 (H) 10*3/mm3      RBC 4.25 (L) 10*6/mm3      Hemoglobin 13.6 (L) g/dL      Hematocrit 40.2 %      MCV 94.6 fL      MCH 32.0 pg      MCHC 33.8 g/dL      RDW 13.7 %      RDW-SD 47.5 (H) fl      MPV 9.8 fL      Platelets 255 10*3/mm3      Neutrophil % 80.0 %      Lymphocyte % 6.7 (L) %      Monocyte % 7.0 %      Eosinophil % 4.2 %      Basophil % 0.2 %      Immature Grans % 1.9 (H) %      Neutrophils, Absolute 9.01 (H) 10*3/mm3      Lymphocytes, Absolute 0.76 10*3/mm3      Monocytes, Absolute 0.79 10*3/mm3      Eosinophils, Absolute 0.47 10*3/mm3      Basophils, Absolute 0.02 10*3/mm3      Immature Grans, Absolute 0.21 (H) 10*3/mm3      nRBC 0.0 /100 WBC     Magnesium [[id number]]  (Normal) Collected:  03/13/18 1515    Specimen:  Blood Updated:  03/13/18 1826     Magnesium 2.0 mg/dL     C-reactive Protein [[id number]]  (Abnormal) Collected:   03/13/18 1515    Specimen:  Blood Updated:  03/13/18 1600     C-Reactive Protein 2.80 (H) mg/dL     Comprehensive Metabolic Panel [[id number]]  (Abnormal) Collected:  03/13/18 1007    Specimen:  Blood Updated:  03/13/18 1042     Glucose 180 (H) mg/dL      BUN 34 (H) mg/dL      Creatinine 1.34 (H) mg/dL      Sodium 139 mmol/L      Potassium 3.0 (L) mmol/L      Chloride 93 (L) mmol/L      CO2 35.0 (H) mmol/L      Calcium 8.1 (L) mg/dL      Total Protein 6.2 (L) g/dL      Albumin 3.10 (L) g/dL      ALT (SGPT) 37 U/L      AST (SGOT) 19 U/L      Alkaline Phosphatase 53 U/L      Total Bilirubin 0.2 mg/dL      eGFR Non African Amer 52 mL/min/1.73      Globulin 3.1 gm/dL      A/G Ratio 1.0 (L) g/dL      BUN/Creatinine Ratio 25.4 (H)     Anion Gap 11.0 mmol/L     Narrative:       The MDRD GFR formula is only valid for adults with stable renal function between ages 18 and 70.         Imaging Results (last 24 hours)     ** No results found for the last 24 hours. **           I reviewed the patient's new clinical results.  I reviewed the patient's new imaging results and agree with the interpretation.  I reviewed the patient's other test results and agree with the interpretation      Assessment/Plan     Principal Problem:    Sepsis  Active Problems:    Sacral decubitus ulcer    UTI (urinary tract infection) due to urinary indwelling catheter    Hypotension    Atrial fibrillation      Assessment & Plan    UTI, acute, causing sepsis related to chronic SP tube last changed 3/5/18  -Urine culture Pseudomonas aeruginosa 3/5/18  -3/11/18 urine culture mixed annamarie.   -WBC 23.46-->15.17-->9.54-->13.87-->10.58-->10.17-->11.26  -Zosyn day #10  -Estimated Creatinine Clearance: 51 mL/min (by C-G formula based on SCr of 1.28 mg/dL).     Neurogenic bladder  -chronic SP tube changed 3/5/18     BPH  Flomax, Proscar    Hematuria, new, since resolved  -probable trauma   -new urine culture shows mixed annamarie    Plan: Continue current care.      Marco A Frederick, APRN  03/14/18  10:35 AM

## 2018-03-14 NOTE — THERAPY TREATMENT NOTE
Acute Care - Physical Therapy Treatment Note  [facility]     Patient Name: Mando Hill  : 1945  MRN: [id number]  Today's Date: 3/14/2018     Date of Referral to PT: 18       Admit Date: 3/5/2018    Visit Dx:    ICD-10-CM ICD-9-CM   1. Fever, unspecified fever cause R50.9 780.60   2. Sepsis, due to unspecified organism A41.9 038.9     995.91   3. Cellulitis of buttock L03.317 682.5   4. Impaired functional mobility, balance, and endurance Z74.09 V49.89     Patient Active Problem List   Diagnosis   • Osteoarthritis of right shoulder   • Sepsis   • Sacral decubitus ulcer   • UTI (urinary tract infection) due to urinary indwelling catheter   • Hypotension   • Atrial fibrillation       Therapy Treatment    Therapy Treatment / Health Promotion    Treatment Time/Intention  Discipline: physical therapy assistant (Duane Marie PTA)  Document Type: therapy note (daily note) (Duane Marie PTA)  Subjective Information: complains of, weakness (Duane Marie PTA)  Mode of Treatment: physical therapy (Duane Marie PTA)  Patient Effort: good (Duaen Marie PTA)  Existing Precautions/Restrictions: fall, other (see comments) (Duane Marie PTA)  Treatment Considerations/Comments: Pt and spouse report that pt doesnt stand to t/f surface to surface. Pt uses a lift or slides over using bed linen. (Duane Marie PTA)  Plan of Care Review  Plan of Care Reviewed With: patient, spouse (Duane Marie PTA)    Vitals/Pain/Safety  Vital Signs  Pre Systolic BP Rehab: 110 (Duane Marie PTA)  Pre Treatment Diastolic BP: 66 (Duane Marie PTA)  Pretreatment Heart Rate (beats/min): 74 (Duane Marie PTA)  Posttreatment Heart Rate (beats/min): 78 (Duane Marie PTA)  Pre SpO2 (%): 86 (O2 sats increased to 93% with cues to PLB.) (Duane Marie PTA)  O2 Delivery Pre Treatment: room air (Duane Marie PTA)  Intra SpO2 (%): 90 (Duane L  EARLE Marie)  Post SpO2 (%): 91 (Duane Marie PTA)  Pre Patient Position: Supine (Duane Marie PTA)  Intra Patient Position: Sitting (Duane Marie PTA)  Post Patient Position: Supine (Duane Marie PTA)  Positioning and Restraints  Pre-Treatment Position: in bed (Duane Marie PTA)  Post Treatment Position: bed (Duane Marie PTA)  In Bed: supine, call light within reach, encouraged to call for assist, with family/caregiver (Duane Marie PTA)    Mobility,ADL,Motor, Modality  Bed Mobility Assessment/Treatment  Rolling Right Assumption (Bed Mobility): dependent (less than 25% patient effort) (Duane Marie PTA)  Supine-Sit [place] (Bed Mobility): maximum assist (25% patient effort), 2 person assist (Duane Marie PTA)  Sit-Supine Assumption (Bed Mobility): moderate assist (50% patient effort), 2 person assist (Duane Marie PTA)  Assistive Device (Bed Mobility): bed rails, draw sheet (Duane Marie PTA)  Comment (Bed Mobility): Pt able to sit EOB with Mod of 1 for 17' before requesting to return supine. (Duane Marie PTA)     Therapeutic Exercise  Lower Extremity (Therapeutic Exercise): gluteal sets, quad sets, right (R LE AP's) (Duane Marie PTA)  Exercise Type (Therapeutic Exercise): AROM (active range of motion) (Duane Marie PTA)  Position (Therapeutic Exercise): supine (Duane Marie PTA)  Sets/Reps (Therapeutic Exercise): 10 reps of each with 3 sec hold. 25 reps of APs. (Duane Marie PTA)           ROM/MMT             Sensory, Edema, Orthotics          Cognition, Communication, Swallow  Cognitive Assessment/Intervention- PT/OT  Orientation Status (Cognition): oriented x 3 (Duane Marie PTA)  Speaking Valve  Pretreatment Heart Rate (beats/min): 74 (Duane Marie PTA)  Pre SpO2 (%): 86 (O2 sats increased to 93% with cues to PLB.) (Duane Marie PTA)  Posttreatment Heart Rate  (beats/min): 78 (Duane Marie, PTA)  Post SpO2 (%): 91 (Duane Marie, PTA)  General Eating/Swallowing Observations  Pre SpO2 (%): 86 (O2 sats increased to 93% with cues to PLB.) (Duane Marie, PTA)  Post SpO2 (%): 91 (Duane Marie PTA)    Outcome Summary  Outcome Summary/Treatment Plan (PT)  Daily Summary of Progress (PT): progress towards functional goals is fair (Duane Marie, PTA)  Barriers to Overall Progress (PT): Pt demonstrates weak/poor core strength during sitting. (Duane Marie, PTA)  Plan for Continued Treatment (PT): Cont with core strengthening. (Duane Marie, PTA)  Anticipated Discharge Disposition (PT): home with home health care (Duane Marie, PTA)            PT Rehab Goals     Row Name 03/14/18 1045 03/13/18 1050 03/12/18 1139       Transfer Goal 1 (PT)    Activity/Assistive Device (Transfer Goal 1, PT) bed-to-chair/chair-to-bed  -TW bed-to-chair/chair-to-bed  -JW bed-to-chair/chair-to-bed  -RW    [place] Level/Cues Needed (Transfer Goal 1, PT) other (see comments)   dependant  -TW --   dependent  -JW other (see comments)   dependant  -RW    Time Frame (Transfer Goal 1, PT) 5 days  -TW 5 days  -JW 5 days  -RW    Barriers (Transfers Goal 1, PT) Pt will tolerate lift t/f to chair w/o significant change to VS or skin irritation.  -TW pt will tolerate lift t/alec to chair w/out significant change in VS or skin irritation  -JW pt will tolerate lift transfer to chair with out significant change in vs or skin irritation  -RW    Progress/Outcome (Transfer Goal 1, PT) goal not met  -TW goal not met  -JW goal ongoing  -RW       Transfer Goal 2 (PT)    Activity/Assistive Device (Transfer Goal 2, PT) bed-to-chair/chair-to-bed  -TW bed-to-chair/chair-to-bed  -JW bed-to-chair/chair-to-bed  -RW    [place] Level/Cues Needed (Transfer Goal 2, PT) --   Pt's wife will t/f as PLOF comforable   -TW other (see comments)   pts wife will t/alec as PLOF comfortably   -JW other (see comments)   pt wife will transfer as plof comfortably  -RW    Time Frame (Transfer Goal 2, PT) by discharge  -TW by discharge  -JW by discharge  -RW    Progress/Outcome (Transfer Goal 2, PT) goal not met  -TW goal not met  -JW goal ongoing  -RW       Wheelchair Locomotion Goal 1 (PT)    Activity (Wheelchair Locomotion Goal 1, PT) other (see comments)   Repositioning in w/c  -TW other (see comments)   repositioning in w/c  -JW other (see comments)   repositioning in wc  -RW    Time Frame (Wheelchair Locomotion Goal 1, PT) by discharge  -TW by discharge  -JW by discharge  -RW    Progress/Outcome (Wheelchair Locomotion Goal 1, PT) goal not met  -TW goal not met  -JW goal ongoing  -RW       Problem Specific Goal 1 (PT)    Problem Specific Goal 1 (PT) Cardiopulmonary goal  -TW cardiopulmonary goal  -JW cardiopulmonary  -RW    Time Frame (Problem Specific Goal 1, PT) 3 days  -TW 3 days  -JW 3 days  -RW    Barriers (Problem Specific Goal 1, PT) Pt will not have dizziness or drop in sats with sitting upright x 30 minutes  -TW pt will not have dizziness or drop in sats w/ sitting upright x 30 mins  -JW pt will not have dizziness or drop in sats with sitting upright x 30 min  -RW    Progress/Outcome (Problem Specific Goal 1, PT) goal not met  -TW goal not met  -JW goal met  -RW    Row Name 03/11/18 1500 03/10/18 1100 03/10/18 0800       Transfer Goal 1 (PT)    Activity/Assistive Device (Transfer Goal 1, PT) bed-to-chair/chair-to-bed  -RW bed-to-chair/chair-to-bed  -CA bed-to-chair/chair-to-bed  -CA    [place] Level/Cues Needed (Transfer Goal 1, PT) other (see comments)   dependant  -RW other (see comments)   Dependent  -CA other (see comments)   Dependent  -CA    Time Frame (Transfer Goal 1, PT) 5 days  -RW 5 days  -CA 5 days  -CA    Barriers (Transfers Goal 1, PT) pt will tolerate lift transfer to chair with out significant change in vs or skin irritation  -RW pt. will tolerated lift transfer to chair  without significant change in VS or skin irritation  -CA Pt. will tolerate lift transfer to chair without significant change in VS or skin irritation  -CA    Progress/Outcome (Transfer Goal 1, PT) goal ongoing  -RW goal ongoing  -CA goal ongoing  -CA       Transfer Goal 2 (PT)    Activity/Assistive Device (Transfer Goal 2, PT) bed-to-chair/chair-to-bed  -RW bed-to-chair/chair-to-bed  -CA bed-to-chair/chair-to-bed  -CA    [place] Level/Cues Needed (Transfer Goal 2, PT) other (see comments)   pt wife will transfer as PLOF comfortable  -RW other (see comments)   Pt./Wife will transfer as PLOF comfortably  -CA other (see comments)   Pt./WIfe  will transfer as PLOF comfortably  -CA    Time Frame (Transfer Goal 2, PT) by discharge  -RW by discharge  -CA by discharge  -CA    Progress/Outcome (Transfer Goal 2, PT) goal ongoing  -RW goal ongoing  -CA goal ongoing  -CA       Wheelchair Locomotion Goal 1 (PT)    Activity (Wheelchair Locomotion Goal 1, PT) other (see comments)   repositioning in wc  -RW other (see comments)   Repositioning in W/C  -CA other (see comments)   Repositioning in W/C  -CA    Time Frame (Wheelchair Locomotion Goal 1, PT) by discharge  -RW by discharge  -CA by discharge  -CA    Progress/Outcome (Wheelchair Locomotion Goal 1, PT) goal ongoing  -RW goal ongoing  -CA goal ongoing  -CA       Problem Specific Goal 1 (PT)    Problem Specific Goal 1 (PT) --   cardiopulmonary goal  -RW Cardiopulmonary goal  -CA Cardiopulmonary Goal  -CA    Time Frame (Problem Specific Goal 1, PT) 3 days  -RW 3 days  -CA 3 days  -CA    Barriers (Problem Specific Goal 1, PT) pt. will not have dizziness or drop in sats w/ sitting upright x 30 mins  -RW pt. will not have dizziness or drop in sats with sitting upright for 30 mins  -CA pt. will not have dizziness or drop in sats w/ sitting upright x 30 mins  -CA    Progress/Outcome (Problem Specific Goal 1, PT) goal ongoing  -RW  --  --      User Key  (r) = Recorded By, (t) =  Taken By, (c) = Cosigned By    Initials Name Provider Type    JW Patricia Nunez, PTA Physical Therapy Assistant    CA Rick Martinez, PTA Physical Therapy Assistant    LILI Marie, PTA Physical Therapy Assistant    RW Twan Rosales, PTA Physical Therapy Assistant          Physical Therapy Education     Title: PT OT SLP Therapies (Active)     Topic: Physical Therapy (Active)     Point: Mobility training (Active)    Learning Progress Summary     Learner Status Readiness Method Response Comment Documented by    Patient Active Acceptance D,E NR POC; mobility  03/08/18 1908          Point: Home exercise program (Active)    Learning Progress Summary     Learner Status Readiness Method Response Comment Documented by    Patient Active Acceptance D,E NR POC; mobility  03/08/18 1908          Point: Body mechanics (Active)    Learning Progress Summary     Learner Status Readiness Method Response Comment Documented by    Patient Active Acceptance D,E NR POC; mobility  03/08/18 1908          Point: Precautions (Active)    Learning Progress Summary     Learner Status Readiness Method Response Comment Documented by    Patient Active Acceptance D,E NR POC; mobility  03/08/18 1908                      User Key     Initials Effective Dates Name Provider Type Discipline     03/07/18 -  Anisa Dickson, PT Physical Therapist PT                    PT Recommendation and Plan  Anticipated Discharge Disposition (PT): home with home health care  Outcome Summary/Treatment Plan (PT)  Daily Summary of Progress (PT): progress towards functional goals is fair  Barriers to Overall Progress (PT): Pt demonstrates weak/poor core strength during sitting.  Plan for Continued Treatment (PT): Cont with core strengthening.  Anticipated Discharge Disposition (PT): home with home health care  Plan of Care Reviewed With: patient, spouse  Progress: improving  Outcome Summary: Pt able to show improvment in sitting tolerance  this date. Pt cont to show weakness in core strength but doing better today with sitting EOB.          Outcome Measures     Row Name 03/14/18 1045 03/13/18 1050          How much help from another person do you currently need...    Turning from your back to your side while in flat bed without using bedrails? 1  -TW 1  -JW     Moving from lying on back to sitting on the side of a flat bed without bedrails? 1  -TW 1  -JW     Moving to and from a bed to a chair (including a wheelchair)? 1  -TW 1  -JW     Standing up from a chair using your arms (e.g., wheelchair, bedside chair)? 1  -TW 1  -JW     Climbing 3-5 steps with a railing? 1  -TW 1  -JW     To walk in hospital room? 1  -TW 1  -JW     AM-PAC 6 Clicks Score 6  -TW 6  -JW        Functional Assessment    Outcome Measure Options AM-PAC 6 Clicks Basic Mobility (PT)  -TW AM-PAC 6 Clicks Basic Mobility (PT)  -JW       User Key  (r) = Recorded By, (t) = Taken By, (c) = Cosigned By    Initials Name Provider Type     Patricia Nunez PTA Physical Therapy Assistant    TW Duane Marie PTA Physical Therapy Assistant           Time Calculation:         PT Charges     Row Name 03/14/18 1334             Time Calculation    Start Time 1045  -TW      Stop Time 1125  -TW      Time Calculation (min) 40 min  -TW      PT Received On 03/14/18  -TW      PT Goal Re-Cert Due Date 03/17/18  -TW         Time Calculation- PT    Total Timed Code Minutes- PT 40 minute(s)  -TW        User Key  (r) = Recorded By, (t) = Taken By, (c) = Cosigned By    Initials Name Provider Type     Duane Marie PTA Physical Therapy Assistant          Therapy Charges for Today     Code Description Service Date Service Provider Modifiers Qty    50708694555 HC PT THER SUPP EA 15 MIN 3/14/2018 Duane Marie PTA GP 1    05785195322 HC PT THERAPEUTIC ACT EA 15 MIN 3/14/2018 Duane Marie PTA GP 2    70464652523 HC PT THER PROC EA 15 MIN 3/14/2018 Duane Marie PTA GP 1           PT G-Codes  PT Professional Judgement Used?: Yes  Outcome Measure Options: AM-PAC 6 Clicks Basic Mobility (PT)  Score: 6  Functional Limitation: Changing and maintaining body position, Mobility: Walking and moving around  Mobility: Walking and Moving Around Current Status (): 100 percent impaired, limited or restricted  Mobility: Walking and Moving Around Goal Status (): 100 percent impaired, limited or restricted    Duane Marie, PTA  3/14/2018

## 2018-03-14 NOTE — PROGRESS NOTES
LOS: 9 days   Patient Care Team:  Valerie Marvin MD as PCP - General    Chief Complaint:  Atrial flutter with variable ventricular response       Review of Systems:   ROS  Constitutional: Positive for activity change, appetite change and fatigue. Negative for chills and fever.   HENT: Positive for rhinorrhea, sinus pain and sinus pressure.    Respiratory: Negative for chest tightness, shortness of breath and wheezing.    Cardiovascular: Positive for leg swelling. Negative for chest pain and palpitations.   Gastrointestinal: Positive for nausea. Negative for abdominal pain, constipation, diarrhea and vomiting.   Endocrine: Negative for cold intolerance and heat intolerance.   Genitourinary:        Gayle is present    Neurological: Negative for dizziness, light-headedness and headaches.   Psychiatric/Behavioral: Negative for agitation, behavioral problems and confusion  Objective     Current Facility-Administered Medications   Medication Dose Route Frequency Provider Last Rate Last Dose   • 8-hydroxyquinoline sulfate (BAG BALM) ointment   Apply externally Daily Evelio Christensen MD       • albuterol (PROVENTIL) nebulizer solution 0.5% 2.5 mg/0.5mL  2.5 mg Nebulization Q6H PRN Yoan Hamilton MD       • amiodarone (PACERONE) tablet 200 mg  200 mg Oral Q24H Sai Muñoz MD   200 mg at 03/14/18 0923   • aspirin EC tablet 81 mg  81 mg Oral Daily Yoan Hamilton MD   81 mg at 03/14/18 0923   • baclofen (LIORESAL) tablet 10 mg  10 mg Oral Q12H Jordi Marie MD   10 mg at 03/14/18 0924   • budesonide-formoterol (SYMBICORT) 160-4.5 MCG/ACT inhaler 2 puff  2 puff Inhalation BID - RT Hardy Fisher MD   2 puff at 03/10/18 0644   • diltiaZEM CD (CARDIZEM CD) 24 hr capsule 240 mg  240 mg Oral Daily With Dinner Sai Muñoz MD   240 mg at 03/13/18 1749   • docusate sodium (COLACE) capsule 100 mg  100 mg Oral Daily Jordi Marie MD   100 mg at 03/14/18 0924   • finasteride (PROSCAR) tablet 5 mg  5 mg  Oral Daily Yoan Hamilton MD   5 mg at 03/14/18 0923   • fluticasone (FLONASE) 50 MCG/ACT nasal spray 2 spray  2 spray Each Nare Daily Hardy Fisher MD   2 spray at 03/14/18 0928   • furosemide (LASIX) tablet 20 mg  20 mg Oral Daily Luca Deleon MD   20 mg at 03/14/18 0924   • HYDROcodone-acetaminophen (NORCO)  MG per tablet 1 tablet  1 tablet Oral Q4H PRN Yoan Hamilton MD   1 tablet at 03/13/18 0756   • ipratropium-albuterol (DUO-NEB) nebulizer solution 3 mL  3 mL Nebulization 4x Daily - RT Hardy Fisher MD   3 mL at 03/10/18 1011   • magic butt ointment   Topical BID Evelio Christensen MD       • nebivolol (BYSTOLIC) tablet 20 mg  20 mg Oral Daily Hardy Fisher MD   20 mg at 03/14/18 0924   • ondansetron (ZOFRAN) injection 4 mg  4 mg Intravenous Q4H PRN Evelio Christensen MD   4 mg at 03/13/18 1141   • phenytoin (DILANTIN) ER capsule 100 mg  100 mg Oral Q12H Yoan Hamilton MD   100 mg at 03/14/18 0923   • piperacillin-tazobactam (ZOSYN) 3.375 g in iso-osmotic dextrose 50 ml (premix)  3.375 g Intravenous Q8H Hardy Fisher MD 12.5 mL/hr at 03/14/18 1401 3.375 g at 03/14/18 1401   • polyethylene glycol 3350 powder (packet)  17 g Oral Daily PRN Hardy Fisher MD   17 g at 03/09/18 0911   • potassium chloride (MICRO-K) CR capsule 40 mEq  40 mEq Oral PRN Hardy Fisher MD   40 mEq at 03/14/18 0614    Or   • potassium chloride (KLOR-CON) packet 40 mEq  40 mEq Oral PRN Hardy Fisher MD        Or   • potassium chloride 10 mEq in 100 mL IVPB  10 mEq Intravenous Q1H PRN Hardy Fisher  mL/hr at 03/10/18 1753 10 mEq at 03/10/18 1753   • promethazine (PHENERGAN) injection 12.5 mg  12.5 mg Intravenous Q6H PRN Jordi Marie MD   12.5 mg at 03/10/18 1514   • sodium chloride (OCEAN) nasal spray 2 spray  2 spray Each Nare PRN Hardy Fisher MD   2 spray at 03/10/18 0854   • sodium chloride 0.9 % flush 1-10 mL  1-10 mL Intravenous PRN Yoan Hamilton MD   10 mL at 03/07/18 1615   •  "sodium chloride 0.9 % flush 10 mL  10 mL Intravenous PRN Robert Anderson MD       • sodium chloride 0.9 % flush 10 mL  10 mL Intracatheter Q12H Hardy Fisher MD   10 mL at 03/14/18 0925   • sodium chloride 0.9 % flush 10 mL  10 mL Intracatheter PRN Hardy Fisher MD       • sodium chloride 0.9 % flush 10 mL  10 mL Intracatheter PRN Hardy Fisher MD   10 mL at 03/10/18 2110   • tamsulosin (FLOMAX) 24 hr capsule 0.4 mg  0.4 mg Oral Nightly Yoan Hamilton MD   0.4 mg at 03/13/18 2111   • temazepam (RESTORIL) capsule 15 mg  15 mg Oral Nightly PRN Jordi Marie MD   15 mg at 03/13/18 2111   • vitamin C (ASCORBIC ACID) tablet 500 mg  500 mg Oral Daily Yoan Hamilton MD   500 mg at 03/14/18 0924       Vital Sign Min/Max for last 24 hours  Temp  Min: 96.8 °F (36 °C)  Max: 98.1 °F (36.7 °C)   BP  Min: 99/69  Max: 137/67   Pulse  Min: 75  Max: 117   Resp  Min: 18  Max: 18   SpO2  Min: 91 %  Max: 97 %   Flow (L/min)  Min: 2  Max: 3   Weight  Min: 81.7 kg (180 lb 3.2 oz)  Max: 81.7 kg (180 lb 3.2 oz)     Flowsheet Rows    Flowsheet Row First Filed Value   Admission Height 170.2 cm (67\") Documented at 03/05/2018 0023   Admission Weight 81.6 kg (180 lb) Documented at 03/05/2018 0023            Intake/Output Summary (Last 24 hours) at 03/14/18 1453  Last data filed at 03/14/18 1401   Gross per 24 hour   Intake              150 ml   Output             2850 ml   Net            -2700 ml       Physical Exam:     General Appearance:    Alert, cooperative, in no acute distress   Lungs:     Clear to auscultation,respirations regular, even and                  unlabored    Heart:    IRRegular rhythm and normal rate, normal S1 and S2, no            murmur, no gallop, no rub, no click   Chest Wall:    No abnormalities observed   Abdomen:     Normal bowel sounds, no masses, no organomegaly, soft        non-tender, non-distended, no guarding, no rebound                tenderness   Extremities:   Moves all extremities " well, no edema, no cyanosis     Pulses:   Pulses palpable and equal bilaterally   Skin:   No bleeding, bruising         Results Review:   I reviewed the patient's new clinical results.  Lab Results   Component Value Date    WBC 11.26 (H) 03/14/2018    HGB 13.6 (L) 03/14/2018    HCT 40.2 03/14/2018    MCV 94.6 03/14/2018     03/14/2018     Lab Results   Component Value Date    GLUCOSE 113 (H) 03/14/2018    BUN 29 (H) 03/14/2018    CREATININE 1.28 03/14/2018    EGFRIFNONA 55 03/14/2018    BCR 22.7 03/14/2018    CO2 35.0 (H) 03/14/2018    CALCIUM 8.4 03/14/2018    ALBUMIN 3.30 (L) 03/14/2018    LABIL2 1.0 (L) 03/14/2018    AST 17 03/14/2018    ALT 36 03/14/2018     No results found for: TSH  Lab Results   Component Value Date    INR 2.48 (H) 03/12/2018    INR 1.4 (H) 12/13/2016    INR 1.1 02/01/2015    PROTIME 25.7 (H) 03/12/2018    PROTIME 16.6 (H) 12/13/2016    PROTIME 13.8 02/01/2015     Lab Results   Component Value Date    PTT 34.9 02/01/2015       EKG:     Telemetry:    Ejection Fraction  No results found for: EF    Echo EF Estimated  Lab Results   Component Value Date    ECHOEFEST 55 03/07/2018         Present on Admission:  • Sepsis  • Sacral decubitus ulcer  • UTI (urinary tract infection) due to urinary indwelling catheter  • Hypotension  • Atrial fibrillation    Assessment/Plan   Atrial flutter #2 supraventricular tachycardia    She resting comfortably in bed.  Patient still in atrial flutter with a variable ventricular response.  Patient is scheduled for EP study and flutter ablations.  Procedure risks discussed with the patient.  Risk included but not limited to infection, bleeding, stroke, pneumothorax, hematoma, heart block and subsequently management.  Will keep nothing by mouth after midnight    Seven Londono MD  03/14/18  2:53 PM      EMR Dragon/Transcription disclaimer:   Much of this encounter note is an electronic transcription/translation of spoken language to printed text. The electronic  translation of spoken language may permit erroneous, or at times, nonsensical words or phrases to be inadvertently transcribed; Although I have reviewed the note for such errors, some may still exist.

## 2018-03-14 NOTE — PLAN OF CARE
Problem: Patient Care Overview (Adult)  Goal: Plan of Care Review  Outcome: Ongoing (interventions implemented as appropriate)    Goal: Adult Individualization and Mutuality  Outcome: Ongoing (interventions implemented as appropriate)    Goal: Discharge Needs Assessment  Outcome: Ongoing (interventions implemented as appropriate)      Problem: Fall Risk (Adult)  Goal: Absence of Fall  Outcome: Ongoing (interventions implemented as appropriate)      Problem: Skin Integrity Impairment, Risk/Actual (Adult)  Goal: Skin Integrity/Wound Healing  Outcome: Ongoing (interventions implemented as appropriate)      Problem: Pressure Ulcer (Adult)  Goal: Signs and Symptoms of Listed Potential Problems Will be Absent or Manageable (Pressure Ulcer)  Outcome: Ongoing (interventions implemented as appropriate)      Problem: Pain, Acute (Adult)  Goal: Acceptable Pain Control/Comfort Level  Outcome: Ongoing (interventions implemented as appropriate)      Problem: Patient Care Overview  Goal: Plan of Care Review  Outcome: Ongoing (interventions implemented as appropriate)   03/14/18 1546   Plan of Care Review   Progress no change   Coping/Psychosocial   Plan of Care Reviewed With patient   OTHER   Outcome Summary no complications or problems noted     Goal: Individualization and Mutuality  Outcome: Ongoing (interventions implemented as appropriate)    Goal: Discharge Needs Assessment  Outcome: Ongoing (interventions implemented as appropriate)    Goal: Interprofessional Rounds/Family Conf  Outcome: Ongoing (interventions implemented as appropriate)      Problem: Skin Injury Risk (Adult)  Goal: Skin Health and Integrity  Outcome: Ongoing (interventions implemented as appropriate)

## 2018-03-14 NOTE — PLAN OF CARE
Problem: Patient Care Overview (Adult)  Goal: Adult Individualization and Mutuality   03/14/18 0402   Individualization   Patient Specific Preferences Requests door to remain cracked at all times.       Problem: Fall Risk (Adult)  Goal: Absence of Fall  Outcome: Ongoing (interventions implemented as appropriate)      Problem: Patient Care Overview  Goal: Individualization and Mutuality   03/14/18 0402   Individualization   Patient Specific Preferences Prefers door to remain cracked at all times.     Goal: Interprofessional Rounds/Family Conf  Outcome: Ongoing (interventions implemented as appropriate)   03/14/18 0402   Interdisciplinary Rounds/Family Conf   Summary family and patient requested not to be disturbed during the night for rounds.   Participants nursing;family;patient       Problem: Skin Injury Risk (Adult)  Goal: Skin Health and Integrity  Outcome: Ongoing (interventions implemented as appropriate)

## 2018-03-15 ENCOUNTER — ANESTHESIA (OUTPATIENT)
Dept: CARDIOLOGY | Facility: HOSPITAL | Age: 73
End: 2018-03-15

## 2018-03-15 LAB
ALBUMIN SERPL-MCNC: 3.3 G/DL (ref 3.4–4.8)
ALBUMIN/GLOB SERPL: 1 G/DL (ref 1.1–1.8)
ALP SERPL-CCNC: 54 U/L (ref 38–126)
ALT SERPL W P-5'-P-CCNC: 28 U/L (ref 21–72)
ANION GAP SERPL CALCULATED.3IONS-SCNC: 8 MMOL/L (ref 5–15)
AST SERPL-CCNC: 22 U/L (ref 17–59)
BASOPHILS # BLD AUTO: 0.02 10*3/MM3 (ref 0–0.2)
BASOPHILS NFR BLD AUTO: 0.2 % (ref 0–2)
BILIRUB SERPL-MCNC: 0.3 MG/DL (ref 0.2–1.3)
BUN BLD-MCNC: 29 MG/DL (ref 7–21)
BUN/CREAT SERPL: 26.1 (ref 7–25)
CALCIUM SPEC-SCNC: 8.4 MG/DL (ref 8.4–10.2)
CHLORIDE SERPL-SCNC: 100 MMOL/L (ref 95–110)
CO2 SERPL-SCNC: 34 MMOL/L (ref 22–31)
CREAT BLD-MCNC: 1.11 MG/DL (ref 0.7–1.3)
CRP SERPL-MCNC: 1.4 MG/DL (ref 0–1)
DEPRECATED RDW RBC AUTO: 46.4 FL (ref 35.1–43.9)
EOSINOPHIL # BLD AUTO: 0.49 10*3/MM3 (ref 0–0.7)
EOSINOPHIL NFR BLD AUTO: 4.7 % (ref 0–7)
ERYTHROCYTE [DISTWIDTH] IN BLOOD BY AUTOMATED COUNT: 13.7 % (ref 11.5–14.5)
GFR SERPL CREATININE-BSD FRML MDRD: 65 ML/MIN/1.73 (ref 42–98)
GLOBULIN UR ELPH-MCNC: 3.2 GM/DL (ref 2.3–3.5)
GLUCOSE BLD-MCNC: 105 MG/DL (ref 60–100)
HCT VFR BLD AUTO: 39.1 % (ref 39–49)
HGB BLD-MCNC: 13.1 G/DL (ref 13.7–17.3)
IMM GRANULOCYTES # BLD: 0.16 10*3/MM3 (ref 0–0.02)
IMM GRANULOCYTES NFR BLD: 1.5 % (ref 0–0.5)
LYMPHOCYTES # BLD AUTO: 0.97 10*3/MM3 (ref 0.6–4.2)
LYMPHOCYTES NFR BLD AUTO: 9.2 % (ref 10–50)
MCH RBC QN AUTO: 31.1 PG (ref 26.5–34)
MCHC RBC AUTO-ENTMCNC: 33.5 G/DL (ref 31.5–36.3)
MCV RBC AUTO: 92.9 FL (ref 80–98)
MONOCYTES # BLD AUTO: 0.94 10*3/MM3 (ref 0–0.9)
MONOCYTES NFR BLD AUTO: 8.9 % (ref 0–12)
NEUTROPHILS # BLD AUTO: 7.93 10*3/MM3 (ref 2–8.6)
NEUTROPHILS NFR BLD AUTO: 75.5 % (ref 37–80)
PLATELET # BLD AUTO: 236 10*3/MM3 (ref 150–450)
PMV BLD AUTO: 10 FL (ref 8–12)
POTASSIUM BLD-SCNC: 4.3 MMOL/L (ref 3.5–5.1)
PROT SERPL-MCNC: 6.5 G/DL (ref 6.3–8.6)
RBC # BLD AUTO: 4.21 10*6/MM3 (ref 4.37–5.74)
SODIUM BLD-SCNC: 142 MMOL/L (ref 137–145)
WBC NRBC COR # BLD: 10.51 10*3/MM3 (ref 3.2–9.8)

## 2018-03-15 PROCEDURE — 4A023FZ MEASUREMENT OF CARDIAC RHYTHM, PERCUTANEOUS APPROACH: ICD-10-PCS | Performed by: INTERNAL MEDICINE

## 2018-03-15 PROCEDURE — C1894 INTRO/SHEATH, NON-LASER: HCPCS | Performed by: INTERNAL MEDICINE

## 2018-03-15 PROCEDURE — 25010000002 PROTAMINE SULFATE PER 10 MG: Performed by: INTERNAL MEDICINE

## 2018-03-15 PROCEDURE — 25010000002 PHENYLEPHRINE PER 1 ML: Performed by: NURSE ANESTHETIST, CERTIFIED REGISTERED

## 2018-03-15 PROCEDURE — 80053 COMPREHEN METABOLIC PANEL: CPT | Performed by: FAMILY MEDICINE

## 2018-03-15 PROCEDURE — C1893 INTRO/SHEATH, FIXED,NON-PEEL: HCPCS | Performed by: INTERNAL MEDICINE

## 2018-03-15 PROCEDURE — 93621 COMP EP EVL L PAC&REC C SINS: CPT | Performed by: INTERNAL MEDICINE

## 2018-03-15 PROCEDURE — 93653 COMPRE EP EVAL TX SVT: CPT | Performed by: INTERNAL MEDICINE

## 2018-03-15 PROCEDURE — 25010000002 DIPHENHYDRAMINE PER 50 MG: Performed by: INTERNAL MEDICINE

## 2018-03-15 PROCEDURE — C1731 CATH, EP, 20 OR MORE ELEC: HCPCS | Performed by: INTERNAL MEDICINE

## 2018-03-15 PROCEDURE — 94799 UNLISTED PULMONARY SVC/PX: CPT

## 2018-03-15 PROCEDURE — 25010000002 FENTANYL CITRATE (PF) 100 MCG/2ML SOLUTION: Performed by: NURSE ANESTHETIST, CERTIFIED REGISTERED

## 2018-03-15 PROCEDURE — C2630 CATH, EP, COOL-TIP: HCPCS | Performed by: INTERNAL MEDICINE

## 2018-03-15 PROCEDURE — 02583ZZ DESTRUCTION OF CONDUCTION MECHANISM, PERCUTANEOUS APPROACH: ICD-10-PCS | Performed by: INTERNAL MEDICINE

## 2018-03-15 PROCEDURE — 25010000002 NEOSTIGMINE PER 0.5 MG: Performed by: NURSE ANESTHETIST, CERTIFIED REGISTERED

## 2018-03-15 PROCEDURE — 5A2204Z RESTORATION OF CARDIAC RHYTHM, SINGLE: ICD-10-PCS | Performed by: INTERNAL MEDICINE

## 2018-03-15 PROCEDURE — C1730 CATH, EP, 19 OR FEW ELECT: HCPCS | Performed by: INTERNAL MEDICINE

## 2018-03-15 PROCEDURE — 94760 N-INVAS EAR/PLS OXIMETRY 1: CPT

## 2018-03-15 PROCEDURE — 85025 COMPLETE CBC W/AUTO DIFF WBC: CPT | Performed by: FAMILY MEDICINE

## 2018-03-15 PROCEDURE — 02K83ZZ MAP CONDUCTION MECHANISM, PERCUTANEOUS APPROACH: ICD-10-PCS | Performed by: INTERNAL MEDICINE

## 2018-03-15 PROCEDURE — 93613 INTRACARDIAC EPHYS 3D MAPG: CPT | Performed by: INTERNAL MEDICINE

## 2018-03-15 PROCEDURE — 25010000002 PROPOFOL 10 MG/ML EMULSION: Performed by: NURSE ANESTHETIST, CERTIFIED REGISTERED

## 2018-03-15 PROCEDURE — 86140 C-REACTIVE PROTEIN: CPT | Performed by: FAMILY MEDICINE

## 2018-03-15 PROCEDURE — 25010000002 ONDANSETRON PER 1 MG: Performed by: NURSE ANESTHETIST, CERTIFIED REGISTERED

## 2018-03-15 PROCEDURE — 25010000002 HEPARIN (PORCINE) PER 1000 UNITS: Performed by: INTERNAL MEDICINE

## 2018-03-15 PROCEDURE — 4A0234Z MEASUREMENT OF CARDIAC ELECTRICAL ACTIVITY, PERCUTANEOUS APPROACH: ICD-10-PCS | Performed by: INTERNAL MEDICINE

## 2018-03-15 RX ORDER — ROCURONIUM BROMIDE 10 MG/ML
INJECTION, SOLUTION INTRAVENOUS AS NEEDED
Status: DISCONTINUED | OUTPATIENT
Start: 2018-03-15 | End: 2018-03-15 | Stop reason: SURG

## 2018-03-15 RX ORDER — PROPOFOL 10 MG/ML
VIAL (ML) INTRAVENOUS AS NEEDED
Status: DISCONTINUED | OUTPATIENT
Start: 2018-03-15 | End: 2018-03-15 | Stop reason: SURG

## 2018-03-15 RX ORDER — ONDANSETRON 2 MG/ML
4 INJECTION INTRAMUSCULAR; INTRAVENOUS ONCE AS NEEDED
Status: DISCONTINUED | OUTPATIENT
Start: 2018-03-15 | End: 2018-03-15 | Stop reason: HOSPADM

## 2018-03-15 RX ORDER — ONDANSETRON 2 MG/ML
INJECTION INTRAMUSCULAR; INTRAVENOUS AS NEEDED
Status: DISCONTINUED | OUTPATIENT
Start: 2018-03-15 | End: 2018-03-15 | Stop reason: SURG

## 2018-03-15 RX ORDER — FENTANYL CITRATE 50 UG/ML
INJECTION, SOLUTION INTRAMUSCULAR; INTRAVENOUS AS NEEDED
Status: DISCONTINUED | OUTPATIENT
Start: 2018-03-15 | End: 2018-03-15 | Stop reason: SURG

## 2018-03-15 RX ORDER — DIPHENHYDRAMINE HYDROCHLORIDE 50 MG/ML
12.5 INJECTION INTRAMUSCULAR; INTRAVENOUS
Status: DISCONTINUED | OUTPATIENT
Start: 2018-03-15 | End: 2018-03-15 | Stop reason: HOSPADM

## 2018-03-15 RX ORDER — LABETALOL HYDROCHLORIDE 5 MG/ML
5 INJECTION, SOLUTION INTRAVENOUS
Status: DISCONTINUED | OUTPATIENT
Start: 2018-03-15 | End: 2018-03-15 | Stop reason: HOSPADM

## 2018-03-15 RX ORDER — ACETAMINOPHEN 650 MG/1
650 SUPPOSITORY RECTAL ONCE AS NEEDED
Status: DISCONTINUED | OUTPATIENT
Start: 2018-03-15 | End: 2018-03-15 | Stop reason: HOSPADM

## 2018-03-15 RX ORDER — ONDANSETRON 2 MG/ML
4 INJECTION INTRAMUSCULAR; INTRAVENOUS EVERY 6 HOURS PRN
Status: DISCONTINUED | OUTPATIENT
Start: 2018-03-15 | End: 2018-03-16 | Stop reason: HOSPADM

## 2018-03-15 RX ORDER — DIPHENHYDRAMINE HYDROCHLORIDE 50 MG/ML
INJECTION INTRAMUSCULAR; INTRAVENOUS AS NEEDED
Status: DISCONTINUED | OUTPATIENT
Start: 2018-03-15 | End: 2018-03-15 | Stop reason: HOSPADM

## 2018-03-15 RX ORDER — SODIUM CHLORIDE 9 MG/ML
INJECTION, SOLUTION INTRAVENOUS CONTINUOUS PRN
Status: DISCONTINUED | OUTPATIENT
Start: 2018-03-15 | End: 2018-03-15 | Stop reason: SURG

## 2018-03-15 RX ORDER — HEPARIN SODIUM 1000 [USP'U]/ML
INJECTION, SOLUTION INTRAVENOUS; SUBCUTANEOUS AS NEEDED
Status: DISCONTINUED | OUTPATIENT
Start: 2018-03-15 | End: 2018-03-15 | Stop reason: HOSPADM

## 2018-03-15 RX ORDER — LIDOCAINE HYDROCHLORIDE 20 MG/ML
INJECTION, SOLUTION INFILTRATION; PERINEURAL AS NEEDED
Status: DISCONTINUED | OUTPATIENT
Start: 2018-03-15 | End: 2018-03-15 | Stop reason: SURG

## 2018-03-15 RX ORDER — GLYCOPYRROLATE 0.2 MG/ML
INJECTION INTRAMUSCULAR; INTRAVENOUS AS NEEDED
Status: DISCONTINUED | OUTPATIENT
Start: 2018-03-15 | End: 2018-03-15 | Stop reason: SURG

## 2018-03-15 RX ORDER — FLUMAZENIL 0.1 MG/ML
0.2 INJECTION INTRAVENOUS AS NEEDED
Status: DISCONTINUED | OUTPATIENT
Start: 2018-03-15 | End: 2018-03-15 | Stop reason: HOSPADM

## 2018-03-15 RX ORDER — ACETAMINOPHEN 325 MG/1
650 TABLET ORAL ONCE AS NEEDED
Status: DISCONTINUED | OUTPATIENT
Start: 2018-03-15 | End: 2018-03-15 | Stop reason: HOSPADM

## 2018-03-15 RX ORDER — PROTAMINE SULFATE 10 MG/ML
INJECTION, SOLUTION INTRAVENOUS AS NEEDED
Status: DISCONTINUED | OUTPATIENT
Start: 2018-03-15 | End: 2018-03-15 | Stop reason: HOSPADM

## 2018-03-15 RX ORDER — NALOXONE HCL 0.4 MG/ML
0.2 VIAL (ML) INJECTION AS NEEDED
Status: DISCONTINUED | OUTPATIENT
Start: 2018-03-15 | End: 2018-03-15 | Stop reason: HOSPADM

## 2018-03-15 RX ADMIN — HYDROCORTISONE: 1 CREAM TOPICAL at 21:10

## 2018-03-15 RX ADMIN — ROCURONIUM BROMIDE 10 MG: 10 INJECTION INTRAVENOUS at 10:53

## 2018-03-15 RX ADMIN — PHENYLEPHRINE HYDROCHLORIDE 1 MCG/KG/MIN: 10 INJECTION INTRAVENOUS at 09:40

## 2018-03-15 RX ADMIN — LIDOCAINE HYDROCHLORIDE 100 MG: 20 INJECTION, SOLUTION INFILTRATION; PERINEURAL at 09:09

## 2018-03-15 RX ADMIN — NEBIVOLOL HYDROCHLORIDE 20 MG: 5 TABLET ORAL at 14:36

## 2018-03-15 RX ADMIN — TEMAZEPAM 15 MG: 15 CAPSULE ORAL at 21:09

## 2018-03-15 RX ADMIN — BACLOFEN 10 MG: 10 TABLET ORAL at 21:09

## 2018-03-15 RX ADMIN — FENTANYL CITRATE 25 MCG: 50 INJECTION, SOLUTION INTRAMUSCULAR; INTRAVENOUS at 10:56

## 2018-03-15 RX ADMIN — SODIUM CHLORIDE: 900 INJECTION, SOLUTION INTRAVENOUS at 08:59

## 2018-03-15 RX ADMIN — GLYCOPYRROLATE 0.2 MG: 0.2 INJECTION, SOLUTION INTRAMUSCULAR; INTRAVENOUS at 12:11

## 2018-03-15 RX ADMIN — FENTANYL CITRATE 50 MCG: 50 INJECTION, SOLUTION INTRAMUSCULAR; INTRAVENOUS at 08:59

## 2018-03-15 RX ADMIN — PHENYLEPHRINE HYDROCHLORIDE 200 MCG: 10 INJECTION INTRAVENOUS at 09:40

## 2018-03-15 RX ADMIN — ROCURONIUM BROMIDE 5 MG: 10 INJECTION INTRAVENOUS at 11:00

## 2018-03-15 RX ADMIN — ROCURONIUM BROMIDE 20 MG: 10 INJECTION INTRAVENOUS at 09:45

## 2018-03-15 RX ADMIN — ROCURONIUM BROMIDE 50 MG: 10 INJECTION INTRAVENOUS at 09:09

## 2018-03-15 RX ADMIN — PHENYLEPHRINE HYDROCHLORIDE 100 MCG: 10 INJECTION INTRAVENOUS at 09:33

## 2018-03-15 RX ADMIN — TAMSULOSIN HYDROCHLORIDE 0.4 MG: 0.4 CAPSULE ORAL at 21:09

## 2018-03-15 RX ADMIN — PHENYTOIN SODIUM 100 MG: 100 CAPSULE, EXTENDED RELEASE ORAL at 21:08

## 2018-03-15 RX ADMIN — BACLOFEN 10 MG: 10 TABLET ORAL at 08:15

## 2018-03-15 RX ADMIN — Medication 10 ML: at 21:10

## 2018-03-15 RX ADMIN — ROCURONIUM BROMIDE 20 MG: 10 INJECTION INTRAVENOUS at 10:13

## 2018-03-15 RX ADMIN — AMIODARONE HYDROCHLORIDE 200 MG: 200 TABLET ORAL at 14:37

## 2018-03-15 RX ADMIN — FENTANYL CITRATE 50 MCG: 50 INJECTION, SOLUTION INTRAMUSCULAR; INTRAVENOUS at 09:03

## 2018-03-15 RX ADMIN — PHENYTOIN SODIUM 100 MG: 100 CAPSULE, EXTENDED RELEASE ORAL at 08:14

## 2018-03-15 RX ADMIN — Medication 1 MG: at 12:11

## 2018-03-15 RX ADMIN — PROPOFOL 100 MG: 10 INJECTION, EMULSION INTRAVENOUS at 09:09

## 2018-03-15 RX ADMIN — PHENYLEPHRINE HYDROCHLORIDE 100 MCG: 10 INJECTION INTRAVENOUS at 09:36

## 2018-03-15 RX ADMIN — ONDANSETRON 4 MG: 2 INJECTION INTRAMUSCULAR; INTRAVENOUS at 12:44

## 2018-03-15 RX ADMIN — FENTANYL CITRATE 25 MCG: 50 INJECTION, SOLUTION INTRAMUSCULAR; INTRAVENOUS at 11:01

## 2018-03-15 RX ADMIN — RIVAROXABAN 20 MG: 10 TABLET, FILM COATED ORAL at 17:49

## 2018-03-15 NOTE — PROGRESS NOTES
[facility] Medicine Services  INPATIENT PROGRESS NOTE    Length of Stay: 10  Date of Admission: 3/5/2018  Primary Care Physician: Valerie Marvin MD    Subjective     Chief Complaint   Patient presents with   • Nausea     HPI: 73 year old male who was admitted for weakness, mild nausea, low grade fever, worsening weakness. He has felt bad for last several days, has experienced more generalized pain than usual and has had a poorer appetite. Pt was found to have pseudomonas urosepsis.     3/11/18: pt states that he is doing better, had episode of nausea this AM, no vomiting.     3/12/2018: pt is doing better today, no more nausea. Urine is clearing up. Cultures pending.     3/13/18: Patient is doing much better today, he was able to keep his food down, his urine is clearing up, repeat urine culture shows mixed culture likely consistent with contamination.    3/14/18: Pt is doing well today, plan for ablation tomorrow.     3/15/18: Status post ablation doing well.    Review of Systems   Constitutional: Positive for activity change, appetite change and fatigue. Negative for chills and fever.   HENT: Negative for rhinorrhea, sinus pain and sinus pressure.    Respiratory: Negative for chest tightness, shortness of breath and wheezing.    Cardiovascular: Positive for leg swelling. Negative for chest pain and palpitations.   Gastrointestinal: Positive for nausea. Negative for abdominal pain, constipation, diarrhea and vomiting.   Endocrine: Negative for cold intolerance and heat intolerance.   Genitourinary:        Gayle is present    Neurological: Negative for dizziness, light-headedness and headaches.   Psychiatric/Behavioral: Negative for agitation, behavioral problems and confusion.        All pertinent negatives and positives are as above. All other systems have been reviewed and are negative unless otherwise stated.     Objective      Vital Sign Min/Max for last 24  hours  Temp  Min: 96.9 °F (36.1 °C)  Max: 97.8 °F (36.6 °C)   BP  Min: 110/55  Max: 152/79   Pulse  Min: 70  Max: 116   Resp  Min: 15  Max: 20   SpO2  Min: 93 %  Max: 98 %   Flow (L/min)  Min: 2  Max: 3   Weight  Min: 77.2 kg (170 lb 3.2 oz)  Max: 77.2 kg (170 lb 3.2 oz)         Physical Exam   Constitutional: He is oriented to person, place, and time. He appears well-developed and well-nourished.   HENT:   Head: Normocephalic and atraumatic.   Eyes: EOM are normal. Pupils are equal, round, and reactive to light.   Neck: Normal range of motion.   Cardiovascular: Normal rate, regular rhythm and normal heart sounds.    Pulmonary/Chest: Effort normal and breath sounds normal.   Abdominal: Soft. Bowel sounds are normal.   Genitourinary:   Genitourinary Comments: Gayle is present.      Musculoskeletal: Normal range of motion. He exhibits edema (1+ edema noted ).   Neurological: He is alert and oriented to person, place, and time.   Skin: Skin is warm.   Psychiatric: He has a normal mood and affect. His behavior is normal.   Vitals reviewed.      Current Facility-Administered Medications   Medication Dose Route Frequency Provider Last Rate Last Dose   • 8-hydroxyquinoline sulfate (BAG BALM) ointment   Apply externally Daily Evelio Christensen MD       • albuterol (PROVENTIL) nebulizer solution 0.5% 2.5 mg/0.5mL  2.5 mg Nebulization Q6H PRN Yoan Hamilton MD       • amiodarone (PACERONE) tablet 200 mg  200 mg Oral Q24H Sai Muñoz MD   200 mg at 03/15/18 1437   • aspirin EC tablet 81 mg  81 mg Oral Daily Yoan Hamilton MD   81 mg at 03/14/18 0923   • baclofen (LIORESAL) tablet 10 mg  10 mg Oral Q12H Jordi Marie MD   10 mg at 03/15/18 0815   • budesonide-formoterol (SYMBICORT) 160-4.5 MCG/ACT inhaler 2 puff  2 puff Inhalation BID - RT Hardy Fisher MD   2 puff at 03/10/18 0644   • docusate sodium (COLACE) capsule 100 mg  100 mg Oral Daily Jordi Marie MD   100 mg at 03/14/18 0997   • EPINEPHrine  (ADRENALIN) 10,000 mcg in sodium chloride 0.9 % 250 mL (40 mcg/mL) infusion  0.02-0.3 mcg/kg/min Intravenous Titrated Yoan Hamilton MD       • finasteride (PROSCAR) tablet 5 mg  5 mg Oral Daily Yoan Hamilton MD   5 mg at 03/14/18 0923   • fluticasone (FLONASE) 50 MCG/ACT nasal spray 2 spray  2 spray Each Nare Daily Hardy Fisher MD   2 spray at 03/14/18 0928   • furosemide (LASIX) tablet 20 mg  20 mg Oral Daily Luca Deleon MD   20 mg at 03/14/18 0924   • HYDROcodone-acetaminophen (NORCO)  MG per tablet 1 tablet  1 tablet Oral Q4H PRN Yoan Hamilton MD   1 tablet at 03/13/18 0756   • ipratropium-albuterol (DUO-NEB) nebulizer solution 3 mL  3 mL Nebulization 4x Daily - RT Hardy Fisher MD   3 mL at 03/10/18 1011   • magic butt ointment   Topical BID Evelio Christensen MD       • nebivolol (BYSTOLIC) tablet 20 mg  20 mg Oral Daily Hardy Fisher MD   20 mg at 03/15/18 1436   • ondansetron (ZOFRAN) injection 4 mg  4 mg Intravenous Q4H PRN Evelio Christensen MD   4 mg at 03/13/18 1141   • ondansetron (ZOFRAN) injection 4 mg  4 mg Intravenous Q6H PRN Seven Londono MD       • phenytoin (DILANTIN) ER capsule 100 mg  100 mg Oral Q12H Yoan Hamilton MD   100 mg at 03/15/18 0814   • polyethylene glycol 3350 powder (packet)  17 g Oral Daily PRN Hardy Fisher MD   17 g at 03/09/18 0911   • potassium chloride (MICRO-K) CR capsule 40 mEq  40 mEq Oral PRN Hardy Fisher MD   40 mEq at 03/14/18 0614    Or   • potassium chloride (KLOR-CON) packet 40 mEq  40 mEq Oral PRN Hardy Fisher MD        Or   • potassium chloride 10 mEq in 100 mL IVPB  10 mEq Intravenous Q1H PRN Hardy Fisher  mL/hr at 03/10/18 1753 10 mEq at 03/10/18 1753   • promethazine (PHENERGAN) injection 12.5 mg  12.5 mg Intravenous Q6H PRN Jordi Marie MD   12.5 mg at 03/10/18 1514   • rivaroxaban (XARELTO) tablet 20 mg  20 mg Oral Daily With Dinner Seven Londono MD       • sodium chloride (OCEAN) nasal spray 2 spray  2  spray Each Nare PRN Hardy Fisher MD   2 spray at 03/10/18 0854   • sodium chloride 0.9 % flush 1-10 mL  1-10 mL Intravenous PRN Yoan Hamilton MD   10 mL at 03/07/18 1615   • sodium chloride 0.9 % flush 10 mL  10 mL Intravenous PRN Robert Anderson MD       • sodium chloride 0.9 % flush 10 mL  10 mL Intracatheter Q12H Hardy Fisher MD   10 mL at 03/14/18 2135   • sodium chloride 0.9 % flush 10 mL  10 mL Intracatheter PRN Hardy Fisher MD       • sodium chloride 0.9 % flush 10 mL  10 mL Intracatheter PRN Hardy Fisher MD   10 mL at 03/10/18 2110   • tamsulosin (FLOMAX) 24 hr capsule 0.4 mg  0.4 mg Oral Nightly Yoan Hamilton MD   0.4 mg at 03/14/18 2101   • temazepam (RESTORIL) capsule 15 mg  15 mg Oral Nightly PRN Jordi Marie MD   15 mg at 03/14/18 2130   • vitamin C (ASCORBIC ACID) tablet 500 mg  500 mg Oral Daily Yoan Hamilton MD   500 mg at 03/14/18 0924           Results Review:  I have reviewed the labs, radiology results, and diagnostic studies.    Laboratory Data:     Results from last 7 days  Lab Units 03/15/18  0608 03/14/18  0959 03/14/18  0751 03/13/18  1007   SODIUM mmol/L 142  --  142 139   POTASSIUM mmol/L 4.3 4.7 5.0 3.0*   CHLORIDE mmol/L 100  --  98 93*   CO2 mmol/L 34.0*  --  35.0* 35.0*   BUN mg/dL 29*  --  29* 34*   CREATININE mg/dL 1.11  --  1.28 1.34*   GLUCOSE mg/dL 105*  --  113* 180*   CALCIUM mg/dL 8.4  --  8.4 8.1*   BILIRUBIN mg/dL 0.3  --  0.4 0.2   ALK PHOS U/L 54  --  55 53   ALT (SGPT) U/L 28  --  36 37   AST (SGOT) U/L 22  --  17 19   ANION GAP mmol/L 8.0  --  9.0 11.0     Estimated Creatinine Clearance: 62.8 mL/min (by C-G formula based on SCr of 1.11 mg/dL).    Results from last 7 days  Lab Units 03/13/18  1515   MAGNESIUM mg/dL 2.0           Results from last 7 days  Lab Units 03/15/18  0608 03/14/18  0532 03/13/18  0604 03/12/18  1447 03/12/18  0650   WBC 10*3/mm3 10.51* 11.26* 10.17* 10.58* 10.24*   HEMOGLOBIN g/dL 13.1* 13.6* 14.4 14.4 15.4    HEMATOCRIT % 39.1 40.2 42.6 42.3 44.8   PLATELETS 10*3/mm3 236 255 227 227 233       Results from last 7 days  Lab Units 03/12/18  1447   INR  2.48*       Results from last 7 days  Lab Units 03/15/18  0608 03/13/18  1515 03/11/18  0808   CRP mg/dL 1.40* 2.80* 3.20*       Culture Data:   No results found for: BLOODCX  No results found for: URINECX  No results found for: RESPCX  No results found for: WOUNDCX  No results found for: STOOLCX  No components found for: BODYFLD      Radiology Data:   Imaging Results (last 24 hours)     ** No results found for the last 24 hours. **          I have reviewed the patient current medications.     Assessment/Plan     Active Hospital Problems (** Indicates Principal Problem)    Diagnosis Date Noted   • **Sepsis [A41.9] 03/05/2018   • Sacral decubitus ulcer [L89.159] 03/05/2018   • UTI (urinary tract infection) due to urinary indwelling catheter [T83.511A, N39.0] 03/05/2018   • Hypotension [I95.9] 03/05/2018   • Atrial fibrillation [I48.91]       Resolved Hospital Problems    Diagnosis Date Noted Date Resolved   No resolved problems to display.     1. Urosepsis: with pseudomonas sensitive to zosyn, completed the course, repeat cultures negative.   2.  AUGIE: baseline Cr. 1.07, now at 1.36. Will lower lasix to 20mg.   3. A. Fib: on amiodarone, cardizem, on xarelto. S/p ablation doing well.  Xarelto on hold.  4. Hypotension: resolved.   5. Right upper lobe pneumonia: completed the course of antibiotics.    6. Hypokalemia: replace potassium.  Check mag    Discharge Planning: I expect patient to be discharged to home vs NH in 1-2 days.      DVT Prophylaxis: xarelto               This document has been electronically signed by Luca Deleon MD on March 15, 2018 5:40 PM

## 2018-03-15 NOTE — PROGRESS NOTES
"   LOS: 10 days   Patient Care Team:  Valerie Marvin MD as PCP - General    Subjective     Subjective:  Symptoms:  Stable.  (Urine is clear and yellow, no penoscrotal edema. Cystostomy site is normal. States he is ready to go home. ).    Diet:  No nausea.        History taken from: patient chart family    Objective     Vital Signs  Temp:  [96.9 °F (36.1 °C)-97.8 °F (36.6 °C)] 97 °F (36.1 °C)  Heart Rate:  [] 80  Resp:  [15-20] 15  BP: (110-152)/(54-96) 139/79    Objective:  General Appearance:  In no acute distress.    Vital signs: (most recent): Blood pressure 139/79, pulse 80, temperature 97 °F (36.1 °C), temperature source Temporal Artery , resp. rate 15, height 170.2 cm (67.01\"), weight 77.2 kg (170 lb 3.2 oz), SpO2 94 %.  Vital signs are normal.  No fever.    Output: Producing urine (Yellow urine is SP tubes gravity bag).    HEENT: Normal HEENT exam.    Lungs:  Normal effort and normal respiratory rate.  He is not in respiratory distress.    Chest: Symmetric chest wall expansion.   Abdomen: Abdomen is soft and non-distended.  There is no abdominal tenderness.     Extremities: Decreased range of motion.  There is deformity.  There is no dependent edema (LLE).    Pulses: Distal pulses are intact.    Neurological: Patient is alert and oriented to person, place and time.  GCS score is 15.    Pupils:  Pupils are equal, round, and reactive to light.    Skin:  Warm and dry.  No ecchymosis or cyanosis.             Results Review:    Lab Results (last 24 hours)     Procedure Component Value Units Date/Time    C-reactive Protein [[id number]]  (Abnormal) Collected:  03/15/18 0608    Specimen:  Blood Updated:  03/15/18 1428     C-Reactive Protein 1.40 (H) mg/dL     Comprehensive Metabolic Panel [[id number]]  (Abnormal) Collected:  03/15/18 0608    Specimen:  Blood Updated:  03/15/18 0722     Glucose 105 (H) mg/dL      BUN 29 (H) mg/dL      Creatinine 1.11 mg/dL      Sodium 142 mmol/L      Potassium 4.3 mmol/L     "  Chloride 100 mmol/L      CO2 34.0 (H) mmol/L      Calcium 8.4 mg/dL      Total Protein 6.5 g/dL      Albumin 3.30 (L) g/dL      ALT (SGPT) 28 U/L      AST (SGOT) 22 U/L      Alkaline Phosphatase 54 U/L      Total Bilirubin 0.3 mg/dL      eGFR Non African Amer 65 mL/min/1.73      Globulin 3.2 gm/dL      A/G Ratio 1.0 (L) g/dL      BUN/Creatinine Ratio 26.1 (H)     Anion Gap 8.0 mmol/L     Narrative:       The MDRD GFR formula is only valid for adults with stable renal function between ages 18 and 70.    CBC & Differential [[id number]] Collected:  03/15/18 0608    Specimen:  Blood Updated:  03/15/18 0652    Narrative:       The following orders were created for panel order CBC & Differential.  Procedure                               Abnormality         Status                     ---------                               -----------         ------                     CBC Auto Differential[[id number]]        Abnormal            Final result                 Please view results for these tests on the individual orders.    CBC Auto Differential [[id number]]  (Abnormal) Collected:  03/15/18 0608    Specimen:  Blood Updated:  03/15/18 0652     WBC 10.51 (H) 10*3/mm3      RBC 4.21 (L) 10*6/mm3      Hemoglobin 13.1 (L) g/dL      Hematocrit 39.1 %      MCV 92.9 fL      MCH 31.1 pg      MCHC 33.5 g/dL      RDW 13.7 %      RDW-SD 46.4 (H) fl      MPV 10.0 fL      Platelets 236 10*3/mm3      Neutrophil % 75.5 %      Lymphocyte % 9.2 (L) %      Monocyte % 8.9 %      Eosinophil % 4.7 %      Basophil % 0.2 %      Immature Grans % 1.5 (H) %      Neutrophils, Absolute 7.93 10*3/mm3      Lymphocytes, Absolute 0.97 10*3/mm3      Monocytes, Absolute 0.94 (H) 10*3/mm3      Eosinophils, Absolute 0.49 10*3/mm3      Basophils, Absolute 0.02 10*3/mm3      Immature Grans, Absolute 0.16 (H) 10*3/mm3          Imaging Results (last 24 hours)     ** No results found for the last 24 hours. **           I reviewed the patient's new clinical results.  I  reviewed the patient's new imaging results and agree with the interpretation.  I reviewed the patient's other test results and agree with the interpretation      Assessment/Plan     Principal Problem:    Sepsis  Active Problems:    Sacral decubitus ulcer    UTI (urinary tract infection) due to urinary indwelling catheter    Hypotension    Atrial fibrillation      Assessment & Plan    UTI, acute, causing sepsis related to chronic SP tube last changed 3/5/18  -Urine culture Pseudomonas aeruginosa 3/5/18  -3/11/18 urine culture mixed annamarie.   -WBC 23.46-->15.17-->9.54-->13.87-->10.58-->10.17-->11.26-->10.51  -Zosyn day #10, antibiotic course complete  -Estimated Creatinine Clearance: 62.8 mL/min (by C-G formula based on SCr of 1.11 mg/dL).     Neurogenic bladder  -chronic SP tube changed 3/5/18     BPH  Flomax, Proscar    Hematuria, new on 3/11/18, since resolved  -probable trauma with catheter manipulation  -3/11/18 new urine culture shows mixed annamarie    Plan: Can continue with monthly SP tube changes with [facility] nursing, SP tube due for changed 4/2/18. Continue Flomax and Proscar for BPH.  Can resume nitrofurantoin for UTI suppression upon discharge as well.     JEAN Robbins  03/15/18  5:06 PM

## 2018-03-15 NOTE — PLAN OF CARE
Problem: Patient Care Overview (Adult)  Goal: Plan of Care Review  Outcome: Ongoing (interventions implemented as appropriate)   03/15/18 0515   Coping/Psychosocial Response Interventions   Plan Of Care Reviewed With patient   Patient Care Overview   Progress no change     Goal: Adult Individualization and Mutuality  Outcome: Ongoing (interventions implemented as appropriate)    Goal: Discharge Needs Assessment  Outcome: Ongoing (interventions implemented as appropriate)      Problem: Fall Risk (Adult)  Goal: Absence of Fall  Outcome: Ongoing (interventions implemented as appropriate)      Problem: Skin Integrity Impairment, Risk/Actual (Adult)  Goal: Skin Integrity/Wound Healing  Outcome: Ongoing (interventions implemented as appropriate)      Problem: Pressure Ulcer (Adult)  Goal: Signs and Symptoms of Listed Potential Problems Will be Absent or Manageable (Pressure Ulcer)  Outcome: Ongoing (interventions implemented as appropriate)      Problem: Skin Injury Risk (Adult)  Goal: Skin Health and Integrity  Outcome: Ongoing (interventions implemented as appropriate)

## 2018-03-15 NOTE — ANESTHESIA POSTPROCEDURE EVALUATION
Patient: Mando Hill    Procedure Summary     Date:  03/15/18 Room / Location:  [facility] CATH LAB  / [facility] CATH INVASIVE LOCATION    Anesthesia Start:  0859 Anesthesia Stop:  1310    Procedure:  EP/Ablation- Flutter (N/A ) Diagnosis:  (atrial flutter)    Provider:  Seven Londono MD Provider:  Jaylan Guajardo MD    Anesthesia Type:  general ASA Status:  3          Anesthesia Type: general  Last vitals  BP   122/80 (03/15/18 0743)   Temp   97.6 °F (36.4 °C) (03/15/18 0743)   Pulse   112 (03/15/18 0743)   Resp   18 (03/15/18 0743)     SpO2   98 % (03/15/18 0743)     Post Anesthesia Care and Evaluation    Patient location during evaluation: PACU  Patient participation: complete - patient participated  Level of consciousness: awake and alert  Pain score: 0  Pain management: adequate  Airway patency: patent  Anesthetic complications: No anesthetic complications  PONV Status: none  Cardiovascular status: acceptable  Respiratory status: acceptable  Hydration status: acceptable

## 2018-03-15 NOTE — ANESTHESIA PROCEDURE NOTES
Arterial Line    Patient location during procedure: OR   Line placed for hemodynamic monitoring and ABGs/Labs/ISTAT.  Performed By   CRNA: JULIANO BUCKLEY  Preanesthetic Checklist  Completed: patient identified, site marked, surgical consent, pre-op evaluation, timeout performed, IV checked, risks and benefits discussed and monitors and equipment checked  Arterial Line Prep   Sterile Tech: cap, gloves and mask  Prep: ChloraPrep  Patient monitoring: blood pressure monitoring, continuous pulse oximetry and EKG  Arterial Line Procedure   Laterality:right  Location:  radial artery  Catheter size: 20 G   Guidance: ultrasound guided  PROCEDURE NOTE/ULTRASOUND INTERPRETATION.  Using ultrasound guidance the potential vascular sites for insertion of the catheter were visualized to determine the patency of the vessel to be used for vascular access.  After selecting the appropriate site for insertion, the needle was visualized under ultrasound being inserted into the radial artery, followed by ultrasound confirmation of wire and catheter placement. There were no abnormalities seen on ultrasound; an image was taken; and the patient tolerated the procedure with no complications.   Number of attempts: 1  Successful placement: yes          Post Assessment   Dressing Type: occlusive dressing applied and secured with tape.   Complications no  Circ/Move/Sens Assessment: normal.   Patient Tolerance: patient tolerated the procedure well with no apparent complications

## 2018-03-15 NOTE — PLAN OF CARE
Problem: Patient Care Overview (Adult)  Goal: Plan of Care Review  Outcome: Ongoing (interventions implemented as appropriate)   03/15/18 4701   Coping/Psychosocial Response Interventions   Plan Of Care Reviewed With patient   Patient Care Overview   Progress improving   Outcome Evaluation   Outcome Summary/Follow up Plan patient had ablation today, in NSR; no co pain. vs stable, resting well; will continue to monitor     Goal: Adult Individualization and Mutuality  Outcome: Ongoing (interventions implemented as appropriate)      Problem: Fall Risk (Adult)  Goal: Absence of Fall  Outcome: Ongoing (interventions implemented as appropriate)      Problem: Skin Integrity Impairment, Risk/Actual (Adult)  Goal: Skin Integrity/Wound Healing  Outcome: Ongoing (interventions implemented as appropriate)      Problem: Pressure Ulcer (Adult)  Goal: Signs and Symptoms of Listed Potential Problems Will be Absent or Manageable (Pressure Ulcer)  Outcome: Ongoing (interventions implemented as appropriate)      Problem: Pain, Acute (Adult)  Goal: Acceptable Pain Control/Comfort Level  Outcome: Ongoing (interventions implemented as appropriate)      Problem: Skin Injury Risk (Adult)  Goal: Skin Health and Integrity  Outcome: Ongoing (interventions implemented as appropriate)      Problem: Arrhythmia/Dysrhythmia (Symptomatic) (Adult)  Goal: Signs and Symptoms of Listed Potential Problems Will be Absent, Minimized or Managed (Arrhythmia/Dysrhythmia)  Outcome: Ongoing (interventions implemented as appropriate)

## 2018-03-15 NOTE — ANESTHESIA PROCEDURE NOTES
Airway  Urgency: elective    Airway not difficult    General Information and Staff    Patient location during procedure: OR  CRNA: JULIANO BUCKLEY    Indications and Patient Condition  Indications for airway management: airway protection    Preoxygenated: yes  MILS maintained throughout  Mask difficulty assessment: 2 - vent by mask + OA or adjuvant +/- NMBA    Final Airway Details  Final airway type: endotracheal airway      Successful airway: ETT    Successful intubation technique: video laryngoscopy  Endotracheal tube insertion site: oral  Blade type: Ribeiro video laryngoscope.  Blade size: #3  ETT size: 8.0 mm  Cormack-Lehane Classification: grade I - full view of glottis  Placement verified by: chest auscultation and capnometry   Measured from: teeth  ETT to teeth (cm): 21  Number of attempts at approach: 1

## 2018-03-16 VITALS
WEIGHT: 170.2 LBS | DIASTOLIC BLOOD PRESSURE: 61 MMHG | BODY MASS INDEX: 26.71 KG/M2 | TEMPERATURE: 99.4 F | SYSTOLIC BLOOD PRESSURE: 137 MMHG | HEART RATE: 65 BPM | RESPIRATION RATE: 18 BRPM | HEIGHT: 67 IN | OXYGEN SATURATION: 90 %

## 2018-03-16 LAB
ALBUMIN SERPL-MCNC: 3.2 G/DL (ref 3.4–4.8)
ALBUMIN/GLOB SERPL: 1 G/DL (ref 1.1–1.8)
ALP SERPL-CCNC: 59 U/L (ref 38–126)
ALT SERPL W P-5'-P-CCNC: 35 U/L (ref 21–72)
ANION GAP SERPL CALCULATED.3IONS-SCNC: 10 MMOL/L (ref 5–15)
AST SERPL-CCNC: 23 U/L (ref 17–59)
BASOPHILS # BLD AUTO: 0.03 10*3/MM3 (ref 0–0.2)
BASOPHILS NFR BLD AUTO: 0.3 % (ref 0–2)
BILIRUB SERPL-MCNC: 0.2 MG/DL (ref 0.2–1.3)
BUN BLD-MCNC: 23 MG/DL (ref 7–21)
BUN/CREAT SERPL: 22.8 (ref 7–25)
CALCIUM SPEC-SCNC: 8.2 MG/DL (ref 8.4–10.2)
CHLORIDE SERPL-SCNC: 100 MMOL/L (ref 95–110)
CO2 SERPL-SCNC: 29 MMOL/L (ref 22–31)
CREAT BLD-MCNC: 1.01 MG/DL (ref 0.7–1.3)
DEPRECATED RDW RBC AUTO: 48.3 FL (ref 35.1–43.9)
EOSINOPHIL # BLD AUTO: 0.55 10*3/MM3 (ref 0–0.7)
EOSINOPHIL NFR BLD AUTO: 5 % (ref 0–7)
ERYTHROCYTE [DISTWIDTH] IN BLOOD BY AUTOMATED COUNT: 14 % (ref 11.5–14.5)
GFR SERPL CREATININE-BSD FRML MDRD: 72 ML/MIN/1.73 (ref 42–98)
GLOBULIN UR ELPH-MCNC: 3.1 GM/DL (ref 2.3–3.5)
GLUCOSE BLD-MCNC: 111 MG/DL (ref 60–100)
HCT VFR BLD AUTO: 37.2 % (ref 39–49)
HGB BLD-MCNC: 12.5 G/DL (ref 13.7–17.3)
IMM GRANULOCYTES # BLD: 0.15 10*3/MM3 (ref 0–0.02)
IMM GRANULOCYTES NFR BLD: 1.4 % (ref 0–0.5)
LYMPHOCYTES # BLD AUTO: 0.84 10*3/MM3 (ref 0.6–4.2)
LYMPHOCYTES NFR BLD AUTO: 7.7 % (ref 10–50)
MCH RBC QN AUTO: 31.6 PG (ref 26.5–34)
MCHC RBC AUTO-ENTMCNC: 33.6 G/DL (ref 31.5–36.3)
MCV RBC AUTO: 94.2 FL (ref 80–98)
MONOCYTES # BLD AUTO: 1.17 10*3/MM3 (ref 0–0.9)
MONOCYTES NFR BLD AUTO: 10.7 % (ref 0–12)
NEUTROPHILS # BLD AUTO: 8.21 10*3/MM3 (ref 2–8.6)
NEUTROPHILS NFR BLD AUTO: 74.9 % (ref 37–80)
NRBC BLD MANUAL-RTO: 0 /100 WBC (ref 0–0)
PLATELET # BLD AUTO: 238 10*3/MM3 (ref 150–450)
PMV BLD AUTO: 9.2 FL (ref 8–12)
POTASSIUM BLD-SCNC: 4.6 MMOL/L (ref 3.5–5.1)
PROT SERPL-MCNC: 6.3 G/DL (ref 6.3–8.6)
RBC # BLD AUTO: 3.95 10*6/MM3 (ref 4.37–5.74)
SODIUM BLD-SCNC: 139 MMOL/L (ref 137–145)
WBC NRBC COR # BLD: 10.95 10*3/MM3 (ref 3.2–9.8)

## 2018-03-16 PROCEDURE — 93010 ELECTROCARDIOGRAM REPORT: CPT | Performed by: INTERNAL MEDICINE

## 2018-03-16 PROCEDURE — 93005 ELECTROCARDIOGRAM TRACING: CPT | Performed by: INTERNAL MEDICINE

## 2018-03-16 PROCEDURE — 85025 COMPLETE CBC W/AUTO DIFF WBC: CPT | Performed by: FAMILY MEDICINE

## 2018-03-16 PROCEDURE — 80053 COMPREHEN METABOLIC PANEL: CPT | Performed by: FAMILY MEDICINE

## 2018-03-16 PROCEDURE — 99232 SBSQ HOSP IP/OBS MODERATE 35: CPT | Performed by: INTERNAL MEDICINE

## 2018-03-16 RX ORDER — AMIODARONE HYDROCHLORIDE 200 MG/1
200 TABLET ORAL
Qty: 30 TABLET | Refills: 0 | Status: SHIPPED | OUTPATIENT
Start: 2018-03-17 | End: 2018-05-16

## 2018-03-16 RX ORDER — ATORVASTATIN CALCIUM 10 MG/1
10 TABLET, FILM COATED ORAL DAILY
Qty: 15 TABLET | Refills: 0 | Status: SHIPPED | OUTPATIENT
Start: 2018-03-16

## 2018-03-16 RX ORDER — BUDESONIDE AND FORMOTEROL FUMARATE DIHYDRATE 160; 4.5 UG/1; UG/1
2 AEROSOL RESPIRATORY (INHALATION)
Qty: 1 INHALER | Refills: 0 | Status: SHIPPED | OUTPATIENT
Start: 2018-03-16 | End: 2018-05-16

## 2018-03-16 RX ORDER — ALBUTEROL SULFATE 90 UG/1
2 AEROSOL, METERED RESPIRATORY (INHALATION) EVERY 4 HOURS PRN
Qty: 1 INHALER | Refills: 0 | Status: SHIPPED | OUTPATIENT
Start: 2018-03-16 | End: 2018-05-16

## 2018-03-16 RX ADMIN — NEBIVOLOL HYDROCHLORIDE 20 MG: 5 TABLET ORAL at 08:09

## 2018-03-16 RX ADMIN — ASPIRIN 81 MG: 81 TABLET, COATED ORAL at 08:09

## 2018-03-16 RX ADMIN — FLUTICASONE PROPIONATE 2 SPRAY: 50 SPRAY, METERED NASAL at 08:10

## 2018-03-16 RX ADMIN — PHENYTOIN SODIUM 100 MG: 100 CAPSULE, EXTENDED RELEASE ORAL at 08:09

## 2018-03-16 RX ADMIN — DOCUSATE SODIUM 100 MG: 100 CAPSULE, LIQUID FILLED ORAL at 08:09

## 2018-03-16 RX ADMIN — HYDROCORTISONE: 1 CREAM TOPICAL at 08:10

## 2018-03-16 RX ADMIN — AMIODARONE HYDROCHLORIDE 200 MG: 200 TABLET ORAL at 08:09

## 2018-03-16 RX ADMIN — OXYCODONE HYDROCHLORIDE AND ACETAMINOPHEN 500 MG: 500 TABLET ORAL at 08:09

## 2018-03-16 RX ADMIN — FINASTERIDE 5 MG: 5 TABLET, FILM COATED ORAL at 08:09

## 2018-03-16 RX ADMIN — FUROSEMIDE 20 MG: 20 TABLET ORAL at 08:09

## 2018-03-16 RX ADMIN — Medication: at 08:10

## 2018-03-16 RX ADMIN — BACLOFEN 10 MG: 10 TABLET ORAL at 08:09

## 2018-03-16 NOTE — DISCHARGE PLACEMENT REQUEST
"Michelle Broderick (73 y.o. Male)     Date of Birth Social Security Number Address Home Phone MRN    1945  [id number] [address] [phone] [id number]    Anabaptist Marital Status          Other        Admission Date Admission Type Admitting Provider Attending Provider Department, Room/Bed    3/5/18 Emergency Yoan Hamilton MD Popescu, Tudor, MD [address], [facility]/1    Discharge Date Discharge Disposition Discharge Destination         [place] [facility] [place]ending Provider:  Yoan Hamilton MD    Allergies:  Aleve [Naproxen], Ambien [Zolpidem], Vancomycin    Isolation:  None   Infection:  None   Code Status:  FULL    Ht:  170.2 cm (67.01\")   Wt:  77.2 kg (170 lb 3.2 oz)    Admission Cmt:  None   Principal Problem:  Sepsis [A41.9]                 Active Insurance as of 3/5/2018     Primary Coverage     Payor Plan Insurance Group Employer/Plan Group    HUMANA MEDICARE REPLACEMENT HUMANA MEDICARE REPL [id number]     Payor Plan Address Payor Plan Phone Number Effective From Effective To    [address] [address] [phone] 1/1/2013     [place] [address]       Subscriber Name Subscriber Birth Date Member ID       MICHELLE BRODERICK 1945 [id number]                 Emergency Contacts      (Rel.) Home Phone Work Phone Mobile Phone    Marly Mueller (Significant Other) [phone] -- --            Emergency Contact Information     Name Relation Home Work Mobile    Marly Mueller Significant Other [phone]            Insurance Information                HUMANA MEDICARE REPLACEMENT/HUMANA MEDICARE REPL Phone: [phone]    Subscriber: Michelle Broderick Subscriber#: [id number]    Group#: [id number] Precert#:              History & Physical      Yoan Hamilton MD at 3/5/2018  3:25 AM          History and Physical  Yoan Hamilton MD  Hospitalist    Date of admission: 3/5/2018    Patient Care Team:  Valerie Espinal" MD Shavonne as PCP - General    Chief complaint   Chief Complaint   Patient presents with   • Nausea     Subjective     Patient is a 73 y.o. male admitted for weakness, mild nausea, low grade fever, worsening weakness. He has felt bad for the last several days, has experienced more generalized pain than usual and has had a poorer appetite. He might have been coughing more than usual as well. His effort capacity is significantly limited by his L hemiplegia.    History  Past Medical History:   Diagnosis Date   • Arthritis    • Atherosclerosis    • Atrial fibrillation    • BPH (benign prostatic hyperplasia)    • Brain injury    • Chronic bronchitis    • Hypertension    • Left hemiplegia    • UTI (urinary tract infection) due to urinary indwelling catheter 3/5/2018     Past Surgical History:   Procedure Laterality Date   • CHOLECYSTECTOMY     • EYE SURGERY     • SUPRAPUBIC CATHETER INSERTION       Family History   Problem Relation Age of Onset   • Stroke Mother    • Hypertension Father      Social History   Substance Use Topics   • Smoking status: Former Smoker   • Smokeless tobacco: Never Used   • Alcohol use No     Prescriptions Prior to Admission   Medication Sig Dispense Refill Last Dose   • aspirin 81 MG EC tablet Take 81 mg by mouth Daily.   Taking   • baclofen (LIORESAL) 10 MG tablet Take 10 mg by mouth 2 (Two) Times a Day.   Taking   • diltiaZEM (CARDIZEM) 60 MG tablet Take 60 mg by mouth 4 (Four) Times a Day.   Taking   • finasteride (PROSCAR) 5 MG tablet Take 5 mg by mouth Daily.   Taking   • furosemide (LASIX) 20 MG tablet Take 20 mg by mouth Daily.      • HYDROcodone-acetaminophen (NORCO)  MG per tablet TAKE 1 TABLET BY MOUTH EVERY SIX HOURS AS NEEDED FOR CHRONIC PAIN  0 Taking   • hydrOXYzine (ATARAX) 25 MG tablet Take 25 mg by mouth 3 (Three) Times a Day As Needed for Itching.      • nebivolol (BYSTOLIC) 20 MG tablet Take 20 mg by mouth Daily.   Taking   • nitrofurantoin (MACRODANTIN) 50 MG capsule  Take 50 mg by mouth Every Night.      • Omega-3 Fatty Acids (FISH OIL) 1000 MG capsule capsule Take  by mouth Daily With Breakfast.   Taking   • phenytoin (DILANTIN) 100 MG ER capsule Take  by mouth 2 (Two) Times a Day.   Taking   • rivaroxaban (XARELTO) 20 MG tablet Take 20 mg by mouth Daily.   Taking   • tamsulosin (FLOMAX) 0.4 MG capsule 24 hr capsule Take 1 capsule by mouth Every Night.   Taking   • vitamin C (ASCORBIC ACID) 500 MG tablet Take 500 mg by mouth Daily.   Taking     Allergies:  Aleve [naproxen]; Ambien [zolpidem]; and Vancomycin    Review of Systems  Review of Systems   Constitutional: Positive for fatigue and fever.   HENT: Positive for congestion.    Respiratory: Positive for cough. Negative for stridor.    Cardiovascular: Positive for chest pain. Negative for palpitations and leg swelling.   Gastrointestinal: Negative for abdominal distention, abdominal pain, anal bleeding, diarrhea, rectal pain and vomiting.   Genitourinary: Negative for dysuria, frequency, testicular pain and urgency.   Musculoskeletal: Positive for arthralgias, back pain and myalgias. Negative for neck pain.   Skin: Positive for pallor.   Neurological: Positive for weakness and headaches. Negative for seizures, syncope and numbness.   Psychiatric/Behavioral: Negative for agitation, behavioral problems and confusion.   All other systems reviewed and are negative.      Objective     Vital Signs  Temp:  [98.5 °F (36.9 °C)-100.9 °F (38.3 °C)] 98.5 °F (36.9 °C)  Heart Rate:  [] 96  Resp:  [18-20] 18  BP: ()/(54-58) 100/58    Physical Exam:  Physical Exam   Constitutional: He appears cachectic. He appears ill. No distress.   HENT:   Head: Normocephalic and atraumatic.   Eyes: EOM are normal. Pupils are equal, round, and reactive to light. No scleral icterus.   Neck: Normal range of motion. Neck supple.   Cardiovascular: Normal rate.  An irregular rhythm present.   Pulmonary/Chest: Effort normal. He has wheezes. He has no  rales.   Abdominal: Soft. Bowel sounds are normal. He exhibits no distension. There is no tenderness.   Suprapubic catheter present   Musculoskeletal: He exhibits no edema or tenderness.   Neurological: He is alert. No cranial nerve deficit.   L hemiparesis   Skin: Skin is dry. No rash noted. There is pallor.   Extensive area of erythema / skin breakdown on his buttocks   Psychiatric: He has a normal mood and affect. His behavior is normal.   Vitals reviewed.      Results Review:   Lab Results (last 24 hours)     Procedure Component Value Units Date/Time    Blood Culture - Blood, [[id number]] Collected:  03/05/18 0136    Specimen:  Blood from Arm, Right Updated:  03/05/18 0146    CBC & Differential [[id number]] Collected:  03/05/18 0136    Specimen:  Blood Updated:  03/05/18 0149    Narrative:       The following orders were created for panel order CBC & Differential.  Procedure                               Abnormality         Status                     ---------                               -----------         ------                     CBC Auto Differential[[id number]]        Abnormal            Final result                 Please view results for these tests on the individual orders.    CBC Auto Differential [[id number]]  (Abnormal) Collected:  03/05/18 0136    Specimen:  Blood Updated:  03/05/18 0149     WBC 23.46 (H) 10*3/mm3      RBC 4.93 10*6/mm3      Hemoglobin 15.6 g/dL      Hematocrit 45.6 %      MCV 92.5 fL      MCH 31.6 pg      MCHC 34.2 g/dL      RDW 13.4 %      RDW-SD 45.4 (H) fl      MPV 10.0 fL      Platelets 197 10*3/mm3      Neutrophil % 89.5 (H) %      Lymphocyte % 2.0 (L) %      Monocyte % 6.4 %      Eosinophil % 0.9 %      Basophil % 0.1 %      Immature Grans % 1.1 (H) %      Neutrophils, Absolute 21.01 (H) 10*3/mm3      Lymphocytes, Absolute 0.46 (L) 10*3/mm3      Monocytes, Absolute 1.49 (H) 10*3/mm3      Eosinophils, Absolute 0.22 10*3/mm3      Basophils, Absolute 0.02 10*3/mm3      Immature  Grans, Absolute 0.26 (H) 10*3/mm3     Lactic Acid, Plasma [[id number]]  (Abnormal) Collected:  03/05/18 0136    Specimen:  Blood Updated:  03/05/18 0205     Lactate 2.7 (C) mmol/L     Urinalysis With / Culture If Indicated - Urine, Clean Catch [[id number]]  (Abnormal) Collected:  03/05/18 0154    Specimen:  Urine from Urine, Catheter Updated:  03/05/18 0209     Color, UA Yellow     Appearance, UA Cloudy (A)     pH, UA <=5.0     Specific Gravity, UA 1.020     Glucose, UA Negative     Ketones, UA Negative     Bilirubin, UA Small (1+) (A)     Blood, UA Large (3+) (A)     Protein, UA Trace (A)     Leuk Esterase, UA Large (3+) (A)     Nitrite, UA Negative     Urobilinogen, UA 1.0 E.U./dL    Influenza Antigen, Rapid - Swab, Nasopharynx [[id number]]  (Normal) Collected:  03/05/18 0147    Specimen:  Swab from Nasopharynx Updated:  03/05/18 0210     Influenza A Ag, EIA Negative     Influenza B Ag, EIA Negative    Phenytoin Level, Total [[id number]]  (Normal) Collected:  03/05/18 0136    Specimen:  Blood Updated:  03/05/18 0212     Phenytoin Level 10.5 mcg/mL     CK [[id number]]  (Normal) Collected:  03/05/18 0136    Specimen:  Blood Updated:  03/05/18 0213     Creatine Kinase 60 U/L     Magnesium [[id number]]  (Normal) Collected:  03/05/18 0136    Specimen:  Blood Updated:  03/05/18 0234     Magnesium 1.8 mg/dL     Urinalysis, Microscopic Only - Urine, Clean Catch [[id number]]  (Abnormal) Collected:  03/05/18 0154    Specimen:  Urine from Urine, Catheter Updated:  03/05/18 0236     RBC, UA 21-30 (A) /HPF      WBC, UA 13-20 (A) /HPF      Bacteria, UA 1+ (A) /HPF      Squamous Epithelial Cells, UA 3-5 (A) /HPF      Hyaline Casts, UA None Seen /LPF      Amorphous Urate Crystals, UA Small/1+ /HPF      Methodology Manual Light Microscopy    Comprehensive Metabolic Panel [[id number]]  (Abnormal) Collected:  03/05/18 0136    Specimen:  Blood Updated:  03/05/18 0241     Glucose 125 (H) mg/dL      BUN 29 (H) mg/dL      Creatinine 1.30 mg/dL       Sodium 142 mmol/L      Potassium 3.7 mmol/L      Chloride 100 mmol/L      CO2 26.0 mmol/L      Calcium 8.6 mg/dL      Total Protein 7.5 g/dL      Albumin 3.90 g/dL      ALT (SGPT) 31 U/L      AST (SGOT) 27 U/L      Alkaline Phosphatase 122 U/L      Total Bilirubin 0.4 mg/dL      eGFR Non African Amer 54 mL/min/1.73      Globulin 3.6 (H) gm/dL      A/G Ratio 1.1 g/dL      BUN/Creatinine Ratio 22.3     Anion Gap 16.0 (H) mmol/L     Narrative:       The MDRD GFR formula is only valid for adults with stable renal function between ages 18 and 70.    Blood Culture - Blood, [[id number]] Collected:  03/05/18 0235    Specimen:  Blood from Wrist, Right Updated:  03/05/18 0243    Light Blue Top [[id number]] Collected:  03/05/18 0136    Specimen:  Blood Updated:  03/05/18 0246     Extra Tube hold for add-on      Auto resulted       Lavender Top [[id number]] Collected:  03/05/18 0136    Specimen:  Blood Updated:  03/05/18 0246     Extra Tube hold for add-on      Auto resulted       Gold Top - SST [[id number]] Collected:  03/05/18 0136    Specimen:  Blood Updated:  03/05/18 0246     Extra Tube Hold for add-ons.      Auto resulted.       [place] Draw [[id number]] Collected:  03/05/18 0136    Specimen:  Blood Updated:  03/05/18 0346    Narrative:       The following orders were created for panel order [place] Draw.  Procedure                               Abnormality         Status                     ---------                               -----------         ------                     Light Blue Top[[id number]]                                   Final result               Green Top (Gel)[[id number]]                                  Final result               Lavender Top[[id number]]                                     Final result               Gold Top - SST[[id number]]                                   Final result                 Please view results for these tests on the individual orders.    Green Top (Gel) [[id number]] Collected:   03/05/18 0235    Specimen:  Blood Updated:  03/05/18 0346     Extra Tube Hold for add-ons.      Auto resulted.       Lactic Acid, Reflex Timer (This will reflex a repeat order 3-3:15 hours after ordered.) [[id number]] Collected:  03/05/18 0136    Specimen:  Blood Updated:  03/05/18 0516     Extra Tube Hold for add-ons.      Auto resulted.       Lactic Acid, Reflex [[id number]]  (Abnormal) Collected:  03/05/18 0539    Specimen:  Blood Updated:  03/05/18 0610     Lactate 2.4 (C) mmol/L     Urine Culture - Urine, Urine, Clean Catch [[id number]]  (Normal) Collected:  03/05/18 0154    Specimen:  Urine from Urine, Catheter Updated:  03/05/18 0618     Urine Culture Culture in progress          Imaging Results (last 24 hours)     Procedure Component Value Units Date/Time    XR Chest 1 View [[id number]] Collected:  03/05/18 0140     Updated:  03/05/18 0200    Narrative:         Chest single view on  3/5/2018     CLINICAL INDICATION: Nausea, fever, tachycardia    COMPARISON: 12/15/2016    FINDINGS: There is mild elevation of the right hemidiaphragm.  Mild chronic interstitial changes are noted. Heart is upper  limits normal for size. Hilar and mediastinal contours are within  normal limits.      Impression:       No acute disease.    Electronically signed by:  Luke Bustos  3/5/2018 1:58 AM [facility]  Workstation: [id number]          Assessment/Plan     Principal Problem:    Sepsis  Active Problems:    UTI (urinary tract infection) due to urinary indwelling catheter    Hypotension    Sacral decubitus ulcer    Atrial fibrillation    IV fluid, IV antibiotics, follow up blood and urine cultures. Consult Wound Care regarding his extensive skin breakdown on his buttocks - provide local care.    Yoan Hamilton MD  03/05/18  7:40 AM      Electronically signed by Yoan Hamilton MD at 3/5/2018  7:40 AM       [address] [address]  Phone:  [phone]  Fax:   Date: Mar 16,  2018      Ambulatory Referral to Home Health     Patient:  Mando Hill MRN:  [id number]   [address] [facility] [address] :  1945  SSN:    Phone: [phone] Sex:  M      INSURANCE PAYOR PLAN GROUP # SUBSCRIBER ID   Primary: HUMANA MEDICARE REPLACEMENT [id number]      Referring Provider Information:  WILMA AGUILERA Phone: [phone] Fax:       Referral Information:   # Visits:  1 Referral Type: Home Health [42]   Urgency:  Routine Referral Reason: Specialty Services Required   Start Date: Mar 16, 2018 End Date:  To be determined by Insurer   Diagnosis: Cellulitis of buttock (L03.317 [ICD-10-CM] 682.5 [ICD-9-CM])  Impaired functional mobility, balance, and endurance (Z74.09 [ICD-10-CM] V49.89 [ICD-9-CM])      Refer to Dept:   Refer to Provider:   Refer to Facility:       Face to Face Visit Date: 3/16/2018  Follow-up Provider for Plan of Care? I treated the patient in an acute care facility and will not continue treatment after discharge.  Follow-up Provider: CHRISTINA CONNER [[id number]]  Reason/Clinical Findings: impaired mobility  Describe mobility limitations that make leaving home difficult: impaired mobility  Nursing/Therapeutic Services Requested: Physical Therapy  Nursing/Therapeutic Services Requested: Skilled Nursing  Nursing/Therapeutic Services Requested: Occupational Therapy  Skilled nursing orders: Medication education  Skilled nursing orders: Wound care dressing/changes  Instructions: bag bam, keep dry, naystatin, zinc, hydrocortisone cream.  PT orders: Strengthening  Occupational orders: Strengthening  Frequency: 1 Week 1     This document serves as a request of services and does not constitute Insurance authorization or approval of services.  To determine eligibility, please contact the members Insurance carrier to verify and review coverage.     If you have medical questions regarding this request for services. Please contact [facility]  [address] at [phone] between the hours of 8:00am - 5:00pm (Mon-Fri).             Authorizing Provider:Luca Deleon MD  Authorizing Provider's NPI: [id number]  Order Entered By: Luca Deleon MD 3/16/2018 12:32 PM     Electronically signed by: Luca Deleon MD 3/16/2018 12:32 PM              Physician Progress Notes (last 24 hours) (Notes from 3/15/2018 12:50 PM through 3/16/2018 12:50 PM)      Luca Deleon MD at 3/15/2018  5:40 PM              [facility] Medicine Services  INPATIENT PROGRESS NOTE    Length of Stay: 10  Date of Admission: 3/5/2018  Primary Care Physician: Valerie Marvin MD    Subjective     Chief Complaint   Patient presents with   • Nausea     HPI: 73 year old male who was admitted for weakness, mild nausea, low grade fever, worsening weakness. He has felt bad for last several days, has experienced more generalized pain than usual and has had a poorer appetite. Pt was found to have pseudomonas urosepsis.     3/11/18: pt states that he is doing better, had episode of nausea this AM, no vomiting.     3/12/2018: pt is doing better today, no more nausea. Urine is clearing up. Cultures pending.     3/13/18: Patient is doing much better today, he was able to keep his food down, his urine is clearing up, repeat urine culture shows mixed culture likely consistent with contamination.    3/14/18: Pt is doing well today, plan for ablation tomorrow.     3/15/18: Status post ablation doing well.    Review of Systems   Constitutional: Positive for activity change, appetite change and fatigue. Negative for chills and fever.   HENT: Negative for rhinorrhea, sinus pain and sinus pressure.    Respiratory: Negative for chest tightness, shortness of breath and wheezing.    Cardiovascular: Positive for leg swelling. Negative for chest pain and palpitations.   Gastrointestinal: Positive for nausea. Negative for abdominal pain, constipation, diarrhea and  vomiting.   Endocrine: Negative for cold intolerance and heat intolerance.   Genitourinary:        Gayle is present    Neurological: Negative for dizziness, light-headedness and headaches.   Psychiatric/Behavioral: Negative for agitation, behavioral problems and confusion.        All pertinent negatives and positives are as above. All other systems have been reviewed and are negative unless otherwise stated.     Objective      Vital Sign Min/Max for last 24 hours  Temp  Min: 96.9 °F (36.1 °C)  Max: 97.8 °F (36.6 °C)   BP  Min: 110/55  Max: 152/79   Pulse  Min: 70  Max: 116   Resp  Min: 15  Max: 20   SpO2  Min: 93 %  Max: 98 %   Flow (L/min)  Min: 2  Max: 3   Weight  Min: 77.2 kg (170 lb 3.2 oz)  Max: 77.2 kg (170 lb 3.2 oz)         Physical Exam   Constitutional: He is oriented to person, place, and time. He appears well-developed and well-nourished.   HENT:   Head: Normocephalic and atraumatic.   Eyes: EOM are normal. Pupils are equal, round, and reactive to light.   Neck: Normal range of motion.   Cardiovascular: Normal rate, regular rhythm and normal heart sounds.    Pulmonary/Chest: Effort normal and breath sounds normal.   Abdominal: Soft. Bowel sounds are normal.   Genitourinary:   Genitourinary Comments: Gayle is present.      Musculoskeletal: Normal range of motion. He exhibits edema (1+ edema noted ).   Neurological: He is alert and oriented to person, place, and time.   Skin: Skin is warm.   Psychiatric: He has a normal mood and affect. His behavior is normal.   Vitals reviewed.      Current Facility-Administered Medications   Medication Dose Route Frequency Provider Last Rate Last Dose   • 8-hydroxyquinoline sulfate (BAG BALM) ointment   Apply externally Daily Evelio Christensen MD       • albuterol (PROVENTIL) nebulizer solution 0.5% 2.5 mg/0.5mL  2.5 mg Nebulization Q6H PRN Yoan Hamilton MD       • amiodarone (PACERONE) tablet 200 mg  200 mg Oral Q24H Sai Muñoz MD   200 mg at 03/15/18 1437   •  aspirin EC tablet 81 mg  81 mg Oral Daily Yoan Hamilton MD   81 mg at 03/14/18 0923   • baclofen (LIORESAL) tablet 10 mg  10 mg Oral Q12H Jordi Marie MD   10 mg at 03/15/18 0815   • budesonide-formoterol (SYMBICORT) 160-4.5 MCG/ACT inhaler 2 puff  2 puff Inhalation BID - RT Hardy Fisher MD   2 puff at 03/10/18 0644   • docusate sodium (COLACE) capsule 100 mg  100 mg Oral Daily Jordi Marie MD   100 mg at 03/14/18 0924   • EPINEPHrine (ADRENALIN) 10,000 mcg in sodium chloride 0.9 % 250 mL (40 mcg/mL) infusion  0.02-0.3 mcg/kg/min Intravenous Titrated Yoan Hamilton MD       • finasteride (PROSCAR) tablet 5 mg  5 mg Oral Daily Yoan Hamilton MD   5 mg at 03/14/18 0923   • fluticasone (FLONASE) 50 MCG/ACT nasal spray 2 spray  2 spray Each Nare Daily Hardy Fisher MD   2 spray at 03/14/18 0928   • furosemide (LASIX) tablet 20 mg  20 mg Oral Daily Luca Deleon MD   20 mg at 03/14/18 0924   • HYDROcodone-acetaminophen (NORCO)  MG per tablet 1 tablet  1 tablet Oral Q4H PRN Yoan Hamilton MD   1 tablet at 03/13/18 0756   • ipratropium-albuterol (DUO-NEB) nebulizer solution 3 mL  3 mL Nebulization 4x Daily - RT Hardy Fisher MD   3 mL at 03/10/18 1011   • magic butt ointment   Topical BID Evelio Christensen MD       • nebivolol (BYSTOLIC) tablet 20 mg  20 mg Oral Daily Hardy Fisher MD   20 mg at 03/15/18 1436   • ondansetron (ZOFRAN) injection 4 mg  4 mg Intravenous Q4H PRN Evelio Christensen MD   4 mg at 03/13/18 1141   • ondansetron (ZOFRAN) injection 4 mg  4 mg Intravenous Q6H PRN Seven Londono MD       • phenytoin (DILANTIN) ER capsule 100 mg  100 mg Oral Q12H Yoan Hamilton MD   100 mg at 03/15/18 0814   • polyethylene glycol 3350 powder (packet)  17 g Oral Daily PRN Hardy Fisher MD   17 g at 03/09/18 0911   • potassium chloride (MICRO-K) CR capsule 40 mEq  40 mEq Oral PRN Hardy Fisher MD   40 mEq at 03/14/18 0614    Or   • potassium chloride (KLOR-CON) packet 40 mEq  40 mEq  Oral PRN Hardy Fisher MD        Or   • potassium chloride 10 mEq in 100 mL IVPB  10 mEq Intravenous Q1H PRN Hardy Fisher  mL/hr at 03/10/18 1753 10 mEq at 03/10/18 1753   • promethazine (PHENERGAN) injection 12.5 mg  12.5 mg Intravenous Q6H PRN Jordi Marie MD   12.5 mg at 03/10/18 1514   • rivaroxaban (XARELTO) tablet 20 mg  20 mg Oral Daily With Dinner Seven Londono MD       • sodium chloride (OCEAN) nasal spray 2 spray  2 spray Each Nare PRN Hardy Fisher MD   2 spray at 03/10/18 0854   • sodium chloride 0.9 % flush 1-10 mL  1-10 mL Intravenous PRN Yoan Hamilton MD   10 mL at 03/07/18 1615   • sodium chloride 0.9 % flush 10 mL  10 mL Intravenous PRN Robert Anderson MD       • sodium chloride 0.9 % flush 10 mL  10 mL Intracatheter Q12H Hardy Fisher MD   10 mL at 03/14/18 2135   • sodium chloride 0.9 % flush 10 mL  10 mL Intracatheter PRN Hardy Fisher MD       • sodium chloride 0.9 % flush 10 mL  10 mL Intracatheter PRN Hardy Fisher MD   10 mL at 03/10/18 2110   • tamsulosin (FLOMAX) 24 hr capsule 0.4 mg  0.4 mg Oral Nightly Yoan Hamilton MD   0.4 mg at 03/14/18 2101   • temazepam (RESTORIL) capsule 15 mg  15 mg Oral Nightly PRN Jordi Marie MD   15 mg at 03/14/18 2130   • vitamin C (ASCORBIC ACID) tablet 500 mg  500 mg Oral Daily Yoan Hamilton MD   500 mg at 03/14/18 0924           Results Review:  I have reviewed the labs, radiology results, and diagnostic studies.    Laboratory Data:     Results from last 7 days  Lab Units 03/15/18  0608 03/14/18  0959 03/14/18  0751 03/13/18  1007   SODIUM mmol/L 142  --  142 139   POTASSIUM mmol/L 4.3 4.7 5.0 3.0*   CHLORIDE mmol/L 100  --  98 93*   CO2 mmol/L 34.0*  --  35.0* 35.0*   BUN mg/dL 29*  --  29* 34*   CREATININE mg/dL 1.11  --  1.28 1.34*   GLUCOSE mg/dL 105*  --  113* 180*   CALCIUM mg/dL 8.4  --  8.4 8.1*   BILIRUBIN mg/dL 0.3  --  0.4 0.2   ALK PHOS U/L 54  --  55 53   ALT (SGPT) U/L 28  --  36 37    AST (SGOT) U/L 22  --  17 19   ANION GAP mmol/L 8.0  --  9.0 11.0     Estimated Creatinine Clearance: 62.8 mL/min (by C-G formula based on SCr of 1.11 mg/dL).    Results from last 7 days  Lab Units 03/13/18  1515   MAGNESIUM mg/dL 2.0           Results from last 7 days  Lab Units 03/15/18  0608 03/14/18  0532 03/13/18  0604 03/12/18  1447 03/12/18  0650   WBC 10*3/mm3 10.51* 11.26* 10.17* 10.58* 10.24*   HEMOGLOBIN g/dL 13.1* 13.6* 14.4 14.4 15.4   HEMATOCRIT % 39.1 40.2 42.6 42.3 44.8   PLATELETS 10*3/mm3 236 255 227 227 233       Results from last 7 days  Lab Units 03/12/18  1447   INR  2.48*       Results from last 7 days  Lab Units 03/15/18  0608 03/13/18  1515 03/11/18  0808   CRP mg/dL 1.40* 2.80* 3.20*       Culture Data:   No results found for: BLOODCX  No results found for: URINECX  No results found for: RESPCX  No results found for: WOUNDCX  No results found for: STOOLCX  No components found for: BODYFLD      Radiology Data:   Imaging Results (last 24 hours)     ** No results found for the last 24 hours. **          I have reviewed the patient current medications.     Assessment/Plan     Active Hospital Problems (** Indicates Principal Problem)    Diagnosis Date Noted   • **Sepsis [A41.9] 03/05/2018   • Sacral decubitus ulcer [L89.159] 03/05/2018   • UTI (urinary tract infection) due to urinary indwelling catheter [T83.511A, N39.0] 03/05/2018   • Hypotension [I95.9] 03/05/2018   • Atrial fibrillation [I48.91]       Resolved Hospital Problems    Diagnosis Date Noted Date Resolved   No resolved problems to display.     1. Urosepsis: with pseudomonas sensitive to zosyn, completed the course, repeat cultures negative.   2.  AUGIE: baseline Cr. 1.07, now at 1.36. Will lower lasix to 20mg.   3. A. Fib: on amiodarone, cardizem, on xarelto. S/p ablation doing well.  Xarelto on hold.  4. Hypotension: resolved.   5. Right upper lobe pneumonia: completed the course of antibiotics.    6. Hypokalemia: replace  "potassium.  Check mag    Discharge Planning: I expect patient to be discharged to home vs NH in 1-2 days.      DVT Prophylaxis: xarelto               This document has been electronically signed by Luca Deleon MD on March 15, 2018 5:40 PM          Electronically signed by Luca Deleon MD at 3/15/2018  6:56 PM     Marco A JEAN Frederick at 3/15/2018  4:00 PM     Attestation signed by Morgan Al MD at 3/15/2018  8:17 PM    I have reviewed the documentation above and agree.                       LOS: 10 days   Patient Care Team:  Valerie Marvin MD as PCP - General    Subjective     Subjective:  Symptoms:  Stable.  (Urine is clear and yellow, no penoscrotal edema. Cystostomy site is normal. States he is ready to go home. ).    Diet:  No nausea.        History taken from: patient chart family    Objective     Vital Signs  Temp:  [96.9 °F (36.1 °C)-97.8 °F (36.6 °C)] 97 °F (36.1 °C)  Heart Rate:  [] 80  Resp:  [15-20] 15  BP: (110-152)/(54-96) 139/79    Objective:  General Appearance:  In no acute distress.    Vital signs: (most recent): Blood pressure 139/79, pulse 80, temperature 97 °F (36.1 °C), temperature source Temporal Artery , resp. rate 15, height 170.2 cm (67.01\"), weight 77.2 kg (170 lb 3.2 oz), SpO2 94 %.  Vital signs are normal.  No fever.    Output: Producing urine (Yellow urine is SP tubes gravity bag).    HEENT: Normal HEENT exam.    Lungs:  Normal effort and normal respiratory rate.  He is not in respiratory distress.    Chest: Symmetric chest wall expansion.   Abdomen: Abdomen is soft and non-distended.  There is no abdominal tenderness.     Extremities: Decreased range of motion.  There is deformity.  There is no dependent edema (LLE).    Pulses: Distal pulses are intact.    Neurological: Patient is alert and oriented to person, place and time.  GCS score is 15.    Pupils:  Pupils are equal, round, and reactive to light.    Skin:  Warm and dry.  No ecchymosis or cyanosis. "             Results Review:    Lab Results (last 24 hours)     Procedure Component Value Units Date/Time    C-reactive Protein [[id number]]  (Abnormal) Collected:  03/15/18 0608    Specimen:  Blood Updated:  03/15/18 1428     C-Reactive Protein 1.40 (H) mg/dL     Comprehensive Metabolic Panel [[id number]]  (Abnormal) Collected:  03/15/18 0608    Specimen:  Blood Updated:  03/15/18 0722     Glucose 105 (H) mg/dL      BUN 29 (H) mg/dL      Creatinine 1.11 mg/dL      Sodium 142 mmol/L      Potassium 4.3 mmol/L      Chloride 100 mmol/L      CO2 34.0 (H) mmol/L      Calcium 8.4 mg/dL      Total Protein 6.5 g/dL      Albumin 3.30 (L) g/dL      ALT (SGPT) 28 U/L      AST (SGOT) 22 U/L      Alkaline Phosphatase 54 U/L      Total Bilirubin 0.3 mg/dL      eGFR Non African Amer 65 mL/min/1.73      Globulin 3.2 gm/dL      A/G Ratio 1.0 (L) g/dL      BUN/Creatinine Ratio 26.1 (H)     Anion Gap 8.0 mmol/L     Narrative:       The MDRD GFR formula is only valid for adults with stable renal function between ages 18 and 70.    CBC & Differential [[id number]] Collected:  03/15/18 0608    Specimen:  Blood Updated:  03/15/18 0652    Narrative:       The following orders were created for panel order CBC & Differential.  Procedure                               Abnormality         Status                     ---------                               -----------         ------                     CBC Auto Differential[[id number]]        Abnormal            Final result                 Please view results for these tests on the individual orders.    CBC Auto Differential [[id number]]  (Abnormal) Collected:  03/15/18 0608    Specimen:  Blood Updated:  03/15/18 0652     WBC 10.51 (H) 10*3/mm3      RBC 4.21 (L) 10*6/mm3      Hemoglobin 13.1 (L) g/dL      Hematocrit 39.1 %      MCV 92.9 fL      MCH 31.1 pg      MCHC 33.5 g/dL      RDW 13.7 %      RDW-SD 46.4 (H) fl      MPV 10.0 fL      Platelets 236 10*3/mm3      Neutrophil % 75.5 %      Lymphocyte %  9.2 (L) %      Monocyte % 8.9 %      Eosinophil % 4.7 %      Basophil % 0.2 %      Immature Grans % 1.5 (H) %      Neutrophils, Absolute 7.93 10*3/mm3      Lymphocytes, Absolute 0.97 10*3/mm3      Monocytes, Absolute 0.94 (H) 10*3/mm3      Eosinophils, Absolute 0.49 10*3/mm3      Basophils, Absolute 0.02 10*3/mm3      Immature Grans, Absolute 0.16 (H) 10*3/mm3          Imaging Results (last 24 hours)     ** No results found for the last 24 hours. **           I reviewed the patient's new clinical results.  I reviewed the patient's new imaging results and agree with the interpretation.  I reviewed the patient's other test results and agree with the interpretation      Assessment/Plan     Principal Problem:    Sepsis  Active Problems:    Sacral decubitus ulcer    UTI (urinary tract infection) due to urinary indwelling catheter    Hypotension    Atrial fibrillation      Assessment & Plan    UTI, acute, causing sepsis related to chronic SP tube last changed 3/5/18  -Urine culture Pseudomonas aeruginosa 3/5/18  -3/11/18 urine culture mixed annamarie.   -WBC 23.46-->15.17-->9.54-->13.87-->10.58-->10.17-->11.26-->10.51  -Zosyn day #10, antibiotic course complete  -Estimated Creatinine Clearance: 62.8 mL/min (by C-G formula based on SCr of 1.11 mg/dL).     Neurogenic bladder  -chronic SP tube changed 3/5/18     BPH  Flomax, Proscar    Hematuria, new on 3/11/18, since resolved  -probable trauma with catheter manipulation  -3/11/18 new urine culture shows mixed annamarie    Plan: Can continue with monthly SP tube changes with [facility] nursing, SP tube due for changed 4/2/18. Continue Flomax and Proscar for BPH.  Can resume nitrofurantoin for UTI suppression upon discharge as well.     JEAN Robbins  03/15/18  5:06 PM        Electronically signed by Morgan Al MD at 3/15/2018  8:17 PM       [address] [address]  Phone:  [phone]  Fax:    Date: Mar 16, 2018      Ambulatory Referral to Home Health     Patient:  Mando Hill MRN:  [id number]   [address] [id number] :  1945  SSN:    Phone: [phone] Sex:  M      INSURANCE PAYOR PLAN GROUP # SUBSCRIBER ID   Primary: [employer] MED[employer]ARE REPLACEMENT [id number]      Referring Provider Information:  WILMA AGUILERA Phone: [phone] Fax:       Referral Information:   # Visits:  1 Referral Type: Home Health [42]   Urgency:  Routine Referral Reason: Specialty Services Required   Start Date: Mar 16, 2018 End Date:  To be determined by Insurer   Diagnosis: Cellulitis of buttock (L03.317 [ICD-10-CM] 682.5 [ICD-9-CM])  Impaired functional mobility, balance, and endurance (Z74.09 [ICD-10-CM] V49.89 [ICD-9-CM])      Refer to Dept:   Refer to Provider:   Refer to Facility:       Face to Face Visit Date: 3/16/2018  Follow-up Provider for Plan of Care? I treated the patient in an acute care facility and will not continue treatment after discharge.  Follow-up Provider: CHRISTINA CONNER [[id number]]  Reason/Clinical Findings: impaired mobility  Describe mobility limitations that make leaving home difficult: impaired mobility  Nursing/Therapeutic Services Requested: Physical Therapy  Nursing/Therapeutic Services Requested: Skilled Nursing  Nursing/Therapeutic Services Requested: Occupational Therapy  Skilled nursing orders: Medication education  Skilled nursing orders: Wound care dressing/changes  Instructions: bag bam, keep dry, naystatin, zinc, hydrocortisone cream.  PT orders: Strengthening  Occupational orders: Strengthening  Frequency: 1 Week 1     This document serves as a request of services and does not constitute Insurance authorization or approval of services.  To determine eligibility, please contact the members Insurance carrier to verify and review coverage.     If you have medical questions regarding this request for services. Please contact  [address] at [phone] between the hours of 8:00am - 5:00pm (Mon-Fri).             Authorizing Provider:Luca Deleon MD  Authorizing Provider's NPI: [id number]  Order Entered By: Luca Deleon MD 3/16/2018 12:32 PM     Electronically signed by: Luca Deleon MD 3/16/2018 12:32 PM

## 2018-03-16 NOTE — DISCHARGE SUMMARY
[address] [address]  T - [phone]     DISCHARGE SUMMARY         PATIENT  DEMOGRAPHICS   PATIENT NAME: Mando Anders McGabarik                      PHYSICIAN: Luca Deleon MD  : 1945  MRN: [id number]    ADMISSION/DISCHARGE INFO   ADMISSION DATE: 3/5/2018   DISCHARGE DATE: 3/16/2018    ADMISSION DIAGNOSES: Cellulitis of buttock [L03.317]  Sepsis, due to unspecified organism [A41.9]  Fever, unspecified fever cause [R50.9]  DISCHARGE DIAGNOSES:     Principal Problem:    Sepsis  Active Problems:    Sacral decubitus ulcer    UTI (urinary tract infection) due to urinary indwelling catheter    Hypotension    Atrial fibrillation      SERVICE:  Medicine       CONSULTS   Consult Orders (all)     Start     Ordered    18 1157  Inpatient Cardiology Consult  Once     Specialty:  Cardiology  Provider:  Sai Muñoz MD    18 1156    18 0859  Inpatient Urology Consult  Once     Specialty:  Urology  Provider:  Morgan Al MD    18 0900    18 0256  Hospitalist (on-call MD unless specified)  Once     Specialty:  Hospitalist  Provider:  Yoan Hamilton MD    18 0255          PROCEDURES     Imaging Results (last 24 hours)     ** No results found for the last 24 hours. **          Us Guided Vascular Access    Result Date: 3/7/2018  Narrative: This procedure was auto-finalized with no dictation required.    Xr Chest 1 View    Result Date: 3/8/2018  Narrative: Chest single view CLINICAL INDICATION: Shortness of breath. Respiratory failure, fever and sepsis COMPARISON: Chest 2018. FINDINGS: Cardiomegaly. Prominence of pulmonary vascular markings slightly improved since prior exam. Infiltrative changes, pneumonitis right lung primarily right upper lobe with partial clearing, slight improvement since prior exam.     Impression: CONCLUSION: As above. Electronically signed by:  Dennis Gates MD  3/8/2018 11:05 AM CST  Workstation: [id number]    Xr Chest 1 View    Result Date: 3/5/2018  Narrative: Chest single view on  3/5/2018 CLINICAL INDICATION: Nausea, fever, tachycardia COMPARISON: 12/15/2016 FINDINGS: There is mild elevation of the right hemidiaphragm. Mild chronic interstitial changes are noted. Heart is upper limits normal for size. Hilar and mediastinal contours are within normal limits.     Impression: No acute disease. Electronically signed by:  Luke Bustos  3/5/2018 1:58 AM CST Workstation: [id number]    Xr Chest Post Cva Port    Result Date: 3/7/2018  Narrative: EXAM:         Radiograph(s), Chest VIEWS:   Portable ; 1     DATE/TIME:  3/7/2018 1:46 PM CST            INDICATION:   verification, R50.9 Fever, unspecified A41.9 Sepsis, unspecified organism L03.317 Cellulitis of buttock  COMPARISON:  CXR: 3/5/18         FINDINGS:         - lines/tubes:    Right approach PICC line in standard position.  - cardiac:         size within normal limits       - mediastinum: contour within normal limits       - lungs:         Focal airspace disease, right upper lobe. A second focus of airspace disease may be present in the left base.           - pleura:         no evidence of  fluid                - osseous:         unremarkable for age                - misc.:        Impression: CONCLUSION:    1. Right approach PICC line in standard position. 2. Right upper lobe airspace disease.                                   Electronically signed by:  ERIC Sibley MD  3/7/2018 1:47 PM CST Workstation: [id number]    Ir Picc Wo Fluoro Guidance    Result Date: 3/7/2018  Narrative: This procedure was auto-finalized with no dictation required.      HISTORY OF PRESENT ILLNESS   Mando Hill is a 73 y.o. male who was admitted for weakness, mild nausea, low-grade fever, worsening weakness.  Patient had felt bad for last several days, had expanders more generalized pain than usual and has had poor appetite.  Patient was found to  have Pseudomonas urosepsis.  Patient was also found to have atrial flutter which required further workup and EP study.    DIAGNOSTIC DATA   Labs  Images    HOSPITAL COURSE   Patient was admitted to the hospital, was started on IV antibiotic, urine and also as was performed and cultures were obtained.  Patient was found to have Pseudomonas urosepsis.  He was started on Levaquin according to the sensitivity.  Patient was also found to have atrial fibrillation with RVR.  He was started on amiodarone and Cardizem however further EP study was required.  Patient was then evaluated by Dr. Londono, AV node ablation was performed.  Currently patient is in normal sinus rhythm.  He will go home on amiodarone and anticoagulation with Xarelto.  Patient does have a suprapubic chronic Gayle which he will continue to maintain as outpatient and will follow up urology.  Patient will go back to nitrofurantoin as suppressive agent for his recurrent urinary tract infection.  Urology has seen patient in the hospital and will follow-up as outpatient. repeat urine cultures have been consistent with contamination.  Currently he is stable, normal sinus rhythm, vitals are stable.  Patient will go home with his significant other and will home health.  She state that she will be taking care of the patient home.    Physical Exam   Constitutional: He is oriented to person, place, and time. He appears well-developed and well-nourished.   HENT:   Head: Normocephalic.   Eyes: EOM are normal. Pupils are equal, round, and reactive to light.   Neck: Normal range of motion.   Cardiovascular: Normal rate, regular rhythm and normal heart sounds.    Pulmonary/Chest: Effort normal and breath sounds normal.   Genitourinary:   Genitourinary Comments: Gayle is present    Musculoskeletal:        Right foot: There is decreased range of motion.        Left foot: There is decreased range of motion.   Neurological: He is alert and oriented to person, place, and time.    Skin: Skin is warm.   Psychiatric: He has a normal mood and affect. His behavior is normal.   Vitals reviewed.           DISCHARGE CONDITION   Stable    DISPOSITION   To home with home health      DISCHARGE MEDICATIONS      Mando Hill   Home Medication Instructions YASMIN:[id number]    Printed on:03/16/18 8378   Medication Information                      albuterol (PROVENTIL HFA;VENTOLIN HFA) 108 (90 Base) MCG/ACT inhaler  Inhale 2 puffs Every 4 (Four) Hours As Needed for Wheezing.             amiodarone (PACERONE) 200 MG tablet  Take 1 tablet by mouth Daily.             aspirin 81 MG EC tablet  Take 81 mg by mouth Daily.             atorvastatin (LIPITOR) 10 MG tablet  Take 1 tablet by mouth Daily.             baclofen (LIORESAL) 10 MG tablet  Take 10 mg by mouth 2 (Two) Times a Day.             budesonide-formoterol (SYMBICORT) 160-4.5 MCG/ACT inhaler  Inhale 2 puffs 2 (Two) Times a Day.             finasteride (PROSCAR) 5 MG tablet  Take 5 mg by mouth Daily.             furosemide (LASIX) 20 MG tablet  Take 20 mg by mouth Daily.             HYDROcodone-acetaminophen (NORCO)  MG per tablet  TAKE 1 TABLET BY MOUTH EVERY SIX HOURS AS NEEDED FOR CHRONIC PAIN             hydrOXYzine (ATARAX) 25 MG tablet  Take 25 mg by mouth 3 (Three) Times a Day As Needed for Itching.             nebivolol (BYSTOLIC) 20 MG tablet  Take 20 mg by mouth Daily.             nitrofurantoin (MACRODANTIN) 50 MG capsule  Take 50 mg by mouth Every Night.             Omega-3 Fatty Acids (FISH OIL) 1000 MG capsule capsule  Take  by mouth Daily With Breakfast.             phenytoin (DILANTIN) 100 MG ER capsule  Take  by mouth 2 (Two) Times a Day.             rivaroxaban (XARELTO) 20 MG tablet  Take 20 mg by mouth Daily.             tamsulosin (FLOMAX) 0.4 MG capsule 24 hr capsule  Take 1 capsule by mouth Every Night.             vitamin C (ASCORBIC ACID) 500 MG tablet  Take 500 mg by mouth Daily.                    INSTRUCTIONS   Activity:   Activity Instructions     Discharge Activity             Diet:   Diet Instructions     Diet: Cardiac, Consistent Carbohydrate       Discharge Diet:   Cardiac  Consistent Carbohydrate             Special Instructions: Patient instructed to call M.D. or return to ED with worsening shortness of breath, chest pain, fever greater than 100.4°F or any other medical concerns.    FOLLOW UP   Follow-up Information     Valerie Marvin MD Follow up.    Specialty:  Family Medicine  Contact information:  [address]  [address]  [facility] [address]  [phone]             Seven Londono MD Follow up in 1 month(s).    Specialty:  Cardiology  Contact information:  [address] [place] [address] [address]  [facility]  [place] [id number] [phone]             Morgan Al MD Follow up in 2 week(s).    Specialty:  Urology  Contact information:  [address]    [place] [address]  [phone]             Sai Muñoz MD Follow up in 6 week(s).    Specialty:  Cardiology  Contact information:  [address] [place] [address] [address]  [facility] [address] [address]  [place] [address]  [phone]                  PENDING TEST RESULTS AT DISCHARGE      TIME   Time: 36 minutes were spent planning this discharge.                      This document has been electronically signed by Luca Deleon MD on March 16, 2018 12:17 PM

## 2018-03-16 NOTE — THERAPY DISCHARGE NOTE
Acute Care - Physical Therapy Discharge Summary  [facility]       Patient Name: Mando Hill  : 1945  MRN: [id number]    Today's Date: 3/16/2018       Date of Referral to PT: 18         Admit Date: 3/5/2018      PT Recommendation and Plan    Visit Dx:    ICD-10-CM ICD-9-CM   1. Fever, unspecified fever cause R50.9 780.60   2. Sepsis, due to unspecified organism A41.9 038.9     995.91   3. Cellulitis of buttock L03.317 682.5   4. Impaired functional mobility, balance, and endurance Z74.09 V49.89             Outcome Measures     Row Name 18 1045             How much help from another person do you currently need...    Turning from your back to your side while in flat bed without using bedrails? 1  -TW      Moving from lying on back to sitting on the side of a flat bed without bedrails? 1  -TW      Moving to and from a bed to a chair (including a wheelchair)? 1  -TW      Standing up from a chair using your arms (e.g., wheelchair, bedside chair)? 1  -TW      Climbing 3-5 steps with a railing? 1  -TW      To walk in hospital room? 1  -TW      AM-PAC 6 Clicks Score 6  -TW         Functional Assessment    Outcome Measure Options AM-PAC 6 Clicks Basic Mobility (PT)  -TW        User Key  (r) = Recorded By, (t) = Taken By, (c) = Cosigned By    Initials Name Provider Type    TW Duane Marie PTA Physical Therapy Assistant                      PT Rehab Goals     Row Name 18 1045 18 1050 18 1139       Transfer Goal 1 (PT)    Activity/Assistive Device (Transfer Goal 1, PT) bed-to-chair/chair-to-bed  -TW bed-to-chair/chair-to-bed  -JW bed-to-chair/chair-to-bed  -RW    [place] Level/Cues Needed (Transfer Goal 1, PT) other (see comments)   dependant  -TW --   dependent  -JW other (see comments)   dependant  -RW    Time Frame (Transfer Goal 1, PT) 5 days  -TW 5 days  -JW 5 days  -RW    Barriers (Transfers Goal 1, PT) Pt will tolerate lift t/f to chair w/o significant  change to VS or skin irritation.  -TW pt will tolerate lift t/alec to chair w/out significant change in VS or skin irritation  -JW pt will tolerate lift transfer to chair with out significant change in vs or skin irritation  -RW    Progress/Outcome (Transfer Goal 1, PT) goal not met  -TW goal not met  -JW goal ongoing  -RW       Transfer Goal 2 (PT)    Activity/Assistive Device (Transfer Goal 2, PT) bed-to-chair/chair-to-bed  -TW bed-to-chair/chair-to-bed  -JW bed-to-chair/chair-to-bed  -RW    [place] Level/Cues Needed (Transfer Goal 2, PT) --   Pt's wife will t/f as PLOF comforable   -TW other (see comments)   pts wife will t/alec as PLOF comfortably  -JW other (see comments)   pt wife will transfer as plof comfortably  -RW    Time Frame (Transfer Goal 2, PT) by discharge  -TW by discharge  -JW by discharge  -RW    Progress/Outcome (Transfer Goal 2, PT) goal not met  -TW goal not met  -JW goal ongoing  -RW       Wheelchair Locomotion Goal 1 (PT)    Activity (Wheelchair Locomotion Goal 1, PT) other (see comments)   Repositioning in w/c  -TW other (see comments)   repositioning in w/c  -JW other (see comments)   repositioning in wc  -RW    Time Frame (Wheelchair Locomotion Goal 1, PT) by discharge  -TW by discharge  -JW by discharge  -RW    Progress/Outcome (Wheelchair Locomotion Goal 1, PT) goal not met  -TW goal not met  -JW goal ongoing  -RW       Problem Specific Goal 1 (PT)    Problem Specific Goal 1 (PT) Cardiopulmonary goal  -TW cardiopulmonary goal  -JW cardiopulmonary  -RW    Time Frame (Problem Specific Goal 1, PT) 3 days  -TW 3 days  -JW 3 days  -RW    Barriers (Problem Specific Goal 1, PT) Pt will not have dizziness or drop in sats with sitting upright x 30 minutes  -TW pt will not have dizziness or drop in sats w/ sitting upright x 30 mins  -JW pt will not have dizziness or drop in sats with sitting upright x 30 min  -RW    Progress/Outcome (Problem Specific Goal 1, PT) goal not met  -TW goal not met   -JW goal met  -RW    Row Name 03/11/18 1500 03/10/18 1100 03/10/18 0800       Transfer Goal 1 (PT)    Activity/Assistive Device (Transfer Goal 1, PT) bed-to-chair/chair-to-bed  -RW bed-to-chair/chair-to-bed  -CA bed-to-chair/chair-to-bed  -CA    [place] Level/Cues Needed (Transfer Goal 1, PT) other (see comments)   dependant  -RW other (see comments)   Dependent  -CA other (see comments)   Dependent  -CA    Time Frame (Transfer Goal 1, PT) 5 days  -RW 5 days  -CA 5 days  -CA    Barriers (Transfers Goal 1, PT) pt will tolerate lift transfer to chair with out significant change in vs or skin irritation  -RW pt. will tolerated lift transfer to chair without significant change in VS or skin irritation  -CA Pt. will tolerate lift transfer to chair without significant change in VS or skin irritation  -CA    Progress/Outcome (Transfer Goal 1, PT) goal ongoing  -RW goal ongoing  -CA goal ongoing  -CA       Transfer Goal 2 (PT)    Activity/Assistive Device (Transfer Goal 2, PT) bed-to-chair/chair-to-bed  -RW bed-to-chair/chair-to-bed  -CA bed-to-chair/chair-to-bed  -CA    [place] Level/Cues Needed (Transfer Goal 2, PT) other (see comments)   pt wife will transfer as PLOF comfortable  -RW other (see comments)   Pt./Wife will transfer as PLOF comfortably  -CA other (see comments)   Pt./WIfe  will transfer as PLOF comfortably  -CA    Time Frame (Transfer Goal 2, PT) by discharge  -RW by discharge  -CA by discharge  -CA    Progress/Outcome (Transfer Goal 2, PT) goal ongoing  -RW goal ongoing  -CA goal ongoing  -CA       Wheelchair Locomotion Goal 1 (PT)    Activity (Wheelchair Locomotion Goal 1, PT) other (see comments)   repositioning in wc  -RW other (see comments)   Repositioning in W/C  -CA other (see comments)   Repositioning in W/C  -CA    Time Frame (Wheelchair Locomotion Goal 1, PT) by discharge  -RW by discharge  -CA by discharge  -CA    Progress/Outcome (Wheelchair Locomotion Goal 1, PT) goal ongoing  -RW  goal ongoing  -CA goal ongoing  -CA       Problem Specific Goal 1 (PT)    Problem Specific Goal 1 (PT) --   cardiopulmonary goal  -RW Cardiopulmonary goal  -CA Cardiopulmonary Goal  -CA    Time Frame (Problem Specific Goal 1, PT) 3 days  -RW 3 days  -CA 3 days  -CA    Barriers (Problem Specific Goal 1, PT) pt. will not have dizziness or drop in sats w/ sitting upright x 30 mins  -RW pt. will not have dizziness or drop in sats with sitting upright for 30 mins  -CA pt. will not have dizziness or drop in sats w/ sitting upright x 30 mins  -CA    Progress/Outcome (Problem Specific Goal 1, PT) goal ongoing  -RW  --  --      User Key  (r) = Recorded By, (t) = Taken By, (c) = Cosigned By    Initials Name Provider Type    TROY Nunez, PTA Physical Therapy Assistant    CA Rick Martinez, PTA Physical Therapy Assistant    TW Duane Marie, PTA Physical Therapy Assistant    RW Twan Rosales, PTA Physical Therapy Assistant              PT Discharge Summary  Anticipated Discharge Disposition (PT): home with home health care  Reason for Discharge: Per MD order, Discharge from facility  Outcomes Achieved: Unable to make functional progress toward goals at this time  Discharge Destination: Home with home health      Veda Hickman, PT   3/16/2018

## 2018-03-16 NOTE — PLAN OF CARE
Problem: Patient Care Overview (Adult)  Goal: Plan of Care Review  Outcome: Ongoing (interventions implemented as appropriate)   03/16/18 0552   Coping/Psychosocial Response Interventions   Plan Of Care Reviewed With patient   Patient Care Overview   Progress no change      03/16/18 0552   Coping/Psychosocial Response Interventions   Plan Of Care Reviewed With patient   Patient Care Overview   Progress improving     Goal: Adult Individualization and Mutuality  Outcome: Ongoing (interventions implemented as appropriate)    Goal: Discharge Needs Assessment  Outcome: Ongoing (interventions implemented as appropriate)      Problem: Fall Risk (Adult)  Goal: Absence of Fall  Outcome: Ongoing (interventions implemented as appropriate)      Problem: Arrhythmia/Dysrhythmia (Symptomatic) (Adult)  Goal: Signs and Symptoms of Listed Potential Problems Will be Absent, Minimized or Managed (Arrhythmia/Dysrhythmia)  Outcome: Ongoing (interventions implemented as appropriate)

## 2018-03-16 NOTE — PROGRESS NOTES
LOS: 11 days   Patient Care Team:  Valerie Marvin MD as PCP - General    Chief Complaint: Atrial flutter with variable ventricular response status post EP study and flutter ablation currently in sinus rhythm       Review of Systems:   ROS  Constitutional: Positive for activity change, appetite change and fatigue. Negative for chills and fever.   HENT: Positive for rhinorrhea.    Respiratory: Negative for chest tightness, shortness of breath and wheezing.    Cardiovascular: Positive for leg swelling. Negative for chest pain and palpitations.   Gastrointestinal:. Negative for abdominal pain, constipation, diarrhea and vomiting.   Endocrine: Negative for cold intolerance and heat intolerance.   Genitourinary:        Gayle is present    Neurological: Negative for dizziness, light-headedness and headaches.   Psychiatric/Behavioral: Negative for agitation, behavioral problems and confusion  Objective     Current Facility-Administered Medications   Medication Dose Route Frequency Provider Last Rate Last Dose   • 8-hydroxyquinoline sulfate (BAG BALM) ointment   Apply externally Daily Evelio Christensen MD       • albuterol (PROVENTIL) nebulizer solution 0.5% 2.5 mg/0.5mL  2.5 mg Nebulization Q6H PRN Yoan Hamilton MD       • amiodarone (PACERONE) tablet 200 mg  200 mg Oral Q24H Sai Muñoz MD   200 mg at 03/16/18 0809   • aspirin EC tablet 81 mg  81 mg Oral Daily Yoan Hamilton MD   81 mg at 03/16/18 0809   • baclofen (LIORESAL) tablet 10 mg  10 mg Oral Q12H Jordi Marie MD   10 mg at 03/16/18 0809   • budesonide-formoterol (SYMBICORT) 160-4.5 MCG/ACT inhaler 2 puff  2 puff Inhalation BID - RT Hardy Fisher MD   2 puff at 03/10/18 0644   • docusate sodium (COLACE) capsule 100 mg  100 mg Oral Daily Jordi Marie MD   100 mg at 03/16/18 0809   • EPINEPHrine (ADRENALIN) 10,000 mcg in sodium chloride 0.9 % 250 mL (40 mcg/mL) infusion  0.02-0.3 mcg/kg/min Intravenous Titrated Yoan Hamilton MD       •  finasteride (PROSCAR) tablet 5 mg  5 mg Oral Daily Yoan Hamilton MD   5 mg at 03/16/18 0809   • fluticasone (FLONASE) 50 MCG/ACT nasal spray 2 spray  2 spray Each Nare Daily Hardy Fisher MD   2 spray at 03/16/18 0810   • furosemide (LASIX) tablet 20 mg  20 mg Oral Daily Luca Deleon MD   20 mg at 03/16/18 0809   • HYDROcodone-acetaminophen (NORCO)  MG per tablet 1 tablet  1 tablet Oral Q4H PRN Yoan Hamilton MD   1 tablet at 03/13/18 0756   • ipratropium-albuterol (DUO-NEB) nebulizer solution 3 mL  3 mL Nebulization 4x Daily - RT Hardy Fisher MD   3 mL at 03/10/18 1011   • magic butt ointment   Topical BID Evelio Christensen MD       • nebivolol (BYSTOLIC) tablet 20 mg  20 mg Oral Daily Hardy Fisher MD   20 mg at 03/16/18 0809   • ondansetron (ZOFRAN) injection 4 mg  4 mg Intravenous Q4H PRN Evelio Christensen MD   4 mg at 03/13/18 1141   • ondansetron (ZOFRAN) injection 4 mg  4 mg Intravenous Q6H PRN Seven Londono MD       • phenytoin (DILANTIN) ER capsule 100 mg  100 mg Oral Q12H Yoan Hamilton MD   100 mg at 03/16/18 0809   • polyethylene glycol 3350 powder (packet)  17 g Oral Daily PRN Hardy Fisher MD   17 g at 03/09/18 0911   • potassium chloride (MICRO-K) CR capsule 40 mEq  40 mEq Oral PRN Hardy Fisher MD   40 mEq at 03/14/18 0614    Or   • potassium chloride (KLOR-CON) packet 40 mEq  40 mEq Oral PRN Hardy Fisher MD        Or   • potassium chloride 10 mEq in 100 mL IVPB  10 mEq Intravenous Q1H PRN Hardy Fisher  mL/hr at 03/10/18 1753 10 mEq at 03/10/18 1753   • promethazine (PHENERGAN) injection 12.5 mg  12.5 mg Intravenous Q6H PRN Jordi Marie MD   12.5 mg at 03/10/18 1514   • rivaroxaban (XARELTO) tablet 20 mg  20 mg Oral Daily With Dinner Seven Londono MD   20 mg at 03/15/18 1749   • sodium chloride (OCEAN) nasal spray 2 spray  2 spray Each Nare PRN Hardy Fisher MD   2 spray at 03/10/18 0866   • sodium chloride 0.9 % flush 1-10 mL  1-10 mL  "Intravenous PRN Yoan Hamilton MD   10 mL at 03/07/18 1615   • sodium chloride 0.9 % flush 10 mL  10 mL Intravenous PRN Robert Anderson MD       • sodium chloride 0.9 % flush 10 mL  10 mL Intracatheter Q12H Hardy Fihser MD   10 mL at 03/15/18 2110   • sodium chloride 0.9 % flush 10 mL  10 mL Intracatheter PRN Hardy Fisher MD       • sodium chloride 0.9 % flush 10 mL  10 mL Intracatheter PRN Hardy Fisher MD   10 mL at 03/10/18 2110   • tamsulosin (FLOMAX) 24 hr capsule 0.4 mg  0.4 mg Oral Nightly Yoan Hamilton MD   0.4 mg at 03/15/18 2109   • temazepam (RESTORIL) capsule 15 mg  15 mg Oral Nightly PRN Jordi Marie MD   15 mg at 03/15/18 2109   • vitamin C (ASCORBIC ACID) tablet 500 mg  500 mg Oral Daily Yoan Hamilton MD   500 mg at 03/16/18 0809       Vital Sign Min/Max for last 24 hours  Temp  Min: 97.3 °F (36.3 °C)  Max: 98.4 °F (36.9 °C)   BP  Min: 131/75  Max: 155/81   Pulse  Min: 65  Max: 81   Resp  Min: 15  Max: 18   SpO2  Min: 93 %  Max: 96 %   Flow (L/min)  Min: 2  Max: 2   No Data Recorded     Flowsheet Rows    Flowsheet Row First Filed Value   Admission Height 170.2 cm (67\") Documented at 03/05/2018 0023   Admission Weight 81.6 kg (180 lb) Documented at 03/05/2018 0023            Intake/Output Summary (Last 24 hours) at 03/16/18 1442  Last data filed at 03/16/18 0812   Gross per 24 hour   Intake              240 ml   Output             1350 ml   Net            -1110 ml       Physical Exam:     General Appearance:    Alert, cooperative, in no acute distress   Lungs:     Clear to auscultation,respirations regular, even and                  unlabored    Heart:    Regular rhythm and normal rate, normal S1 and S2, no            murmur, no gallop, no rub, no click   Chest Wall:    No abnormalities observed   Abdomen:     Normal bowel sounds, no masses, no organomegaly, soft        non-tender, non-distended, no guarding, no rebound                tenderness   Extremities:   Moves all " extremities well, no edema, no cyanosis, no             redness   Pulses:   Pulses palpable and equal bilaterally   Skin:   No bleeding, bruising or rash        Results Review:   I reviewed the patient's new clinical results.  Lab Results   Component Value Date    WBC 10.95 (H) 03/16/2018    HGB 12.5 (L) 03/16/2018    HCT 37.2 (L) 03/16/2018    MCV 94.2 03/16/2018     03/16/2018     Lab Results   Component Value Date    GLUCOSE 111 (H) 03/16/2018    BUN 23 (H) 03/16/2018    CREATININE 1.01 03/16/2018    EGFRIFNONA 72 03/16/2018    BCR 22.8 03/16/2018    CO2 29.0 03/16/2018    CALCIUM 8.2 (L) 03/16/2018    ALBUMIN 3.20 (L) 03/16/2018    LABIL2 1.0 (L) 03/16/2018    AST 23 03/16/2018    ALT 35 03/16/2018     No results found for: TSH  Lab Results   Component Value Date    INR 2.48 (H) 03/12/2018    INR 1.4 (H) 12/13/2016    INR 1.1 02/01/2015    PROTIME 25.7 (H) 03/12/2018    PROTIME 16.6 (H) 12/13/2016    PROTIME 13.8 02/01/2015     Lab Results   Component Value Date    PTT 34.9 02/01/2015       EKG:     Telemetry:    Ejection Fraction  No results found for: EF    Echo EF Estimated  Lab Results   Component Value Date    ECHOEFEST 55 03/07/2018         Present on Admission:  • Sepsis  • Sacral decubitus ulcer  • UTI (urinary tract infection) due to urinary indwelling catheter  • Hypotension  • Atrial fibrillation    Assessment/Plan   11 atrial flutter status post EP study and flutter ablation #2 history of DVT on chronic oral intake ablation    Clinically, no sign of cardiac decompensation based the clinical history physical finding.  Patient underwent EP study and successful flutter ablation.  Patient is in sinus rhythm.  He was placed back on oral anti coagulation.  We'll see him on PRN  basis and one month in the office.    Seven Londono MD  03/16/18  2:42 PM      EMR Dragon/Transcription disclaimer:   Much of this encounter note is an electronic transcription/translation of spoken language to printed  text. The electronic translation of spoken language may permit erroneous, or at times, nonsensical words or phrases to be inadvertently transcribed; Although I have reviewed the note for such errors, some may still exist.

## 2018-03-19 NOTE — PAYOR COMM NOTE
"Mihcelle Broderick (73 y.o. Male)     Date of Birth Social Security Number Address Home Phone MRN    1945  [address] [address] [phone] [id number]    Protestant Marital Status          Other        Admission Date Admission Type Admitting Provider Attending Provider Department, Room/Bed    3/5/18 Emergency Yoan Hamilton MD  [address], [facility]/1    Discharge Date Discharge Disposition Discharge Destination        3/16/2018 Home-Health Care Svc              Attending Provider:  (none)   Allergies:  Aleve [Naproxen], Ambien [Zolpidem], Vancomycin    Isolation:  None   Infection:  None   Code Status:  Prior    Ht:  170.2 cm (67.01\")   Wt:  77.2 kg (170 lb 3.2 oz)    Admission Cmt:  None   Principal Problem:  Sepsis [A41.9]                 Active Insurance as of 3/5/2018     Primary Coverage     Payor Plan Insurance Group Employer/Plan Group    HUMANA MEDICARE REPLACEMENT HUMANA MEDICARE REPL [id number]     Payor Plan Address Payor Plan Phone Number Effective From Effective To    [address] [address] [phone] 2013     [place] [address]       Subscriber Name Subscriber Birth Date Member ID       MICHELLE BRODERICK 1945 [id number]                 Emergency Contacts      (Rel.) Home Phone Work Phone Mobile Phone    Marly Mueller (Significant Other) [phone] -- --               Discharge Summary      Luca Deleon MD at 3/16/2018 12:10 PM           [address] [address]  T - [phone]     DISCHARGE SUMMARY         PATIENT  DEMOGRAPHICS   PATIENT NAME: Michelle Anders Liz                      PHYSICIAN: Luca Deleon MD  : 1945  MRN: [id number]    ADMISSION/DISCHARGE INFO   ADMISSION DATE: 3/5/2018   DISCHARGE DATE: 3/16/2018    ADMISSION DIAGNOSES: Cellulitis of buttock [L03.317]  Sepsis, due to unspecified organism [A41.9]  Fever, unspecified fever " cause [R50.9]  DISCHARGE DIAGNOSES:     Principal Problem:    Sepsis  Active Problems:    Sacral decubitus ulcer    UTI (urinary tract infection) due to urinary indwelling catheter    Hypotension    Atrial fibrillation      SERVICE:  Medicine       CONSULTS   Consult Orders (all)     Start     Ordered    03/07/18 1157  Inpatient Cardiology Consult  Once     Specialty:  Cardiology  Provider:  Sai Muñoz MD    03/07/18 1156    03/05/18 0859  Inpatient Urology Consult  Once     Specialty:  Urology  Provider:  Morgan Al MD    03/05/18 0900    03/05/18 0256  Hospitalist (on-call MD unless specified)  Once     Specialty:  Hospitalist  Provider:  Yoan Hamilton MD    03/05/18 0255          PROCEDURES     Imaging Results (last 24 hours)     ** No results found for the last 24 hours. **          Us Guided Vascular Access    Result Date: 3/7/2018  Narrative: This procedure was auto-finalized with no dictation required.    Xr Chest 1 View    Result Date: 3/8/2018  Narrative: Chest single view CLINICAL INDICATION: Shortness of breath. Respiratory failure, fever and sepsis COMPARISON: Chest March 7, 2018. FINDINGS: Cardiomegaly. Prominence of pulmonary vascular markings slightly improved since prior exam. Infiltrative changes, pneumonitis right lung primarily right upper lobe with partial clearing, slight improvement since prior exam.     Impression: CONCLUSION: As above. Electronically signed by:  Dennis Gates MD  3/8/2018 11:05 AM CST Workstation: [id number]    Xr Chest 1 View    Result Date: 3/5/2018  Narrative: Chest single view on  3/5/2018 CLINICAL INDICATION: Nausea, fever, tachycardia COMPARISON: 12/15/2016 FINDINGS: There is mild elevation of the right hemidiaphragm. Mild chronic interstitial changes are noted. Heart is upper limits normal for size. Hilar and mediastinal contours are within normal limits.     Impression: No acute disease. Electronically signed by:  Luke Bustos  3/5/2018 1:58 AM CST  Workstation: [id number]    Xr Chest Post Cva Port    Result Date: 3/7/2018  Narrative: EXAM:         Radiograph(s), Chest VIEWS:   Portable ; 1     DATE/TIME:  3/7/2018 1:46 PM CST            INDICATION:   verification, R50.9 Fever, unspecified A41.9 Sepsis, unspecified organism L03.317 Cellulitis of buttock  COMPARISON:  CXR: 3/5/18         FINDINGS:         - lines/tubes:    Right approach PICC line in standard position.  - cardiac:         size within normal limits       - mediastinum: contour within normal limits       - lungs:         Focal airspace disease, right upper lobe. A second focus of airspace disease may be present in the left base.           - pleura:         no evidence of  fluid                - osseous:         unremarkable for age                - misc.:        Impression: CONCLUSION:    1. Right approach PICC line in standard position. 2. Right upper lobe airspace disease.                                   Electronically signed by:  ERIC Sibley MD  3/7/2018 1:47 PM CST Workstation: [id number]    Ir Picc Wo Fluoro Guidance    Result Date: 3/7/2018  Narrative: This procedure was auto-finalized with no dictation required.      HISTORY OF PRESENT ILLNESS   Mando Hill is a 73 y.o. male who was admitted for weakness, mild nausea, low-grade fever, worsening weakness.  Patient had felt bad for last several days, had expanders more generalized pain than usual and has had poor appetite.  Patient was found to have Pseudomonas urosepsis.  Patient was also found to have atrial flutter which required further workup and EP study.    DIAGNOSTIC DATA   Labs  Images    HOSPITAL COURSE   Patient was admitted to the hospital, was started on IV antibiotic, urine and also as was performed and cultures were obtained.  Patient was found to have Pseudomonas urosepsis.  He was started on Levaquin according to the sensitivity.  Patient was also found to have atrial fibrillation with RVR.  He was  started on amiodarone and Cardizem however further EP study was required.  Patient was then evaluated by Dr. Londono, AV node ablation was performed.  Currently patient is in normal sinus rhythm.  He will go home on amiodarone and anticoagulation with Xarelto.  Patient does have a suprapubic chronic Gayle which he will continue to maintain as outpatient and will follow up urology.  Patient will go back to nitrofurantoin as suppressive agent for his recurrent urinary tract infection.  Urology has seen patient in the hospital and will follow-up as outpatient. repeat urine cultures have been consistent with contamination.  Currently he is stable, normal sinus rhythm, vitals are stable.  Patient will go home with his significant other and will home health.  She state that she will be taking care of the patient home.    Physical Exam   Constitutional: He is oriented to person, place, and time. He appears well-developed and well-nourished.   HENT:   Head: Normocephalic.   Eyes: EOM are normal. Pupils are equal, round, and reactive to light.   Neck: Normal range of motion.   Cardiovascular: Normal rate, regular rhythm and normal heart sounds.    Pulmonary/Chest: Effort normal and breath sounds normal.   Genitourinary:   Genitourinary Comments: Gayle is present    Musculoskeletal:        Right foot: There is decreased range of motion.        Left foot: There is decreased range of motion.   Neurological: He is alert and oriented to person, place, and time.   Skin: Skin is warm.   Psychiatric: He has a normal mood and affect. His behavior is normal.   Vitals reviewed.           DISCHARGE CONDITION   Stable    DISPOSITION   To home with home health      DISCHARGE MEDICATIONS      Mando Hill   Home Medication Instructions YASMIN:[id number]    Printed on:03/16/18 1217   Medication Information                      albuterol (PROVENTIL HFA;VENTOLIN HFA) 108 (90 Base) MCG/ACT inhaler  Inhale 2 puffs Every 4 (Four) Hours  As Needed for Wheezing.             amiodarone (PACERONE) 200 MG tablet  Take 1 tablet by mouth Daily.             aspirin 81 MG EC tablet  Take 81 mg by mouth Daily.             atorvastatin (LIPITOR) 10 MG tablet  Take 1 tablet by mouth Daily.             baclofen (LIORESAL) 10 MG tablet  Take 10 mg by mouth 2 (Two) Times a Day.             budesonide-formoterol (SYMBICORT) 160-4.5 MCG/ACT inhaler  Inhale 2 puffs 2 (Two) Times a Day.             finasteride (PROSCAR) 5 MG tablet  Take 5 mg by mouth Daily.             furosemide (LASIX) 20 MG tablet  Take 20 mg by mouth Daily.             HYDROcodone-acetaminophen (NORCO)  MG per tablet  TAKE 1 TABLET BY MOUTH EVERY SIX HOURS AS NEEDED FOR CHRONIC PAIN             hydrOXYzine (ATARAX) 25 MG tablet  Take 25 mg by mouth 3 (Three) Times a Day As Needed for Itching.             nebivolol (BYSTOLIC) 20 MG tablet  Take 20 mg by mouth Daily.             nitrofurantoin (MACRODANTIN) 50 MG capsule  Take 50 mg by mouth Every Night.             Omega-3 Fatty Acids (FISH OIL) 1000 MG capsule capsule  Take  by mouth Daily With Breakfast.             phenytoin (DILANTIN) 100 MG ER capsule  Take  by mouth 2 (Two) Times a Day.             rivaroxaban (XARELTO) 20 MG tablet  Take 20 mg by mouth Daily.             tamsulosin (FLOMAX) 0.4 MG capsule 24 hr capsule  Take 1 capsule by mouth Every Night.             vitamin C (ASCORBIC ACID) 500 MG tablet  Take 500 mg by mouth Daily.                   INSTRUCTIONS   Activity:   Activity Instructions     Discharge Activity             Diet:   Diet Instructions     Diet: Cardiac, Consistent Carbohydrate       Discharge Diet:   Cardiac  Consistent Carbohydrate             Special Instructions: Patient instructed to call M.D. or return to ED with worsening shortness of breath, chest pain, fever greater than 100.4°F or any other medical concerns.    FOLLOW UP   Follow-up Information     Valerie Marvin MD Follow up.    Specialty:   Family Medicine  Contact information:  [address]  [address]  [facility] [address]  [phone]             Seven Londono MD Follow up in 1 month(s).    Specialty:  Cardiology  Contact information:  [address] [place] [address] [address]  [address]  [facility] [address] [phone]             Morgan Al MD Follow up in 2 week(s).    Specialty:  Urology  Contact information:  [address]    [place] [address]  [phone]             Sai Muñoz MD Follow up in 6 week(s).    Specialty:  Cardiology  Contact information:  [address] [place] [address] [address]  [address]  [place] [id number] [phone]                  PENDING TEST RESULTS AT DISCHARGE      TIME   Time: 36 minutes were spent planning this discharge.                      This document has been electronically signed by Luca Deleon MD on March 16, 2018 12:17 PM             Electronically signed by Luca Deleon MD at 3/16/2018 12:17 PM

## 2018-03-28 ENCOUNTER — LAB REQUISITION (OUTPATIENT)
Dept: LAB | Facility: HOSPITAL | Age: 73
End: 2018-03-28

## 2018-03-28 ENCOUNTER — TELEPHONE (OUTPATIENT)
Dept: CARDIOLOGY | Facility: CLINIC | Age: 73
End: 2018-03-28

## 2018-03-28 DIAGNOSIS — N39.0 URINARY TRACT INFECTION: ICD-10-CM

## 2018-03-28 LAB
BACTERIA UR QL AUTO: ABNORMAL /HPF
BASOPHILS # BLD AUTO: 0.01 10*3/MM3 (ref 0–0.2)
BASOPHILS NFR BLD AUTO: 0.1 % (ref 0–2)
BILIRUB UR QL STRIP: NEGATIVE
CLARITY UR: ABNORMAL
COLOR UR: YELLOW
DEPRECATED RDW RBC AUTO: 49.9 FL (ref 35.1–43.9)
EOSINOPHIL # BLD AUTO: 0.32 10*3/MM3 (ref 0–0.7)
EOSINOPHIL NFR BLD AUTO: 4.4 % (ref 0–7)
ERYTHROCYTE [DISTWIDTH] IN BLOOD BY AUTOMATED COUNT: 14.5 % (ref 11.5–14.5)
GLUCOSE UR STRIP-MCNC: NEGATIVE MG/DL
HCT VFR BLD AUTO: 40.7 % (ref 39–49)
HGB BLD-MCNC: 13.6 G/DL (ref 13.7–17.3)
HGB UR QL STRIP.AUTO: ABNORMAL
HYALINE CASTS UR QL AUTO: ABNORMAL /LPF
IMM GRANULOCYTES # BLD: 0.05 10*3/MM3 (ref 0–0.02)
IMM GRANULOCYTES NFR BLD: 0.7 % (ref 0–0.5)
KETONES UR QL STRIP: NEGATIVE
LEUKOCYTE ESTERASE UR QL STRIP.AUTO: ABNORMAL
LYMPHOCYTES # BLD AUTO: 0.92 10*3/MM3 (ref 0.6–4.2)
LYMPHOCYTES NFR BLD AUTO: 12.8 % (ref 10–50)
MCH RBC QN AUTO: 31.5 PG (ref 26.5–34)
MCHC RBC AUTO-ENTMCNC: 33.4 G/DL (ref 31.5–36.3)
MCV RBC AUTO: 94.2 FL (ref 80–98)
MONOCYTES # BLD AUTO: 1.36 10*3/MM3 (ref 0–0.9)
MONOCYTES NFR BLD AUTO: 18.9 % (ref 0–12)
NEUTROPHILS # BLD AUTO: 4.54 10*3/MM3 (ref 2–8.6)
NEUTROPHILS NFR BLD AUTO: 63.1 % (ref 37–80)
NITRITE UR QL STRIP: POSITIVE
PH UR STRIP.AUTO: 6 [PH] (ref 5–9)
PLATELET # BLD AUTO: 232 10*3/MM3 (ref 150–450)
PMV BLD AUTO: 10 FL (ref 8–12)
PROT UR QL STRIP: ABNORMAL
RBC # BLD AUTO: 4.32 10*6/MM3 (ref 4.37–5.74)
RBC # UR: ABNORMAL /HPF
REF LAB TEST METHOD: ABNORMAL
SP GR UR STRIP: 1.02 (ref 1–1.03)
SQUAMOUS #/AREA URNS HPF: ABNORMAL /HPF
UROBILINOGEN UR QL STRIP: ABNORMAL
WBC NRBC COR # BLD: 7.2 10*3/MM3 (ref 3.2–9.8)
WBC UR QL AUTO: ABNORMAL /HPF

## 2018-03-28 PROCEDURE — 85025 COMPLETE CBC W/AUTO DIFF WBC: CPT | Performed by: PHYSICAL MEDICINE & REHABILITATION

## 2018-03-28 PROCEDURE — 87086 URINE CULTURE/COLONY COUNT: CPT | Performed by: PHYSICAL MEDICINE & REHABILITATION

## 2018-03-28 PROCEDURE — 87186 SC STD MICRODIL/AGAR DIL: CPT | Performed by: PHYSICAL MEDICINE & REHABILITATION

## 2018-03-28 PROCEDURE — 81001 URINALYSIS AUTO W/SCOPE: CPT | Performed by: PHYSICAL MEDICINE & REHABILITATION

## 2018-03-28 PROCEDURE — 87077 CULTURE AEROBIC IDENTIFY: CPT | Performed by: PHYSICAL MEDICINE & REHABILITATION

## 2018-03-28 NOTE — TELEPHONE ENCOUNTER
Instructed family member to take pt to be evaluated by urgent care or family practice per MD due to extreme fatigue.  Family states HR 80, oxygen above 90%, no fever.

## 2018-03-30 LAB
BACTERIA SPEC AEROBE CULT: ABNORMAL
BACTERIA SPEC AEROBE CULT: ABNORMAL

## 2018-05-16 ENCOUNTER — OFFICE VISIT (OUTPATIENT)
Dept: CARDIOLOGY | Facility: CLINIC | Age: 73
End: 2018-05-16

## 2018-05-16 VITALS — DIASTOLIC BLOOD PRESSURE: 72 MMHG | SYSTOLIC BLOOD PRESSURE: 108 MMHG | HEART RATE: 65 BPM

## 2018-05-16 DIAGNOSIS — I48.3 TYPICAL ATRIAL FLUTTER (HCC): Primary | ICD-10-CM

## 2018-05-16 DIAGNOSIS — E78.5 HYPERLIPIDEMIA, UNSPECIFIED HYPERLIPIDEMIA TYPE: ICD-10-CM

## 2018-05-16 DIAGNOSIS — I48.0 PAROXYSMAL ATRIAL FIBRILLATION (HCC): Chronic | ICD-10-CM

## 2018-05-16 PROCEDURE — 99213 OFFICE O/P EST LOW 20 MIN: CPT | Performed by: INTERNAL MEDICINE

## 2018-05-16 PROCEDURE — 93000 ELECTROCARDIOGRAM COMPLETE: CPT | Performed by: INTERNAL MEDICINE

## 2018-05-16 RX ORDER — AMIKACIN SULFATE 250 MG/ML
INJECTION, SOLUTION INTRAMUSCULAR; INTRAVENOUS
Refills: 0 | COMMUNITY
Start: 2018-03-30 | End: 2018-05-16

## 2018-05-16 RX ORDER — METHENAMINE HIPPURATE 1000 MG/1
1 TABLET ORAL 2 TIMES DAILY WITH MEALS
COMMUNITY
End: 2018-01-01

## 2018-05-16 NOTE — PROGRESS NOTES
Mando Hill  73 y.o. male    05/16/2018  1. Typical atrial flutter    2. Paroxysmal atrial fibrillation    3. Hyperlipidemia, unspecified hyperlipidemia type        History of Present Illness      73-year-old gentleman admitted on 3/5/2018 for evaluation of GI symptom, urosepsis, chronic suprapubic catheter placement, history of traumatic brain injury with history of paroxysmal atrial fibrillation noted to be in atrial flutter with rapid ventricular response evaluated by me as there is difficult to control the rate and underwent EP study and flutter ablations.  He has a suprapubic catheter chronically.  He lives with his girlfriend.  Never had any cardiac problems in the past other than atrial arrhythmia  and on anticoagulation.  Patient has good mental status and able to provide information.  Patient in wheel chair     Echo 3/7/2018  · Left ventricular systolic function is normal. Estimated EF = 55%.  · Left ventricular diastolic dysfunction (grade I) consistent with impaired relaxation.  · Mild mitral valve regurgitation is present  · Mild tricuspid valve regurgitation is present      SUBJECTIVE    Allergies   Allergen Reactions   • Aleve [Naproxen]    • Ambien [Zolpidem]    • Vancomycin    • Amikacin Rash   • Amiodarone Rash         Past Medical History:   Diagnosis Date   • Arthritis    • Atherosclerosis    • Atrial fibrillation    • BPH (benign prostatic hyperplasia)    • Brain injury    • Chronic bronchitis    • Hypertension    • Left hemiplegia    • UTI (urinary tract infection) due to urinary indwelling catheter 3/5/2018         Past Surgical History:   Procedure Laterality Date   • CARDIAC ELECTROPHYSIOLOGY PROCEDURE N/A 3/15/2018    Procedure: EP/Ablation- Flutter;  Surgeon: Seven Londono MD;  Location: [facility] INVASIVE LOC[facility];  Service: Cardiology   • CHOLECYSTECTOMY     • EYE SURGERY     • SUPRAPUBIC CATHETER INSERTION           Family History   Problem Relation Age of Onset   • Stroke  Mother    • Hypertension Father          Social History     Social History   • Marital status:      Spouse name: N/A   • Number of children: N/A   • Years of education: N/A     Occupational History   • Not on file.     Social History Main Topics   • Smoking status: Former Smoker   • Smokeless tobacco: Never Used   • Alcohol use No   • Drug use: No   • Sexual activity: Not Currently     Other Topics Concern   • Not on file     Social History Narrative   • No narrative on file         Current Outpatient Prescriptions   Medication Sig Dispense Refill   • aspirin 81 MG EC tablet Take 81 mg by mouth Daily.     • atorvastatin (LIPITOR) 10 MG tablet Take 1 tablet by mouth Daily. 15 tablet 0   • baclofen (LIORESAL) 10 MG tablet Take 10 mg by mouth 2 (Two) Times a Day.     • finasteride (PROSCAR) 5 MG tablet Take 5 mg by mouth Daily.     • furosemide (LASIX) 20 MG tablet Take 20 mg by mouth Daily.     • HYDROcodone-acetaminophen (NORCO)  MG per tablet TAKE 1 TABLET BY MOUTH EVERY SIX HOURS AS NEEDED FOR CHRONIC PAIN  0   • hydrOXYzine (ATARAX) 25 MG tablet Take 25 mg by mouth 3 (Three) Times a Day As Needed for Itching.     • methenamine (HIPREX) 1 g tablet Take 1 g by mouth 2 (Two) Times a Day With Meals.     • nebivolol (BYSTOLIC) 20 MG tablet Take 20 mg by mouth Daily.     • nitrofurantoin (MACRODANTIN) 50 MG capsule Take 50 mg by mouth Every Night.     • Omega-3 Fatty Acids (FISH OIL) 1000 MG capsule capsule Take  by mouth Daily With Breakfast.     • phenytoin (DILANTIN) 100 MG ER capsule Take  by mouth 2 (Two) Times a Day.     • rivaroxaban (XARELTO) 20 MG tablet Take 20 mg by mouth Daily.     • tamsulosin (FLOMAX) 0.4 MG capsule 24 hr capsule Take 1 capsule by mouth Every Night.     • vitamin C (ASCORBIC ACID) 500 MG tablet Take 500 mg by mouth Daily.       No current facility-administered medications for this visit.          OBJECTIVE    /72   Pulse 65         Review of Systems     Constitutional:   Denies recent weight loss, weight gain, fever or chills, no change in exercise tolerance     HENT:  Denies any hearing loss, epistaxis, hoarseness, or difficulty speaking.     Eyes: Wears eyeglasses or contact lenses     Respiratory:  Denies dyspnea with exertion,no cough, wheezing, or hemoptysis.     Cardiovascular: Negative for palpations, chest pain, orthopnea, PND, peripheral edema, syncope, or claudication.     Gastrointestinal:  Denies change in bowel habits, dyspepsia, ulcer disease, hematochezia, or melena.     Endocrine: Negative for cold intolerance, heat intolerance, polydipsia, polyphagia and polyuria. Denies any history of weight change, heat/cold intolerance, polydipsia, polyuria     Genitourinary: Chronic suprapubic catheter placement    Musculoskeletal: Denies any history of arthritic symptoms or back problems     Skin:  Skin rash     Allergic/Immunologic: Negative.  Negative for environmental allergies, food allergies and immunocompromised state.     Neurological: General brain traumatic injury     Hematological: Denies any food allergies, seasonal allergies, bleeding disorders, or lymphadenopathy.     Psychiatric/Behavioral: Denies any history of depression, substance abuse, or change in cognitive function.         Physical Exam     Constitutional: Cooperative, alert and oriented, well-developed, well-nourished, in no acute distress.     HENT:   Head: Normocephalic, normal hair patterns, no masses or tenderness.  Ears, Nose, and Throat: No gross abnormalities. No pallor or cyanosis. Dentition good.   Eyes: EOMS intact, PERRL, conjunctivae and lids unremarkable. Fundoscopic exam and visual fields not performed.   Neck: No palpable masses or adenopathy, no thyromegaly, no JVD, carotid pulses are full and equal bilaterally and without  Bruits.     Cardiovascular: Regular rhythm, S1 and S2 normal, no S3 or S4. Apical impulse not displaced. No murmurs, gallops, or rubs detected.     Pulmonary/Chest:  Chest: normal symmetry, no tenderness to palpation, normal respiratory excursion, no intercostal retraction, no use of accessory muscles.            Pulmonary: Normal breath sounds. No rales or ronchi.    Abdominal: Abdomen soft, bowel sounds normoactive, no masses, no hepatosplenomegaly, non-tender, no bruits.     Musculoskeletal: No deformities, clubbing, cyanosis, erythema, or edema observed. There are no spinal abnormalities noted. Normal muscle strength and tone. Pulses full and equal in all extremities, no bruits auscultated.     Neurological: No gross motor or sensory deficits noted, affect appropriate, oriented to time, person, place.     Skin: Warm and dry to the touch, no apparent skin lesions or masses noted.     Psychiatric: He has a normal mood and affect. His behavior is normal. Judgment and thought content normal.           ECG 12 Lead  Date/Time: 5/16/2018 11:59 AM  Performed by: KAITLYNN GRIMES  Authorized by: KAITLYNN GRIMES   Comparison: not compared with previous ECG   Rhythm: sinus rhythm  Ectopy: PVCs              Lab Results   Component Value Date    WBC 7.20 03/28/2018    HGB 13.6 (L) 03/28/2018    HCT 40.7 03/28/2018    MCV 94.2 03/28/2018     03/28/2018     Lab Results   Component Value Date    GLUCOSE 111 (H) 03/16/2018    BUN 23 (H) 03/16/2018    CREATININE 1.01 03/16/2018    EGFRIFNONA 72 03/16/2018    BCR 22.8 03/16/2018    CO2 29.0 03/16/2018    CALCIUM 8.2 (L) 03/16/2018    ALBUMIN 3.20 (L) 03/16/2018    LABIL2 1.0 (L) 03/16/2018    AST 23 03/16/2018    ALT 35 03/16/2018     No results found for: CHOL  No results found for: TRIG  No results found for: HDL  No components found for: LDLCALC  No results found for: LDL  No results found for: HDLLDLRATIO  No components found for: CHOLHDL  Lab Results   Component Value Date    HGBA1C 5.8 (H) 12/20/2014     No results found for: TSH, F2MLRVK, O0AMQSI, THYROIDAB        ASSESSMENT AND PLAN  #1 atrial flutter with a rapid ventricular  response #2 supraventricular tachycardia #3 history of traumatic brain injury    Cardiac point of view patient doing remarkably well.  No further recurrence of palpitations or tachycardia.  EKG done and finding discussed with the patient with you good mental status and his girlfriend.  The patient noted fresh and amiodarone discontinued.  Currently atorvastatin for management of hyperlipidemia, diastolic with history of hypertension and hypertensive heart disease, Xarelto to decrease risk of cardiac embolic phenomena with history of atrial fibrillations and will see him back in 6 month R depends on patient clinical condition.    Mando was seen today for follow-up.    Diagnoses and all orders for this visit:    Typical atrial flutter    Paroxysmal atrial fibrillation    Hyperlipidemia, unspecified hyperlipidemia type        Seven Londono MD  5/16/2018  11:35 AM

## 2018-05-21 ENCOUNTER — OFFICE VISIT (OUTPATIENT)
Dept: ORTHOPEDIC SURGERY | Facility: CLINIC | Age: 73
End: 2018-05-21

## 2018-05-21 VITALS — HEIGHT: 67 IN

## 2018-05-21 DIAGNOSIS — M25.511 RIGHT SHOULDER PAIN, UNSPECIFIED CHRONICITY: ICD-10-CM

## 2018-05-21 DIAGNOSIS — M19.011 PRIMARY OSTEOARTHRITIS OF RIGHT SHOULDER: Primary | ICD-10-CM

## 2018-05-21 PROCEDURE — 99214 OFFICE O/P EST MOD 30 MIN: CPT | Performed by: NURSE PRACTITIONER

## 2018-05-21 PROCEDURE — 20610 DRAIN/INJ JOINT/BURSA W/O US: CPT | Performed by: NURSE PRACTITIONER

## 2018-05-21 RX ORDER — TRIAMCINOLONE ACETONIDE 40 MG/ML
80 INJECTION, SUSPENSION INTRA-ARTICULAR; INTRAMUSCULAR
Status: COMPLETED | OUTPATIENT
Start: 2018-05-21 | End: 2018-05-21

## 2018-05-21 RX ORDER — LIDOCAINE HYDROCHLORIDE 10 MG/ML
2 INJECTION, SOLUTION INFILTRATION; PERINEURAL
Status: COMPLETED | OUTPATIENT
Start: 2018-05-21 | End: 2018-05-21

## 2018-05-21 RX ADMIN — LIDOCAINE HYDROCHLORIDE 2 ML: 10 INJECTION, SOLUTION INFILTRATION; PERINEURAL at 11:38

## 2018-05-21 RX ADMIN — TRIAMCINOLONE ACETONIDE 80 MG: 40 INJECTION, SUSPENSION INTRA-ARTICULAR; INTRAMUSCULAR at 11:38

## 2018-05-21 NOTE — PROGRESS NOTES
Mando Hill is a 73 y.o. male returns for     Chief Complaint   Patient presents with   • Right Shoulder - Follow-up       HISTORY OF PRESENT ILLNESS:   73-year-old  male in the office today complaining of right shoulder pain.  He reports that he did not have much improvement with her shoulder pain after the previous intra-articular injection of steroids.     CONCURRENT MEDICAL HISTORY:    Past Medical History:   Diagnosis Date   • Arthritis    • Atherosclerosis    • Atrial fibrillation    • BPH (benign prostatic hyperplasia)    • Brain injury    • Chronic bronchitis    • Hypertension    • Left hemiplegia    • UTI (urinary tract infection) due to urinary indwelling catheter 3/5/2018       Allergies   Allergen Reactions   • Aleve [Naproxen]    • Ambien [Zolpidem]    • Vancomycin    • Amikacin Rash   • Amiodarone Rash         Current Outpatient Prescriptions:   •  aspirin 81 MG EC tablet, Take 81 mg by mouth Daily., Disp: , Rfl:   •  atorvastatin (LIPITOR) 10 MG tablet, Take 1 tablet by mouth Daily., Disp: 15 tablet, Rfl: 0  •  baclofen (LIORESAL) 10 MG tablet, Take 10 mg by mouth 2 (Two) Times a Day., Disp: , Rfl:   •  finasteride (PROSCAR) 5 MG tablet, Take 5 mg by mouth Daily., Disp: , Rfl:   •  furosemide (LASIX) 20 MG tablet, Take 20 mg by mouth Daily., Disp: , Rfl:   •  HYDROcodone-acetaminophen (NORCO)  MG per tablet, TAKE 1 TABLET BY MOUTH EVERY SIX HOURS AS NEEDED FOR CHRONIC PAIN, Disp: , Rfl: 0  •  hydrOXYzine (ATARAX) 25 MG tablet, Take 25 mg by mouth 3 (Three) Times a Day As Needed for Itching., Disp: , Rfl:   •  methenamine (HIPREX) 1 g tablet, Take 1 g by mouth 2 (Two) Times a Day With Meals., Disp: , Rfl:   •  nebivolol (BYSTOLIC) 20 MG tablet, Take 20 mg by mouth Daily., Disp: , Rfl:   •  nitrofurantoin (MACRODANTIN) 50 MG capsule, Take 50 mg by mouth Every Night., Disp: , Rfl:   •  Omega-3 Fatty Acids (FISH OIL) 1000 MG capsule capsule, Take  by mouth Daily With Breakfast.,  "Disp: , Rfl:   •  phenytoin (DILANTIN) 100 MG ER capsule, Take  by mouth 2 (Two) Times a Day., Disp: , Rfl:   •  rivaroxaban (XARELTO) 20 MG tablet, Take 20 mg by mouth Daily., Disp: , Rfl:   •  tamsulosin (FLOMAX) 0.4 MG capsule 24 hr capsule, Take 1 capsule by mouth Every Night., Disp: , Rfl:   •  vitamin C (ASCORBIC ACID) 500 MG tablet, Take 500 mg by mouth Daily., Disp: , Rfl:     Past Surgical History:   Procedure Laterality Date   • CARDIAC ELECTROPHYSIOLOGY PROCEDURE N/A 3/15/2018    Procedure: EP/Ablation- Flutter;  Surgeon: Seven Londono MD;  Location: [facility] INVASIVE LOCATION;  Service: Cardiology   • CHOLECYSTECTOMY     • EYE SURGERY     • SUPRAPUBIC CATHETER INSERTION         ROS  No fevers or chills.  No chest pain or shortness of air.  No GI or  disturbances.    PHYSICAL EXAMINATION:       Ht 170.2 cm (67\")     Physical Exam   Constitutional: He is oriented to person, place, and time. Vital signs are normal. He appears well-developed and well-nourished. He is cooperative.   HENT:   Head: Normocephalic and atraumatic.   Neck: Trachea normal and phonation normal.   Pulmonary/Chest: Effort normal. No respiratory distress.   Abdominal: Soft. Normal appearance. He exhibits no distension.   Neurological: He is alert and oriented to person, place, and time. GCS eye subscore is 4. GCS verbal subscore is 5. GCS motor subscore is 6.   Skin: Skin is warm, dry and intact.   Psychiatric: He has a normal mood and affect. His speech is normal and behavior is normal. Judgment and thought content normal. Cognition and memory are normal.   Vitals reviewed.      GAIT:     []  Normal  []  Antalgic    Assistive device: []  None  []  Walker     []  Crutches  []  Cane     [x]  Wheelchair  []  Stretcher    Right Shoulder Exam     Tenderness   The patient is experiencing tenderness in the acromioclavicular joint.    Range of Motion   Right shoulder active abduction: 90.   Right shoulder forward flexion: 90.   External " Rotation: abnormal   Internal Rotation 90 degrees: abnormal     Muscle Strength   Abduction: 4/5   Internal Rotation: 4/5   External Rotation: 4/5   Supraspinatus: 4/5     Tests   Drop Arm: positive  Impingement: positive    Other   Erythema: absent  Scars: absent  Sensation: normal  Pulse: present      Left Shoulder Exam     Tenderness   The patient is experiencing no tenderness.         Comments:  Hx of stroke, little function on left upper ex.               Large Joint Arthrocentesis  Date/Time: 5/21/2018 11:38 AM  Consent given by: patient  Site marked: site marked  Timeout: Immediately prior to procedure a time out was called to verify the correct patient, procedure, equipment, support staff and site/side marked as required   Supporting Documentation  Indications: pain   Procedure Details  Location: shoulder - R glenohumeral  Preparation: Patient was prepped and draped in the usual sterile fashion  Needle size: 22 G  Approach: anteromedial  Medications administered: 2 mL lidocaine 1 %; 80 mg triamcinolone acetonide 40 MG/ML                    ASSESSMENT:    Diagnoses and all orders for this visit:    Primary osteoarthritis of right shoulder  -     Large Joint Arthrocentesis    Right shoulder pain, unspecified chronicity  -     Large Joint Arthrocentesis          PLAN  Repeated the intra-articular injection of steroids and of the right shoulder today and recommended progression range of motion and activity as tolerated based on pain follow-up in 3 months for recheck.  No Follow-up on file.    JEAN Houser

## 2018-05-25 ENCOUNTER — LAB REQUISITION (OUTPATIENT)
Dept: LAB | Facility: HOSPITAL | Age: 73
End: 2018-05-25

## 2018-05-25 DIAGNOSIS — L89.152 STAGE II PRESSURE ULCER OF SACRAL REGION (HCC): ICD-10-CM

## 2018-05-25 PROCEDURE — 87070 CULTURE OTHR SPECIMN AEROBIC: CPT | Performed by: NURSE PRACTITIONER

## 2018-05-25 PROCEDURE — 87186 SC STD MICRODIL/AGAR DIL: CPT | Performed by: NURSE PRACTITIONER

## 2018-05-25 PROCEDURE — 87205 SMEAR GRAM STAIN: CPT | Performed by: NURSE PRACTITIONER

## 2018-05-25 PROCEDURE — 87077 CULTURE AEROBIC IDENTIFY: CPT | Performed by: NURSE PRACTITIONER

## 2018-05-28 LAB
BACTERIA SPEC AEROBE CULT: ABNORMAL
GRAM STN SPEC: ABNORMAL

## 2018-06-25 ENCOUNTER — LAB REQUISITION (OUTPATIENT)
Dept: LAB | Facility: HOSPITAL | Age: 73
End: 2018-06-25

## 2018-06-25 DIAGNOSIS — N39.0 URINARY TRACT INFECTION: ICD-10-CM

## 2018-06-25 LAB
BACTERIA UR QL AUTO: ABNORMAL /HPF
BILIRUB UR QL STRIP: NEGATIVE
CLARITY UR: ABNORMAL
COLOR UR: YELLOW
GLUCOSE UR STRIP-MCNC: NEGATIVE MG/DL
HGB UR QL STRIP.AUTO: ABNORMAL
HYALINE CASTS UR QL AUTO: ABNORMAL /LPF
KETONES UR QL STRIP: NEGATIVE
LEUKOCYTE ESTERASE UR QL STRIP.AUTO: ABNORMAL
NITRITE UR QL STRIP: NEGATIVE
PH UR STRIP.AUTO: 5.5 [PH] (ref 5–9)
PROT UR QL STRIP: ABNORMAL
RBC # UR: ABNORMAL /HPF
REF LAB TEST METHOD: ABNORMAL
SP GR UR STRIP: 1.01 (ref 1–1.03)
SQUAMOUS #/AREA URNS HPF: ABNORMAL /HPF
UROBILINOGEN UR QL STRIP: ABNORMAL
WBC UR QL AUTO: ABNORMAL /HPF

## 2018-06-25 PROCEDURE — 81001 URINALYSIS AUTO W/SCOPE: CPT | Performed by: UROLOGY

## 2018-06-27 ENCOUNTER — LAB REQUISITION (OUTPATIENT)
Dept: LAB | Facility: HOSPITAL | Age: 73
End: 2018-06-27

## 2018-06-27 DIAGNOSIS — Z87.440 PERSONAL HISTORY OF URINARY INFECTION: ICD-10-CM

## 2018-06-27 LAB
BACTERIA UR QL AUTO: ABNORMAL /HPF
BILIRUB UR QL STRIP: NEGATIVE
CLARITY UR: CLEAR
COLOR UR: YELLOW
GLUCOSE UR STRIP-MCNC: NEGATIVE MG/DL
HGB UR QL STRIP.AUTO: ABNORMAL
HYALINE CASTS UR QL AUTO: ABNORMAL /LPF
KETONES UR QL STRIP: NEGATIVE
LEUKOCYTE ESTERASE UR QL STRIP.AUTO: ABNORMAL
NITRITE UR QL STRIP: NEGATIVE
PH UR STRIP.AUTO: 6.5 [PH] (ref 5–9)
PROT UR QL STRIP: ABNORMAL
RBC # UR: ABNORMAL /HPF
REF LAB TEST METHOD: ABNORMAL
SP GR UR STRIP: 1.01 (ref 1–1.03)
SQUAMOUS #/AREA URNS HPF: ABNORMAL /HPF
UROBILINOGEN UR QL STRIP: ABNORMAL
WBC UR QL AUTO: ABNORMAL /HPF
YEAST URNS QL MICRO: ABNORMAL /HPF

## 2018-06-27 PROCEDURE — 87086 URINE CULTURE/COLONY COUNT: CPT | Performed by: NURSE PRACTITIONER

## 2018-06-27 PROCEDURE — 81001 URINALYSIS AUTO W/SCOPE: CPT | Performed by: NURSE PRACTITIONER

## 2018-06-29 LAB — BACTERIA SPEC AEROBE CULT: ABNORMAL

## 2018-07-27 ENCOUNTER — LAB REQUISITION (OUTPATIENT)
Dept: LAB | Facility: HOSPITAL | Age: 73
End: 2018-07-27

## 2018-07-27 DIAGNOSIS — I10 ESSENTIAL (PRIMARY) HYPERTENSION: ICD-10-CM

## 2018-07-27 LAB
BASOPHILS # BLD AUTO: 0.04 10*3/MM3 (ref 0–0.2)
BASOPHILS NFR BLD AUTO: 0.5 % (ref 0–2)
DEPRECATED RDW RBC AUTO: 50 FL (ref 35.1–43.9)
EOSINOPHIL # BLD AUTO: 0.79 10*3/MM3 (ref 0–0.7)
EOSINOPHIL NFR BLD AUTO: 9.9 % (ref 0–7)
ERYTHROCYTE [DISTWIDTH] IN BLOOD BY AUTOMATED COUNT: 14.9 % (ref 11.5–14.5)
HCT VFR BLD AUTO: 43.7 % (ref 39–49)
HGB BLD-MCNC: 14.9 G/DL (ref 13.7–17.3)
IMM GRANULOCYTES # BLD: 0.08 10*3/MM3 (ref 0–0.02)
IMM GRANULOCYTES NFR BLD: 1 % (ref 0–0.5)
LYMPHOCYTES # BLD AUTO: 1.13 10*3/MM3 (ref 0.6–4.2)
LYMPHOCYTES NFR BLD AUTO: 14.1 % (ref 10–50)
MCH RBC QN AUTO: 31 PG (ref 26.5–34)
MCHC RBC AUTO-ENTMCNC: 34.1 G/DL (ref 31.5–36.3)
MCV RBC AUTO: 90.9 FL (ref 80–98)
MONOCYTES # BLD AUTO: 0.76 10*3/MM3 (ref 0–0.9)
MONOCYTES NFR BLD AUTO: 9.5 % (ref 0–12)
NEUTROPHILS # BLD AUTO: 5.22 10*3/MM3 (ref 2–8.6)
NEUTROPHILS NFR BLD AUTO: 65 % (ref 37–80)
PLATELET # BLD AUTO: 293 10*3/MM3 (ref 150–450)
PMV BLD AUTO: 9.3 FL (ref 8–12)
RBC # BLD AUTO: 4.81 10*6/MM3 (ref 4.37–5.74)
WBC NRBC COR # BLD: 8.02 10*3/MM3 (ref 3.2–9.8)

## 2018-07-27 PROCEDURE — 85025 COMPLETE CBC W/AUTO DIFF WBC: CPT | Performed by: PHYSICAL MEDICINE & REHABILITATION

## 2018-08-01 ENCOUNTER — LAB REQUISITION (OUTPATIENT)
Dept: LAB | Facility: HOSPITAL | Age: 73
End: 2018-08-01

## 2018-08-01 DIAGNOSIS — I50.20 SYSTOLIC CONGESTIVE HEART FAILURE (HCC): ICD-10-CM

## 2018-08-01 LAB
25(OH)D3 SERPL-MCNC: 23.5 NG/ML (ref 30–100)
ALBUMIN SERPL-MCNC: 3.9 G/DL (ref 3.4–4.8)
ALBUMIN/GLOB SERPL: 1 G/DL (ref 1.1–1.8)
ALP SERPL-CCNC: 106 U/L (ref 38–126)
ALT SERPL W P-5'-P-CCNC: 26 U/L (ref 21–72)
ANION GAP SERPL CALCULATED.3IONS-SCNC: 7 MMOL/L (ref 5–15)
ARTICHOKE IGE QN: 125 MG/DL (ref 1–129)
AST SERPL-CCNC: 23 U/L (ref 17–59)
BILIRUB SERPL-MCNC: 0.6 MG/DL (ref 0.2–1.3)
BUN BLD-MCNC: 17 MG/DL (ref 7–21)
BUN/CREAT SERPL: 23.9 (ref 7–25)
CALCIUM SPEC-SCNC: 8.7 MG/DL (ref 8.4–10.2)
CHLORIDE SERPL-SCNC: 101 MMOL/L (ref 95–110)
CHOLEST SERPL-MCNC: 214 MG/DL (ref 0–199)
CO2 SERPL-SCNC: 31 MMOL/L (ref 22–31)
CREAT BLD-MCNC: 0.71 MG/DL (ref 0.7–1.3)
FOLATE SERPL-MCNC: 5.81 NG/ML (ref 2.76–21)
GFR SERPL CREATININE-BSD FRML MDRD: 108 ML/MIN/1.73 (ref 42–98)
GLOBULIN UR ELPH-MCNC: 3.8 GM/DL (ref 2.3–3.5)
GLUCOSE BLD-MCNC: 102 MG/DL (ref 60–100)
HDLC SERPL-MCNC: 57 MG/DL (ref 60–200)
LDLC/HDLC SERPL: 2.22 {RATIO} (ref 0–3.55)
MAGNESIUM SERPL-MCNC: 2 MG/DL (ref 1.6–2.3)
POTASSIUM BLD-SCNC: 3.8 MMOL/L (ref 3.5–5.1)
PROT SERPL-MCNC: 7.7 G/DL (ref 6.3–8.6)
SODIUM BLD-SCNC: 139 MMOL/L (ref 137–145)
T4 SERPL-MCNC: 5.41 MCG/DL (ref 5.5–11)
TRIGL SERPL-MCNC: 153 MG/DL (ref 20–199)
TSH SERPL DL<=0.05 MIU/L-ACNC: 1.63 MIU/ML (ref 0.46–4.68)
VIT B12 BLD-MCNC: 370 PG/ML (ref 239–931)

## 2018-08-01 PROCEDURE — 82306 VITAMIN D 25 HYDROXY: CPT | Performed by: PHYSICAL MEDICINE & REHABILITATION

## 2018-08-01 PROCEDURE — 84436 ASSAY OF TOTAL THYROXINE: CPT | Performed by: PHYSICAL MEDICINE & REHABILITATION

## 2018-08-01 PROCEDURE — 82607 VITAMIN B-12: CPT | Performed by: PHYSICAL MEDICINE & REHABILITATION

## 2018-08-01 PROCEDURE — 83735 ASSAY OF MAGNESIUM: CPT | Performed by: PHYSICAL MEDICINE & REHABILITATION

## 2018-08-01 PROCEDURE — 80061 LIPID PANEL: CPT | Performed by: PHYSICAL MEDICINE & REHABILITATION

## 2018-08-01 PROCEDURE — 84443 ASSAY THYROID STIM HORMONE: CPT | Performed by: PHYSICAL MEDICINE & REHABILITATION

## 2018-08-01 PROCEDURE — 80053 COMPREHEN METABOLIC PANEL: CPT | Performed by: PHYSICAL MEDICINE & REHABILITATION

## 2018-08-01 PROCEDURE — 82746 ASSAY OF FOLIC ACID SERUM: CPT | Performed by: PHYSICAL MEDICINE & REHABILITATION

## 2018-08-31 ENCOUNTER — APPOINTMENT (OUTPATIENT)
Dept: CT IMAGING | Facility: HOSPITAL | Age: 73
End: 2018-08-31

## 2018-08-31 ENCOUNTER — HOSPITAL ENCOUNTER (OUTPATIENT)
Facility: HOSPITAL | Age: 73
Setting detail: OBSERVATION
Discharge: HOME | End: 2018-09-02
Attending: EMERGENCY MEDICINE | Admitting: EMERGENCY MEDICINE

## 2018-08-31 DIAGNOSIS — R10.9 ABDOMINAL PAIN, UNSPECIFIED ABDOMINAL LOCATION: Primary | ICD-10-CM

## 2018-08-31 DIAGNOSIS — N20.1 URETEROLITHIASIS: ICD-10-CM

## 2018-08-31 DIAGNOSIS — N39.0 URINARY TRACT INFECTION WITHOUT HEMATURIA, SITE UNSPECIFIED: ICD-10-CM

## 2018-08-31 LAB
ALBUMIN SERPL-MCNC: 3.9 G/DL (ref 3.4–4.8)
ALBUMIN/GLOB SERPL: 1 G/DL (ref 1.1–1.8)
ALP SERPL-CCNC: 128 U/L (ref 38–126)
ALT SERPL W P-5'-P-CCNC: 21 U/L (ref 21–72)
ANION GAP SERPL CALCULATED.3IONS-SCNC: 8 MMOL/L (ref 5–15)
AST SERPL-CCNC: 23 U/L (ref 17–59)
ATMOSPHERIC PRESS: ABNORMAL MMHG
BACTERIA UR QL AUTO: ABNORMAL /HPF
BASE EXCESS BLDV CALC-SCNC: 8.5 MMOL/L (ref 0–2)
BASOPHILS # BLD AUTO: 0.03 10*3/MM3 (ref 0–0.2)
BASOPHILS NFR BLD AUTO: 0.3 % (ref 0–2)
BDY SITE: ABNORMAL
BILIRUB SERPL-MCNC: 0.4 MG/DL (ref 0.2–1.3)
BILIRUB UR QL STRIP: NEGATIVE
BUN BLD-MCNC: 12 MG/DL (ref 7–21)
BUN/CREAT SERPL: 13.3 (ref 7–25)
CALCIUM SPEC-SCNC: 8.5 MG/DL (ref 8.4–10.2)
CHLORIDE SERPL-SCNC: 97 MMOL/L (ref 95–110)
CLARITY UR: CLEAR
CO2 SERPL-SCNC: 34 MMOL/L (ref 22–31)
COLOR UR: YELLOW
CREAT BLD-MCNC: 0.9 MG/DL (ref 0.7–1.3)
D-LACTATE SERPL-SCNC: 1.4 MMOL/L (ref 0.5–2)
DEPRECATED RDW RBC AUTO: 50 FL (ref 35.1–43.9)
EOSINOPHIL # BLD AUTO: 0.47 10*3/MM3 (ref 0–0.7)
EOSINOPHIL NFR BLD AUTO: 4.5 % (ref 0–7)
ERYTHROCYTE [DISTWIDTH] IN BLOOD BY AUTOMATED COUNT: 15.5 % (ref 11.5–14.5)
GFR SERPL CREATININE-BSD FRML MDRD: 82 ML/MIN/1.73 (ref 42–98)
GLOBULIN UR ELPH-MCNC: 4 GM/DL (ref 2.3–3.5)
GLUCOSE BLD-MCNC: 129 MG/DL (ref 60–100)
GLUCOSE UR STRIP-MCNC: NEGATIVE MG/DL
HCO3 BLDV-SCNC: 37.1 MMOL/L (ref 18–23)
HCT VFR BLD AUTO: 43.3 % (ref 39–49)
HGB BLD-MCNC: 14.6 G/DL (ref 13.7–17.3)
HGB UR QL STRIP.AUTO: ABNORMAL
HOLD SPECIMEN: NORMAL
HOLD SPECIMEN: NORMAL
HYALINE CASTS UR QL AUTO: ABNORMAL /LPF
IMM GRANULOCYTES # BLD: 0.02 10*3/MM3 (ref 0–0.02)
IMM GRANULOCYTES NFR BLD: 0.2 % (ref 0–0.5)
KETONES UR QL STRIP: NEGATIVE
LEUKOCYTE ESTERASE UR QL STRIP.AUTO: ABNORMAL
LIPASE SERPL-CCNC: 32 U/L (ref 23–300)
LYMPHOCYTES # BLD AUTO: 0.69 10*3/MM3 (ref 0.6–4.2)
LYMPHOCYTES NFR BLD AUTO: 6.6 % (ref 10–50)
MCH RBC QN AUTO: 30 PG (ref 26.5–34)
MCHC RBC AUTO-ENTMCNC: 33.7 G/DL (ref 31.5–36.3)
MCV RBC AUTO: 89.1 FL (ref 80–98)
MODALITY: ABNORMAL
MONOCYTES # BLD AUTO: 0.85 10*3/MM3 (ref 0–0.9)
MONOCYTES NFR BLD AUTO: 8.2 % (ref 0–12)
NEUTROPHILS # BLD AUTO: 8.32 10*3/MM3 (ref 2–8.6)
NEUTROPHILS NFR BLD AUTO: 80.2 % (ref 37–80)
NITRITE UR QL STRIP: NEGATIVE
PCO2 BLDV: 68.8 MM HG (ref 41–51)
PH BLDV: 7.34 PH UNITS (ref 7.29–7.37)
PH UR STRIP.AUTO: 6.5 [PH] (ref 5–9)
PLATELET # BLD AUTO: 227 10*3/MM3 (ref 150–450)
PMV BLD AUTO: 9.9 FL (ref 8–12)
PO2 BLDV: 39.4 MM HG (ref 27–53)
POTASSIUM BLD-SCNC: 3.5 MMOL/L (ref 3.5–5.1)
PROT SERPL-MCNC: 7.9 G/DL (ref 6.3–8.6)
PROT UR QL STRIP: ABNORMAL
RBC # BLD AUTO: 4.86 10*6/MM3 (ref 4.37–5.74)
RBC # UR: ABNORMAL /HPF
REF LAB TEST METHOD: ABNORMAL
SAO2 % BLDCOV: 71.5 % (ref 45–75)
SODIUM BLD-SCNC: 139 MMOL/L (ref 137–145)
SP GR UR STRIP: 1.01 (ref 1–1.03)
SQUAMOUS #/AREA URNS HPF: ABNORMAL /HPF
UROBILINOGEN UR QL STRIP: ABNORMAL
WBC NRBC COR # BLD: 10.38 10*3/MM3 (ref 3.2–9.8)
WBC UR QL AUTO: ABNORMAL /HPF
WHOLE BLOOD HOLD SPECIMEN: NORMAL
WHOLE BLOOD HOLD SPECIMEN: NORMAL
YEAST URNS QL MICRO: ABNORMAL /HPF

## 2018-08-31 PROCEDURE — G0378 HOSPITAL OBSERVATION PER HR: HCPCS

## 2018-08-31 PROCEDURE — 0 DIATRIZOATE MEGLUMINE & SODIUM PER 1 ML: Performed by: EMERGENCY MEDICINE

## 2018-08-31 PROCEDURE — 25010000002 ONDANSETRON PER 1 MG: Performed by: EMERGENCY MEDICINE

## 2018-08-31 PROCEDURE — 80053 COMPREHEN METABOLIC PANEL: CPT | Performed by: EMERGENCY MEDICINE

## 2018-08-31 PROCEDURE — 82803 BLOOD GASES ANY COMBINATION: CPT | Performed by: FAMILY MEDICINE

## 2018-08-31 PROCEDURE — 96375 TX/PRO/DX INJ NEW DRUG ADDON: CPT

## 2018-08-31 PROCEDURE — 96365 THER/PROPH/DIAG IV INF INIT: CPT

## 2018-08-31 PROCEDURE — 74177 CT ABD & PELVIS W/CONTRAST: CPT

## 2018-08-31 PROCEDURE — 25010000002 MORPHINE PER 10 MG: Performed by: EMERGENCY MEDICINE

## 2018-08-31 PROCEDURE — 83690 ASSAY OF LIPASE: CPT | Performed by: EMERGENCY MEDICINE

## 2018-08-31 PROCEDURE — 25010000002 IOPAMIDOL 61 % SOLUTION: Performed by: EMERGENCY MEDICINE

## 2018-08-31 PROCEDURE — 81001 URINALYSIS AUTO W/SCOPE: CPT | Performed by: EMERGENCY MEDICINE

## 2018-08-31 PROCEDURE — 51798 US URINE CAPACITY MEASURE: CPT

## 2018-08-31 PROCEDURE — 87040 BLOOD CULTURE FOR BACTERIA: CPT | Performed by: FAMILY MEDICINE

## 2018-08-31 PROCEDURE — 85025 COMPLETE CBC W/AUTO DIFF WBC: CPT | Performed by: EMERGENCY MEDICINE

## 2018-08-31 PROCEDURE — 99285 EMERGENCY DEPT VISIT HI MDM: CPT

## 2018-08-31 PROCEDURE — 25010000002 CEFTRIAXONE PER 250 MG: Performed by: EMERGENCY MEDICINE

## 2018-08-31 PROCEDURE — 83605 ASSAY OF LACTIC ACID: CPT | Performed by: EMERGENCY MEDICINE

## 2018-08-31 RX ORDER — MORPHINE SULFATE 2 MG/ML
2 INJECTION, SOLUTION INTRAMUSCULAR; INTRAVENOUS EVERY 4 HOURS PRN
Status: DISCONTINUED | OUTPATIENT
Start: 2018-08-31 | End: 2018-09-02 | Stop reason: HOSPADM

## 2018-08-31 RX ORDER — ONDANSETRON 2 MG/ML
4 INJECTION INTRAMUSCULAR; INTRAVENOUS ONCE
Status: COMPLETED | OUTPATIENT
Start: 2018-08-31 | End: 2018-08-31

## 2018-08-31 RX ORDER — SODIUM CHLORIDE 9 MG/ML
125 INJECTION, SOLUTION INTRAVENOUS CONTINUOUS
Status: DISCONTINUED | OUTPATIENT
Start: 2018-08-31 | End: 2018-09-02 | Stop reason: HOSPADM

## 2018-08-31 RX ORDER — FLUCONAZOLE 2 MG/ML
200 INJECTION, SOLUTION INTRAVENOUS DAILY
Status: DISCONTINUED | OUTPATIENT
Start: 2018-09-01 | End: 2018-08-31

## 2018-08-31 RX ORDER — SODIUM CHLORIDE 0.9 % (FLUSH) 0.9 %
10 SYRINGE (ML) INJECTION AS NEEDED
Status: DISCONTINUED | OUTPATIENT
Start: 2018-08-31 | End: 2018-09-02 | Stop reason: HOSPADM

## 2018-08-31 RX ORDER — NALOXONE HCL 0.4 MG/ML
0.4 VIAL (ML) INJECTION
Status: DISCONTINUED | OUTPATIENT
Start: 2018-08-31 | End: 2018-09-02 | Stop reason: HOSPADM

## 2018-08-31 RX ORDER — SODIUM CHLORIDE 0.9 % (FLUSH) 0.9 %
1-10 SYRINGE (ML) INJECTION AS NEEDED
Status: DISCONTINUED | OUTPATIENT
Start: 2018-08-31 | End: 2018-09-02 | Stop reason: HOSPADM

## 2018-08-31 RX ORDER — FAMOTIDINE 40 MG/1
40 TABLET, FILM COATED ORAL DAILY
Status: DISCONTINUED | OUTPATIENT
Start: 2018-09-01 | End: 2018-09-02 | Stop reason: HOSPADM

## 2018-08-31 RX ADMIN — MORPHINE SULFATE 4 MG: 4 INJECTION, SOLUTION INTRAMUSCULAR; INTRAVENOUS at 17:13

## 2018-08-31 RX ADMIN — SODIUM CHLORIDE 125 ML/HR: 900 INJECTION, SOLUTION INTRAVENOUS at 17:11

## 2018-08-31 RX ADMIN — CEFTRIAXONE SODIUM 1 G: 1 INJECTION, POWDER, FOR SOLUTION INTRAMUSCULAR; INTRAVENOUS at 19:08

## 2018-08-31 RX ADMIN — IOPAMIDOL 94 ML: 612 INJECTION, SOLUTION INTRAVENOUS at 20:27

## 2018-08-31 RX ADMIN — ONDANSETRON 4 MG: 2 INJECTION INTRAMUSCULAR; INTRAVENOUS at 17:13

## 2018-08-31 RX ADMIN — DIATRIZOATE MEGLUMINE AND DIATRIZOATE SODIUM 30 ML: 660; 100 LIQUID ORAL; RECTAL at 18:00

## 2018-09-01 LAB
ANION GAP SERPL CALCULATED.3IONS-SCNC: 8 MMOL/L (ref 5–15)
BUN BLD-MCNC: 13 MG/DL (ref 7–21)
BUN/CREAT SERPL: 12.9 (ref 7–25)
CALCIUM SPEC-SCNC: 8.5 MG/DL (ref 8.4–10.2)
CHLORIDE SERPL-SCNC: 99 MMOL/L (ref 95–110)
CO2 SERPL-SCNC: 35 MMOL/L (ref 22–31)
CREAT BLD-MCNC: 1.01 MG/DL (ref 0.7–1.3)
DEPRECATED RDW RBC AUTO: 53 FL (ref 35.1–43.9)
ERYTHROCYTE [DISTWIDTH] IN BLOOD BY AUTOMATED COUNT: 15.9 % (ref 11.5–14.5)
GFR SERPL CREATININE-BSD FRML MDRD: 72 ML/MIN/1.73 (ref 42–98)
GLUCOSE BLD-MCNC: 132 MG/DL (ref 60–100)
HCT VFR BLD AUTO: 40.4 % (ref 39–49)
HGB BLD-MCNC: 13.2 G/DL (ref 13.7–17.3)
MCH RBC QN AUTO: 29.7 PG (ref 26.5–34)
MCHC RBC AUTO-ENTMCNC: 32.7 G/DL (ref 31.5–36.3)
MCV RBC AUTO: 90.8 FL (ref 80–98)
PLATELET # BLD AUTO: 219 10*3/MM3 (ref 150–450)
PMV BLD AUTO: 9.9 FL (ref 8–12)
POTASSIUM BLD-SCNC: 3.7 MMOL/L (ref 3.5–5.1)
RBC # BLD AUTO: 4.45 10*6/MM3 (ref 4.37–5.74)
SODIUM BLD-SCNC: 142 MMOL/L (ref 137–145)
WBC NRBC COR # BLD: 9.01 10*3/MM3 (ref 3.2–9.8)

## 2018-09-01 PROCEDURE — G0378 HOSPITAL OBSERVATION PER HR: HCPCS

## 2018-09-01 PROCEDURE — 80048 BASIC METABOLIC PNL TOTAL CA: CPT | Performed by: FAMILY MEDICINE

## 2018-09-01 PROCEDURE — 96361 HYDRATE IV INFUSION ADD-ON: CPT

## 2018-09-01 PROCEDURE — 25010000002 PIPERACILLIN SOD-TAZOBACTAM PER 1 G: Performed by: RADIOLOGY

## 2018-09-01 PROCEDURE — 25010000002 PIPERACILLIN-TAZOBACTAM: Performed by: RADIOLOGY

## 2018-09-01 PROCEDURE — 36415 COLL VENOUS BLD VENIPUNCTURE: CPT | Performed by: FAMILY MEDICINE

## 2018-09-01 PROCEDURE — 96376 TX/PRO/DX INJ SAME DRUG ADON: CPT

## 2018-09-01 PROCEDURE — 85027 COMPLETE CBC AUTOMATED: CPT | Performed by: FAMILY MEDICINE

## 2018-09-01 PROCEDURE — 94760 N-INVAS EAR/PLS OXIMETRY 1: CPT

## 2018-09-01 PROCEDURE — 25010000002 MORPHINE PER 10 MG: Performed by: FAMILY MEDICINE

## 2018-09-01 PROCEDURE — 94799 UNLISTED PULMONARY SVC/PX: CPT

## 2018-09-01 PROCEDURE — 96366 THER/PROPH/DIAG IV INF ADDON: CPT

## 2018-09-01 PROCEDURE — 96367 TX/PROPH/DG ADDL SEQ IV INF: CPT

## 2018-09-01 RX ORDER — NEBIVOLOL 5 MG/1
20 TABLET ORAL DAILY
Status: DISCONTINUED | OUTPATIENT
Start: 2018-09-01 | End: 2018-09-02 | Stop reason: HOSPADM

## 2018-09-01 RX ORDER — TAMSULOSIN HYDROCHLORIDE 0.4 MG/1
0.4 CAPSULE ORAL NIGHTLY
Status: DISCONTINUED | OUTPATIENT
Start: 2018-09-01 | End: 2018-09-02 | Stop reason: HOSPADM

## 2018-09-01 RX ORDER — ATORVASTATIN CALCIUM 10 MG/1
10 TABLET, FILM COATED ORAL DAILY
Status: DISCONTINUED | OUTPATIENT
Start: 2018-09-01 | End: 2018-09-02 | Stop reason: HOSPADM

## 2018-09-01 RX ORDER — TEMAZEPAM 15 MG/1
15 CAPSULE ORAL NIGHTLY PRN
Status: DISCONTINUED | OUTPATIENT
Start: 2018-09-01 | End: 2018-09-02 | Stop reason: HOSPADM

## 2018-09-01 RX ORDER — PHENYTOIN SODIUM 100 MG/1
100 CAPSULE, EXTENDED RELEASE ORAL EVERY 12 HOURS SCHEDULED
Status: DISCONTINUED | OUTPATIENT
Start: 2018-09-01 | End: 2018-09-02 | Stop reason: HOSPADM

## 2018-09-01 RX ORDER — HYDROCODONE BITARTRATE AND ACETAMINOPHEN 10; 325 MG/1; MG/1
1 TABLET ORAL EVERY 6 HOURS PRN
Status: DISCONTINUED | OUTPATIENT
Start: 2018-09-01 | End: 2018-09-02 | Stop reason: HOSPADM

## 2018-09-01 RX ORDER — BACLOFEN 10 MG/1
10 TABLET ORAL EVERY 12 HOURS SCHEDULED
Status: DISCONTINUED | OUTPATIENT
Start: 2018-09-01 | End: 2018-09-02 | Stop reason: HOSPADM

## 2018-09-01 RX ORDER — FINASTERIDE 5 MG/1
5 TABLET, FILM COATED ORAL DAILY
Status: DISCONTINUED | OUTPATIENT
Start: 2018-09-01 | End: 2018-09-02 | Stop reason: HOSPADM

## 2018-09-01 RX ORDER — FUROSEMIDE 20 MG/1
20 TABLET ORAL DAILY
Status: DISCONTINUED | OUTPATIENT
Start: 2018-09-01 | End: 2018-09-02 | Stop reason: HOSPADM

## 2018-09-01 RX ADMIN — PIPERACILLIN SODIUM,TAZOBACTAM SODIUM 3.38 G: 3; .375 INJECTION, POWDER, FOR SOLUTION INTRAVENOUS at 00:57

## 2018-09-01 RX ADMIN — TAMSULOSIN HYDROCHLORIDE 0.4 MG: 0.4 CAPSULE ORAL at 00:58

## 2018-09-01 RX ADMIN — MORPHINE SULFATE 2 MG: 2 INJECTION, SOLUTION INTRAMUSCULAR; INTRAVENOUS at 17:58

## 2018-09-01 RX ADMIN — SODIUM CHLORIDE 125 ML/HR: 900 INJECTION, SOLUTION INTRAVENOUS at 20:30

## 2018-09-01 RX ADMIN — BACLOFEN 10 MG: 10 TABLET ORAL at 20:02

## 2018-09-01 RX ADMIN — PIPERACILLIN SODIUM,TAZOBACTAM SODIUM 3.38 G: 3; .375 INJECTION, POWDER, FOR SOLUTION INTRAVENOUS at 15:48

## 2018-09-01 RX ADMIN — TEMAZEPAM 15 MG: 15 CAPSULE ORAL at 20:03

## 2018-09-01 RX ADMIN — HYDROCORTISONE: 1 CREAM TOPICAL at 20:04

## 2018-09-01 RX ADMIN — PIPERACILLIN SODIUM,TAZOBACTAM SODIUM 3.38 G: 3; .375 INJECTION, POWDER, FOR SOLUTION INTRAVENOUS at 22:05

## 2018-09-01 RX ADMIN — MORPHINE SULFATE 2 MG: 2 INJECTION, SOLUTION INTRAMUSCULAR; INTRAVENOUS at 10:52

## 2018-09-01 RX ADMIN — SODIUM CHLORIDE 125 ML/HR: 900 INJECTION, SOLUTION INTRAVENOUS at 05:21

## 2018-09-01 RX ADMIN — TAMSULOSIN HYDROCHLORIDE 0.4 MG: 0.4 CAPSULE ORAL at 20:02

## 2018-09-01 RX ADMIN — PHENYTOIN SODIUM 100 MG: 100 CAPSULE, EXTENDED RELEASE ORAL at 00:58

## 2018-09-01 RX ADMIN — PHENYTOIN SODIUM 100 MG: 100 CAPSULE, EXTENDED RELEASE ORAL at 20:02

## 2018-09-01 RX ADMIN — BACLOFEN 10 MG: 10 TABLET ORAL at 00:57

## 2018-09-01 NOTE — CONSULTS
Adult Nutrition  Assessment    Patient Name:  Mando Hill  YOB: 1945  MRN: [id number]  Admit Date:  8/31/2018    Assessment Date:  9/1/2018    Comments:  Pt is a 73 year old male admitted with nausea and abdominal pain related to kidney stone.  Pt also has sacral/gluteal pressure injury.  Nausea improved at time of RD visit and pt eating lunch.  Pt and family report he normally has a fairly good appetite.  PO 75% at breakfast.  Menu provided with options available.  Pt likes Boost or Ensure, RD will send on trays to increase protein/vazquez intake for wound healing.  Coupons provided for home use.  RD will monitor.          Reason for Assessment     Row Name 09/01/18 1447          Reason for Assessment    Reason For Assessment identified at risk by screening criteria     Identified At Risk by Screening Criteria large or nonhealing wound, burn or pressure injury               Nutrition/Diet History     Row Name 09/01/18 1447          Nutrition/Diet History    Typical Food/Fluid Intake Pt eating lunch, normally has good appetite.  Nausea on admit likely due to pain from UTI/kidney stone.     Supplemental Drinks/Foods/Additives Likes Boost/Ensure.               Labs/Tests/Procedures/Meds     Row Name 09/01/18 1448          Labs/Procedures/Meds    Lab Results Reviewed reviewed, pertinent        Medications    Pertinent Medications Reviewed reviewed, pertinent     Pertinent Medications Comments lasix             Physical Findings     Row Name 09/01/18 1448          Physical Findings    Skin pressure injury             Estimated/Assessed Needs     Row Name 09/01/18 1448          Calculation Measurements    Weight Used For Calculations 73.6 kg (162 lb 4.1 oz)        Estimated/Assessed Needs    Additional Documentation KCAL/KG (Group);Protein Requirements (Group);Fluid Requirements (Group)        KCAL/KG    14 Kcal/Kg (kcal) 1030.4     15 Kcal/Kg (kcal) 1104     18 Kcal/Kg (kcal) 1324.8     20 Kcal/Kg  (kcal) 1472     25 Kcal/Kg (kcal) 1840     30 Kcal/Kg (kcal) 2208     35 Kcal/Kg (kcal) 2576     40 Kcal/Kg (kcal) 2944     45 Kcal/Kg (kcal) 3312     50 Kcal/Kg (kcal) 3680     kcal/kg (Specify) 30        Reynolds-St. Jeor Equation    RMR (Reynolds-St. Jeor Equation) 1429.63        Protein Requirements    Est Protein Requirement Amount (gms/kg) 1.2 gm protein     Estimated Protein Requirements (gms/day) 88.32        Fluid Requirements    Estimated Fluid Requirements (mL/day) 2200     Estimated Fluid Requirement Method RDA Method     RDA Method (mL) 2200     Brennen-Kim Method (over 20 kg) 2972             Nutrition Prescription Ordered     Row Name 09/01/18 1449          Nutrition Prescription PO    Current PO Diet Regular             Evaluation of Received Nutrient/Fluid Intake     Row Name 09/01/18 1449 09/01/18 1448       Calculation Measurements    Weight Used For Calculations  -- 73.6 kg (162 lb 4.1 oz)       PO Evaluation    Number of Meals 1  --    % PO Intake 75  --            Evaluation of Prescribed Nutrient/Fluid Intake     Row Name 09/01/18 1448          Calculation Measurements    Weight Used For Calculations 73.6 kg (162 lb 4.1 oz)           Electronically signed by:  Autumn Urbano RD  09/01/18 2:49 PM

## 2018-09-01 NOTE — PLAN OF CARE
Problem: Patient Care Overview  Goal: Plan of Care Review  Outcome: Ongoing (interventions implemented as appropriate)   09/01/18 0206   Coping/Psychosocial   Plan of Care Reviewed With patient   Plan of Care Review   Progress no change   OTHER   Outcome Summary new admission to floor, first dose of Zosyn given

## 2018-09-01 NOTE — PLAN OF CARE
Problem: Patient Care Overview  Goal: Plan of Care Review  Outcome: Ongoing (interventions implemented as appropriate)   09/01/18 1037   Coping/Psychosocial   Plan of Care Reviewed With patient   Plan of Care Review   Progress no change   OTHER   Outcome Summary vss, pain controlled at this time, NPO status initiated this AM per order

## 2018-09-01 NOTE — CONSULTS
Inpatient Urology Consult  Consult performed by: LOULOU URBINA  Consult ordered by: MICHELLE ORTEGA          Patient Care Team:  Provider, No Known as PCP - General    Chief complaint: left 1.3 cm kidney stone    Subjective     Mr. Hill is a 73-year-old male known to Dr. Al at [facility], history of neurogenic bladder, recurrent UTI, BPH, left hemiplegia, and has chronic suprapubic catheter that is changed monthly by nursing staff by Home Health. He was last seen in the office last week, KUB at that time suggestive vascular calcifications. He is on UTI suppression with nitrofurantoin, takes finasteride and Tamsulosin for BPH. His last acute UTI was about 6 months ago. He reports left flank pain, nausea, and shortness of air that started yesterday and he presented to the ED via ambulance.       Abdominal Pain   This is a new problem. The current episode started yesterday. The problem occurs intermittently. The problem has been waxing and waning. The pain is located in the left flank. The pain is mild. The quality of the pain is dull. The abdominal pain radiates to the suprapubic region. Associated symptoms include nausea. Pertinent negatives include no constipation, diarrhea, dysuria, fever, headaches, hematuria or vomiting. Exacerbated by: recent SP Tube change. He has tried oral narcotic analgesics and antibiotics for the symptoms. The treatment provided mild relief. Prior diagnostic workup includes CT scan. kidney stones, recurrent UTI, NGB, chronic SP tube       Review of Systems   Constitutional: Positive for fatigue. Negative for activity change, appetite change, chills, diaphoresis, fever and unexpected weight change.   HENT: Negative.  Negative for congestion, drooling, ear discharge, ear pain and facial swelling.    Eyes: Negative for photophobia, pain, discharge, redness, itching and visual disturbance.   Respiratory: Positive for shortness of breath. Negative for apnea, cough,  choking, chest tightness, wheezing and stridor.    Cardiovascular: Positive for leg swelling. Negative for chest pain and palpitations.   Gastrointestinal: Positive for abdominal pain and nausea. Negative for abdominal distention, anal bleeding, blood in stool, constipation, diarrhea, rectal pain and vomiting.   Endocrine: Negative for cold intolerance, heat intolerance, polydipsia, polyphagia and polyuria.   Genitourinary: Positive for flank pain. Negative for decreased urine volume, difficulty urinating, discharge, dysuria, hematuria, penile pain and scrotal swelling.   Musculoskeletal: Positive for back pain and gait problem.   Skin: Negative for color change, pallor and wound.   Neurological: Negative for facial asymmetry, weakness, light-headedness and headaches.   Hematological: Negative.  Negative for adenopathy. Does not bruise/bleed easily.   Psychiatric/Behavioral: Negative for agitation, behavioral problems and confusion.   All other systems reviewed and are negative.       Past Medical History:   Diagnosis Date   • Arthritis    • Atherosclerosis    • Atrial fibrillation (CMS/HCC)    • BPH (benign prostatic hyperplasia)    • Brain injury (CMS/HCC)    • Chronic bronchitis (CMS/HCC)    • Hypertension    • Left hemiplegia (CMS/HCC)    • UTI (urinary tract infection) due to urinary indwelling catheter (CMS/HCC) 3/5/2018   ,   Past Surgical History:   Procedure Laterality Date   • CARDIAC ELECTROPHYSIOLOGY PROCEDURE N/A 3/15/2018    Procedure: EP/Ablation- Flutter;  Surgeon: Seven Londono MD;  Location: [facility] INVASIVE LOCATION;  Service: Cardiology   • CHOLECYSTECTOMY     • EYE SURGERY     • SUPRAPUBIC CATHETER INSERTION     ,   Family History   Problem Relation Age of Onset   • Stroke Mother    • Hypertension Father    ,   Social History   Substance Use Topics   • Smoking status: Former Smoker   • Smokeless tobacco: Never Used   • Alcohol use Yes      Comment: occassionally   ,   Prescriptions Prior  to Admission   Medication Sig Dispense Refill Last Dose   • aspirin 81 MG EC tablet Take 81 mg by mouth Daily.   Taking   • atorvastatin (LIPITOR) 10 MG tablet Take 1 tablet by mouth Daily. 15 tablet 0 Taking   • baclofen (LIORESAL) 10 MG tablet Take 10 mg by mouth 2 (Two) Times a Day.   Taking   • finasteride (PROSCAR) 5 MG tablet Take 5 mg by mouth Daily.   Taking   • furosemide (LASIX) 20 MG tablet Take 20 mg by mouth Daily.   Taking   • HYDROcodone-acetaminophen (NORCO)  MG per tablet TAKE 1 TABLET BY MOUTH EVERY SIX HOURS AS NEEDED FOR CHRONIC PAIN  0 Taking   • nebivolol (BYSTOLIC) 20 MG tablet Take 20 mg by mouth Daily.   Taking   • nitrofurantoin (MACRODANTIN) 50 MG capsule Take 50 mg by mouth Every Night.   Taking   • Omega-3 Fatty Acids (FISH OIL) 1000 MG capsule capsule Take  by mouth Daily With Breakfast.   Taking   • phenytoin (DILANTIN) 100 MG ER capsule Take  by mouth 2 (Two) Times a Day.   Taking   • rivaroxaban (XARELTO) 20 MG tablet Take 20 mg by mouth Daily.   Taking   • tamsulosin (FLOMAX) 0.4 MG capsule 24 hr capsule Take 1 capsule by mouth Every Night.   Taking   • vitamin C (ASCORBIC ACID) 500 MG tablet Take 500 mg by mouth Daily.   Taking   • hydrOXYzine (ATARAX) 25 MG tablet Take 25 mg by mouth 3 (Three) Times a Day As Needed for Itching.   Taking   • methenamine (HIPREX) 1 g tablet Take 1 g by mouth 2 (Two) Times a Day With Meals.   Taking     ALLERGIES:  Ambien [zolpidem]; Aleve [naproxen]; Amikacin; Amiodarone; and Vancomycin    Objective      Vital Signs  Temp:  [95.9 °F (35.5 °C)-98.9 °F (37.2 °C)] 97.8 °F (36.6 °C)  Heart Rate:  [] 77  Resp:  [18] 18  BP: (114-170)/(55-89) 120/77    Physical Exam   Constitutional: He is oriented to person, place, and time. He appears well-developed and well-nourished. No distress.   HENT:   Head: Normocephalic.   Nose: Nose normal.   Mouth/Throat: No oropharyngeal exudate.   Eyes: Pupils are equal, round, and reactive to light. Conjunctivae  are normal. Right eye exhibits no discharge. Left eye exhibits no discharge. No scleral icterus.   Neck: No JVD present. No tracheal deviation present.   Cardiovascular: Normal rate and regular rhythm.    No murmur heard.  Pulmonary/Chest: Effort normal and breath sounds normal. No stridor. No respiratory distress. He has no wheezes. He has no rales. He exhibits no tenderness.   Abdominal: Soft. Bowel sounds are normal. He exhibits no distension. There is no tenderness. There is no CVA tenderness.   Genitourinary: Penis normal. No penile tenderness.   Musculoskeletal: He exhibits edema. He exhibits no tenderness.   Contracture LUE   Neurological: He is alert and oriented to person, place, and time. He is not disoriented. He exhibits abnormal muscle tone (left side).   Skin: Skin is warm and dry. Capillary refill takes 2 to 3 seconds. No rash noted. He is not diaphoretic. No erythema. Nails show no clubbing.   Psychiatric: He has a normal mood and affect. His speech is normal and behavior is normal. Judgment and thought content normal. He is attentive.       Results Review:   Lab Results (last 24 hours)     Procedure Component Value Units Date/Time    Blood Culture - Blood, [[id number]]  (Normal) Collected:  08/31/18 2319    Specimen:  Blood from Blood, Arterial Line Updated:  09/01/18 1145     Blood Culture No growth at less than 24 hours    Blood Culture - Blood, [[id number]]  (Normal) Collected:  08/31/18 2319    Specimen:  Blood from Hand, Left Updated:  09/01/18 1145     Blood Culture No growth at less than 24 hours    Basic Metabolic Panel [[id number]]  (Abnormal) Collected:  09/01/18 0808    Specimen:  Blood Updated:  09/01/18 0832     Glucose 132 (H) mg/dL      BUN 13 mg/dL      Creatinine 1.01 mg/dL      Sodium 142 mmol/L      Potassium 3.7 mmol/L      Chloride 99 mmol/L      CO2 35.0 (H) mmol/L      Calcium 8.5 mg/dL      eGFR Non African Amer 72 mL/min/1.73      BUN/Creatinine Ratio 12.9     Anion Gap 8.0  mmol/L     Narrative:       The MDRD GFR formula is only valid for adults with stable renal function between ages 18 and 70.    CBC (No Diff) [[id number]]  (Abnormal) Collected:  09/01/18 0808    Specimen:  Blood Updated:  09/01/18 0821     WBC 9.01 10*3/mm3      RBC 4.45 10*6/mm3      Hemoglobin 13.2 (L) g/dL      Hematocrit 40.4 %      MCV 90.8 fL      MCH 29.7 pg      MCHC 32.7 g/dL      RDW 15.9 (H) %      RDW-SD 53.0 (H) fl      MPV 9.9 fL      Platelets 219 10*3/mm3     Blood Gas, Venous [[id number]]  (Abnormal) Collected:  08/31/18 2319    Specimen:  Venous Blood Updated:  08/31/18 2341     Site Venous     pH, Venous 7.340 pH Units      pCO2, Venous 68.8 (H) mm Hg      pO2, Venous 39.4 mm Hg      HCO3, Venous 37.1 (H) mmol/L      Base Excess, Venous 8.5 (H) mmol/L      O2 Saturation, Venous 71.5 %      Barometric Pressure for Blood Gas -- mmHg      Comment: PATM not performed at this facility.        Modality N/A    [facility] Draw [[id number]] Collected:  08/31/18 1722    Specimen:  Blood Updated:  08/31/18 1830    Narrative:       The following orders were created for panel order [facility] Draw.  Procedure                               Abnormality         Status                     ---------                               -----------         ------                     Light Blue Top[[id number]]                                   Final result               Green Top (Gel)[[id number]]                                  Final result               Lavender Top[[id number]]                                     Final result               Gold Top - SST[[id number]]                                   Final result                 Please view results for these tests on the individual orders.    Light Blue Top [[id number]] Collected:  08/31/18 1722    Specimen:  Blood Updated:  08/31/18 1830     Extra Tube hold for add-on     Comment: Auto resulted       Green Top (Gel) [[id number]] Collected:  08/31/18 1722    Specimen:  Blood Updated:   08/31/18 1830     Extra Tube Hold for add-ons.     Comment: Auto resulted.       Lavender Top [[id number]] Collected:  08/31/18 1722    Specimen:  Blood Updated:  08/31/18 1830     Extra Tube hold for add-on     Comment: Auto resulted       Gold Top - SST [[id number]] Collected:  08/31/18 1722    Specimen:  Blood Updated:  08/31/18 1830     Extra Tube Hold for add-ons.     Comment: Auto resulted.       Urinalysis, Microscopic Only - Urine, Clean Catch [[id number]]  (Abnormal) Collected:  08/31/18 1735    Specimen:  Urine from Urine, Clean Catch Updated:  08/31/18 1820     RBC, UA 6-12 (A) /HPF      WBC, UA 31-50 (A) /HPF      Bacteria, UA Trace (A) /HPF      Squamous Epithelial Cells, UA 0-2 /HPF      Yeast, UA       Large/3+ Budding Yeast w/Hyphae     /HPF     Hyaline Casts, UA None Seen /LPF      Methodology Manual Light Microscopy    Urinalysis With Microscopic If Indicated (No Culture) - Urine, Clean Catch [[id number]]  (Abnormal) Collected:  08/31/18 1735    Specimen:  Urine from Urine, Clean Catch Updated:  08/31/18 1805     Color, UA Yellow     Appearance, UA Clear     pH, UA 6.5     Specific Gravity, UA 1.011     Glucose, UA Negative     Ketones, UA Negative     Bilirubin, UA Negative     Blood, UA Small (1+) (A)     Protein, UA 30 mg/dL (1+) (A)     Leuk Esterase, UA Moderate (2+) (A)     Nitrite, UA Negative     Urobilinogen, UA 1.0 E.U./dL    Lactic Acid, Plasma [[id number]]  (Normal) Collected:  08/31/18 1722    Specimen:  Blood Updated:  08/31/18 1745     Lactate 1.4 mmol/L     Comprehensive Metabolic Panel [[id number]]  (Abnormal) Collected:  08/31/18 1722    Specimen:  Blood Updated:  08/31/18 1744     Glucose 129 (H) mg/dL      BUN 12 mg/dL      Creatinine 0.90 mg/dL      Sodium 139 mmol/L      Potassium 3.5 mmol/L      Chloride 97 mmol/L      CO2 34.0 (H) mmol/L      Calcium 8.5 mg/dL      Total Protein 7.9 g/dL      Albumin 3.90 g/dL      ALT (SGPT) 21 U/L      AST (SGOT) 23 U/L      Alkaline Phosphatase  128 (H) U/L      Total Bilirubin 0.4 mg/dL      eGFR Non African Amer 82 mL/min/1.73      Globulin 4.0 (H) gm/dL      A/G Ratio 1.0 (L) g/dL      BUN/Creatinine Ratio 13.3     Anion Gap 8.0 mmol/L     Narrative:       The MDRD GFR formula is only valid for adults with stable renal function between ages 18 and 70.    Lipase [[id number]]  (Normal) Collected:  08/31/18 1722    Specimen:  Blood Updated:  08/31/18 1744     Lipase 32 U/L     CBC & Differential [[id number]] Collected:  08/31/18 1722    Specimen:  Blood Updated:  08/31/18 1731    Narrative:       The following orders were created for panel order CBC & Differential.  Procedure                               Abnormality         Status                     ---------                               -----------         ------                     CBC Auto Differential[[id number]]        Abnormal            Final result                 Please view results for these tests on the individual orders.    CBC Auto Differential [[id number]]  (Abnormal) Collected:  08/31/18 1722    Specimen:  Blood Updated:  08/31/18 1731     WBC 10.38 (H) 10*3/mm3      RBC 4.86 10*6/mm3      Hemoglobin 14.6 g/dL      Hematocrit 43.3 %      MCV 89.1 fL      MCH 30.0 pg      MCHC 33.7 g/dL      RDW 15.5 (H) %      RDW-SD 50.0 (H) fl      MPV 9.9 fL      Platelets 227 10*3/mm3      Neutrophil % 80.2 (H) %      Lymphocyte % 6.6 (L) %      Monocyte % 8.2 %      Eosinophil % 4.5 %      Basophil % 0.3 %      Immature Grans % 0.2 %      Neutrophils, Absolute 8.32 10*3/mm3      Lymphocytes, Absolute 0.69 10*3/mm3      Monocytes, Absolute 0.85 10*3/mm3      Eosinophils, Absolute 0.47 10*3/mm3      Basophils, Absolute 0.03 10*3/mm3      Immature Grans, Absolute 0.02 10*3/mm3          Imaging Results (last 24 hours)     Procedure Component Value Units Date/Time    CT Abdomen Pelvis With Contrast [[id number]] Collected:  08/31/18 2022     Updated:  08/31/18 2058    Narrative:         CT abdomen and pelvis  with contrast on 8/31/2018     CLINICAL INDICATION: Lower abdominal pain    TECHNIQUE: Multiple axial images are obtained throughout the  abdomen and pelvis following the administration of IV and oral  contrast. This exam was performed according to our departmental  dose-optimization program, which includes automated exposure  control, adjustment of the mA and/or kV according to patient size  and/or use of iterative reconstruction technique.   Total DLP is 567.8 mGy*cm.    COMPARISON: 2/1/2015    FINDINGS:     Abdomen: There is right greater than left basilar atelectasis.  The patient is status post cholecystectomy. 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 There  is right greater than left renal atrophy with renal cortical  scarring that may be related to reflux nephropathy/chronic  pyelonephritis. Previously noted bilateral ureteral stents have  been removed. There is no significant left hydronephrosis but  there is very mild left hydroureter to the level of a 1.3 cm left  distal ureteral stone a few CM above the left UVJ. There is also  a 5 mm left UVJ stone. No right-sided renal or ureteral stones  are noted. Solid abdominal organs are otherwise unremarkable.  Vascular calcifications are noted. There is no abdominal  adenopathy. There is no free fluid or free air within the  abdomen. The abdominal portion of the GI tract is unremarkable.    Pelvis: The prostate is enlarged, please correlate with physical  exam and PSA levels. Suprapubic bladder catheter appears in good  position in the bladder that is decompressed. Mildly enlarged  left inguinal lymph node on image 94 is likely reactive. No other  pelvic adenopathy is noted by CT size criteria. There is no free  fluid in the pelvis. Pelvic portion of the GI tract is  unremarkable. Degenerative changes are noted in the spine. There  is slight levoscoliosis of the lumbar spine. There is fat  stranding around the bladder  suggestive of infection, recommend  correlation with urinalysis.      Impression:       1. Couple of relatively large left distal ureteral stones causing  minimal obstruction of the left renal collecting system.  2. Prostate enlargement.  3. Fat stranding around the bladder suggesting infection,  recommend correlation with urinalysis.    Electronically signed by:  uLke Bustos  8/31/2018 8:57 PM  CDT Workstation: [id number]           I reviewed the patient's new clinical results.  I reviewed the patient's new imaging results and agree with the interpretation.  I reviewed the patient's other test results and agree with the interpretation        Assessment/Plan     Active Problems:    Abdominal pain      Assessment & Plan  1. LEFT distal ureteric stones, 1.3 cm near the UVJ and another 5 mm UVJ stone causing mild left hydroureter without AUGIE, appears chronic.     2. UTI cystitis after SP tube change last week by Home Health   -WBC 10.38-->9.01, lactic acid 1.4  -Zosyn day #1, Diflucan given as well x 1 dose    Plan: treat UTI, SP tube change, Dr. Al will plan for lithotripsy of UVJ stones outpatient once UTI is treated.     PRE DIAGNOSIS:  NGB     POST DIAGNOSIS:  NGB     PROCEDURE DETAILS:  I have reviewed the diagnosis and treatment options with patient. Risks and benefits explained and patient wishes to proceed with catheter change. Supra pubic site prepped in sterile technique. Existing catheter removed with 16 catheter inserted with 10 ccsterile water in balloon. Supra pubic site clear with minimal amount of redness and no drainage noted. Placement was checked. Catheter connected to gravity bag, closed system.      COMPLICATIONS:  No Complications.     FINDINGS: Urine clear yellow.      INSTRUCTIONS: Appropriate post procedure instructions given. Verbalized understanding    I discussed the patient's findings and my recommendations with patient, family, nursing staff and Dr. Mikaela Frederick,  JEAN  09/01/18  12:47 PM

## 2018-09-01 NOTE — PLAN OF CARE
Problem: Patient Care Overview  Goal: Plan of Care Review  Outcome: Ongoing (interventions implemented as appropriate)   09/01/18 6634   Coping/Psychosocial   Plan of Care Reviewed With patient;family   Plan of Care Review   Progress no change   OTHER   Outcome Summary Pt has fair appetite. Nausea improved. Will send Ensure on trays to optimize nutrition for wound healing of pressure injury.

## 2018-09-01 NOTE — PROGRESS NOTES
[facility] Medicine Services  INPATIENT PROGRESS NOTE    Length of Stay: 0  Date of Admission: 8/31/2018  Primary Care Physician: Provider, No Known    Subjective   Chief Complaint: No complaints    HPI:      9/1/2018:  The patient states he is feeling much better.  The patient has a chronic catheter.  Lives at home with family.      H&P by Dr. Lieberman:  This is a 73-year-old white gentleman that has a concurrent and past medical history significant for atrial fibrillation on Xarelto, CVA, chronic indwelling Gayle with prior UTIs.  Patient also has had a history of sacral ulcer with sepsis.  The patient has hypertension and seizure disorder on phenytoin.  He also has a history of BPH.  The patient follows with urology and is on chronic antibiotics for chronic UTIs.  The patient is present with his significant other who called the ambulance for further evaluation.  The patient states that over the last few hours he has not felt well.  He has had left-sided flank and abdomen pain.  The patient also has been very in very much nauseous.  The patient is without any shortness of breath.  In the ER it was evaluated that the patient does have a UTI with 31-50 white blood cells and moderate leukocyte esterase.  Patient also does have yeast 3+ in the urinalysis as well.  The patient on CT abdomen was found to have a 1.3 cm left stone near the day UVJ.  The patient will be admitted for further management.    Review of Systems   Constitutional: Positive for fatigue.   Genitourinary: Positive for flank pain.      All pertinent negatives and positives are as above. All other systems have been reviewed and are negative unless otherwise stated.     Objective    Temp:  [95.9 °F (35.5 °C)-98.9 °F (37.2 °C)] 97.8 °F (36.6 °C)  Heart Rate:  [] 77  Resp:  [18] 18  BP: (114-170)/(55-89) 120/77    Physical Exam   Constitutional: He is oriented to person, place, and time. He appears  well-developed and well-nourished.   HENT:   Head: Normocephalic and atraumatic.   Eyes: Pupils are equal, round, and reactive to light. EOM are normal.   Neck: Normal range of motion. Neck supple.   Cardiovascular: Normal rate and regular rhythm.    Pulmonary/Chest: Effort normal and breath sounds normal.   Abdominal: Soft. Bowel sounds are normal.   Musculoskeletal: Normal range of motion.   Neurological: He is alert and oriented to person, place, and time.   Skin: Skin is warm and dry.   Psychiatric: He has a normal mood and affect.     Results Review:  I have reviewed the labs, radiology results, and diagnostic studies.    Laboratory Data:     Results from last 7 days  Lab Units 09/01/18  0808 08/31/18  1722   SODIUM mmol/L 142 139   POTASSIUM mmol/L 3.7 3.5   CHLORIDE mmol/L 99 97   CO2 mmol/L 35.0* 34.0*   BUN mg/dL 13 12   CREATININE mg/dL 1.01 0.90   GLUCOSE mg/dL 132* 129*   CALCIUM mg/dL 8.5 8.5   BILIRUBIN mg/dL  --  0.4   ALK PHOS U/L  --  128*   ALT (SGPT) U/L  --  21   AST (SGOT) U/L  --  23   ANION GAP mmol/L 8.0 8.0     Estimated Creatinine Clearance: 65.8 mL/min (by C-G formula based on SCr of 1.01 mg/dL).            Results from last 7 days  Lab Units 09/01/18  0808 08/31/18  1722   WBC 10*3/mm3 9.01 10.38*   HEMOGLOBIN g/dL 13.2* 14.6   HEMATOCRIT % 40.4 43.3   PLATELETS 10*3/mm3 219 227           Culture Data:   Blood Culture   Date Value Ref Range Status   08/31/2018 No growth at less than 24 hours  Preliminary   08/31/2018 No growth at less than 24 hours  Preliminary     No results found for: URINECX  No results found for: RESPCX  No results found for: WOUNDCX  No results found for: STOOLCX  No components found for: BODYFLD    Radiology Data:   Imaging Results (last 24 hours)     Procedure Component Value Units Date/Time    CT Abdomen Pelvis With Contrast [[id number]] Collected:  08/31/18 2022     Updated:  08/31/18 2058    Narrative:         CT abdomen and pelvis with contrast on 8/31/2018      CLINICAL INDICATION: Lower abdominal pain    TECHNIQUE: Multiple axial images are obtained throughout the  abdomen and pelvis following the administration of IV and oral  contrast. This exam was performed according to our departmental  dose-optimization program, which includes automated exposure  control, adjustment of the mA and/or kV according to patient size  and/or use of iterative reconstruction technique.   Total DLP is 567.8 mGy*cm.    COMPARISON: 2/1/2015    FINDINGS:     Abdomen: There is right greater than left basilar atelectasis.  The patient is status post cholecystectomy. 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 There  is right greater than left renal atrophy with renal cortical  scarring that may be related to reflux nephropathy/chronic  pyelonephritis. Previously noted bilateral ureteral stents have  been removed. There is no significant left hydronephrosis but  there is very mild left hydroureter to the level of a 1.3 cm left  distal ureteral stone a few CM above the left UVJ. There is also  a 5 mm left UVJ stone. No right-sided renal or ureteral stones  are noted. Solid abdominal organs are otherwise unremarkable.  Vascular calcifications are noted. There is no abdominal  adenopathy. There is no free fluid or free air within the  abdomen. The abdominal portion of the GI tract is unremarkable.    Pelvis: The prostate is enlarged, please correlate with physical  exam and PSA levels. Suprapubic bladder catheter appears in good  position in the bladder that is decompressed. Mildly enlarged  left inguinal lymph node on image 94 is likely reactive. No other  pelvic adenopathy is noted by CT size criteria. There is no free  fluid in the pelvis. Pelvic portion of the GI tract is  unremarkable. Degenerative changes are noted in the spine. There  is slight levoscoliosis of the lumbar spine. There is fat  stranding around the bladder suggestive of infection,  recommend  correlation with urinalysis.      Impression:       1. Couple of relatively large left distal ureteral stones causing  minimal obstruction of the left renal collecting system.  2. Prostate enlargement.  3. Fat stranding around the bladder suggesting infection,  recommend correlation with urinalysis.    Electronically signed by:  Luke Bustos  8/31/2018 8:57 PM  CDT Workstation: [id number]          I have reviewed the patient's current medications.     Assessment/Plan     Active Hospital Problems    Diagnosis Date Noted   • Abdominal pain [R10.9] 08/31/2018       Plan:    1.  Acute left hydroureter with 1.3 cm distal ureter stone:  Flomax, IV fluids, and Duska following.     2.  BPH: Will restart patient on finasteride with Flomax.     3.  UTI with yeast and moderate bacteria with chronic indwelling palmer:- will cover with Zosyn and have patient see urology.  Also patient is on phenytoin and there is an interaction with phenytoin and fluconazole.  Await urine culture and sensitivity.     4.  A fib s/p ablation - patient is a normal sinus rhythm with rate controlled and we'll continue Xarelto.     5.  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  Patient does have an old left upper extremity contracture.  Monitor.     6.  Old sacral ulcer - consult wound care.     7.  History of seizure disorder - we'll continue patient on phenytoin.  Patient has not had seizure in over 5 years.         Discharge Planning: I expect patient to be discharged to home in 1-2 days.      This document has been electronically signed by JEAN Sahu on September 1, 2018 12:15 PM

## 2018-09-01 NOTE — H&P
[facility] Medicine Admission      Date of Admission: 8/31/2018      Primary Care Physician: Provider, No Known    Chief compliant: Left-sided flank and abdomen pain ×1 day    HPI: This is a 73-year-old white gentleman that has a concurrent and past medical history significant for atrial fibrillation on Xarelto, CVA, chronic indwelling Gayle with prior UTIs.  Patient also has had a history of sacral ulcer with sepsis.  The patient has hypertension and seizure disorder on phenytoin.  He also has a history of BPH.  The patient follows with urology and is on chronic antibiotics for chronic UTIs.  The patient is present with his significant other who called the ambulance for further evaluation.  The patient states that over the last few hours he has not felt well.  He has had left-sided flank and abdomen pain.  The patient also has been very in very much nauseous.  The patient is without any shortness of breath.  In the ER it was evaluated that the patient does have a UTI with 31-50 white blood cells and moderate leukocyte esterase.  Patient also does have yeast 3+ in the urinalysis as well.  The patient on CT abdomen was found to have a 1.3 cm left stone near the day UVJ.  The patient will be admitted for further management.    Past Medical History:   Past Medical History:   Diagnosis Date   • Arthritis    • Atherosclerosis    • Atrial fibrillation (CMS/HCC)    • BPH (benign prostatic hyperplasia)    • Brain injury (CMS/HCC)    • Chronic bronchitis (CMS/HCC)    • Hypertension    • Left hemiplegia (CMS/HCC)    • UTI (urinary tract infection) due to urinary indwelling catheter (CMS/HCC) 3/5/2018       Past Surgical History:   Past Surgical History:   Procedure Laterality Date   • CARDIAC ELECTROPHYSIOLOGY PROCEDURE N/A 3/15/2018    Procedure: EP/Ablation- Flutter;  Surgeon: Seven Londono MD;  Location: [facility] INVASIVE LOCATION;  Service: Cardiology   • CHOLECYSTECTOMY      • EYE SURGERY     • SUPRAPUBIC CATHETER INSERTION         Family History:   Family History   Problem Relation Age of Onset   • Stroke Mother    • Hypertension Father        Social History:   Social History     Social History   • Marital status:      Social History Main Topics   • Smoking status: Former Smoker   • Smokeless tobacco: Never Used   • Alcohol use Yes      Comment: occassionally   • Drug use: No   • Sexual activity: Not Currently     Other Topics Concern   • Not on file       Allergies:   Allergies   Allergen Reactions   • Aleve [Naproxen]    • Ambien [Zolpidem]    • Vancomycin    • Amikacin Rash   • Amiodarone Rash       Medications:   Prior to Admission medications    Medication Sig Start Date End Date Taking? Authorizing Provider   aspirin 81 MG EC tablet Take 81 mg by mouth Daily.   Yes Michael Plasencia MD   atorvastatin (LIPITOR) 10 MG tablet Take 1 tablet by mouth Daily. 3/16/18  Yes Luca Deleon MD   baclofen (LIORESAL) 10 MG tablet Take 10 mg by mouth 2 (Two) Times a Day.   Yes Michael Plasencia MD   finasteride (PROSCAR) 5 MG tablet Take 5 mg by mouth Daily.   Yes Michael Plasencia MD   furosemide (LASIX) 20 MG tablet Take 20 mg by mouth Daily.   Yes Michael Plasencia MD   HYDROcodone-acetaminophen (NORCO)  MG per tablet TAKE 1 TABLET BY MOUTH EVERY SIX HOURS AS NEEDED FOR CHRONIC PAIN 1/4/18  Yes Michael Plasencia MD   hydrOXYzine (ATARAX) 25 MG tablet Take 25 mg by mouth 3 (Three) Times a Day As Needed for Itching.   Yes Michael Plasencia MD   methenamine (HIPREX) 1 g tablet Take 1 g by mouth 2 (Two) Times a Day With Meals.   Yes Michael Plasencia MD   nebivolol (BYSTOLIC) 20 MG tablet Take 20 mg by mouth Daily.   Yes Michael Plasencia MD   nitrofurantoin (MACRODANTIN) 50 MG capsule Take 50 mg by mouth Every Night.   Yes Michael Plasencia MD   Omega-3 Fatty Acids (FISH OIL) 1000 MG capsule capsule Take  by mouth Daily With Breakfast.   Yes  Provider, MD Michael   phenytoin (DILANTIN) 100 MG ER capsule Take  by mouth 2 (Two) Times a Day.   Yes Michael Plasencia MD   rivaroxaban (XARELTO) 20 MG tablet Take 20 mg by mouth Daily.   Yes Michael Plasencia MD   tamsulosin (FLOMAX) 0.4 MG capsule 24 hr capsule Take 1 capsule by mouth Every Night.   Yes Michael Plasencia MD   vitamin C (ASCORBIC ACID) 500 MG tablet Take 500 mg by mouth Daily.   Yes Michael Plasencia MD       Review of Systems:    Constitutional: Positive weakness, positive fatigue  HENT: Negative for congestion, ear discharge, ear pain, hearing loss, rhinorrhea, sneezing, sore throat and trouble swallowing.    Eyes: Negative for photophobia, pain, discharge and visual disturbance.   Respiratory: Negative for cough, chest tightness, shortness of breath and wheezing.    Cardiovascular: Negative for chest pain and palpitations.   Gastrointestinal: Positive for abdominal pain, positive for nausea.   Endocrine: Negative for polydipsia and polyuria.   Genitourinary: positive dysuria, frequency, hematuria and urgency.   Skin: Negative for rash.   Neurological: Negative for dizziness, syncope,      Physical Exam:    Temp:  [98.9 °F (37.2 °C)] 98.9 °F (37.2 °C)  Heart Rate:  [] 70  Resp:  [18] 18  BP: (128-170)/(57-89) 129/61    General: The patient is somewhat somnolent and drowsy.  He is ill-appearing but is overall nontoxic.  HEENT: Head: Normocephalic and atraumatic. Right Ear: External ear normal.   Left Ear: External ear normal. Nose: Nose normal. Mouth/Throat: Oropharynx is clear and moist.   Eyes: Conjunctivae and EOM are normal. Pupils are equal, round, and reactive to light. Right eye exhibits no discharge. Left eye exhibits no discharge.   Neck: Normal range of motion. Neck supple. No JVD present. No tracheal deviation present. No thyromegaly present.   Heart: Normal rate, regular rhythm, normal heart sounds   Pulmonary: Effort normal and breath sounds normal. No  stridor. No respiratory distress. He has no wheezes. No rales or tenderness.   Abdominal: Soft, Bowel sounds are normal. No distension and no mass. There is no tenderness. There is no rebound and no guarding. No hernia.   Musculoskeletal: The patient has 1+ pitting edema in the left foot ankle extending up the extremity to the level of the midcalf.    Lymph: No cervical adenopathy.   Neurological: Patient has a chronic left-sided contracture of the left arm from prior CVA.     Results Reviewed:  I have personally reviewed current lab, radiology, and data and agree with results.  Lab Results (last 24 hours)     Procedure Component Value Units Date/Time    Silvis Draw [[id number]] Collected:  08/31/18 1722    Specimen:  Blood Updated:  08/31/18 1830    Narrative:       The following orders were created for panel order Silvis Draw.  Procedure                               Abnormality         Status                     ---------                               -----------         ------                     Light Blue Top[[id number]]                                   Final result               Green Top (Gel)[[id number]]                                  Final result               Lavender Top[[id number]]                                     Final result               Gold Top - SST[[id number]]                                   Final result                 Please view results for these tests on the individual orders.    Light Blue Top [[id number]] Collected:  08/31/18 1722    Specimen:  Blood Updated:  08/31/18 1830     Extra Tube hold for add-on     Comment: Auto resulted       Green Top (Gel) [[id number]] Collected:  08/31/18 1722    Specimen:  Blood Updated:  08/31/18 1830     Extra Tube Hold for add-ons.     Comment: Auto resulted.       Lavender Top [[id number]] Collected:  08/31/18 1722    Specimen:  Blood Updated:  08/31/18 1830     Extra Tube hold for add-on     Comment: Auto resulted       Gold Top - SST [[id number]]  Collected:  08/31/18 1722    Specimen:  Blood Updated:  08/31/18 1830     Extra Tube Hold for add-ons.     Comment: Auto resulted.       Urinalysis, Microscopic Only - Urine, Clean Catch [[id number]]  (Abnormal) Collected:  08/31/18 1735    Specimen:  Urine from Urine, Clean Catch Updated:  08/31/18 1820     RBC, UA 6-12 (A) /HPF      WBC, UA 31-50 (A) /HPF      Bacteria, UA Trace (A) /HPF      Squamous Epithelial Cells, UA 0-2 /HPF      Yeast, UA       Large/3+ Budding Yeast w/Hyphae     /HPF     Hyaline Casts, UA None Seen /LPF      Methodology Manual Light Microscopy    Urinalysis With Microscopic If Indicated (No Culture) - Urine, Clean Catch [[id number]]  (Abnormal) Collected:  08/31/18 1735    Specimen:  Urine from Urine, Clean Catch Updated:  08/31/18 1805     Color, UA Yellow     Appearance, UA Clear     pH, UA 6.5     Specific Gravity, UA 1.011     Glucose, UA Negative     Ketones, UA Negative     Bilirubin, UA Negative     Blood, UA Small (1+) (A)     Protein, UA 30 mg/dL (1+) (A)     Leuk Esterase, UA Moderate (2+) (A)     Nitrite, UA Negative     Urobilinogen, UA 1.0 E.U./dL    Lactic Acid, Plasma [[id number]]  (Normal) Collected:  08/31/18 1722    Specimen:  Blood Updated:  08/31/18 1745     Lactate 1.4 mmol/L     Comprehensive Metabolic Panel [[id number]]  (Abnormal) Collected:  08/31/18 1722    Specimen:  Blood Updated:  08/31/18 1744     Glucose 129 (H) mg/dL      BUN 12 mg/dL      Creatinine 0.90 mg/dL      Sodium 139 mmol/L      Potassium 3.5 mmol/L      Chloride 97 mmol/L      CO2 34.0 (H) mmol/L      Calcium 8.5 mg/dL      Total Protein 7.9 g/dL      Albumin 3.90 g/dL      ALT (SGPT) 21 U/L      AST (SGOT) 23 U/L      Alkaline Phosphatase 128 (H) U/L      Total Bilirubin 0.4 mg/dL      eGFR Non African Amer 82 mL/min/1.73      Globulin 4.0 (H) gm/dL      A/G Ratio 1.0 (L) g/dL      BUN/Creatinine Ratio 13.3     Anion Gap 8.0 mmol/L     Narrative:       The MDRD GFR formula is only valid for adults  with stable renal function between ages 18 and 70.    Lipase [[id number]]  (Normal) Collected:  08/31/18 1722    Specimen:  Blood Updated:  08/31/18 1744     Lipase 32 U/L     CBC & Differential [[id number]] Collected:  08/31/18 1722    Specimen:  Blood Updated:  08/31/18 1731    Narrative:       The following orders were created for panel order CBC & Differential.  Procedure                               Abnormality         Status                     ---------                               -----------         ------                     CBC Auto Differential[[id number]]        Abnormal            Final result                 Please view results for these tests on the individual orders.    CBC Auto Differential [[id number]]  (Abnormal) Collected:  08/31/18 1722    Specimen:  Blood Updated:  08/31/18 1731     WBC 10.38 (H) 10*3/mm3      RBC 4.86 10*6/mm3      Hemoglobin 14.6 g/dL      Hematocrit 43.3 %      MCV 89.1 fL      MCH 30.0 pg      MCHC 33.7 g/dL      RDW 15.5 (H) %      RDW-SD 50.0 (H) fl      MPV 9.9 fL      Platelets 227 10*3/mm3      Neutrophil % 80.2 (H) %      Lymphocyte % 6.6 (L) %      Monocyte % 8.2 %      Eosinophil % 4.5 %      Basophil % 0.3 %      Immature Grans % 0.2 %      Neutrophils, Absolute 8.32 10*3/mm3      Lymphocytes, Absolute 0.69 10*3/mm3      Monocytes, Absolute 0.85 10*3/mm3      Eosinophils, Absolute 0.47 10*3/mm3      Basophils, Absolute 0.03 10*3/mm3      Immature Grans, Absolute 0.02 10*3/mm3         Imaging Results (last 24 hours)     Procedure Component Value Units Date/Time    CT Abdomen Pelvis With Contrast [[id number]] Collected:  08/31/18 2022     Updated:  08/31/18 2058    Narrative:         CT abdomen and pelvis with contrast on 8/31/2018     CLINICAL INDICATION: Lower abdominal pain    TECHNIQUE: Multiple axial images are obtained throughout the  abdomen and pelvis following the administration of IV and oral  contrast. This exam was performed according to our  departmental  dose-optimization program, which includes automated exposure  control, adjustment of the mA and/or kV according to patient size  and/or use of iterative reconstruction technique.   Total DLP is 567.8 mGy*cm.    COMPARISON: 2/1/2015    FINDINGS:     Abdomen: There is right greater than left basilar atelectasis.  The patient is status post cholecystectomy. 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 There  is right greater than left renal atrophy with renal cortical  scarring that may be related to reflux nephropathy/chronic  pyelonephritis. Previously noted bilateral ureteral stents have  been removed. There is no significant left hydronephrosis but  there is very mild left hydroureter to the level of a 1.3 cm left  distal ureteral stone a few CM above the left UVJ. There is also  a 5 mm left UVJ stone. No right-sided renal or ureteral stones  are noted. Solid abdominal organs are otherwise unremarkable.  Vascular calcifications are noted. There is no abdominal  adenopathy. There is no free fluid or free air within the  abdomen. The abdominal portion of the GI tract is unremarkable.    Pelvis: The prostate is enlarged, please correlate with physical  exam and PSA levels. Suprapubic bladder catheter appears in good  position in the bladder that is decompressed. Mildly enlarged  left inguinal lymph node on image 94 is likely reactive. No other  pelvic adenopathy is noted by CT size criteria. There is no free  fluid in the pelvis. Pelvic portion of the GI tract is  unremarkable. Degenerative changes are noted in the spine. There  is slight levoscoliosis of the lumbar spine. There is fat  stranding around the bladder suggestive of infection, recommend  correlation with urinalysis.      Impression:       1. Couple of relatively large left distal ureteral stones causing  minimal obstruction of the left renal collecting system.  2. Prostate enlargement.  3. Fat stranding  around the bladder suggesting infection,  recommend correlation with urinalysis.    Electronically signed by:  Luke Bustos  8/31/2018 8:57 PM  CDT Workstation: [id number]          Assessment/Plan         Hospital Problem List     Abdominal pain          Assessment / Plan:  Acute left hydroureter with 1.3 cm distal ureter stone - Flomax, IV fluids, and have consult urology to see the patient.    BPH - will restart patient on finasteride with Flomax.    UTI with yeast and moderate bacteria with chronic indwelling palmer - will cover with Zosyn and have patient see urology.  Also patient is on phenytoin and there is an interaction with phenytoin and fluconazole.  For now we'll hold fluconazole and await urine culture and sensitivity.    A fib s/p ablation - patient is a normal sinus rhythm with rate controlled and we'll continue Xarelto.    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  Patient does have an old left upper extremity contracture.  Monitor.    Old sacral ulcer - consult wound care.    History of seizure disorder - we'll continue patient on phenytoin.  Patient has not had seizure in over 5 years.    Ethics - full code    dvt prophylaxis  - xarelto.      EMR Dragon/Transcription disclaimer:   Much of this encounter note is an electronic transcription/translation of spoken language to printed text. The electronic translation of spoken language may permit erroneous, or at times, nonsensical words or phrases to be inadvertently transcribed; Although I have reviewed the note for such errors, some may still exist.                                  This document has been electronically signed by Philippe Lieberman DO on August 31, 2018 10:34 PM

## 2018-09-02 VITALS
DIASTOLIC BLOOD PRESSURE: 53 MMHG | OXYGEN SATURATION: 97 % | HEIGHT: 67 IN | HEART RATE: 64 BPM | BODY MASS INDEX: 25.47 KG/M2 | TEMPERATURE: 97.1 F | SYSTOLIC BLOOD PRESSURE: 110 MMHG | RESPIRATION RATE: 20 BRPM | WEIGHT: 162.25 LBS

## 2018-09-02 LAB
BACTERIA UR QL AUTO: ABNORMAL /HPF
BILIRUB UR QL STRIP: NEGATIVE
CLARITY UR: CLEAR
COLOR UR: YELLOW
GLUCOSE UR STRIP-MCNC: NEGATIVE MG/DL
HGB UR QL STRIP.AUTO: NEGATIVE
HYALINE CASTS UR QL AUTO: ABNORMAL /LPF
KETONES UR QL STRIP: NEGATIVE
LEUKOCYTE ESTERASE UR QL STRIP.AUTO: ABNORMAL
NITRITE UR QL STRIP: NEGATIVE
PH UR STRIP.AUTO: 6 [PH] (ref 5–9)
PROT UR QL STRIP: NEGATIVE
RBC # UR: ABNORMAL /HPF
REF LAB TEST METHOD: ABNORMAL
SP GR UR STRIP: 1.01 (ref 1–1.03)
SQUAMOUS #/AREA URNS HPF: ABNORMAL /HPF
UROBILINOGEN UR QL STRIP: ABNORMAL
WBC UR QL AUTO: ABNORMAL /HPF

## 2018-09-02 PROCEDURE — G0378 HOSPITAL OBSERVATION PER HR: HCPCS

## 2018-09-02 PROCEDURE — 81001 URINALYSIS AUTO W/SCOPE: CPT | Performed by: NURSE PRACTITIONER

## 2018-09-02 PROCEDURE — 87086 URINE CULTURE/COLONY COUNT: CPT | Performed by: NURSE PRACTITIONER

## 2018-09-02 PROCEDURE — 96361 HYDRATE IV INFUSION ADD-ON: CPT

## 2018-09-02 PROCEDURE — 25010000002 PIPERACILLIN SOD-TAZOBACTAM PER 1 G: Performed by: RADIOLOGY

## 2018-09-02 RX ORDER — AMOXICILLIN AND CLAVULANATE POTASSIUM 875; 125 MG/1; MG/1
1 TABLET, FILM COATED ORAL EVERY 12 HOURS SCHEDULED
Qty: 10 TABLET | Refills: 0 | Status: SHIPPED | OUTPATIENT
Start: 2018-09-02 | End: 2018-09-07

## 2018-09-02 RX ADMIN — ATORVASTATIN CALCIUM 10 MG: 10 TABLET, FILM COATED ORAL at 08:05

## 2018-09-02 RX ADMIN — HYDROCORTISONE: 1 CREAM TOPICAL at 08:06

## 2018-09-02 RX ADMIN — FAMOTIDINE 40 MG: 40 TABLET ORAL at 08:05

## 2018-09-02 RX ADMIN — NEBIVOLOL HYDROCHLORIDE 20 MG: 5 TABLET ORAL at 08:06

## 2018-09-02 RX ADMIN — PIPERACILLIN SODIUM,TAZOBACTAM SODIUM 3.38 G: 3; .375 INJECTION, POWDER, FOR SOLUTION INTRAVENOUS at 06:50

## 2018-09-02 RX ADMIN — FUROSEMIDE 20 MG: 20 TABLET ORAL at 08:06

## 2018-09-02 RX ADMIN — FINASTERIDE 5 MG: 5 TABLET, FILM COATED ORAL at 08:06

## 2018-09-02 RX ADMIN — SODIUM CHLORIDE 125 ML/HR: 900 INJECTION, SOLUTION INTRAVENOUS at 06:51

## 2018-09-02 RX ADMIN — PHENYTOIN SODIUM 100 MG: 100 CAPSULE, EXTENDED RELEASE ORAL at 08:06

## 2018-09-02 RX ADMIN — BACLOFEN 10 MG: 10 TABLET ORAL at 08:05

## 2018-09-02 RX ADMIN — HYDROCODONE BITARTRATE AND ACETAMINOPHEN 1 TABLET: 10; 325 TABLET ORAL at 08:05

## 2018-09-02 NOTE — PROGRESS NOTES
"   LOS: 0 days   Patient Care Team:  Provider, No Known as PCP - General    Subjective     Subjective:  Symptoms:  Stable.  (Urine clear and yellow in SP tube's gravity bag. ).    Diet:  Adequate intake.  No nausea or vomiting.    Activity level: Normal.    Pain:  He reports pain is improving.        History taken from: patient chart    Objective     Vital Signs  Temp:  [97.2 °F (36.2 °C)-99 °F (37.2 °C)] 99 °F (37.2 °C)  Heart Rate:  [73-82] 74  Resp:  [18-20] 20  BP: (120-128)/(70-77) 124/70    Objective:  General Appearance:  In no acute distress.    Vital signs: (most recent): Blood pressure 124/70, pulse 74, temperature 99 °F (37.2 °C), temperature source Oral, resp. rate 20, height 170.2 cm (67\"), weight 73.6 kg (162 lb 4 oz), SpO2 98 %.  Vital signs are normal.  No fever.    Output: Producing urine.    HEENT: Normal HEENT exam.    Lungs:  Normal effort and normal respiratory rate.  Breath sounds clear to auscultation.  He is not in respiratory distress.    Heart: Normal rate.  Regular rhythm.  S1 normal and S2 normal.    Chest: Symmetric chest wall expansion.   Abdomen: Abdomen is soft and non-distended.  Bowel sounds are normal.     Extremities: There is dependent edema.  (Left arm contracture and atrophy)  Pulses: Distal pulses are intact.    Neurological: Patient is alert and oriented to person, place and time.    Pupils:  Pupils are equal, round, and reactive to light.    Skin:  Warm and dry.  No rash, ecchymosis or cyanosis.             Results Review:    Lab Results (last 24 hours)     Procedure Component Value Units Date/Time    Urinalysis With Culture If Indicated - Urine, Clean Catch [[id number]]  (Abnormal) Collected:  09/02/18 0914    Specimen:  Urine from Urine, Clean Catch Updated:  09/02/18 0927     Color, UA Yellow     Appearance, UA Clear     pH, UA 6.0     Specific Gravity, UA 1.006     Glucose, UA Negative     Ketones, UA Negative     Bilirubin, UA Negative     Blood, UA Negative     Protein, " UA Negative     Leuk Esterase, UA Moderate (2+) (A)     Nitrite, UA Negative     Urobilinogen, UA 0.2 E.U./dL    Urinalysis, Microscopic Only - Urine, Clean Catch [[id number]]  (Abnormal) Collected:  09/02/18 0914    Specimen:  Urine from Urine, Clean Catch Updated:  09/02/18 0927     RBC, UA 0-2 (A) /HPF      WBC, UA 13-20 (A) /HPF      Bacteria, UA None Seen /HPF      Squamous Epithelial Cells, UA None Seen /HPF      Hyaline Casts, UA 0-2 /LPF      Methodology Automated Microscopy    Urine Culture - Urine, [[id number]] Collected:  09/02/18 0914    Specimen:  Urine from Urine, Clean Catch Updated:  09/02/18 0927    Blood Culture - Blood, [[id number]]  (Normal) Collected:  08/31/18 2319    Specimen:  Blood from Blood, Arterial Line Updated:  09/01/18 2345     Blood Culture No growth at 24 hours    Blood Culture - Blood, [[id number]]  (Normal) Collected:  08/31/18 2319    Specimen:  Blood from Hand, Left Updated:  09/01/18 2345     Blood Culture No growth at 24 hours         Imaging Results (last 24 hours)     ** No results found for the last 24 hours. **           I reviewed the patient's new clinical results.  I reviewed the patient's new imaging results and agree with the interpretation.  I reviewed the patient's other test results and agree with the interpretation      Assessment/Plan     Active Problems:    Abdominal pain      Assessment & Plan    1. LEFT distal ureteric stones, 1.3 cm near the UVJ and another 5 mm UVJ stone causing mild left hydroureter without AUGIE, appears chronic.      2. UTI cystitis after SP tube change last week by Home Health   -WBC 10.38-->9.01, lactic acid 1.4  -Zosyn day #2, Diflucan given as well x 1 dose     Plan: treat UTI, SP tube changed,  [place] will plan for lithotripsy of UVJ stones outpatient once UTI is treated. No labs today to review.     JEAN Robbins  09/02/18  9:46 AM

## 2018-09-02 NOTE — PLAN OF CARE
Problem: Patient Care Overview  Goal: Plan of Care Review  Outcome: Ongoing (interventions implemented as appropriate)   09/02/18 0359   Coping/Psychosocial   Plan of Care Reviewed With patient   Plan of Care Review   Progress no change   OTHER   Outcome Summary no complaints of nausea or pain, pt has slept through the night       Problem: Fall Risk (Adult)  Goal: Absence of Fall  Outcome: Ongoing (interventions implemented as appropriate)      Problem: Skin Injury Risk (Adult)  Goal: Skin Health and Integrity  Outcome: Ongoing (interventions implemented as appropriate)      Problem: Wound (Includes Pressure Injury) (Adult)  Goal: Signs and Symptoms of Listed Potential Problems Will be Absent, Minimized or Managed (Wound)  Outcome: Ongoing (interventions implemented as appropriate)      Problem: Urinary Tract Infection (Adult)  Goal: Signs and Symptoms of Listed Potential Problems Will be Absent, Minimized or Managed (Urinary Tract Infection)  Outcome: Ongoing (interventions implemented as appropriate)

## 2018-09-02 NOTE — DISCHARGE SUMMARY
[facility]  DISCHARGE SUMMARY       Date of Admission: 8/31/2018  Date of Discharge:  9/2/2018  Primary Care Physician: Provider, No Known    Presenting Problem/History of Present Illness:  Ureterolithiasis [N20.1]  Abdominal pain, unspecified abdominal location [R10.9]  Urinary tract infection without hematuria, site unspecified [N39.0]  Abdominal pain, unspecified abdominal location [R10.9]     Final Discharge Diagnoses:  Hospital Problem List     Abdominal pain          Consults:   Consults     Date and Time Order Name Status Description    9/1/2018 0030 Inpatient Urology Consult Completed     8/31/2018 2140 Hospitalist (on-call MD unless specified)          Pertinent Test Results:   Lab Results (last 24 hours)     Procedure Component Value Units Date/Time    Urinalysis With Culture If Indicated - Urine, Clean Catch [[id number]]  (Abnormal) Collected:  09/02/18 0914    Specimen:  Urine from Urine, Clean Catch Updated:  09/02/18 0927     Color, UA Yellow     Appearance, UA Clear     pH, UA 6.0     Specific Gravity, UA 1.006     Glucose, UA Negative     Ketones, UA Negative     Bilirubin, UA Negative     Blood, UA Negative     Protein, UA Negative     Leuk Esterase, UA Moderate (2+) (A)     Nitrite, UA Negative     Urobilinogen, UA 0.2 E.U./dL    Urinalysis, Microscopic Only - Urine, Clean Catch [[id number]]  (Abnormal) Collected:  09/02/18 0914    Specimen:  Urine from Urine, Clean Catch Updated:  09/02/18 0927     RBC, UA 0-2 (A) /HPF      WBC, UA 13-20 (A) /HPF      Bacteria, UA None Seen /HPF      Squamous Epithelial Cells, UA None Seen /HPF      Hyaline Casts, UA 0-2 /LPF      Methodology Automated Microscopy    Urine Culture - Urine, [[id number]] Collected:  09/02/18 0914    Specimen:  Urine from Urine, Clean Catch Updated:  09/02/18 0927    Blood Culture - Blood, [[id number]]  (Normal) Collected:  08/31/18 9519    Specimen:  Blood from Blood, Arterial  "Line Updated:  09/01/18 2345     Blood Culture No growth at 24 hours    Blood Culture - Blood, [[id number]]  (Normal) Collected:  08/31/18 2319    Specimen:  Blood from Hand, Left Updated:  09/01/18 2345     Blood Culture No growth at 24 hours        Imaging Results (last 24 hours)     ** No results found for the last 24 hours. **        Chief Complaint on Day of Discharge: No complaints    Hospital Course:  This is a 73 year old male with PMH of atrial fibrillation (On Xarelto), CVA, chronic Gayle, UTIs, HTN, seizures, BPH and sacral ulcers that presented to [facility] by ambulance for left-sided flank, nausea, and abdominal pain.  The patient was found to have a left distal ureteral stone, 1.3 cm near the UVJ and another 5 mm UVJ stone causing mild left hydroureter without AUGIE.  The patient was started on Zosyn for a urinary tract infection and a prescription for Augmentin was given at discharge.  The patient's suprapubic catheter was changed by Marco A PENA with urology. Per urology, the patient will continue UTI treatment and lithotripsy will be scheduled outpatient.        Condition on Discharge:  Stable    Physical Exam on Discharge:  /70 (BP Location: Right arm, Patient Position: Lying)   Pulse 74   Temp 99 °F (37.2 °C) (Oral)   Resp 20   Ht 170.2 cm (67\")   Wt 73.6 kg (162 lb 4 oz)   SpO2 98%   BMI 25.41 kg/m²   Physical Exam   Constitutional: He is oriented to person, place, and time. He appears well-developed and well-nourished.   HENT:   Head: Normocephalic and atraumatic.   Eyes: Pupils are equal, round, and reactive to light. EOM are normal.   Neck: Normal range of motion. Neck supple.   Cardiovascular: Normal rate and regular rhythm.    Pulmonary/Chest: Effort normal and breath sounds normal.   Abdominal: Soft. Bowel sounds are normal.   Musculoskeletal: Normal range of motion.   Neurological: He is alert and oriented to person, place, and time.   Skin: Skin is warm and dry.   Psychiatric: He " has a normal mood and affect.     Discharge Disposition:  Home or Self Care    Discharge Medications:     Discharge Medications      New Medications      Instructions Start Date   amoxicillin-clavulanate 875-125 MG per tablet  Commonly known as:  AUGMENTIN   1 tablet, Oral, Every 12 Hours Scheduled         Continue These Medications      Instructions Start Date   aspirin 81 MG EC tablet   81 mg, Oral, Daily      atorvastatin 10 MG tablet  Commonly known as:  LIPITOR   10 mg, Oral, Daily      baclofen 10 MG tablet  Commonly known as:  LIORESAL   10 mg, Oral, 2 Times Daily      finasteride 5 MG tablet  Commonly known as:  PROSCAR   5 mg, Oral, Daily      fish oil 1000 MG capsule capsule   Oral, Daily With Breakfast      furosemide 20 MG tablet  Commonly known as:  LASIX   20 mg, Oral, Daily      HYDROcodone-acetaminophen  MG per tablet  Commonly known as:  NORCO   TAKE 1 TABLET BY MOUTH EVERY SIX HOURS AS NEEDED FOR CHRONIC PAIN      hydrOXYzine 25 MG tablet  Commonly known as:  ATARAX   25 mg, Oral, 3 Times Daily PRN      methenamine 1 g tablet  Commonly known as:  HIPREX   1 g, Oral, 2 Times Daily With Meals      nebivolol 20 MG tablet  Commonly known as:  BYSTOLIC   20 mg, Oral, Daily      nitrofurantoin 50 MG capsule  Commonly known as:  MACRODANTIN   50 mg, Oral, Nightly      phenytoin 100 MG ER capsule  Commonly known as:  DILANTIN   Oral, 2 Times Daily      rivaroxaban 20 MG tablet  Commonly known as:  XARELTO   20 mg, Oral, Daily      tamsulosin 0.4 MG capsule 24 hr capsule  Commonly known as:  FLOMAX   1 capsule, Oral, Nightly      vitamin C 500 MG tablet  Commonly known as:  ASCORBIC ACID   500 mg, Oral, Daily             Discharge Diet:   Diet Instructions     Diet: Regular       Discharge Diet:  Regular          Activity at Discharge:   Activity Instructions     Activity as Tolerated             Discharge Care Plan/Instructions: As above.     Follow-up Appointments:   Future Appointments  Date Time  Provider Department Center   11/14/2018 11:45 AM Seven Londono MD [facility] None             This document has been electronically signed by JEAN Sahu on September 2, 2018 11:39 AM        Time: Greater than 30 minutes.

## 2018-09-03 LAB — BACTERIA SPEC AEROBE CULT: NORMAL

## 2018-09-05 LAB
BACTERIA SPEC AEROBE CULT: NORMAL
BACTERIA SPEC AEROBE CULT: NORMAL

## 2018-09-11 ENCOUNTER — PREP FOR SURGERY (OUTPATIENT)
Dept: OTHER | Facility: HOSPITAL | Age: 73
End: 2018-09-11

## 2018-09-11 DIAGNOSIS — N20.0 CALCULUS OF KIDNEY: Primary | ICD-10-CM

## 2018-09-11 RX ORDER — LEVOFLOXACIN 5 MG/ML
500 INJECTION, SOLUTION INTRAVENOUS ONCE
Status: CANCELLED | OUTPATIENT
Start: 2018-01-01 | End: 2018-01-01

## 2018-09-12 PROBLEM — N20.0 CALCULUS OF KIDNEY: Status: ACTIVE | Noted: 2018-01-01

## 2018-09-18 NOTE — H&P
UROLOGY [facility] History and Physical  MICHELLE BRODERICK  73-year-old; :1945;;male  [address];[place] [address]  Ins: 1) [employer]  Employer: UNEMPLOYED  MRN:[id number]  MARIANNA PATTERSON MD  [facility]  Sep. 11, 2018 03:58 PM  Allergies  No Known Allergies Unknown  Current Medications  aspirin 81 mg tablet 1 oral  Diltia  mg capsule, extended release 1  oral  Fish Oil capsule 1 oral  hydrocodone 7.5 mg-acetaminophen 500 mg  tablet 1 oral  Xarelto 10 mg tablet 1 oral  baclofen 10 mg tablet 1 oral  Bystolic 20 mg tablet 1 oral  Dilantin Kapseal 100 mg capsule 1 oral  finasteride 5 mg tablet 1 oral  Flomax 0.4 mg capsule 1 oral  Vitamin D-400 400 unit capsule 1 oral  Diflucan 150 mg oral tablet one tablet today  nystatin 100,000 units/g topical cream apply to  affected area three times a day  oxybutynin 10 mg/24 hr oral tablet, extended  release Take 1 Tablet(s) 1 time a day  * Medications Reconciled  He had pneumonia vaccine in 2015.  Problem List  Ureteric stone  Medical History  HYPERTENSION  seizure  Cataract  Ischemic stroke, NOS  Surgical History  Gallbladder removed  CATARACT REMOVED  ESWL  Suprapubic tube placement  Psychiatric History  Patient is generally satisfied with life. No  depression, anxiety or thoughts of suicide.  Family History  Father CANCER  Father STROKE  HEART DISEASE  DIABETES UNSP  Father HYPERTENSION  Mother  natural causes Father   STROKE Brother Alive Sister Alive  Social History  . Lives with . Retired. Does  not drink alcohol  Immunization History  Pneumococcal Vaccine, Nos  Chief Complaint  Ureteral stone  History of Present Illness  MICHELLE BRODERICK is a 73-year-old male here for evaluation. He has a history of  right ureteral stone and UTI. Complains of assicated right side pain. Has SP tube.  Location: Right ureter.  Severity: Mild.  Onset / Duration: >2 weeks.  Associated  signs and symptoms: No fever.  Review of Systems  Eyes: ; Denies: blurry vision, pain in the eyes and double vision  ENMT: ; Denies: ear pain, sore throat and sinus problems  Cardiovascular: ; Denies: chest pain, varicose veins, angina and syncope  Respiratory: Reports: shortness of breath; Denies: wheezing and frequent cough  Gastrointestinal: ; Denies: abdominal pain, nausea, vomiting, indigestion and  heartburn  Genitourinary: ; Denies: other symptoms (see HPI)  Musculoskeletal: Reports: joint pain, neck pain and back pain  Integumentary: ; Denies: skin rash, boils and persistent itch  Neurological: ; Denies: tremors, dizzy spells and numbness / tingling  Psychiatric: Reports: generally satisfied with life; Denies: depression, suicidal  ideation and anxiety  Endocrine: ; Denies: Excessive thirst, cold intolerance, heat intolerance and  tired/sluggish  Hematologic/Lymphatic: ; Denies: swollen glands and blood clotting problem  Allergic/Immunologic: ; Denies: hayfever  Constitutional: ; Denies: fever, chills and weight loss  Vital Signs  9/11/2018 3:58:00 PM  Weight: 165 (lb) Resp Rate: 16 (/min)  Height: 67 (in) BMI: 25.84  Current every day smoker  Exam  General appearance: The patient appears well developed and well nourished, in no  apparent acute distress.  Examination of pupils and irises: No redness or drainage noted.  Assessment of hearing: Responds to verbal command.  Examination of neck: Neck appears supple. No masses noted.  Assessment of respiratory effort: Respiratory effort appears normal. No apparent  distress.  Examination of gait and station: Normal gait and stature.  Head and neck (Assessment of range of motion): Adequate ROM noted in all  extremities.  Head and neck (Assessment of muscle strength and tone): Muscle strength and tone  normal for age.  Inspection of skin and subcutaneous tissue: Exam of the skin is within normal limits.  The color and skin turgor are normal. No lesions, bruises,  rashes, or jaundice.  Orientation to time, place and person: Oriented to person, place, and time.  Mood and affect: Normal mood and affect.  Data Review  Medications and chart reviewed. History and physical form/ROS reviewed and no  changes noted. Previous encounter reviewed. Last form done 11/08/17.  Assessment - Ureteric stone of lower third of ureter  DX:  Ureteric stone of lower third of ureter  SNO: [id number], ICD-9: 592.1, ICD-10: N20.1  Plan  Plan Notes:  Right ESWL.  Education:  Kidney Stones - English  Discussion:  Discussed my findings and plan of action and reasoning behind decision making. All  questions were answered. FINDINGS WERE DISCUSSED WITH PATIENT. RISKS  AND BENEFITS EXPLAINED. PATIENT WISHES TO PROCEED.  Instructions:  Patient is instructed to call with any problems. Patient is instructed to call if the  condition worsens. Patient is instructed to call with any changes in condition.

## 2018-09-21 NOTE — ANESTHESIA PROCEDURE NOTES
Airway  Urgency: elective      General Information and Staff    Patient location during procedure: OR    Indications and Patient Condition  Indications for airway management: airway protection    Preoxygenated: yes  MILS maintained throughout  Mask difficulty assessment: 0 - not attempted    Final Airway Details  Final airway type: supraglottic airway      Successful airway: I-gel  Size 4    Number of attempts at approach: 1

## 2018-09-21 NOTE — ANESTHESIA PREPROCEDURE EVALUATION
Anesthesia Evaluation     no history of anesthetic complications:  NPO Solid Status: > 8 hours  NPO Liquid Status: > 2 hours           Airway   Mallampati: II  TM distance: >3 FB  Neck ROM: limited  Possible difficult intubation  Dental    (+) lower dentures and upper dentures    Pulmonary    (+) COPD, decreased breath sounds,     ROS comment: On nasal canula O2 in hospital  Cardiovascular   Exercise tolerance: poor (<4 METS)    ECG reviewed  PT is on anticoagulation therapy  Patient on routine beta blocker and Beta blocker given within 24 hours of surgery  Rhythm: regular  Rate: normal    (+) hypertension well controlled, dysrhythmias (s/p ablation) Atrial Flutter, hyperlipidemia,   (-) angina, murmur    ROS comment: Comparison: not compared with previous ECG   Rhythm: sinus rhythm  Ectopy: PVCs    · Left ventricular systolic function is normal. Estimated EF = 55%.  · Left ventricular diastolic dysfunction (grade I) consistent with impaired relaxation.  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

## 2018-09-21 NOTE — OP NOTE
EXTRACORPOREAL SHOCKWAVE LITHOTRIPSY WITH STENT INSERTION/REMOVAL  Procedure Note    Mando Hill  9/21/2018    Pre-op Diagnosis:   Calculus of kidney [N20.0]    Post-op Diagnosis:     Post-Op Diagnosis Codes:     * Calculus of kidney [N20.0]      Procedure(s):  EXTRACORPOREAL SHOCKWAVE LITHOTRIPSY    Surgeon(s):  Morgan Al MD    Anesthesia: General    Staff:   Circulator: Angelita Riddle RN; Rosibel Perales RN  Scrub Person: Soila Villanueva  Assistant: Lor Robbins CSA    Estimated Blood Loss: none    Specimens:                None      Drains:   Suprapubic Catheter Non-latex (Active)       Findings: Right renal stone    Complications: None    Indications: SameThe patient was brought to the operating suite, placed in the supine position on lithotripsy table, in AP and oblique planes, stone was localized.  With a power setting ranging between 4 and 6, we used 2500 shocks to fragment the stone.  Once this was accomplished, patient was taken back off the table and taken to recovery having tolerated the procedure well.    Description of Procedure: The patient was brought to the operating suite, placed in the supine position on lithotripsy table, in AP and oblique planes, stone was localized.  With a power setting ranging between 4 and 6, we used 2500 shocks to fragment the stone.  Once this was accomplished, patient was taken back off the table and taken to recovery having tolerated the procedure well.    Morgan Al MD     Date: 9/21/2018  Time: 3:43 PM

## 2018-09-21 NOTE — ANESTHESIA POSTPROCEDURE EVALUATION
Patient: Mando Hill    Procedure Summary     Date:  09/21/18 Room / Location:  [facility] OR 08 / [facility] OR    Anesthesia Start:  1456 Anesthesia Stop:  1554    Procedure:  EXTRACORPOREAL SHOCKWAVE LITHOTRIPSY (Right ) Diagnosis:       Calculus of kidney      (Calculus of kidney [N20.0])    Surgeon:  Morgan Al MD Provider:  Jaylan Guajardo MD    Anesthesia Type:  general ASA Status:  3          Anesthesia Type: general  Last vitals  BP   132/59 (09/21/18 1310)   Temp   98.1 °F (36.7 °C) (09/21/18 1310)   Pulse   71 (09/21/18 1310)   Resp   18 (09/21/18 1310)     SpO2   97 % (left ear) (09/21/18 1310)     Post Anesthesia Care and Evaluation    Patient location during evaluation: bedside  Patient participation: complete - patient cannot participate  Level of consciousness: obtunded/minimal responses  Pain score: 0  Pain management: adequate  Airway patency: patent (lma)  Anesthetic complications: No anesthetic complications  PONV Status: none  Cardiovascular status: acceptable  Respiratory status: acceptable  Hydration status: acceptable

## 2018-09-21 NOTE — INTERVAL H&P NOTE
H&P reviewed. The patient was examined and there are no changes to the H&P.  plan also possible cysto and j stent placement risk and benefits discussed

## 2018-09-21 NOTE — NURSING NOTE
Upon entering the patients room and explaining the neb treatment, patient refused.  States that they always give him a sore throat and he was not taking it.  Explained the benefits of increased respiratory efforts during surgery, patient states that he is not taking it.  Dr Guajardo informed.

## 2019-01-01 ENCOUNTER — APPOINTMENT (OUTPATIENT)
Dept: INTERVENTIONAL RADIOLOGY/VASCULAR | Facility: HOSPITAL | Age: 74
End: 2019-01-01

## 2019-01-01 ENCOUNTER — APPOINTMENT (OUTPATIENT)
Dept: CARDIOLOGY | Facility: HOSPITAL | Age: 74
End: 2019-01-01

## 2019-01-01 ENCOUNTER — ANESTHESIA EVENT (OUTPATIENT)
Dept: PERIOP | Facility: HOSPITAL | Age: 74
End: 2019-01-01

## 2019-01-01 ENCOUNTER — APPOINTMENT (OUTPATIENT)
Dept: ULTRASOUND IMAGING | Facility: HOSPITAL | Age: 74
End: 2019-01-01

## 2019-01-01 ENCOUNTER — APPOINTMENT (OUTPATIENT)
Dept: CT IMAGING | Facility: HOSPITAL | Age: 74
End: 2019-01-01

## 2019-01-01 ENCOUNTER — HOSPITAL ENCOUNTER (INPATIENT)
Facility: HOSPITAL | Age: 74
LOS: 4 days | Discharge: ANOTHER HOSPITAL | End: 2019-09-07
Attending: EMERGENCY MEDICINE | Admitting: UROLOGY

## 2019-01-01 ENCOUNTER — LAB REQUISITION (OUTPATIENT)
Dept: LAB | Facility: HOSPITAL | Age: 74
End: 2019-01-01

## 2019-01-01 ENCOUNTER — APPOINTMENT (OUTPATIENT)
Dept: GENERAL RADIOLOGY | Facility: HOSPITAL | Age: 74
End: 2019-01-01

## 2019-01-01 ENCOUNTER — ANESTHESIA (OUTPATIENT)
Dept: PERIOP | Facility: HOSPITAL | Age: 74
End: 2019-01-01

## 2019-01-01 ENCOUNTER — HOSPITAL ENCOUNTER (OUTPATIENT)
Facility: HOSPITAL | Age: 74
Setting detail: OBSERVATION
Discharge: HOME | End: 2019-06-09
Attending: FAMILY MEDICINE | Admitting: INTERNAL MEDICINE

## 2019-01-01 VITALS
DIASTOLIC BLOOD PRESSURE: 73 MMHG | HEART RATE: 89 BPM | WEIGHT: 191.2 LBS | HEIGHT: 67 IN | RESPIRATION RATE: 18 BRPM | SYSTOLIC BLOOD PRESSURE: 125 MMHG | OXYGEN SATURATION: 100 % | TEMPERATURE: 98.8 F | BODY MASS INDEX: 30.01 KG/M2

## 2019-01-01 VITALS
BODY MASS INDEX: 31.42 KG/M2 | WEIGHT: 200.18 LBS | HEIGHT: 67 IN | TEMPERATURE: 98.2 F | RESPIRATION RATE: 30 BRPM | OXYGEN SATURATION: 95 % | DIASTOLIC BLOOD PRESSURE: 64 MMHG | SYSTOLIC BLOOD PRESSURE: 145 MMHG | HEART RATE: 67 BPM

## 2019-01-01 DIAGNOSIS — R13.12 OROPHARYNGEAL DYSPHAGIA: ICD-10-CM

## 2019-01-01 DIAGNOSIS — R50.9 FEVER, UNSPECIFIED FEVER CAUSE: Primary | ICD-10-CM

## 2019-01-01 DIAGNOSIS — N39.0 URINARY TRACT INFECTION WITHOUT HEMATURIA, SITE UNSPECIFIED: ICD-10-CM

## 2019-01-01 DIAGNOSIS — N39.0 URINARY TRACT INFECTION: ICD-10-CM

## 2019-01-01 DIAGNOSIS — N17.9 ACUTE KIDNEY INJURY (NONTRAUMATIC) (HCC): ICD-10-CM

## 2019-01-01 DIAGNOSIS — N20.1 URETEROLITHIASIS: Primary | ICD-10-CM

## 2019-01-01 LAB
ALBUMIN SERPL-MCNC: 2.9 G/DL (ref 3.5–5.2)
ALBUMIN SERPL-MCNC: 3.5 G/DL (ref 3.5–5.2)
ALBUMIN SERPL-MCNC: 3.6 G/DL (ref 3.5–5.2)
ALBUMIN/GLOB SERPL: 0.7 G/DL
ALBUMIN/GLOB SERPL: 0.8 G/DL
ALBUMIN/GLOB SERPL: 0.9 G/DL
ALP SERPL-CCNC: 111 U/L (ref 39–117)
ALP SERPL-CCNC: 200 U/L (ref 39–117)
ALP SERPL-CCNC: 95 U/L (ref 39–117)
ALT SERPL W P-5'-P-CCNC: 14 U/L (ref 1–41)
ALT SERPL W P-5'-P-CCNC: 9 U/L (ref 1–41)
ALT SERPL W P-5'-P-CCNC: 9 U/L (ref 1–41)
ANION GAP SERPL CALCULATED.3IONS-SCNC: 10 MMOL/L (ref 5–15)
ANION GAP SERPL CALCULATED.3IONS-SCNC: 12 MMOL/L (ref 5–15)
ANION GAP SERPL CALCULATED.3IONS-SCNC: 15 MMOL/L (ref 5–15)
ANION GAP SERPL CALCULATED.3IONS-SCNC: 17 MMOL/L (ref 5–15)
ANION GAP SERPL CALCULATED.3IONS-SCNC: 18 MMOL/L (ref 5–15)
ANION GAP SERPL CALCULATED.3IONS-SCNC: 9 MMOL/L
ANION GAP SERPL CALCULATED.3IONS-SCNC: 9 MMOL/L
ARTERIAL PATENCY WRIST A: ABNORMAL
ARTERIAL PATENCY WRIST A: ABNORMAL
ARTERIAL PATENCY WRIST A: POSITIVE
AST SERPL-CCNC: 11 U/L (ref 1–40)
AST SERPL-CCNC: 12 U/L (ref 1–40)
AST SERPL-CCNC: 15 U/L (ref 1–40)
ATMOSPHERIC PRESS: 747 MMHG
ATMOSPHERIC PRESS: 747 MMHG
ATMOSPHERIC PRESS: 749 MMHG
BACTERIA SPEC AEROBE CULT: ABNORMAL
BACTERIA SPEC AEROBE CULT: NO GROWTH
BACTERIA SPEC AEROBE CULT: NORMAL
BACTERIA UR QL AUTO: ABNORMAL /HPF
BASE EXCESS BLDA CALC-SCNC: -0.1 MMOL/L (ref 0–2)
BASE EXCESS BLDA CALC-SCNC: -7.4 MMOL/L (ref 0–2)
BASE EXCESS BLDA CALC-SCNC: -7.7 MMOL/L (ref 0–2)
BASOPHILS # BLD AUTO: 0.03 10*3/MM3 (ref 0–0.2)
BASOPHILS # BLD AUTO: 0.04 10*3/MM3 (ref 0–0.2)
BASOPHILS # BLD AUTO: 0.05 10*3/MM3 (ref 0–0.2)
BASOPHILS # BLD AUTO: 0.05 10*3/MM3 (ref 0–0.2)
BASOPHILS NFR BLD AUTO: 0.2 % (ref 0–1.5)
BASOPHILS NFR BLD AUTO: 0.3 % (ref 0–1.5)
BASOPHILS NFR BLD AUTO: 0.3 % (ref 0–1.5)
BASOPHILS NFR BLD AUTO: 0.4 % (ref 0–1.5)
BDY SITE: ABNORMAL
BH CV ECHO MEAS - ACS: 2.1 CM
BH CV ECHO MEAS - AO MAX PG (FULL): 3.4 MMHG
BH CV ECHO MEAS - AO MAX PG: 5.3 MMHG
BH CV ECHO MEAS - AO MEAN PG (FULL): 2 MMHG
BH CV ECHO MEAS - AO MEAN PG: 3 MMHG
BH CV ECHO MEAS - AO ROOT AREA (BSA CORRECTED): 1.8
BH CV ECHO MEAS - AO ROOT AREA: 9.6 CM^2
BH CV ECHO MEAS - AO ROOT DIAM: 3.5 CM
BH CV ECHO MEAS - AO V2 MAX: 115 CM/SEC
BH CV ECHO MEAS - AO V2 MEAN: 73.7 CM/SEC
BH CV ECHO MEAS - AO V2 VTI: 22 CM
BH CV ECHO MEAS - ASC AORTA: 3.1 CM
BH CV ECHO MEAS - AVA(I,A): 2.1 CM^2
BH CV ECHO MEAS - AVA(I,D): 2.1 CM^2
BH CV ECHO MEAS - AVA(V,A): 2.1 CM^2
BH CV ECHO MEAS - AVA(V,D): 2.1 CM^2
BH CV ECHO MEAS - BSA(HAYCOCK): 2 M^2
BH CV ECHO MEAS - BSA: 2 M^2
BH CV ECHO MEAS - BZI_BMI: 29.1 KILOGRAMS/M^2
BH CV ECHO MEAS - BZI_METRIC_HEIGHT: 170.2 CM
BH CV ECHO MEAS - BZI_METRIC_WEIGHT: 84.4 KG
BH CV ECHO MEAS - EDV(CUBED): 148 ML
BH CV ECHO MEAS - EDV(MOD-SP2): 85.3 ML
BH CV ECHO MEAS - EDV(MOD-SP4): 85.8 ML
BH CV ECHO MEAS - EDV(TEICH): 134.8 ML
BH CV ECHO MEAS - EF(CUBED): 25.8 %
BH CV ECHO MEAS - EF(MOD-SP2): 53 %
BH CV ECHO MEAS - EF(MOD-SP4): 40.7 %
BH CV ECHO MEAS - EF(TEICH): 20.6 %
BH CV ECHO MEAS - ESV(CUBED): 109.9 ML
BH CV ECHO MEAS - ESV(MOD-SP2): 40.1 ML
BH CV ECHO MEAS - ESV(MOD-SP4): 50.9 ML
BH CV ECHO MEAS - ESV(TEICH): 107 ML
BH CV ECHO MEAS - FS: 9.5 %
BH CV ECHO MEAS - IVS/LVPW: 0.94
BH CV ECHO MEAS - IVSD: 1 CM
BH CV ECHO MEAS - LA DIMENSION: 4 CM
BH CV ECHO MEAS - LA/AO: 1.1
BH CV ECHO MEAS - LV DIASTOLIC VOL/BSA (35-75): 43.8 ML/M^2
BH CV ECHO MEAS - LV MASS(C)D: 207.8 GRAMS
BH CV ECHO MEAS - LV MASS(C)DI: 106 GRAMS/M^2
BH CV ECHO MEAS - LV MAX PG: 1.9 MMHG
BH CV ECHO MEAS - LV MEAN PG: 1 MMHG
BH CV ECHO MEAS - LV SYSTOLIC VOL/BSA (12-30): 26 ML/M^2
BH CV ECHO MEAS - LV V1 MAX: 69.6 CM/SEC
BH CV ECHO MEAS - LV V1 MEAN: 41.6 CM/SEC
BH CV ECHO MEAS - LV V1 VTI: 13.3 CM
BH CV ECHO MEAS - LVIDD: 5.3 CM
BH CV ECHO MEAS - LVIDS: 4.8 CM
BH CV ECHO MEAS - LVLD AP2: 7.7 CM
BH CV ECHO MEAS - LVLD AP4: 7.9 CM
BH CV ECHO MEAS - LVLS AP2: 7.4 CM
BH CV ECHO MEAS - LVLS AP4: 7.5 CM
BH CV ECHO MEAS - LVOT AREA (M): 3.5 CM^2
BH CV ECHO MEAS - LVOT AREA: 3.5 CM^2
BH CV ECHO MEAS - LVOT DIAM: 2.1 CM
BH CV ECHO MEAS - LVPWD: 1.1 CM
BH CV ECHO MEAS - MR MAX PG: 70.2 MMHG
BH CV ECHO MEAS - MR MAX VEL: 419 CM/SEC
BH CV ECHO MEAS - MV A MAX VEL: 56.8 CM/SEC
BH CV ECHO MEAS - MV DEC SLOPE: 922 CM/SEC^2
BH CV ECHO MEAS - MV E MAX VEL: 145 CM/SEC
BH CV ECHO MEAS - MV E/A: 2.6
BH CV ECHO MEAS - MV MAX PG: 8 MMHG
BH CV ECHO MEAS - MV MEAN PG: 2 MMHG
BH CV ECHO MEAS - MV P1/2T MAX VEL: 143 CM/SEC
BH CV ECHO MEAS - MV P1/2T: 45.4 MSEC
BH CV ECHO MEAS - MV V2 MAX: 141 CM/SEC
BH CV ECHO MEAS - MV V2 MEAN: 62.2 CM/SEC
BH CV ECHO MEAS - MV V2 VTI: 29.8 CM
BH CV ECHO MEAS - MVA P1/2T LCG: 1.5 CM^2
BH CV ECHO MEAS - MVA(P1/2T): 4.8 CM^2
BH CV ECHO MEAS - MVA(VTI): 1.5 CM^2
BH CV ECHO MEAS - PA MAX PG: 4.8 MMHG
BH CV ECHO MEAS - PA V2 MAX: 110 CM/SEC
BH CV ECHO MEAS - RAP SYSTOLE: 10 MMHG
BH CV ECHO MEAS - RVDD: 1.9 CM
BH CV ECHO MEAS - RVSP: 56.5 MMHG
BH CV ECHO MEAS - SI(AO): 108 ML/M^2
BH CV ECHO MEAS - SI(CUBED): 19.4 ML/M^2
BH CV ECHO MEAS - SI(LVOT): 23.5 ML/M^2
BH CV ECHO MEAS - SI(MOD-SP2): 23.1 ML/M^2
BH CV ECHO MEAS - SI(MOD-SP4): 17.8 ML/M^2
BH CV ECHO MEAS - SI(TEICH): 14.2 ML/M^2
BH CV ECHO MEAS - SV(AO): 211.7 ML
BH CV ECHO MEAS - SV(CUBED): 38.1 ML
BH CV ECHO MEAS - SV(LVOT): 46.1 ML
BH CV ECHO MEAS - SV(MOD-SP2): 45.2 ML
BH CV ECHO MEAS - SV(MOD-SP4): 34.9 ML
BH CV ECHO MEAS - SV(TEICH): 27.8 ML
BH CV ECHO MEAS - TR MAX VEL: 341 CM/SEC
BILIRUB SERPL-MCNC: 0.5 MG/DL (ref 0.2–1.2)
BILIRUB SERPL-MCNC: 0.6 MG/DL (ref 0.2–1.2)
BILIRUB SERPL-MCNC: 0.7 MG/DL (ref 0.2–1.2)
BILIRUB UR QL STRIP: ABNORMAL
BILIRUB UR QL STRIP: NEGATIVE
BUN BLD-MCNC: 15 MG/DL (ref 8–23)
BUN BLD-MCNC: 17 MG/DL (ref 8–23)
BUN BLD-MCNC: 30 MG/DL (ref 8–23)
BUN BLD-MCNC: 30 MG/DL (ref 8–23)
BUN BLD-MCNC: 34 MG/DL (ref 8–23)
BUN BLD-MCNC: 41 MG/DL (ref 8–23)
BUN BLD-MCNC: 53 MG/DL (ref 8–23)
BUN/CREAT SERPL: 12.1 (ref 7–25)
BUN/CREAT SERPL: 12.3 (ref 7–25)
BUN/CREAT SERPL: 13.5 (ref 7–25)
BUN/CREAT SERPL: 14.2 (ref 7–25)
BUN/CREAT SERPL: 14.9 (ref 7–25)
BUN/CREAT SERPL: 15.2 (ref 7–25)
BUN/CREAT SERPL: 15.2 (ref 7–25)
CALCIUM SPEC-SCNC: 7.9 MG/DL (ref 8.6–10.5)
CALCIUM SPEC-SCNC: 8.3 MG/DL (ref 8.6–10.5)
CALCIUM SPEC-SCNC: 8.4 MG/DL (ref 8.6–10.5)
CALCIUM SPEC-SCNC: 8.4 MG/DL (ref 8.6–10.5)
CALCIUM SPEC-SCNC: 8.5 MG/DL (ref 8.6–10.5)
CALCIUM SPEC-SCNC: 8.7 MG/DL (ref 8.6–10.5)
CALCIUM SPEC-SCNC: 8.9 MG/DL (ref 8.6–10.5)
CHLORIDE SERPL-SCNC: 100 MMOL/L (ref 98–107)
CHLORIDE SERPL-SCNC: 102 MMOL/L (ref 98–107)
CHLORIDE SERPL-SCNC: 102 MMOL/L (ref 98–107)
CHLORIDE SERPL-SCNC: 103 MMOL/L (ref 98–107)
CHLORIDE SERPL-SCNC: 94 MMOL/L (ref 98–107)
CHLORIDE SERPL-SCNC: 97 MMOL/L (ref 98–107)
CHLORIDE SERPL-SCNC: 99 MMOL/L (ref 98–107)
CK MB SERPL-CCNC: 3.22 NG/ML
CK SERPL-CCNC: 41 U/L (ref 20–200)
CLARITY UR: ABNORMAL
CLARITY UR: CLEAR
CO2 SERPL-SCNC: 20 MMOL/L (ref 22–29)
CO2 SERPL-SCNC: 23 MMOL/L (ref 22–29)
CO2 SERPL-SCNC: 29 MMOL/L (ref 22–29)
CO2 SERPL-SCNC: 30 MMOL/L (ref 22–29)
CO2 SERPL-SCNC: 30 MMOL/L (ref 22–29)
CO2 SERPL-SCNC: 31 MMOL/L (ref 22–29)
CO2 SERPL-SCNC: 32 MMOL/L (ref 22–29)
COLOR UR: ABNORMAL
COLOR UR: YELLOW
CREAT BLD-MCNC: 1.01 MG/DL (ref 0.76–1.27)
CREAT BLD-MCNC: 1.2 MG/DL (ref 0.76–1.27)
CREAT BLD-MCNC: 2.23 MG/DL (ref 0.76–1.27)
CREAT BLD-MCNC: 2.43 MG/DL (ref 0.76–1.27)
CREAT BLD-MCNC: 2.48 MG/DL (ref 0.76–1.27)
CREAT BLD-MCNC: 3.03 MG/DL (ref 0.76–1.27)
CREAT BLD-MCNC: 3.49 MG/DL (ref 0.76–1.27)
D-LACTATE SERPL-SCNC: 0.9 MMOL/L (ref 0.5–2)
D-LACTATE SERPL-SCNC: 1.4 MMOL/L (ref 0.5–2)
DEPRECATED RDW RBC AUTO: 51 FL (ref 37–54)
DEPRECATED RDW RBC AUTO: 51.9 FL (ref 37–54)
DEPRECATED RDW RBC AUTO: 52.1 FL (ref 37–54)
DEPRECATED RDW RBC AUTO: 54.4 FL (ref 37–54)
DEPRECATED RDW RBC AUTO: 54.6 FL (ref 37–54)
DEPRECATED RDW RBC AUTO: 57.1 FL (ref 37–54)
DEPRECATED RDW RBC AUTO: 58.9 FL (ref 37–54)
EOSINOPHIL # BLD AUTO: 0 10*3/MM3 (ref 0–0.4)
EOSINOPHIL # BLD AUTO: 0.02 10*3/MM3 (ref 0–0.4)
EOSINOPHIL # BLD AUTO: 0.04 10*3/MM3 (ref 0–0.4)
EOSINOPHIL # BLD AUTO: 0.16 10*3/MM3 (ref 0–0.4)
EOSINOPHIL # BLD AUTO: 0.22 10*3/MM3 (ref 0–0.4)
EOSINOPHIL # BLD AUTO: 0.36 10*3/MM3 (ref 0–0.4)
EOSINOPHIL NFR BLD AUTO: 0 % (ref 0.3–6.2)
EOSINOPHIL NFR BLD AUTO: 0.1 % (ref 0.3–6.2)
EOSINOPHIL NFR BLD AUTO: 0.2 % (ref 0.3–6.2)
EOSINOPHIL NFR BLD AUTO: 0.9 % (ref 0.3–6.2)
EOSINOPHIL NFR BLD AUTO: 1.4 % (ref 0.3–6.2)
EOSINOPHIL NFR BLD AUTO: 4.4 % (ref 0.3–6.2)
EPAP: 6
ERYTHROCYTE [DISTWIDTH] IN BLOOD BY AUTOMATED COUNT: 15.2 % (ref 12.3–15.4)
ERYTHROCYTE [DISTWIDTH] IN BLOOD BY AUTOMATED COUNT: 15.4 % (ref 12.3–15.4)
ERYTHROCYTE [DISTWIDTH] IN BLOOD BY AUTOMATED COUNT: 15.5 % (ref 12.3–15.4)
ERYTHROCYTE [DISTWIDTH] IN BLOOD BY AUTOMATED COUNT: 15.6 % (ref 12.3–15.4)
ERYTHROCYTE [DISTWIDTH] IN BLOOD BY AUTOMATED COUNT: 16.1 % (ref 12.3–15.4)
ERYTHROCYTE [DISTWIDTH] IN BLOOD BY AUTOMATED COUNT: 16.4 % (ref 12.3–15.4)
ERYTHROCYTE [DISTWIDTH] IN BLOOD BY AUTOMATED COUNT: 16.4 % (ref 12.3–15.4)
GAS FLOW AIRWAY: 10 LPM
GAS FLOW AIRWAY: 6 LPM
GFR SERPL CREATININE-BSD FRML MDRD: 17 ML/MIN/1.73
GFR SERPL CREATININE-BSD FRML MDRD: 20 ML/MIN/1.73
GFR SERPL CREATININE-BSD FRML MDRD: 25 ML/MIN/1.73
GFR SERPL CREATININE-BSD FRML MDRD: 26 ML/MIN/1.73
GFR SERPL CREATININE-BSD FRML MDRD: 29 ML/MIN/1.73
GFR SERPL CREATININE-BSD FRML MDRD: 59 ML/MIN/1.73
GFR SERPL CREATININE-BSD FRML MDRD: 72 ML/MIN/1.73
GLOBULIN UR ELPH-MCNC: 4.2 GM/DL
GLOBULIN UR ELPH-MCNC: 4.4 GM/DL
GLOBULIN UR ELPH-MCNC: 4.4 GM/DL
GLUCOSE BLD-MCNC: 122 MG/DL (ref 65–99)
GLUCOSE BLD-MCNC: 123 MG/DL (ref 65–99)
GLUCOSE BLD-MCNC: 132 MG/DL (ref 65–99)
GLUCOSE BLD-MCNC: 138 MG/DL (ref 65–99)
GLUCOSE BLD-MCNC: 149 MG/DL (ref 65–99)
GLUCOSE BLD-MCNC: 176 MG/DL (ref 65–99)
GLUCOSE BLD-MCNC: 181 MG/DL (ref 65–99)
GLUCOSE BLDC GLUCOMTR-MCNC: 114 MG/DL (ref 70–130)
GLUCOSE BLDC GLUCOMTR-MCNC: 114 MG/DL (ref 70–130)
GLUCOSE BLDC GLUCOMTR-MCNC: 118 MG/DL (ref 70–130)
GLUCOSE BLDC GLUCOMTR-MCNC: 142 MG/DL (ref 70–130)
GLUCOSE BLDC GLUCOMTR-MCNC: 155 MG/DL (ref 70–130)
GLUCOSE BLDC GLUCOMTR-MCNC: 162 MG/DL (ref 70–130)
GLUCOSE BLDC GLUCOMTR-MCNC: 173 MG/DL (ref 70–130)
GLUCOSE UR STRIP-MCNC: NEGATIVE MG/DL
HBA1C MFR BLD: 5.6 % (ref 4.8–5.6)
HCO3 BLDA-SCNC: 22.5 MMOL/L (ref 20–26)
HCO3 BLDA-SCNC: 23.9 MMOL/L (ref 20–26)
HCO3 BLDA-SCNC: 27.8 MMOL/L (ref 20–26)
HCO3 BLDA-SCNC: 28.8 MMOL/L (ref 20–26)
HCO3 BLDA-SCNC: 30.6 MMOL/L (ref 20–26)
HCT VFR BLD AUTO: 36.5 % (ref 37.5–51)
HCT VFR BLD AUTO: 39.6 % (ref 37.5–51)
HCT VFR BLD AUTO: 39.8 % (ref 37.5–51)
HCT VFR BLD AUTO: 41 % (ref 37.5–51)
HCT VFR BLD AUTO: 41.5 % (ref 37.5–51)
HCT VFR BLD AUTO: 43.7 % (ref 37.5–51)
HCT VFR BLD AUTO: 44.3 % (ref 37.5–51)
HGB BLD-MCNC: 12.1 G/DL (ref 13–17.7)
HGB BLD-MCNC: 12.7 G/DL (ref 13–17.7)
HGB BLD-MCNC: 12.9 G/DL (ref 13–17.7)
HGB BLD-MCNC: 13 G/DL (ref 13–17.7)
HGB BLD-MCNC: 13.2 G/DL (ref 13–17.7)
HGB BLD-MCNC: 13.6 G/DL (ref 13–17.7)
HGB BLD-MCNC: 14.1 G/DL (ref 13–17.7)
HGB UR QL STRIP.AUTO: ABNORMAL
HGB UR QL STRIP.AUTO: NEGATIVE
HOLD SPECIMEN: NORMAL
HOROWITZ INDEX BLD+IHG-RTO: 30 %
HOROWITZ INDEX BLD+IHG-RTO: 30 %
HOROWITZ INDEX BLD+IHG-RTO: 50 %
HYALINE CASTS UR QL AUTO: ABNORMAL /LPF
IMM GRANULOCYTES # BLD AUTO: 0.03 10*3/MM3 (ref 0–0.05)
IMM GRANULOCYTES # BLD AUTO: 0.09 10*3/MM3 (ref 0–0.05)
IMM GRANULOCYTES # BLD AUTO: 0.1 10*3/MM3 (ref 0–0.05)
IMM GRANULOCYTES # BLD AUTO: 0.17 10*3/MM3 (ref 0–0.05)
IMM GRANULOCYTES # BLD AUTO: 0.28 10*3/MM3 (ref 0–0.05)
IMM GRANULOCYTES # BLD AUTO: 0.42 10*3/MM3 (ref 0–0.05)
IMM GRANULOCYTES NFR BLD AUTO: 0.4 % (ref 0–0.5)
IMM GRANULOCYTES NFR BLD AUTO: 0.5 % (ref 0–0.5)
IMM GRANULOCYTES NFR BLD AUTO: 0.7 % (ref 0–0.5)
IMM GRANULOCYTES NFR BLD AUTO: 0.9 % (ref 0–0.5)
IMM GRANULOCYTES NFR BLD AUTO: 1.3 % (ref 0–0.5)
IMM GRANULOCYTES NFR BLD AUTO: 2.1 % (ref 0–0.5)
IPAP: 20
IPAP: 20
IPAP: 22
KETONES UR QL STRIP: ABNORMAL
KETONES UR QL STRIP: NEGATIVE
LEUKOCYTE ESTERASE UR QL STRIP.AUTO: ABNORMAL
LEUKOCYTE ESTERASE UR QL STRIP.AUTO: NEGATIVE
LIPASE SERPL-CCNC: 23 U/L (ref 13–60)
LV EF 2D ECHO EST: 24 %
LYMPHOCYTES # BLD AUTO: 0.39 10*3/MM3 (ref 0.7–3.1)
LYMPHOCYTES # BLD AUTO: 0.42 10*3/MM3 (ref 0.7–3.1)
LYMPHOCYTES # BLD AUTO: 0.44 10*3/MM3 (ref 0.7–3.1)
LYMPHOCYTES # BLD AUTO: 0.52 10*3/MM3 (ref 0.7–3.1)
LYMPHOCYTES # BLD AUTO: 0.65 10*3/MM3 (ref 0.7–3.1)
LYMPHOCYTES # BLD AUTO: 0.8 10*3/MM3 (ref 0.7–3.1)
LYMPHOCYTES NFR BLD AUTO: 1.7 % (ref 19.6–45.3)
LYMPHOCYTES NFR BLD AUTO: 2.2 % (ref 19.6–45.3)
LYMPHOCYTES NFR BLD AUTO: 2.3 % (ref 19.6–45.3)
LYMPHOCYTES NFR BLD AUTO: 2.9 % (ref 19.6–45.3)
LYMPHOCYTES NFR BLD AUTO: 4.2 % (ref 19.6–45.3)
LYMPHOCYTES NFR BLD AUTO: 9.9 % (ref 19.6–45.3)
Lab: ABNORMAL
MAGNESIUM SERPL-MCNC: 1.9 MG/DL (ref 1.6–2.4)
MAXIMAL PREDICTED HEART RATE: 144 BPM
MCH RBC QN AUTO: 29.5 PG (ref 26.6–33)
MCH RBC QN AUTO: 29.6 PG (ref 26.6–33)
MCH RBC QN AUTO: 29.7 PG (ref 26.6–33)
MCH RBC QN AUTO: 30.2 PG (ref 26.6–33)
MCH RBC QN AUTO: 30.2 PG (ref 26.6–33)
MCH RBC QN AUTO: 30.3 PG (ref 26.6–33)
MCH RBC QN AUTO: 30.3 PG (ref 26.6–33)
MCHC RBC AUTO-ENTMCNC: 30.7 G/DL (ref 31.5–35.7)
MCHC RBC AUTO-ENTMCNC: 31 G/DL (ref 31.5–35.7)
MCHC RBC AUTO-ENTMCNC: 31.3 G/DL (ref 31.5–35.7)
MCHC RBC AUTO-ENTMCNC: 32.3 G/DL (ref 31.5–35.7)
MCHC RBC AUTO-ENTMCNC: 32.6 G/DL (ref 31.5–35.7)
MCHC RBC AUTO-ENTMCNC: 33.2 G/DL (ref 31.5–35.7)
MCHC RBC AUTO-ENTMCNC: 33.2 G/DL (ref 31.5–35.7)
MCV RBC AUTO: 91 FL (ref 79–97)
MCV RBC AUTO: 91.5 FL (ref 79–97)
MCV RBC AUTO: 91.6 FL (ref 79–97)
MCV RBC AUTO: 92.7 FL (ref 79–97)
MCV RBC AUTO: 94.1 FL (ref 79–97)
MCV RBC AUTO: 96 FL (ref 79–97)
MCV RBC AUTO: 98.7 FL (ref 79–97)
MODALITY: ABNORMAL
MONOCYTES # BLD AUTO: 1.05 10*3/MM3 (ref 0.1–0.9)
MONOCYTES # BLD AUTO: 1.13 10*3/MM3 (ref 0.1–0.9)
MONOCYTES # BLD AUTO: 1.68 10*3/MM3 (ref 0.1–0.9)
MONOCYTES # BLD AUTO: 1.85 10*3/MM3 (ref 0.1–0.9)
MONOCYTES # BLD AUTO: 1.89 10*3/MM3 (ref 0.1–0.9)
MONOCYTES # BLD AUTO: 2.18 10*3/MM3 (ref 0.1–0.9)
MONOCYTES NFR BLD AUTO: 12 % (ref 5–12)
MONOCYTES NFR BLD AUTO: 12.3 % (ref 5–12)
MONOCYTES NFR BLD AUTO: 14 % (ref 5–12)
MONOCYTES NFR BLD AUTO: 5.8 % (ref 5–12)
MONOCYTES NFR BLD AUTO: 8.3 % (ref 5–12)
MONOCYTES NFR BLD AUTO: 8.5 % (ref 5–12)
NEUTROPHILS # BLD AUTO: 12.45 10*3/MM3 (ref 1.7–7)
NEUTROPHILS # BLD AUTO: 15.17 10*3/MM3 (ref 1.7–7)
NEUTROPHILS # BLD AUTO: 16.48 10*3/MM3 (ref 1.7–7)
NEUTROPHILS # BLD AUTO: 17.21 10*3/MM3 (ref 1.7–7)
NEUTROPHILS # BLD AUTO: 19.8 10*3/MM3 (ref 1.7–7)
NEUTROPHILS # BLD AUTO: 5.75 10*3/MM3 (ref 1.7–7)
NEUTROPHILS NFR BLD AUTO: 70.9 % (ref 42.7–76)
NEUTROPHILS NFR BLD AUTO: 81.1 % (ref 42.7–76)
NEUTROPHILS NFR BLD AUTO: 83.6 % (ref 42.7–76)
NEUTROPHILS NFR BLD AUTO: 86.9 % (ref 42.7–76)
NEUTROPHILS NFR BLD AUTO: 88.5 % (ref 42.7–76)
NEUTROPHILS NFR BLD AUTO: 90.4 % (ref 42.7–76)
NITRITE UR QL STRIP: NEGATIVE
NITRITE UR QL STRIP: NEGATIVE
NITRITE UR QL STRIP: POSITIVE
NRBC BLD AUTO-RTO: 0 /100 WBC (ref 0–0.2)
NRBC BLD AUTO-RTO: 0.2 /100 WBC (ref 0–0.2)
PCO2 BLDA: 59.3 MM HG (ref 35–45)
PCO2 BLDA: 64.6 MM HG (ref 35–45)
PCO2 BLDA: 67.8 MM HG (ref 35–45)
PCO2 BLDA: 79.9 MM HG (ref 35–45)
PCO2 BLDA: 81.4 MM HG (ref 35–45)
PH BLDA: 7.08 PH UNITS (ref 7.35–7.45)
PH BLDA: 7.15 PH UNITS (ref 7.35–7.45)
PH BLDA: 7.18 PH UNITS (ref 7.35–7.45)
PH BLDA: 7.24 PH UNITS (ref 7.35–7.45)
PH BLDA: 7.28 PH UNITS (ref 7.35–7.45)
PH UR STRIP.AUTO: 6 [PH] (ref 5–9)
PH UR STRIP.AUTO: 6.5 [PH] (ref 5–9)
PH UR STRIP.AUTO: 6.5 [PH] (ref 5–9)
PH UR STRIP.AUTO: 7 [PH] (ref 5–9)
PH UR STRIP.AUTO: 7 [PH] (ref 5–9)
PHENYTOIN SERPL-MCNC: 6.7 MCG/ML (ref 10–20)
PLATELET # BLD AUTO: 183 10*3/MM3 (ref 140–450)
PLATELET # BLD AUTO: 188 10*3/MM3 (ref 140–450)
PLATELET # BLD AUTO: 192 10*3/MM3 (ref 140–450)
PLATELET # BLD AUTO: 198 10*3/MM3 (ref 140–450)
PLATELET # BLD AUTO: 205 10*3/MM3 (ref 140–450)
PLATELET # BLD AUTO: 219 10*3/MM3 (ref 140–450)
PLATELET # BLD AUTO: 242 10*3/MM3 (ref 140–450)
PMV BLD AUTO: 10.1 FL (ref 6–12)
PMV BLD AUTO: 10.1 FL (ref 6–12)
PMV BLD AUTO: 10.4 FL (ref 6–12)
PMV BLD AUTO: 10.6 FL (ref 6–12)
PMV BLD AUTO: 9.6 FL (ref 6–12)
PMV BLD AUTO: 9.8 FL (ref 6–12)
PMV BLD AUTO: 9.9 FL (ref 6–12)
PO2 BLDA: 62.5 MM HG (ref 83–108)
PO2 BLDA: 62.9 MM HG (ref 83–108)
PO2 BLDA: 65.3 MM HG (ref 83–108)
PO2 BLDA: 70.6 MM HG (ref 83–108)
PO2 BLDA: 83.4 MM HG (ref 83–108)
POTASSIUM BLD-SCNC: 3.7 MMOL/L (ref 3.5–5.2)
POTASSIUM BLD-SCNC: 3.8 MMOL/L (ref 3.5–5.2)
POTASSIUM BLD-SCNC: 4 MMOL/L (ref 3.5–5.2)
POTASSIUM BLD-SCNC: 4.2 MMOL/L (ref 3.5–5.2)
POTASSIUM BLD-SCNC: 4.4 MMOL/L (ref 3.5–5.2)
POTASSIUM BLD-SCNC: 4.5 MMOL/L (ref 3.5–5.2)
POTASSIUM BLD-SCNC: 5.3 MMOL/L (ref 3.5–5.2)
PROT SERPL-MCNC: 7.3 G/DL (ref 6–8.5)
PROT SERPL-MCNC: 7.8 G/DL (ref 6–8.5)
PROT SERPL-MCNC: 7.9 G/DL (ref 6–8.5)
PROT UR QL STRIP: ABNORMAL
PROT UR QL STRIP: NEGATIVE
RBC # BLD AUTO: 4.01 10*6/MM3 (ref 4.14–5.8)
RBC # BLD AUTO: 4.27 10*6/MM3 (ref 4.14–5.8)
RBC # BLD AUTO: 4.27 10*6/MM3 (ref 4.14–5.8)
RBC # BLD AUTO: 4.35 10*6/MM3 (ref 4.14–5.8)
RBC # BLD AUTO: 4.41 10*6/MM3 (ref 4.14–5.8)
RBC # BLD AUTO: 4.49 10*6/MM3 (ref 4.14–5.8)
RBC # BLD AUTO: 4.77 10*6/MM3 (ref 4.14–5.8)
RBC # UR: ABNORMAL /HPF
REF LAB TEST METHOD: ABNORMAL
SAO2 % BLDCOA: 88.8 % (ref 94–99)
SAO2 % BLDCOA: 90.9 % (ref 94–99)
SAO2 % BLDCOA: 91.2 % (ref 94–99)
SAO2 % BLDCOA: 94 % (ref 94–99)
SAO2 % BLDCOA: 94.2 % (ref 94–99)
SET MECH RESP RATE: 18
SET MECH RESP RATE: 18
SET MECH RESP RATE: 24
SODIUM BLD-SCNC: 138 MMOL/L (ref 136–145)
SODIUM BLD-SCNC: 139 MMOL/L (ref 136–145)
SODIUM BLD-SCNC: 140 MMOL/L (ref 136–145)
SODIUM BLD-SCNC: 140 MMOL/L (ref 136–145)
SODIUM BLD-SCNC: 141 MMOL/L (ref 136–145)
SODIUM BLD-SCNC: 141 MMOL/L (ref 136–145)
SODIUM BLD-SCNC: 143 MMOL/L (ref 136–145)
SP GR UR STRIP: 1 (ref 1–1.03)
SP GR UR STRIP: 1.01 (ref 1–1.03)
SQUAMOUS #/AREA URNS HPF: ABNORMAL /HPF
STRESS TARGET HR: 122 BPM
TROPONIN T SERPL-MCNC: 0.06 NG/ML (ref 0–0.03)
TROPONIN T SERPL-MCNC: 0.07 NG/ML (ref 0–0.03)
UROBILINOGEN UR QL STRIP: ABNORMAL
VENTILATOR MODE: ABNORMAL
WBC NRBC COR # BLD: 15.35 10*3/MM3 (ref 3.4–10.8)
WBC NRBC COR # BLD: 16.55 10*3/MM3 (ref 3.4–10.8)
WBC NRBC COR # BLD: 18.15 10*3/MM3 (ref 3.4–10.8)
WBC NRBC COR # BLD: 18.22 10*3/MM3 (ref 3.4–10.8)
WBC NRBC COR # BLD: 19.81 10*3/MM3 (ref 3.4–10.8)
WBC NRBC COR # BLD: 22.37 10*3/MM3 (ref 3.4–10.8)
WBC NRBC COR # BLD: 8.1 10*3/MM3 (ref 3.4–10.8)
WBC UR QL AUTO: ABNORMAL /HPF
WHOLE BLOOD HOLD SPECIMEN: NORMAL

## 2019-01-01 PROCEDURE — 82553 CREATINE MB FRACTION: CPT | Performed by: INTERNAL MEDICINE

## 2019-01-01 PROCEDURE — 25010000002 METHYLPREDNISOLONE PER 125 MG: Performed by: INTERNAL MEDICINE

## 2019-01-01 PROCEDURE — 94760 N-INVAS EAR/PLS OXIMETRY 1: CPT

## 2019-01-01 PROCEDURE — 25010000002 AMIODARONE IN DEXTROSE 5% 360-4.14 MG/200ML-% SOLUTION: Performed by: INTERNAL MEDICINE

## 2019-01-01 PROCEDURE — 82962 GLUCOSE BLOOD TEST: CPT

## 2019-01-01 PROCEDURE — 82550 ASSAY OF CK (CPK): CPT | Performed by: INTERNAL MEDICINE

## 2019-01-01 PROCEDURE — 94799 UNLISTED PULMONARY SVC/PX: CPT

## 2019-01-01 PROCEDURE — 96366 THER/PROPH/DIAG IV INF ADDON: CPT

## 2019-01-01 PROCEDURE — 36600 WITHDRAWAL OF ARTERIAL BLOOD: CPT

## 2019-01-01 PROCEDURE — 25010000003 PHENYTOIN PER 50 MG: Performed by: INTERNAL MEDICINE

## 2019-01-01 PROCEDURE — 63710000001 INSULIN ASPART PER 5 UNITS: Performed by: INTERNAL MEDICINE

## 2019-01-01 PROCEDURE — 99284 EMERGENCY DEPT VISIT MOD MDM: CPT

## 2019-01-01 PROCEDURE — 25010000002 CEFEPIME 2 G/NS 100 ML SOLUTION: Performed by: INTERNAL MEDICINE

## 2019-01-01 PROCEDURE — 80185 ASSAY OF PHENYTOIN TOTAL: CPT | Performed by: FAMILY MEDICINE

## 2019-01-01 PROCEDURE — 93306 TTE W/DOPPLER COMPLETE: CPT

## 2019-01-01 PROCEDURE — 81001 URINALYSIS AUTO W/SCOPE: CPT | Performed by: FAMILY MEDICINE

## 2019-01-01 PROCEDURE — 93306 TTE W/DOPPLER COMPLETE: CPT | Performed by: INTERNAL MEDICINE

## 2019-01-01 PROCEDURE — 81001 URINALYSIS AUTO W/SCOPE: CPT | Performed by: NURSE PRACTITIONER

## 2019-01-01 PROCEDURE — 96361 HYDRATE IV INFUSION ADD-ON: CPT

## 2019-01-01 PROCEDURE — 85027 COMPLETE CBC AUTOMATED: CPT | Performed by: INTERNAL MEDICINE

## 2019-01-01 PROCEDURE — 99223 1ST HOSP IP/OBS HIGH 75: CPT | Performed by: INTERNAL MEDICINE

## 2019-01-01 PROCEDURE — 82803 BLOOD GASES ANY COMBINATION: CPT

## 2019-01-01 PROCEDURE — 81001 URINALYSIS AUTO W/SCOPE: CPT | Performed by: EMERGENCY MEDICINE

## 2019-01-01 PROCEDURE — 71045 X-RAY EXAM CHEST 1 VIEW: CPT

## 2019-01-01 PROCEDURE — 87077 CULTURE AEROBIC IDENTIFY: CPT | Performed by: FAMILY MEDICINE

## 2019-01-01 PROCEDURE — 25010000002 ALBUMIN HUMAN 25% PER 50 ML: Performed by: INTERNAL MEDICINE

## 2019-01-01 PROCEDURE — 84484 ASSAY OF TROPONIN QUANT: CPT | Performed by: INTERNAL MEDICINE

## 2019-01-01 PROCEDURE — 80048 BASIC METABOLIC PNL TOTAL CA: CPT | Performed by: FAMILY MEDICINE

## 2019-01-01 PROCEDURE — 25010000002 AMIODARONE PER 30 MG: Performed by: INTERNAL MEDICINE

## 2019-01-01 PROCEDURE — 87077 CULTURE AEROBIC IDENTIFY: CPT | Performed by: PHYSICAL MEDICINE & REHABILITATION

## 2019-01-01 PROCEDURE — 85025 COMPLETE CBC W/AUTO DIFF WBC: CPT | Performed by: FAMILY MEDICINE

## 2019-01-01 PROCEDURE — 0T29X0Z CHANGE DRAINAGE DEVICE IN URETER, EXTERNAL APPROACH: ICD-10-PCS | Performed by: UROLOGY

## 2019-01-01 PROCEDURE — 25010000002 ONDANSETRON PER 1 MG: Performed by: NURSE ANESTHETIST, CERTIFIED REGISTERED

## 2019-01-01 PROCEDURE — 25010000002 PIPERACILLIN SOD-TAZOBACTAM PER 1 G: Performed by: FAMILY MEDICINE

## 2019-01-01 PROCEDURE — 87040 BLOOD CULTURE FOR BACTERIA: CPT | Performed by: FAMILY MEDICINE

## 2019-01-01 PROCEDURE — C1769 GUIDE WIRE: HCPCS | Performed by: UROLOGY

## 2019-01-01 PROCEDURE — 94660 CPAP INITIATION&MGMT: CPT

## 2019-01-01 PROCEDURE — 80053 COMPREHEN METABOLIC PANEL: CPT | Performed by: FAMILY MEDICINE

## 2019-01-01 PROCEDURE — 36410 VNPNXR 3YR/> PHY/QHP DX/THER: CPT

## 2019-01-01 PROCEDURE — 87186 SC STD MICRODIL/AGAR DIL: CPT | Performed by: PHYSICAL MEDICINE & REHABILITATION

## 2019-01-01 PROCEDURE — 87186 SC STD MICRODIL/AGAR DIL: CPT | Performed by: FAMILY MEDICINE

## 2019-01-01 PROCEDURE — 83036 HEMOGLOBIN GLYCOSYLATED A1C: CPT | Performed by: INTERNAL MEDICINE

## 2019-01-01 PROCEDURE — 93005 ELECTROCARDIOGRAM TRACING: CPT | Performed by: INTERNAL MEDICINE

## 2019-01-01 PROCEDURE — 93010 ELECTROCARDIOGRAM REPORT: CPT | Performed by: INTERNAL MEDICINE

## 2019-01-01 PROCEDURE — G0378 HOSPITAL OBSERVATION PER HR: HCPCS

## 2019-01-01 PROCEDURE — 87086 URINE CULTURE/COLONY COUNT: CPT | Performed by: PHYSICAL MEDICINE & REHABILITATION

## 2019-01-01 PROCEDURE — 87040 BLOOD CULTURE FOR BACTERIA: CPT | Performed by: INTERNAL MEDICINE

## 2019-01-01 PROCEDURE — 76775 US EXAM ABDO BACK WALL LIM: CPT

## 2019-01-01 PROCEDURE — 83735 ASSAY OF MAGNESIUM: CPT | Performed by: INTERNAL MEDICINE

## 2019-01-01 PROCEDURE — P9047 ALBUMIN (HUMAN), 25%, 50ML: HCPCS | Performed by: INTERNAL MEDICINE

## 2019-01-01 PROCEDURE — 80053 COMPREHEN METABOLIC PANEL: CPT | Performed by: INTERNAL MEDICINE

## 2019-01-01 PROCEDURE — 87086 URINE CULTURE/COLONY COUNT: CPT | Performed by: INTERNAL MEDICINE

## 2019-01-01 PROCEDURE — 74176 CT ABD & PELVIS W/O CONTRAST: CPT

## 2019-01-01 PROCEDURE — 96365 THER/PROPH/DIAG IV INF INIT: CPT

## 2019-01-01 PROCEDURE — 74420 UROGRAPHY RTRGR +-KUB: CPT

## 2019-01-01 PROCEDURE — 80053 COMPREHEN METABOLIC PANEL: CPT | Performed by: EMERGENCY MEDICINE

## 2019-01-01 PROCEDURE — C1751 CATH, INF, PER/CENT/MIDLINE: HCPCS

## 2019-01-01 PROCEDURE — 94640 AIRWAY INHALATION TREATMENT: CPT

## 2019-01-01 PROCEDURE — 87086 URINE CULTURE/COLONY COUNT: CPT | Performed by: FAMILY MEDICINE

## 2019-01-01 PROCEDURE — 25010000002 IOPAMIDOL 61 % SOLUTION: Performed by: UROLOGY

## 2019-01-01 PROCEDURE — 99233 SBSQ HOSP IP/OBS HIGH 50: CPT | Performed by: INTERNAL MEDICINE

## 2019-01-01 PROCEDURE — 83605 ASSAY OF LACTIC ACID: CPT | Performed by: EMERGENCY MEDICINE

## 2019-01-01 PROCEDURE — 85025 COMPLETE CBC W/AUTO DIFF WBC: CPT

## 2019-01-01 PROCEDURE — 25010000002 FENTANYL CITRATE (PF) 100 MCG/2ML SOLUTION: Performed by: NURSE ANESTHETIST, CERTIFIED REGISTERED

## 2019-01-01 PROCEDURE — 25010000002 CEFTRIAXONE PER 250 MG: Performed by: INTERNAL MEDICINE

## 2019-01-01 PROCEDURE — 93005 ELECTROCARDIOGRAM TRACING: CPT | Performed by: FAMILY MEDICINE

## 2019-01-01 PROCEDURE — 85025 COMPLETE CBC W/AUTO DIFF WBC: CPT | Performed by: INTERNAL MEDICINE

## 2019-01-01 PROCEDURE — 96375 TX/PRO/DX INJ NEW DRUG ADDON: CPT

## 2019-01-01 PROCEDURE — 83690 ASSAY OF LIPASE: CPT | Performed by: EMERGENCY MEDICINE

## 2019-01-01 PROCEDURE — 83605 ASSAY OF LACTIC ACID: CPT | Performed by: FAMILY MEDICINE

## 2019-01-01 PROCEDURE — C1758 CATHETER, URETERAL: HCPCS | Performed by: UROLOGY

## 2019-01-01 PROCEDURE — 81003 URINALYSIS AUTO W/O SCOPE: CPT | Performed by: PHYSICAL MEDICINE & REHABILITATION

## 2019-01-01 PROCEDURE — BT1F1ZZ FLUOROSCOPY OF LEFT KIDNEY, URETER AND BLADDER USING LOW OSMOLAR CONTRAST: ICD-10-PCS | Performed by: UROLOGY

## 2019-01-01 PROCEDURE — 87086 URINE CULTURE/COLONY COUNT: CPT | Performed by: NURSE PRACTITIONER

## 2019-01-01 PROCEDURE — 25010000003 LIDOCAINE 1 % SOLUTION: Performed by: NURSE ANESTHETIST, CERTIFIED REGISTERED

## 2019-01-01 PROCEDURE — 81003 URINALYSIS AUTO W/O SCOPE: CPT | Performed by: NURSE PRACTITIONER

## 2019-01-01 PROCEDURE — 25010000002 PROPOFOL 10 MG/ML EMULSION: Performed by: NURSE ANESTHETIST, CERTIFIED REGISTERED

## 2019-01-01 PROCEDURE — 25010000002 CEFEPIME PER 500 MG: Performed by: INTERNAL MEDICINE

## 2019-01-01 PROCEDURE — 25010000002 ONDANSETRON PER 1 MG: Performed by: FAMILY MEDICINE

## 2019-01-01 PROCEDURE — 25010000002 PIPERACILLIN SOD-TAZOBACTAM PER 1 G: Performed by: EMERGENCY MEDICINE

## 2019-01-01 PROCEDURE — 92950 HEART/LUNG RESUSCITATION CPR: CPT

## 2019-01-01 PROCEDURE — 92610 EVALUATE SWALLOWING FUNCTION: CPT | Performed by: SPEECH-LANGUAGE PATHOLOGIST

## 2019-01-01 PROCEDURE — 76937 US GUIDE VASCULAR ACCESS: CPT

## 2019-01-01 PROCEDURE — 36415 COLL VENOUS BLD VENIPUNCTURE: CPT | Performed by: INTERNAL MEDICINE

## 2019-01-01 PROCEDURE — 80048 BASIC METABOLIC PNL TOTAL CA: CPT | Performed by: INTERNAL MEDICINE

## 2019-01-01 RX ORDER — PHENYTOIN SODIUM 100 MG/1
100 CAPSULE, EXTENDED RELEASE ORAL EVERY 12 HOURS SCHEDULED
Status: DISCONTINUED | OUTPATIENT
Start: 2019-01-01 | End: 2019-01-01 | Stop reason: HOSPADM

## 2019-01-01 RX ORDER — BUMETANIDE 0.25 MG/ML
2 INJECTION INTRAMUSCULAR; INTRAVENOUS ONCE
Status: COMPLETED | OUTPATIENT
Start: 2019-01-01 | End: 2019-01-01

## 2019-01-01 RX ORDER — SODIUM CHLORIDE 9 MG/ML
100 INJECTION, SOLUTION INTRAVENOUS CONTINUOUS
Status: DISCONTINUED | OUTPATIENT
Start: 2019-01-01 | End: 2019-01-01

## 2019-01-01 RX ORDER — ATORVASTATIN CALCIUM 10 MG/1
10 TABLET, FILM COATED ORAL DAILY
Status: DISCONTINUED | OUTPATIENT
Start: 2019-01-01 | End: 2019-01-01 | Stop reason: HOSPADM

## 2019-01-01 RX ORDER — SODIUM POLYSTYRENE SULFONATE 15 G/60ML
15 SUSPENSION ORAL; RECTAL ONCE
Status: COMPLETED | OUTPATIENT
Start: 2019-01-01 | End: 2019-01-01

## 2019-01-01 RX ORDER — ONDANSETRON 2 MG/ML
4 INJECTION INTRAMUSCULAR; INTRAVENOUS ONCE AS NEEDED
Status: DISCONTINUED | OUTPATIENT
Start: 2019-01-01 | End: 2019-01-01 | Stop reason: HOSPADM

## 2019-01-01 RX ORDER — PHENYTOIN SODIUM 100 MG/1
100 CAPSULE, EXTENDED RELEASE ORAL EVERY 12 HOURS SCHEDULED
Status: DISCONTINUED | OUTPATIENT
Start: 2019-01-01 | End: 2019-01-01

## 2019-01-01 RX ORDER — OXYCODONE AND ACETAMINOPHEN 7.5; 325 MG/1; MG/1
1 TABLET ORAL EVERY 4 HOURS PRN
Status: DISCONTINUED | OUTPATIENT
Start: 2019-01-01 | End: 2019-01-01 | Stop reason: HOSPADM

## 2019-01-01 RX ORDER — TAMSULOSIN HYDROCHLORIDE 0.4 MG/1
0.4 CAPSULE ORAL NIGHTLY
Status: DISCONTINUED | OUTPATIENT
Start: 2019-01-01 | End: 2019-01-01 | Stop reason: HOSPADM

## 2019-01-01 RX ORDER — AMIODARONE HYDROCHLORIDE 200 MG/1
200 TABLET ORAL
Qty: 301 TABLET | Refills: 1 | Status: SHIPPED | OUTPATIENT
Start: 2019-01-01

## 2019-01-01 RX ORDER — NEBIVOLOL 5 MG/1
20 TABLET ORAL NIGHTLY
Status: DISCONTINUED | OUTPATIENT
Start: 2019-01-01 | End: 2019-01-01

## 2019-01-01 RX ORDER — IPRATROPIUM BROMIDE AND ALBUTEROL SULFATE 2.5; .5 MG/3ML; MG/3ML
3 SOLUTION RESPIRATORY (INHALATION)
Status: DISCONTINUED | OUTPATIENT
Start: 2019-01-01 | End: 2019-01-01 | Stop reason: HOSPADM

## 2019-01-01 RX ORDER — MORPHINE SULFATE 2 MG/ML
2 INJECTION, SOLUTION INTRAMUSCULAR; INTRAVENOUS EVERY 4 HOURS PRN
Status: DISCONTINUED | OUTPATIENT
Start: 2019-01-01 | End: 2019-01-01 | Stop reason: HOSPADM

## 2019-01-01 RX ORDER — FINASTERIDE 5 MG/1
5 TABLET, FILM COATED ORAL DAILY
Status: DISCONTINUED | OUTPATIENT
Start: 2019-01-01 | End: 2019-01-01 | Stop reason: HOSPADM

## 2019-01-01 RX ORDER — METHYLPREDNISOLONE SODIUM SUCCINATE 125 MG/2ML
80 INJECTION, POWDER, LYOPHILIZED, FOR SOLUTION INTRAMUSCULAR; INTRAVENOUS EVERY 6 HOURS
Status: DISCONTINUED | OUTPATIENT
Start: 2019-01-01 | End: 2019-01-01

## 2019-01-01 RX ORDER — SODIUM CHLORIDE 0.9 % (FLUSH) 0.9 %
10 SYRINGE (ML) INJECTION AS NEEDED
Status: DISCONTINUED | OUTPATIENT
Start: 2019-01-01 | End: 2019-01-01 | Stop reason: HOSPADM

## 2019-01-01 RX ORDER — LIDOCAINE HYDROCHLORIDE 10 MG/ML
INJECTION, SOLUTION INFILTRATION; PERINEURAL AS NEEDED
Status: DISCONTINUED | OUTPATIENT
Start: 2019-01-01 | End: 2019-01-01 | Stop reason: SURG

## 2019-01-01 RX ORDER — HYDROCODONE BITARTRATE AND ACETAMINOPHEN 10; 325 MG/1; MG/1
1 TABLET ORAL EVERY 6 HOURS PRN
Status: DISCONTINUED | OUTPATIENT
Start: 2019-01-01 | End: 2019-01-01

## 2019-01-01 RX ORDER — TEMAZEPAM 15 MG/1
15 CAPSULE ORAL NIGHTLY PRN
COMMUNITY

## 2019-01-01 RX ORDER — FINASTERIDE 5 MG/1
5 TABLET, FILM COATED ORAL DAILY
Status: DISCONTINUED | OUTPATIENT
Start: 2019-01-01 | End: 2019-01-01

## 2019-01-01 RX ORDER — SODIUM CHLORIDE 0.9 % (FLUSH) 0.9 %
10 SYRINGE (ML) INJECTION EVERY 12 HOURS SCHEDULED
Status: DISCONTINUED | OUTPATIENT
Start: 2019-01-01 | End: 2019-01-01 | Stop reason: HOSPADM

## 2019-01-01 RX ORDER — ACETAMINOPHEN 325 MG/1
650 TABLET ORAL ONCE
Status: COMPLETED | OUTPATIENT
Start: 2019-01-01 | End: 2019-01-01

## 2019-01-01 RX ORDER — ATORVASTATIN CALCIUM 10 MG/1
10 TABLET, FILM COATED ORAL DAILY
Status: DISCONTINUED | OUTPATIENT
Start: 2019-01-01 | End: 2019-01-01

## 2019-01-01 RX ORDER — PROPOFOL 10 MG/ML
VIAL (ML) INTRAVENOUS AS NEEDED
Status: DISCONTINUED | OUTPATIENT
Start: 2019-01-01 | End: 2019-01-01 | Stop reason: SURG

## 2019-01-01 RX ORDER — FAMOTIDINE 10 MG/ML
20 INJECTION, SOLUTION INTRAVENOUS DAILY
Status: DISCONTINUED | OUTPATIENT
Start: 2019-01-01 | End: 2019-01-01 | Stop reason: HOSPADM

## 2019-01-01 RX ORDER — FUROSEMIDE 20 MG/1
20 TABLET ORAL DAILY
Status: DISCONTINUED | OUTPATIENT
Start: 2019-01-01 | End: 2019-01-01

## 2019-01-01 RX ORDER — TEMAZEPAM 15 MG/1
15 CAPSULE ORAL ONCE
Status: COMPLETED | OUTPATIENT
Start: 2019-01-01 | End: 2019-01-01

## 2019-01-01 RX ORDER — CEFDINIR 300 MG/1
300 CAPSULE ORAL 2 TIMES DAILY
Qty: 20 CAPSULE | Refills: 0 | Status: SHIPPED | OUTPATIENT
Start: 2019-01-01 | End: 2019-01-01

## 2019-01-01 RX ORDER — NICOTINE POLACRILEX 4 MG
15 LOZENGE BUCCAL
Status: DISCONTINUED | OUTPATIENT
Start: 2019-01-01 | End: 2019-01-01 | Stop reason: HOSPADM

## 2019-01-01 RX ORDER — AMIODARONE HYDROCHLORIDE 50 MG/ML
INJECTION, SOLUTION INTRAVENOUS
Status: COMPLETED | OUTPATIENT
Start: 2019-01-01 | End: 2019-01-01

## 2019-01-01 RX ORDER — NOREPINEPHRINE BIT/0.9 % NACL 8 MG/250ML
.02-.3 INFUSION BOTTLE (ML) INTRAVENOUS
Status: DISCONTINUED | OUTPATIENT
Start: 2019-01-01 | End: 2019-01-01 | Stop reason: HOSPADM

## 2019-01-01 RX ORDER — SODIUM CHLORIDE 0.9 % (FLUSH) 0.9 %
3 SYRINGE (ML) INJECTION EVERY 12 HOURS SCHEDULED
Status: DISCONTINUED | OUTPATIENT
Start: 2019-01-01 | End: 2019-01-01 | Stop reason: HOSPADM

## 2019-01-01 RX ORDER — ACETAMINOPHEN 325 MG/1
650 TABLET ORAL EVERY 6 HOURS PRN
Status: DISCONTINUED | OUTPATIENT
Start: 2019-01-01 | End: 2019-01-01 | Stop reason: HOSPADM

## 2019-01-01 RX ORDER — PAROXETINE HYDROCHLORIDE 20 MG/1
20 TABLET, FILM COATED ORAL EVERY MORNING
COMMUNITY

## 2019-01-01 RX ORDER — ALBUMIN (HUMAN) 12.5 G/50ML
12.5 SOLUTION INTRAVENOUS ONCE
Status: COMPLETED | OUTPATIENT
Start: 2019-01-01 | End: 2019-01-01

## 2019-01-01 RX ORDER — BACLOFEN 10 MG/1
10 TABLET ORAL EVERY 12 HOURS SCHEDULED
Status: DISCONTINUED | OUTPATIENT
Start: 2019-01-01 | End: 2019-01-01 | Stop reason: HOSPADM

## 2019-01-01 RX ORDER — ONDANSETRON 2 MG/ML
INJECTION INTRAMUSCULAR; INTRAVENOUS AS NEEDED
Status: DISCONTINUED | OUTPATIENT
Start: 2019-01-01 | End: 2019-01-01 | Stop reason: SURG

## 2019-01-01 RX ORDER — DEXTROSE MONOHYDRATE 25 G/50ML
25 INJECTION, SOLUTION INTRAVENOUS
Status: DISCONTINUED | OUTPATIENT
Start: 2019-01-01 | End: 2019-01-01 | Stop reason: HOSPADM

## 2019-01-01 RX ORDER — SODIUM CHLORIDE 9 MG/ML
75 INJECTION, SOLUTION INTRAVENOUS CONTINUOUS
Status: DISCONTINUED | OUTPATIENT
Start: 2019-01-01 | End: 2019-01-01 | Stop reason: HOSPADM

## 2019-01-01 RX ORDER — BACLOFEN 10 MG/1
10 TABLET ORAL 3 TIMES DAILY PRN
Status: DISCONTINUED | OUTPATIENT
Start: 2019-01-01 | End: 2019-01-01 | Stop reason: HOSPADM

## 2019-01-01 RX ORDER — HYDROCODONE BITARTRATE AND ACETAMINOPHEN 5; 325 MG/1; MG/1
1 TABLET ORAL EVERY 6 HOURS PRN
Status: DISCONTINUED | OUTPATIENT
Start: 2019-01-01 | End: 2019-01-01 | Stop reason: HOSPADM

## 2019-01-01 RX ORDER — BACLOFEN 10 MG/1
10 TABLET ORAL EVERY 12 HOURS SCHEDULED
Status: DISCONTINUED | OUTPATIENT
Start: 2019-01-01 | End: 2019-01-01

## 2019-01-01 RX ORDER — ALBUTEROL SULFATE 2.5 MG/3ML
2.5 SOLUTION RESPIRATORY (INHALATION) ONCE
Status: COMPLETED | OUTPATIENT
Start: 2019-01-01 | End: 2019-01-01

## 2019-01-01 RX ORDER — AMIODARONE HYDROCHLORIDE 200 MG/1
200 TABLET ORAL
Status: DISCONTINUED | OUTPATIENT
Start: 2019-01-01 | End: 2019-01-01 | Stop reason: HOSPADM

## 2019-01-01 RX ORDER — ONDANSETRON 2 MG/ML
4 INJECTION INTRAMUSCULAR; INTRAVENOUS ONCE
Status: COMPLETED | OUTPATIENT
Start: 2019-01-01 | End: 2019-01-01

## 2019-01-01 RX ORDER — FLUTICASONE PROPIONATE 50 MCG
2 SPRAY, SUSPENSION (ML) NASAL DAILY
Status: DISCONTINUED | OUTPATIENT
Start: 2019-01-01 | End: 2019-01-01 | Stop reason: HOSPADM

## 2019-01-01 RX ORDER — TAMSULOSIN HYDROCHLORIDE 0.4 MG/1
0.4 CAPSULE ORAL NIGHTLY
Status: DISCONTINUED | OUTPATIENT
Start: 2019-01-01 | End: 2019-01-01

## 2019-01-01 RX ORDER — PAROXETINE HYDROCHLORIDE 20 MG/1
20 TABLET, FILM COATED ORAL EVERY MORNING
Status: DISCONTINUED | OUTPATIENT
Start: 2019-01-01 | End: 2019-01-01

## 2019-01-01 RX ORDER — METHYLPREDNISOLONE SODIUM SUCCINATE 125 MG/2ML
60 INJECTION, POWDER, LYOPHILIZED, FOR SOLUTION INTRAMUSCULAR; INTRAVENOUS EVERY 8 HOURS
Status: DISCONTINUED | OUTPATIENT
Start: 2019-01-01 | End: 2019-01-01 | Stop reason: HOSPADM

## 2019-01-01 RX ORDER — ASPIRIN 81 MG/1
81 TABLET ORAL DAILY
Status: DISCONTINUED | OUTPATIENT
Start: 2019-01-01 | End: 2019-01-01 | Stop reason: HOSPADM

## 2019-01-01 RX ORDER — FENTANYL CITRATE 50 UG/ML
INJECTION, SOLUTION INTRAMUSCULAR; INTRAVENOUS AS NEEDED
Status: DISCONTINUED | OUTPATIENT
Start: 2019-01-01 | End: 2019-01-01 | Stop reason: SURG

## 2019-01-01 RX ORDER — PAROXETINE HYDROCHLORIDE 20 MG/1
20 TABLET, FILM COATED ORAL ONCE
Status: COMPLETED | OUTPATIENT
Start: 2019-01-01 | End: 2019-01-01

## 2019-01-01 RX ORDER — ONDANSETRON 2 MG/ML
4 INJECTION INTRAMUSCULAR; INTRAVENOUS ONCE AS NEEDED
Status: DISCONTINUED | OUTPATIENT
Start: 2019-01-01 | End: 2019-01-01 | Stop reason: SDUPTHER

## 2019-01-01 RX ORDER — SODIUM CHLORIDE 0.9 % (FLUSH) 0.9 %
3-10 SYRINGE (ML) INJECTION AS NEEDED
Status: DISCONTINUED | OUTPATIENT
Start: 2019-01-01 | End: 2019-01-01 | Stop reason: HOSPADM

## 2019-01-01 RX ADMIN — METHYLPREDNISOLONE SODIUM SUCCINATE 60 MG: 125 INJECTION, POWDER, FOR SOLUTION INTRAMUSCULAR; INTRAVENOUS at 05:47

## 2019-01-01 RX ADMIN — METHYLPREDNISOLONE SODIUM SUCCINATE 60 MG: 125 INJECTION, POWDER, FOR SOLUTION INTRAMUSCULAR; INTRAVENOUS at 05:45

## 2019-01-01 RX ADMIN — METHYLPREDNISOLONE SODIUM SUCCINATE 80 MG: 125 INJECTION, POWDER, FOR SOLUTION INTRAMUSCULAR; INTRAVENOUS at 05:58

## 2019-01-01 RX ADMIN — CEFEPIME 2 G: 2 INJECTION, POWDER, FOR SOLUTION INTRAVENOUS at 17:25

## 2019-01-01 RX ADMIN — SODIUM CHLORIDE 500 ML: 9 INJECTION, SOLUTION INTRAVENOUS at 23:22

## 2019-01-01 RX ADMIN — METHYLPREDNISOLONE SODIUM SUCCINATE 60 MG: 125 INJECTION, POWDER, FOR SOLUTION INTRAMUSCULAR; INTRAVENOUS at 14:43

## 2019-01-01 RX ADMIN — Medication: at 22:50

## 2019-01-01 RX ADMIN — PIPERACILLIN SODIUM,TAZOBACTAM SODIUM 3.38 G: 3; .375 INJECTION, POWDER, FOR SOLUTION INTRAVENOUS at 09:01

## 2019-01-01 RX ADMIN — OXYCODONE HYDROCHLORIDE AND ACETAMINOPHEN 1 TABLET: 7.5; 325 TABLET ORAL at 13:09

## 2019-01-01 RX ADMIN — LIDOCAINE HYDROCHLORIDE 50 MG: 10 INJECTION, SOLUTION INFILTRATION; PERINEURAL at 19:52

## 2019-01-01 RX ADMIN — Medication 10 ML: at 04:46

## 2019-01-01 RX ADMIN — RIVAROXABAN 15 MG: 15 TABLET, FILM COATED ORAL at 20:38

## 2019-01-01 RX ADMIN — TAMSULOSIN HYDROCHLORIDE 0.4 MG: 0.4 CAPSULE ORAL at 20:01

## 2019-01-01 RX ADMIN — PIPERACILLIN SODIUM,TAZOBACTAM SODIUM 3.38 G: 3; .375 INJECTION, POWDER, FOR SOLUTION INTRAVENOUS at 11:14

## 2019-01-01 RX ADMIN — SODIUM CHLORIDE, PRESERVATIVE FREE 10 ML: 5 INJECTION INTRAVENOUS at 20:44

## 2019-01-01 RX ADMIN — Medication 2.5 MG: at 20:40

## 2019-01-01 RX ADMIN — SODIUM CHLORIDE, PRESERVATIVE FREE 10 ML: 5 INJECTION INTRAVENOUS at 08:53

## 2019-01-01 RX ADMIN — SODIUM CHLORIDE 100 ML/HR: 900 INJECTION, SOLUTION INTRAVENOUS at 17:38

## 2019-01-01 RX ADMIN — FINASTERIDE 5 MG: 5 TABLET, FILM COATED ORAL at 08:51

## 2019-01-01 RX ADMIN — OXYCODONE HYDROCHLORIDE AND ACETAMINOPHEN 1 TABLET: 7.5; 325 TABLET ORAL at 06:23

## 2019-01-01 RX ADMIN — PIPERACILLIN SODIUM,TAZOBACTAM SODIUM 3.38 G: 3; .375 INJECTION, POWDER, FOR SOLUTION INTRAVENOUS at 18:50

## 2019-01-01 RX ADMIN — FAMOTIDINE 20 MG: 10 INJECTION INTRAVENOUS at 11:21

## 2019-01-01 RX ADMIN — BACLOFEN 10 MG: 10 TABLET ORAL at 21:06

## 2019-01-01 RX ADMIN — ATORVASTATIN CALCIUM 10 MG: 10 TABLET, FILM COATED ORAL at 09:09

## 2019-01-01 RX ADMIN — ASPIRIN 81 MG: 81 TABLET, COATED ORAL at 09:09

## 2019-01-01 RX ADMIN — TAMSULOSIN HYDROCHLORIDE 0.4 MG: 0.4 CAPSULE ORAL at 21:49

## 2019-01-01 RX ADMIN — AMIODARONE HYDROCHLORIDE 0.5 MG/MIN: 1.8 INJECTION, SOLUTION INTRAVENOUS at 14:48

## 2019-01-01 RX ADMIN — SODIUM CHLORIDE 100 ML/HR: 900 INJECTION, SOLUTION INTRAVENOUS at 16:57

## 2019-01-01 RX ADMIN — PHENYTOIN SODIUM 100 MG: 50 INJECTION INTRAMUSCULAR; INTRAVENOUS at 10:04

## 2019-01-01 RX ADMIN — PHENYTOIN SODIUM 100 MG: 100 CAPSULE, EXTENDED RELEASE ORAL at 09:09

## 2019-01-01 RX ADMIN — SODIUM CHLORIDE 100 ML/HR: 9 INJECTION, SOLUTION INTRAVENOUS at 13:40

## 2019-01-01 RX ADMIN — IPRATROPIUM BROMIDE AND ALBUTEROL SULFATE 3 ML: 2.5; .5 SOLUTION RESPIRATORY (INHALATION) at 14:27

## 2019-01-01 RX ADMIN — SODIUM POLYSTYRENE SULFONATE 15 G: 15 SUSPENSION ORAL; RECTAL at 16:42

## 2019-01-01 RX ADMIN — PIPERACILLIN SODIUM,TAZOBACTAM SODIUM 3.38 G: 3; .375 INJECTION, POWDER, FOR SOLUTION INTRAVENOUS at 02:48

## 2019-01-01 RX ADMIN — PHENYTOIN SODIUM 100 MG: 100 CAPSULE ORAL at 09:01

## 2019-01-01 RX ADMIN — ATORVASTATIN CALCIUM 10 MG: 10 TABLET, FILM COATED ORAL at 09:01

## 2019-01-01 RX ADMIN — SODIUM CHLORIDE, PRESERVATIVE FREE 10 ML: 5 INJECTION INTRAVENOUS at 20:39

## 2019-01-01 RX ADMIN — PHENYTOIN SODIUM 100 MG: 100 CAPSULE, EXTENDED RELEASE ORAL at 21:49

## 2019-01-01 RX ADMIN — CEFEPIME HYDROCHLORIDE 2 G: 2 INJECTION, POWDER, FOR SOLUTION INTRAVENOUS at 16:42

## 2019-01-01 RX ADMIN — IPRATROPIUM BROMIDE AND ALBUTEROL SULFATE 3 ML: 2.5; .5 SOLUTION RESPIRATORY (INHALATION) at 07:34

## 2019-01-01 RX ADMIN — PAROXETINE HYDROCHLORIDE 20 MG: 20 TABLET, FILM COATED ORAL at 20:01

## 2019-01-01 RX ADMIN — FINASTERIDE 5 MG: 5 TABLET, FILM COATED ORAL at 09:09

## 2019-01-01 RX ADMIN — ASPIRIN 81 MG: 81 TABLET, COATED ORAL at 09:32

## 2019-01-01 RX ADMIN — AMIODARONE HYDROCHLORIDE 150 MG: 50 INJECTION, SOLUTION INTRAVENOUS at 08:29

## 2019-01-01 RX ADMIN — IPRATROPIUM BROMIDE AND ALBUTEROL SULFATE 3 ML: 2.5; .5 SOLUTION RESPIRATORY (INHALATION) at 06:34

## 2019-01-01 RX ADMIN — ATORVASTATIN CALCIUM 10 MG: 10 TABLET, FILM COATED ORAL at 08:51

## 2019-01-01 RX ADMIN — ONDANSETRON 4 MG: 2 INJECTION INTRAMUSCULAR; INTRAVENOUS at 20:05

## 2019-01-01 RX ADMIN — TEMAZEPAM 15 MG: 15 CAPSULE ORAL at 20:01

## 2019-01-01 RX ADMIN — METHYLPREDNISOLONE SODIUM SUCCINATE 80 MG: 125 INJECTION, POWDER, FOR SOLUTION INTRAMUSCULAR; INTRAVENOUS at 00:11

## 2019-01-01 RX ADMIN — ATORVASTATIN CALCIUM 10 MG: 10 TABLET, FILM COATED ORAL at 09:32

## 2019-01-01 RX ADMIN — METHYLPREDNISOLONE SODIUM SUCCINATE 60 MG: 125 INJECTION, POWDER, FOR SOLUTION INTRAMUSCULAR; INTRAVENOUS at 14:47

## 2019-01-01 RX ADMIN — PHENYTOIN SODIUM 100 MG: 100 CAPSULE, EXTENDED RELEASE ORAL at 09:32

## 2019-01-01 RX ADMIN — FENTANYL CITRATE 25 MCG: 50 INJECTION, SOLUTION INTRAMUSCULAR; INTRAVENOUS at 20:04

## 2019-01-01 RX ADMIN — BACLOFEN 10 MG: 10 TABLET ORAL at 20:38

## 2019-01-01 RX ADMIN — METHYLPREDNISOLONE SODIUM SUCCINATE 60 MG: 125 INJECTION, POWDER, FOR SOLUTION INTRAMUSCULAR; INTRAVENOUS at 23:45

## 2019-01-01 RX ADMIN — PHENYTOIN SODIUM 100 MG: 50 INJECTION INTRAMUSCULAR; INTRAVENOUS at 09:36

## 2019-01-01 RX ADMIN — PAROXETINE HYDROCHLORIDE 20 MG: 20 TABLET, FILM COATED ORAL at 06:49

## 2019-01-01 RX ADMIN — FAMOTIDINE 20 MG: 10 INJECTION INTRAVENOUS at 09:38

## 2019-01-01 RX ADMIN — SODIUM CHLORIDE, PRESERVATIVE FREE 10 ML: 5 INJECTION INTRAVENOUS at 09:34

## 2019-01-01 RX ADMIN — SODIUM CHLORIDE, PRESERVATIVE FREE 10 ML: 5 INJECTION INTRAVENOUS at 09:35

## 2019-01-01 RX ADMIN — SODIUM CHLORIDE 75 ML/HR: 9 INJECTION, SOLUTION INTRAVENOUS at 09:31

## 2019-01-01 RX ADMIN — FINASTERIDE 5 MG: 5 TABLET, FILM COATED ORAL at 09:01

## 2019-01-01 RX ADMIN — Medication: at 09:33

## 2019-01-01 RX ADMIN — RIVAROXABAN 20 MG: 10 TABLET, FILM COATED ORAL at 09:09

## 2019-01-01 RX ADMIN — BACLOFEN 10 MG: 10 TABLET ORAL at 08:49

## 2019-01-01 RX ADMIN — TAMSULOSIN HYDROCHLORIDE 0.4 MG: 0.4 CAPSULE ORAL at 20:38

## 2019-01-01 RX ADMIN — INSULIN ASPART 2 UNITS: 100 INJECTION, SOLUTION INTRAVENOUS; SUBCUTANEOUS at 12:20

## 2019-01-01 RX ADMIN — SODIUM CHLORIDE 100 ML/HR: 900 INJECTION, SOLUTION INTRAVENOUS at 09:29

## 2019-01-01 RX ADMIN — OXYCODONE HYDROCHLORIDE AND ACETAMINOPHEN 1 TABLET: 7.5; 325 TABLET ORAL at 21:49

## 2019-01-01 RX ADMIN — SODIUM CHLORIDE 100 ML/HR: 9 INJECTION, SOLUTION INTRAVENOUS at 07:30

## 2019-01-01 RX ADMIN — SODIUM CHLORIDE 125 ML/HR: 900 INJECTION, SOLUTION INTRAVENOUS at 09:03

## 2019-01-01 RX ADMIN — AMIODARONE HYDROCHLORIDE 1 MG/MIN: 1.8 INJECTION, SOLUTION INTRAVENOUS at 08:45

## 2019-01-01 RX ADMIN — NEBIVOLOL HYDROCHLORIDE 20 MG: 5 TABLET ORAL at 21:06

## 2019-01-01 RX ADMIN — ALBUMIN HUMAN 12.5 G: 0.25 SOLUTION INTRAVENOUS at 09:35

## 2019-01-01 RX ADMIN — PHENYTOIN SODIUM 100 MG: 50 INJECTION INTRAMUSCULAR; INTRAVENOUS at 22:19

## 2019-01-01 RX ADMIN — HYDROCODONE BITARTRATE AND ACETAMINOPHEN 1 TABLET: 10; 325 TABLET ORAL at 08:49

## 2019-01-01 RX ADMIN — PHENYTOIN SODIUM 100 MG: 50 INJECTION INTRAMUSCULAR; INTRAVENOUS at 11:11

## 2019-01-01 RX ADMIN — PIPERACILLIN SODIUM,TAZOBACTAM SODIUM 3.38 G: 3; .375 INJECTION, POWDER, FOR SOLUTION INTRAVENOUS at 17:37

## 2019-01-01 RX ADMIN — INSULIN ASPART 2 UNITS: 100 INJECTION, SOLUTION INTRAVENOUS; SUBCUTANEOUS at 21:23

## 2019-01-01 RX ADMIN — IPRATROPIUM BROMIDE AND ALBUTEROL SULFATE 3 ML: 2.5; .5 SOLUTION RESPIRATORY (INHALATION) at 10:54

## 2019-01-01 RX ADMIN — METHYLPREDNISOLONE SODIUM SUCCINATE 60 MG: 125 INJECTION, POWDER, FOR SOLUTION INTRAMUSCULAR; INTRAVENOUS at 22:19

## 2019-01-01 RX ADMIN — HYDROCODONE BITARTRATE AND ACETAMINOPHEN 1 TABLET: 10; 325 TABLET ORAL at 18:32

## 2019-01-01 RX ADMIN — PIPERACILLIN SODIUM,TAZOBACTAM SODIUM 3.38 G: 3; .375 INJECTION, POWDER, FOR SOLUTION INTRAVENOUS at 00:16

## 2019-01-01 RX ADMIN — METHYLPREDNISOLONE SODIUM SUCCINATE 60 MG: 125 INJECTION, POWDER, FOR SOLUTION INTRAMUSCULAR; INTRAVENOUS at 13:58

## 2019-01-01 RX ADMIN — IPRATROPIUM BROMIDE AND ALBUTEROL SULFATE 3 ML: 2.5; .5 SOLUTION RESPIRATORY (INHALATION) at 23:52

## 2019-01-01 RX ADMIN — PIPERACILLIN SODIUM,TAZOBACTAM SODIUM 3.38 G: 3; .375 INJECTION, POWDER, FOR SOLUTION INTRAVENOUS at 09:03

## 2019-01-01 RX ADMIN — IPRATROPIUM BROMIDE AND ALBUTEROL SULFATE 3 ML: 2.5; .5 SOLUTION RESPIRATORY (INHALATION) at 12:48

## 2019-01-01 RX ADMIN — BACLOFEN 10 MG: 10 TABLET ORAL at 20:01

## 2019-01-01 RX ADMIN — PROPOFOL 150 MG: 10 INJECTION, EMULSION INTRAVENOUS at 19:52

## 2019-01-01 RX ADMIN — SODIUM CHLORIDE 250 ML: 9 INJECTION, SOLUTION INTRAVENOUS at 09:00

## 2019-01-01 RX ADMIN — BACLOFEN 10 MG: 10 TABLET ORAL at 09:38

## 2019-01-01 RX ADMIN — RIVAROXABAN 20 MG: 10 TABLET, FILM COATED ORAL at 09:32

## 2019-01-01 RX ADMIN — AMIODARONE HYDROCHLORIDE 200 MG: 200 TABLET ORAL at 13:58

## 2019-01-01 RX ADMIN — FINASTERIDE 5 MG: 5 TABLET, FILM COATED ORAL at 09:38

## 2019-01-01 RX ADMIN — ONDANSETRON 4 MG: 2 INJECTION INTRAMUSCULAR; INTRAVENOUS at 04:38

## 2019-01-01 RX ADMIN — CEFTRIAXONE SODIUM 1 G: 1 INJECTION, POWDER, FOR SOLUTION INTRAMUSCULAR; INTRAVENOUS at 09:31

## 2019-01-01 RX ADMIN — SODIUM CHLORIDE, PRESERVATIVE FREE 10 ML: 5 INJECTION INTRAVENOUS at 09:33

## 2019-01-01 RX ADMIN — ACETAMINOPHEN 650 MG: 325 TABLET, FILM COATED ORAL at 05:27

## 2019-01-01 RX ADMIN — AMIODARONE HYDROCHLORIDE 0.5 MG/MIN: 1.8 INJECTION, SOLUTION INTRAVENOUS at 02:09

## 2019-01-01 RX ADMIN — PIPERACILLIN SODIUM,TAZOBACTAM SODIUM 3.38 G: 3; .375 INJECTION, POWDER, FOR SOLUTION INTRAVENOUS at 00:50

## 2019-01-01 RX ADMIN — SODIUM CHLORIDE 100 ML/HR: 900 INJECTION, SOLUTION INTRAVENOUS at 04:15

## 2019-01-01 RX ADMIN — PHENYTOIN SODIUM 100 MG: 50 INJECTION INTRAMUSCULAR; INTRAVENOUS at 21:44

## 2019-01-01 RX ADMIN — AMIODARONE HYDROCHLORIDE 200 MG: 200 TABLET ORAL at 11:55

## 2019-01-01 RX ADMIN — PIPERACILLIN SODIUM,TAZOBACTAM SODIUM 3.38 G: 3; .375 INJECTION, POWDER, FOR SOLUTION INTRAVENOUS at 08:50

## 2019-01-01 RX ADMIN — FINASTERIDE 5 MG: 5 TABLET, FILM COATED ORAL at 09:32

## 2019-01-01 RX ADMIN — ATORVASTATIN CALCIUM 10 MG: 10 TABLET, FILM COATED ORAL at 09:38

## 2019-01-01 RX ADMIN — FAMOTIDINE 20 MG: 10 INJECTION INTRAVENOUS at 08:51

## 2019-01-01 RX ADMIN — ALBUMIN HUMAN 12.5 G: 0.25 SOLUTION INTRAVENOUS at 21:25

## 2019-01-01 RX ADMIN — BUMETANIDE 2 MG: 0.25 INJECTION INTRAMUSCULAR; INTRAVENOUS at 09:37

## 2019-01-01 RX ADMIN — SODIUM CHLORIDE, PRESERVATIVE FREE 10 ML: 5 INJECTION INTRAVENOUS at 08:50

## 2019-01-01 RX ADMIN — FLUTICASONE PROPIONATE 2 SPRAY: 50 SPRAY, METERED NASAL at 09:38

## 2019-01-01 RX ADMIN — SODIUM CHLORIDE 250 ML: 9 INJECTION, SOLUTION INTRAVENOUS at 21:23

## 2019-01-01 RX ADMIN — TAMSULOSIN HYDROCHLORIDE 0.4 MG: 0.4 CAPSULE ORAL at 21:06

## 2019-01-01 RX ADMIN — PHENYTOIN SODIUM 100 MG: 100 CAPSULE ORAL at 21:06

## 2019-01-01 RX ADMIN — BUMETANIDE 2 MG: 0.25 INJECTION INTRAMUSCULAR; INTRAVENOUS at 21:25

## 2019-01-01 RX ADMIN — ALBUTEROL SULFATE 2.5 MG: 2.5 SOLUTION RESPIRATORY (INHALATION) at 21:07

## 2019-01-01 RX ADMIN — INSULIN ASPART 2 UNITS: 100 INJECTION, SOLUTION INTRAVENOUS; SUBCUTANEOUS at 17:23

## 2019-01-01 RX ADMIN — CEFTRIAXONE SODIUM 1 G: 1 INJECTION, POWDER, FOR SOLUTION INTRAMUSCULAR; INTRAVENOUS at 09:09

## 2019-01-01 RX ADMIN — SODIUM CHLORIDE 1000 ML: 9 INJECTION, SOLUTION INTRAVENOUS at 04:36

## 2019-01-01 RX ADMIN — FLUTICASONE PROPIONATE 2 SPRAY: 50 SPRAY, METERED NASAL at 08:53

## 2019-06-08 PROBLEM — R50.9 FEVER: Status: ACTIVE | Noted: 2019-01-01

## 2019-06-08 PROBLEM — S42.002A FRACTURE OF CLAVICLE, LEFT, CLOSED: Status: ACTIVE | Noted: 2019-01-01

## 2019-06-08 PROBLEM — S42.001A CLOSED FRACTURE OF RIGHT CLAVICLE: Status: ACTIVE | Noted: 2019-01-01

## 2019-06-08 NOTE — ED NOTES
Pt broke shoulder 2 weeks ago from a fall.  He lives at home. Pt does not wear O2 at home      Renee Malin RN  06/08/19 3154

## 2019-06-08 NOTE — H&P
History and Physical  Yoan Hamilton MD  Hospitalist    Date of admission: 6/8/2019    Patient Care Team:  Manuel Lopez APRN as PCP - General (Family Medicine)    Chief complaint   Chief Complaint   Patient presents with   • Fever   • Nausea   • Recurrent falls     Subjective     Patient is a 74 y.o. male admitted for generalized weakness, recurrent falls, and now chills, fever and nausea. He has recurrent, severe urinary tract infections due to a suprapubic catheter.    History  Past Medical History:   Diagnosis Date   • Atherosclerosis    • Atrial fibrillation (CMS/HCC)    • BPH (benign prostatic hyperplasia)    • Chronic bronchitis (CMS/HCC)    • Hyperlipidemia    • Hypertension    • Kidney stones    • Left hemiplegia (CMS/HCC)    • Osteoarthritis    • Sacral decubitus ulcer    • Stroke (cerebrum) (CMS/HCC)    • UTI (urinary tract infection) due to urinary indwelling catheter (CMS/HCC)      Past Surgical History:   Procedure Laterality Date   • CARDIAC ELECTROPHYSIOLOGY PROCEDURE N/A 3/15/2018   • CHOLECYSTECTOMY     • EXTRACORPOREAL SHOCKWAVE LITHOTRIPSY (ESWL), STENT INSERTION/REMOVAL Right 9/21/2018   • SUPRAPUBIC CATHETER INSERTION       Family History   Problem Relation Age of Onset   • Stroke Mother    • Hypertension Father      Social History     Tobacco Use   • Smoking status: Former Smoker   • Smokeless tobacco: Never Used   Substance Use Topics   • Alcohol use: Yes   • Drug use: No     Medications Prior to Admission   Medication Sig Dispense Refill Last Dose   • aspirin 81 MG EC tablet Take 81 mg by mouth Daily.   9/11/2018   • atorvastatin (LIPITOR) 10 MG tablet Take 1 tablet by mouth Daily. 15 tablet 0 9/20/2018 at 0800   • baclofen (LIORESAL) 10 MG tablet Take 10 mg by mouth 2 (Two) Times a Day.   9/20/2018 at 1700   • finasteride (PROSCAR) 5 MG tablet Take 5 mg by mouth Daily.   9/20/2018 at 1700   • furosemide (LASIX) 20 MG tablet Take 20 mg by mouth Daily.   9/20/2018 at 1700   •  HYDROcodone-acetaminophen (NORCO)  MG per tablet Take 1 tablet by mouth Every 6 (Six) Hours As Needed for Moderate Pain .   9/21/2018 at 0930   • nebivolol (BYSTOLIC) 20 MG tablet Take 20 mg by mouth Daily.   9/20/2018 at 1700   • nitrofurantoin (MACRODANTIN) 50 MG capsule Take 50 mg by mouth Every Night.   9/20/2018 at 1700   • phenytoin (DILANTIN) 100 MG ER capsule Take  by mouth 2 (Two) Times a Day.   9/21/2018 at 0800   • potassium chloride (K-DUR,KLOR-CON) 10 MEQ CR tablet Take 10 mEq by mouth 2 (Two) Times a Day.   9/20/2018 at 0800   • rivaroxaban (XARELTO) 20 MG tablet Take 20 mg by mouth Daily.   9/18/2018   • tamsulosin (FLOMAX) 0.4 MG capsule 24 hr capsule Take 1 capsule by mouth Every Night.   9/20/2018 at 1700     Allergies:  Ambien [zolpidem]; Aleve [naproxen]; Amikacin; Amiodarone; and Vancomycin    Review of Systems  Review of Systems   Constitutional: Positive for fatigue and fever.   Respiratory: Negative for cough, shortness of breath and wheezing.    Cardiovascular: Negative for chest pain, palpitations and leg swelling.   Gastrointestinal: Negative for abdominal distention, abdominal pain, blood in stool and diarrhea.   Genitourinary: Negative for dysuria, flank pain, frequency, genital sores, hematuria and urgency.   Musculoskeletal: Positive for arthralgias (R shoulder pain) and gait problem. Negative for back pain and joint swelling.   Skin: Negative for color change, pallor and rash.   Neurological: Positive for weakness. Negative for seizures, syncope, numbness and headaches.   Psychiatric/Behavioral: Negative for agitation, behavioral problems and confusion.   All other systems reviewed and are negative.      Objective     Vital Signs  Temp:  [100.6 °F (38.1 °C)-101.4 °F (38.6 °C)] 100.6 °F (38.1 °C)  Heart Rate:  [81-91] 81  Resp:  [18-20] 18  BP: (102-130)/(51-60) 102/51    Physical Exam:  Physical Exam   Constitutional: He is oriented to person, place, and time. He appears ill. No  distress.   HENT:   Head: Normocephalic and atraumatic.   Eyes: EOM are normal. Pupils are equal, round, and reactive to light. No scleral icterus.   Neck: Normal range of motion. Neck supple.   Cardiovascular: Normal rate and regular rhythm.   Pulmonary/Chest: Effort normal and breath sounds normal. No stridor. No respiratory distress.   Abdominal: Soft. Bowel sounds are normal. He exhibits no distension. There is no tenderness. There is no guarding.   Suprapubic catheter in place / very cloudy urine noticed   Musculoskeletal: He exhibits tenderness (R shoulder ). He exhibits no edema.   Neurological: He is alert and oriented to person, place, and time. No cranial nerve deficit. Coordination abnormal.   L hemiparesis   Skin: Skin is warm and dry. No erythema. No pallor.   Psychiatric: He has a normal mood and affect. His behavior is normal.   Vitals reviewed.      Results Review:   Lab Results (last 24 hours)     Procedure Component Value Units Date/Time    Extra Tubes [[id number]] Collected:  06/08/19 0733    Specimen:  Blood, Venous Line Updated:  06/08/19 0845    Narrative:       The following orders were created for panel order Extra Tubes.  Procedure                               Abnormality         Status                     ---------                               -----------         ------                     Lavender Top[[id number]]                                                                Green Top (Gel)[[id number]]                                  Final result               Yee Top[[id number]]                                                                      Please view results for these tests on the individual orders.    Green Top (Gel) [[id number]] Collected:  06/08/19 0733    Specimen:  Blood Updated:  06/08/19 0845     Extra Tube Hold for add-ons.     Comment: Auto resulted.       Blood Culture - Blood, Hand, Right [[id number]] Collected:  06/08/19 0717    Specimen:  Blood from Hand, Right Updated:   06/08/19 0730    Blood Culture - Blood, Arm, Right [[id number]] Collected:  06/08/19 0717    Specimen:  Blood from Arm, Right Updated:  06/08/19 0730    Extra Tubes [[id number]] Collected:  06/08/19 0440    Specimen:  Blood, Venous Line Updated:  06/08/19 0546    Narrative:       The following orders were created for panel order Extra Tubes.  Procedure                               Abnormality         Status                     ---------                               -----------         ------                     Light Blue Top[[id number]]                                   Final result               Gold Top - SST[[id number]]                                   Final result                 Please view results for these tests on the individual orders.    Light Blue Top [[id number]] Collected:  06/08/19 0440    Specimen:  Blood Updated:  06/08/19 0546     Extra Tube hold for add-on     Comment: Auto resulted       Gold Top - SST [[id number]] Collected:  06/08/19 0440    Specimen:  Blood Updated:  06/08/19 0546     Extra Tube Hold for add-ons.     Comment: Auto resulted.       Urinalysis, Microscopic Only - Urine, Clean Catch [[id number]]  (Abnormal) Collected:  06/08/19 0452    Specimen:  Urine, Clean Catch Updated:  06/08/19 0536     RBC, UA None Seen /HPF      WBC, UA 0-2 /HPF      Bacteria, UA Trace /HPF      Squamous Epithelial Cells, UA None Seen /HPF      Hyaline Casts, UA 0-2 /LPF      Methodology Automated Microscopy    Urinalysis With Microscopic If Indicated (No Culture) - Urine, Clean Catch [[id number]]  (Abnormal) Collected:  06/08/19 0452    Specimen:  Urine, Clean Catch Updated:  06/08/19 0530     Color, UA Yellow     Appearance, UA Clear     pH, UA 6.5     Specific [place], UA 1.000     Comment: Result obtained by Refractometer        Glucose, UA Negative     Ketones, UA Negative     Bilirubin, UA Negative     Blood, UA Negative     Protein, UA Negative     Leuk Esterase, UA Negative     Nitrite, UA Positive      Urobilinogen, UA 0.2 E.U./dL    Comprehensive Metabolic Panel [[id number]]  (Abnormal) Collected:  06/08/19 0440    Specimen:  Blood Updated:  06/08/19 0510     Glucose 149 mg/dL      BUN 17 mg/dL      Creatinine 1.20 mg/dL      Sodium 138 mmol/L      Potassium 4.5 mmol/L      Chloride 97 mmol/L      CO2 32.0 mmol/L      Calcium 8.9 mg/dL      Total Protein 7.8 g/dL      Albumin 3.60 g/dL      ALT (SGPT) 14 U/L      AST (SGOT) 15 U/L      Alkaline Phosphatase 200 U/L      Total Bilirubin 0.5 mg/dL      eGFR Non African Amer 59 mL/min/1.73      Globulin 4.2 gm/dL      A/G Ratio 0.9 g/dL      BUN/Creatinine Ratio 14.2     Anion Gap 9.0 mmol/L     Narrative:       GFR Normal >60  Chronic Kidney Disease <60  Kidney Failure <15    Phenytoin Level, Total [[id number]]  (Abnormal) Collected:  06/08/19 0440    Specimen:  Blood Updated:  06/08/19 0510     Phenytoin Level 6.7 mcg/mL     Lactic Acid, Plasma [[id number]]  (Normal) Collected:  06/08/19 0445    Specimen:  Blood Updated:  06/08/19 0507     Lactate 1.4 mmol/L     CBC & Differential [[id number]] Collected:  06/08/19 0440    Specimen:  Blood Updated:  06/08/19 0448    Narrative:       The following orders were created for panel order CBC & Differential.  Procedure                               Abnormality         Status                     ---------                               -----------         ------                     CBC Auto Differential[[id number]]        Abnormal            Final result                 Please view results for these tests on the individual orders.    CBC Auto Differential [[id number]]  (Abnormal) Collected:  06/08/19 0440    Specimen:  Blood Updated:  06/08/19 0448     WBC 18.22 10*3/mm3      RBC 4.77 10*6/mm3      Hemoglobin 14.1 g/dL      Hematocrit 43.7 %      MCV 91.6 fL      MCH 29.6 pg      MCHC 32.3 g/dL      RDW 16.4 %      RDW-SD 54.4 fl      MPV 9.6 fL      Platelets 219 10*3/mm3      Neutrophil % 90.4 %      Lymphocyte % 2.9 %       Monocyte % 5.8 %      Eosinophil % 0.2 %      Basophil % 0.2 %      Immature Grans % 0.5 %      Neutrophils, Absolute 16.48 10*3/mm3      Lymphocytes, Absolute 0.52 10*3/mm3      Monocytes, Absolute 1.05 10*3/mm3      Eosinophils, Absolute 0.04 10*3/mm3      Basophils, Absolute 0.04 10*3/mm3      Immature Grans, Absolute 0.09 10*3/mm3      nRBC 0.0 /100 WBC           Imaging Results (last 24 hours)     Procedure Component Value Units Date/Time    XR Chest 1 View [[id number]] Collected:  06/08/19 0441     Updated:  06/08/19 0458    Narrative:         Chest single view on  6/8/2019     CLINICAL INDICATION: Fever    COMPARISON: 3/8/2018    FINDINGS: There is an age-indeterminate displaced left mid to  distal clavicle diaphysis fracture. The patient's face and chin  obscures most of the right lung. This is a low-volume inspiration  film. Left apical scarring is noted. There is a trace right  pleural effusion. There is minimal basilar atelectasis. The lungs  are otherwise clear. Heart is borderline in size.      Impression:       1. Much of the right lung is obscured but otherwise no acute  disease noted.  2. Age-indeterminate displaced left clavicle fracture.    Electronically signed by:  Luke Bustos  6/8/2019 4:57 AM CDT  Workstation: [id number]          Assessment/Plan       UTI (urinary tract infection) due to urinary indwelling catheter (CMS/HCC)    Fever    Sepsis (CMS/HCC)    Closed fracture of right clavicle    Follow up the blood and urine cultures obtained in the ER, continue with the IV antibiotics, IV fluid, symptomatic treatment.    Yoan Hamilton MD  06/08/19  8:57 AM

## 2019-06-08 NOTE — PLAN OF CARE
Problem: Patient Care Overview  Goal: Plan of Care Review  Outcome: Ongoing (interventions implemented as appropriate)   06/08/19 2100   Coping/Psychosocial   Plan of Care Reviewed With patient;spouse   Plan of Care Review   Progress no change   OTHER   Outcome Summary antibiotics started, fluids adjusted; pt resting well; will continue to monitor       Problem: Fall Risk (Adult)  Goal: Identify Related Risk Factors and Signs and Symptoms  Outcome: Ongoing (interventions implemented as appropriate)      Problem: Pain, Chronic (Adult)  Goal: Identify Related Risk Factors and Signs and Symptoms  Outcome: Ongoing (interventions implemented as appropriate)      Problem: Skin Injury Risk (Adult)  Goal: Identify Related Risk Factors and Signs and Symptoms  Outcome: Ongoing (interventions implemented as appropriate)    Goal: Skin Health and Integrity  Outcome: Ongoing (interventions implemented as appropriate)      Problem: Urinary Tract Infection (Adult)  Goal: Signs and Symptoms of Listed Potential Problems Will be Absent, Minimized or Managed (Urinary Tract Infection)  Outcome: Ongoing (interventions implemented as appropriate)

## 2019-06-08 NOTE — PLAN OF CARE
Problem: Patient Care Overview  Goal: Plan of Care Review  Outcome: Ongoing (interventions implemented as appropriate)   06/08/19 1514   Coping/Psychosocial   Plan of Care Reviewed With caregiver;significant other;patient   Plan of Care Review   Progress no change   OTHER   Outcome Summary New Assessment. Pt with a decreased appetite and nausea. Dx of UTI. Pressure injury present on admit. Will add supplements and monitor.        Problem: Skin Injury Risk (Adult)  Intervention: Promote/Optimize Nutrition   06/08/19 1514   Nutrition Interventions   Oral Nutrition Promotion calorie dense foods provided;nutritional therapy counseling provided;calorie dense liquids provided

## 2019-06-08 NOTE — ED PROVIDER NOTES
Subjective   74-year-old white male presents the emergency department initially complaining of left shoulder pain.  He states that he fell a week and a half ago and comes to the emergency department because of left shoulder pain.  However, on further questioning, he states that he is coming to the emergency department because of sick to his stomach and nausea.  He states that he has medication at home to keep him from throwing up and he has not been vomiting.    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  However, he goes on to say that he worked for 30 years.  He is not able to walk.  He has had a Gayle catheter for several years.            Review of Systems   Constitutional: Negative for chills, diaphoresis, fatigue and fever.   HENT: Negative for nosebleeds, sore throat, trouble swallowing and voice change.    Eyes: Negative for pain and visual disturbance.   Respiratory: Negative for shortness of breath.    Cardiovascular: Negative for chest pain and palpitations.   Gastrointestinal: Negative for vomiting.   Endocrine: Negative for polydipsia and polyuria.   Genitourinary: Negative for difficulty urinating and dysuria.   Musculoskeletal: Negative for arthralgias and back pain.   Skin: Negative for color change and rash.   Allergic/Immunologic: Negative for immunocompromised state.   Neurological: Negative for weakness and numbness.   Hematological: Negative for adenopathy.   Psychiatric/Behavioral: Negative for agitation and confusion.       Past Medical History:   Diagnosis Date   • Arthritis    • Atherosclerosis    • Atrial fibrillation (CMS/HCC)    • BPH (benign prostatic hyperplasia)    • Brain injury (CMS/HCC)    • Chronic bronchitis (CMS/HCC)    • Hypertension    • Kidney stones    • Left hemiplegia (CMS/HCC)    • UTI (urinary tract infection) due to urinary indwelling catheter (CMS/[facility]) 3/5/2018       Allergies   Allergen Reactions   • Ambien [Zolpidem]  Hallucinations   • Aleve [Naproxen] Other (See Comments)     seizure   • Amikacin Rash   • Amiodarone Rash   • Vancomycin Rash       Past Surgical History:   Procedure Laterality Date   • CARDIAC ELECTROPHYSIOLOGY PROCEDURE N/A 3/15/2018    Procedure: EP/Ablation- Flutter;  Surgeon: Seven Londono MD;  Location: [facility] CATH INVASIVE LOCATION;  Service: Cardiology   • CHOLECYSTECTOMY     • EXTRACORPOREAL SHOCKWAVE LITHOTRIPSY (ESWL), STENT INSERTION/REMOVAL Right 9/21/2018    Procedure: EXTRACORPOREAL SHOCKWAVE LITHOTRIPSY;  Surgeon: Morgan Al MD;  Location: [facility] OR;  Service: Urology   • EYE SURGERY     • SUPRAPUBIC CATHETER INSERTION         Family History   Problem Relation Age of Onset   • Stroke Mother    • Hypertension Father        Social History     Socioeconomic History   • Marital status:      Spouse name: Not on file   • Number of children: Not on file   • Years of education: Not on file   • Highest education level: Not on file   Tobacco Use   • Smoking status: Former Smoker   • Smokeless tobacco: Never Used   Substance and Sexual Activity   • Alcohol use: Yes     Comment: occassionally   • Drug use: No   • Sexual activity: Not Currently           Objective   Physical Exam   Constitutional: He is oriented to person, place, and time. He appears well-developed and well-nourished. No distress.   HENT:   Head: Normocephalic and atraumatic.   Right Ear: External ear normal.   Left Ear: External ear normal.   Mouth/Throat: Oropharynx is clear and moist. No oropharyngeal exudate.   Eyes: Conjunctivae and EOM are normal. Pupils are equal, round, and reactive to light. Right eye exhibits no discharge. Left eye exhibits no discharge. No scleral icterus.   Neck: Neck supple. No JVD present. No tracheal deviation present. No thyromegaly present.   Cardiovascular: Normal rate, regular rhythm, normal heart sounds and intact distal pulses. Exam reveals no friction rub.   No murmur  heard.  Pulmonary/Chest: Effort normal and breath sounds normal. No stridor. No respiratory distress. He has no wheezes. He has no rales. He exhibits no tenderness.   Abdominal: Soft. Bowel sounds are normal. He exhibits no distension and no mass. There is no tenderness. There is no rebound and no guarding.   Musculoskeletal: He exhibits tenderness. He exhibits no edema or deformity.   L arm is in a sling and there is some ecchymosis to his L upper arm.    Lymphadenopathy:     He has no cervical adenopathy.   Neurological: He is alert and oriented to person, place, and time. No cranial nerve deficit. He exhibits normal muscle tone. Coordination normal.   Skin: Skin is warm and dry. Capillary refill takes 2 to 3 seconds. No rash noted. He is not diaphoretic. No erythema.   Psychiatric: He has a normal mood and affect. His behavior is normal. Judgment and thought content normal.   Nursing note and vitals reviewed.      Procedures           ED Course  ED Course as of Jun 08 0520   Sat Jun 08, 2019 0519 Patient was placed in room 10 and evaluated by me.  Physical exam was on concerning.  Labs were obtained and his white blood cell count was elevated at 18,000.  He was given Rocephin in the emergency department as well as Zofran for his nausea.  He was febrile at 101.4 for which he was given Tylenol.  The case was discussed with Dr. Hamilton who agreed to admit the patient.  [CE]      ED Course User Index  [CE] Rick Larkin,         Labs Reviewed   COMPREHENSIVE METABOLIC PANEL - Abnormal; Notable for the following components:       Result Value    Glucose 149 (*)     Chloride 97 (*)     CO2 32.0 (*)     Alkaline Phosphatase 200 (*)     eGFR Non  Amer 59 (*)     All other components within normal limits    Narrative:     GFR Normal >60  Chronic Kidney Disease <60  Kidney Failure <15   PHENYTOIN LEVEL, TOTAL - Abnormal; Notable for the following components:    Phenytoin Level 6.7 (*)     All other  components within normal limits   CBC WITH AUTO DIFFERENTIAL - Abnormal; Notable for the following components:    WBC 18.22 (*)     RDW 16.4 (*)     RDW-SD 54.4 (*)     Neutrophil % 90.4 (*)     Lymphocyte % 2.9 (*)     Eosinophil % 0.2 (*)     Neutrophils, Absolute 16.48 (*)     Lymphocytes, Absolute 0.52 (*)     Monocytes, Absolute 1.05 (*)     Immature Grans, Absolute 0.09 (*)     All other components within normal limits   LACTIC ACID, PLASMA - Normal   BLOOD CULTURE   BLOOD CULTURE   URINALYSIS W/ MICROSCOPIC IF INDICATED (NO CULTURE)   CBC AND DIFFERENTIAL    Narrative:     The following orders were created for panel order CBC & Differential.  Procedure                               Abnormality         Status                     ---------                               -----------         ------                     CBC Auto Differential[[id number]]        Abnormal            Final result                 Please view results for these tests on the individual orders.   EXTRA TUBES    Narrative:     The following orders were created for panel order Extra Tubes.  Procedure                               Abnormality         Status                     ---------                               -----------         ------                     Light Blue Top[[id number]]                                   In process                 Gold Top - SST[[id number]]                                   In process                   Please view results for these tests on the individual orders.   LIGHT BLUE TOP   GOLD TOP - SST     Xr Chest 1 View    Result Date: 6/8/2019  Narrative: Chest single view on  6/8/2019 CLINICAL INDICATION: Fever COMPARISON: 3/8/2018 FINDINGS: There is an age-indeterminate displaced left mid to distal clavicle diaphysis fracture. The patient's face and chin obscures most of the right lung. This is a low-volume inspiration film. Left apical scarring is noted. There is a trace right pleural effusion. There is minimal  basilar atelectasis. The lungs are otherwise clear. Heart is borderline in size.     Impression: 1. Much of the right lung is obscured but otherwise no acute disease noted. 2. Age-indeterminate displaced left clavicle fracture. Electronically signed by:  Luke Bustos  6/8/2019 4:57 AM CDT Workstation: [id number]            MDM      Final diagnoses:   Fever, unspecified fever cause            Rick Larkin,   06/08/19 0591

## 2019-06-08 NOTE — CONSULTS
"Adult Nutrition  Assessment    Patient Name:  Mando Hill  YOB: 1945  MRN: [id number]  Admit Date:  6/8/2019    Assessment Date:  6/8/2019    Comments: Pt admitted with a UTI with fever and nausea.  He was eating well pta but at this time his appetite is down.  Pressure injury present on admit with increased nutritional needs.  Will add Boost with meals and monitor intake.     Reason for Assessment     Row Name 06/08/19 1501          Reason for Assessment    Reason For Assessment  per organizational policy     Diagnosis  infection/sepsis     Identified At Risk by Screening Criteria  large or nonhealing wound, burn or pressure injury         Nutrition/Diet History     Row Name 06/08/19 1501          Nutrition/Diet History    Typical Food/Fluid Intake  Pt sleepy.  His \"singnifcant other\" states that he eats well at home.  No hx of wt loss.  UBW ~172.  He continues to have nausea. due to the UTI.  He drinks Boost at home           Labs/Tests/Procedures/Meds     Row Name 06/08/19 1504          Labs/Procedures/Meds    Lab Results Reviewed  reviewed, pertinent     Lab Results Comments  Glu 149        Diagnostic Tests/Procedures    Diagnostic Test/Procedure Reviewed  reviewed, pertinent     Diagnostic Test/Procedures Comments  abx         Medications    Pertinent Medications Reviewed  reviewed, pertinent     Pertinent Medications Comments  Abx         Physical Findings     Row Name 06/08/19 1505          Physical Findings    Overall Physical Appearance  on oxygen therapy     Skin  pressure injury         Estimated/Assessed Needs     Row Name 06/08/19 1505          Calculation Measurements    Weight Used For Calculations  78 kg (172 lb)        Estimated/Assessed Needs    Additional Documentation  Additional Requirements (Group);Fluid Requirements (Group);Rena Lara-St. Jeor Equation (Group);Protein Requirements (Group);Calorie Requirements (Group);KCAL/KG (Group)        Calorie Requirements    " Estimated Calorie Need Method  Todd-St Jeor     Estimated Calorie Requirement Comment  1800        Todd-St. Jeor Equation    RMR (Todd-St. Jeor Equation)  1468.815        Protein Requirements    Weight Used For Protein Calculations  67 kg (147 lb 11.3 oz)     Est Protein Requirement Amount (gms/kg)  -- 80 gms (1.2 gms/ kg ibw)        Fluid Requirements    Estimated Fluid Requirements (mL/day)  1800     Estimated Fluid Requirement Method  RDA Method     RDA Method (mL)  1800     Brennen-Kim Method (over 20 kg)  3060.38         Nutrition Prescription Ordered     Row Name 06/08/19 1508          Nutrition Prescription PO    Current PO Diet  Regular     Fluid Consistency  Thin         Evaluation of Received Nutrient/Fluid Intake     Row Name 06/08/19 1508 06/08/19 1505       Calculation Measurements    Weight Used For Calculations  --  78 kg (172 lb)       PO Evaluation    Number of Days PO Intake Evaluated  Insufficient Data  --    Number of Meals  1  --    % PO Intake  0% this am  --        Evaluation of Prescribed Nutrient/Fluid Intake     Row Name 06/08/19 1505          Calculation Measurements    Weight Used For Calculations  78 kg (172 lb)             Electronically signed by:  Radha Valentine RD  06/08/19 3:17 PM

## 2019-06-08 NOTE — PROGRESS NOTES
[facility] Medicine Services  INPATIENT PROGRESS NOTE    Length of Stay: 0  Date of Admission: 6/8/2019  Primary Care Physician: Manuel Lopez APRN    Subjective   Chief Complaint: Fever, nausea, poor appetite and recurrent falls    HPI: Patient presented to the emergency room this morning with complaints of fever, nausea, poor appetite and recurrent falls.  Initially his only complaint was left shoulder pain secondary to a fall about 10 days ago during which he sustained a left clavicular fracture that was managed conservatively by his primary care provider.  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  He has a chronic suprapubic catheter for several years.    This morning the patient states that he has no fevers and denies nausea and vomiting.  His appetite is slightly better.  He denies abdominal pain.  He admits to bilateral pedal edema which is chronic and for which she uses Lasix.    Review of Systems   Constitutional: Positive for activity change and fatigue. Negative for chills and fever.   HENT: Negative for facial swelling.    Eyes: Negative for discharge and redness.   Respiratory: Negative for cough, chest tightness and shortness of breath.    Cardiovascular: Positive for leg swelling. Negative for chest pain and palpitations.   Gastrointestinal: Negative for abdominal distention, abdominal pain, diarrhea, nausea and vomiting.   Genitourinary: Negative for difficulty urinating and hematuria.   Neurological: Positive for weakness. Negative for dizziness and headaches.   Psychiatric/Behavioral: Negative for agitation and confusion. The patient is not nervous/anxious.        Objective    Temp:  [98.6 °F (37 °C)-101.4 °F (38.6 °C)] 100.1 °F (37.8 °C)  Heart Rate:  [70-91] 77  Resp:  [16-20] 16  BP: ()/(51-62) 116/62    Physical Exam   Constitutional: He is oriented to person, place, and time. He appears well-developed and  well-nourished. No distress.   HENT:   Head: Normocephalic and atraumatic.   Mouth/Throat: Oropharynx is clear and moist.   Eyes: Conjunctivae and EOM are normal. Pupils are equal, round, and reactive to light.   Neck: Normal range of motion. Neck supple. No JVD present. No tracheal deviation present. No thyromegaly present.   Cardiovascular: Normal rate, regular rhythm and normal heart sounds. Exam reveals no gallop and no friction rub.   No murmur heard.  Pulmonary/Chest: Effort normal and breath sounds normal. No stridor. No respiratory distress. He has no wheezes. He has no rales. He exhibits no tenderness.   Abdominal: Soft. Bowel sounds are normal. He exhibits no distension and no mass. There is no tenderness. There is no rebound and no guarding. No hernia.   Suprapubic catheter in situ draining cloudy urine.   Musculoskeletal: Normal range of motion. He exhibits edema. He exhibits no tenderness or deformity.   Bilateral pitting pedal edema up to knees   Lymphadenopathy:     He has no cervical adenopathy.   Neurological: He is alert and oriented to person, place, and time. No cranial nerve deficit. Coordination normal.   Left-sided hemiplegia of upper and lower limbs   Skin: Skin is warm and dry. No rash noted. He is not diaphoretic. No erythema. No pallor.   Psychiatric: He has a normal mood and affect. His behavior is normal. Judgment and thought content normal.     Medication Review:    Current Facility-Administered Medications:   •  acetaminophen (TYLENOL) tablet 650 mg, 650 mg, Oral, Q6H PRN, Yoan Hamilton MD  •  aspirin EC tablet 81 mg, 81 mg, Oral, Daily, Yoan Hamilton MD, 81 mg at 06/08/19 0909  •  atorvastatin (LIPITOR) tablet 10 mg, 10 mg, Oral, Daily, Yoan Hamilton MD, 10 mg at 06/08/19 0909  •  baclofen (LIORESAL) tablet 10 mg, 10 mg, Oral, TID PRN, Yoan Hamilton MD  •  cefTRIAXone (ROCEPHIN) 1 g/100 mL 0.9% NS (MBP), 1 g, Intravenous, Q24H, Yoan Hamilton MD, Stopped at 06/08/19 1028  •   finasteride (PROSCAR) tablet 5 mg, 5 mg, Oral, Daily, Yoan Hamilton MD, 5 mg at 06/08/19 0909  •  oxyCODONE-acetaminophen (PERCOCET) 7.5-325 MG per tablet 1 tablet, 1 tablet, Oral, Q4H PRN, Yoan Hamilton MD  •  phenytoin (DILANTIN) ER capsule 100 mg, 100 mg, Oral, Q12H, Yoan Hamilton MD, 100 mg at 06/08/19 0909  •  rivaroxaban (XARELTO) tablet 20 mg, 20 mg, Oral, Daily, Yoan Hamilton MD, 20 mg at 06/08/19 0909  •  [COMPLETED] Insert peripheral IV, , , Once **AND** sodium chloride 0.9 % flush 10 mL, 10 mL, Intravenous, PRN, Rick Larkin, DO, 10 mL at 06/08/19 0446  •  sodium chloride 0.9 % flush 3 mL, 3 mL, Intravenous, Q12H, Yoan Hamilton MD  •  sodium chloride 0.9 % flush 3-10 mL, 3-10 mL, Intravenous, PRN, Yoan Hamilton MD  •  sodium chloride 0.9 % infusion, 100 mL/hr, Intravenous, Continuous, Yoan Hamilton MD, Last Rate: 100 mL/hr at 06/08/19 1231, 100 mL/hr at 06/08/19 1231  •  tamsulosin (FLOMAX) 24 hr capsule 0.4 mg, 0.4 mg, Oral, Nightly, Yoan Hamilton MD    Results Review:  I have reviewed the labs, radiology results, and diagnostic studies.    Laboratory Data:   Results from last 7 days   Lab Units 06/08/19  0440   SODIUM mmol/L 138   POTASSIUM mmol/L 4.5   CHLORIDE mmol/L 97*   CO2 mmol/L 32.0*   BUN mg/dL 17   CREATININE mg/dL 1.20   GLUCOSE mg/dL 149*   CALCIUM mg/dL 8.9   BILIRUBIN mg/dL 0.5   ALK PHOS U/L 200*   ALT (SGPT) U/L 14   AST (SGOT) U/L 15   ANION GAP mmol/L 9.0     Estimated Creatinine Clearance: 55 mL/min (by C-G formula based on SCr of 1.2 mg/dL).          Results from last 7 days   Lab Units 06/08/19  0440   WBC 10*3/mm3 18.22*   HEMOGLOBIN g/dL 14.1   HEMATOCRIT % 43.7   PLATELETS 10*3/mm3 219           Culture Data:   No results found for: BLOODCX  No results found for: URINECX  No results found for: RESPCX  No results found for: WOUNDCX  No results found for: STOOLCX  No components found for: BODYFLD    Radiology Data:   Imaging Results (last 24 hours)      Procedure Component Value Units Date/Time    XR Chest 1 View [[id number]] Collected:  06/08/19 0441     Updated:  06/08/19 0458    Narrative:         Chest single view on  6/8/2019     CLINICAL INDICATION: Fever    COMPARISON: 3/8/2018    FINDINGS: There is an age-indeterminate displaced left mid to  distal clavicle diaphysis fracture. The patient's face and chin  obscures most of the right lung. This is a low-volume inspiration  film. Left apical scarring is noted. There is a trace right  pleural effusion. There is minimal basilar atelectasis. The lungs  are otherwise clear. Heart is borderline in size.      Impression:       1. Much of the right lung is obscured but otherwise no acute  disease noted.  2. Age-indeterminate displaced left clavicle fracture.    Electronically signed by:  Luke Bustos  6/8/2019 4:57 AM CDT  Workstation: [id number]          I have reviewed the patient's current medications.     Assessment/Plan     Hospital Problem List:  Principal Problem:    UTI (urinary tract infection) due to urinary indwelling catheter (CMS/[facility])  Active Problems:    Sepsis (CMS/[facility])    Fever    Closed fracture of left clavicle  Chronic respiratory failure with hypoxia on 2.5 L of home oxygen by nasal cannula  Gluteal stage I and II decubitus ulcers present on admission    Plan  - Follow blood cultures, send urine cultures  -Continue IV ceftriaxone and monitor white count for resolution of leukocytosis  -Monitor blood pressure and vital signs closely  - Continue IV normal saline reduced to 75 cc an hour  - Hold home medication of Lasix for now  - Continue aspirin, Xarelto  - Oxygen as tolerated to keep O2 sats greater than 92%  - Continue baclofen, phenytoin, Norco PRN for pain  -Continue with sling for left clavicle fracture  -Offloading for gluteal decubitus ulcers present on admission  - DVT prophylaxis with Xarelto  -CODE STATUS is full code    Discharge Planning: In progress    Gilma Stallings MD    06/08/19   1:28 PM

## 2019-06-09 NOTE — DISCHARGE SUMMARY
[facility] Medicine Services  DISCHARGE SUMMARY       Date of Admission: 6/8/2019  Date of Discharge:  6/9/2019  Primary Care Physician: Manuel Lopez APRN    Presenting Problem/History of Present Illness:  Fever, unspecified fever cause [R50.9]     Final Discharge Diagnoses:  Active Hospital Problems    Diagnosis   • **UTI (urinary tract infection) due to urinary indwelling catheter (CMS/[facility])   • Fever   • Closed fracture of right clavicle   • Sepsis (CMS/[facility])   Gluteal stage I and II decubitus ulcers present on admission    Consults:   Consults     Date and Time Order Name Status Description    6/9/2019 1113 Inpatient Urology Consult      6/8/2019 0519 Hospitalist (on-call MD unless specified)            Procedures Performed:  None                Pertinent Test Results:   06/08/2019 0440 06/08/2019 0510  Comprehensive Metabolic Panel [[id number]]    (Abnormal)   Blood     Final result  Glucose 149 Abnormally high  mg/dL   BUN 17 mg/dL   Creatinine 1.20 mg/dL   Sodium 138 mmol/L   Potassium 4.5 mmol/L   Chloride 97 Abnormally low  mmol/L   CO2 32.0 Abnormally high  mmol/L   Calcium 8.9 mg/dL   Total Protein 7.8 g/dL   Albumin 3.60 g/dL   ALT (SGPT) 14 U/L   AST (SGOT) 15 U/L   Alkaline Phosphatase 200 Abnormally high  U/L   Total Bilirubin 0.5 mg/dL   eGFR Non  Am 59 Abnormally low  mL/min/1.73   Globulin 4.2 gm/dL   A/G Ratio 0.9 g/dL   BUN/Creatinine Ratio 14.2    Anion Gap 9.0 mmol/L          06/08/2019 0440  06/08/2019 0510  Phenytoin Level, Total [[id number]]   (Abnormal)   Blood     Final result  Phenytoin Level 6.7 Abnormally low  mcg/mL          06/08/2019 0440  06/08/2019 0448  CBC Auto Differential [[id number]]   (Abnormal)   Blood     Final result  WBC 18.22 Abnormally high  10*3/mm3   RBC 4.77 10*6/mm3   Hemoglobin 14.1 g/dL   Hematocrit 43.7 %   MCV 91.6 fL   MCH 29.6 pg   MCHC 32.3 g/dL   RDW 16.4 Abnormally high  %   RDW-SD 54.4 Abnormally high  fl    MPV 9.6 fL   Platelets 219 10*3/mm3   Neutrophil Rel % 90.4 Abnormally high  %   Lymphocyte Rel % 2.9 Abnormally low  %   Monocyte Rel % 5.8 %   Eosinophil Rel % 0.2 Abnormally low  %   Basophil Rel % 0.2 %   Immature Granulocyte Rel % 0.5 %   Neutrophils Absolute 16.48 Abnormally high  10*3/mm3   Lymphocytes Absolute 0.52 Abnormally low  10*3/mm3   Monocytes Absolute 1.05 Abnormally high  10*3/mm3   Eosinophils Absolute 0.04 10*3/mm3   Basophils Absolute 0.04 10*3/mm3   Immature Grans, Absolute 0.09 Abnormally high  10*3/mm3   nRBC 0.0 /100 WBC        06/08/2019 0717  06/10/2019 0730  Blood Culture - Blood, Arm, Right [[id number]]   Blood from Arm, Right     Preliminary result  Blood Culture No growth at 2 days             06/08/2019 0717  06/10/2019 0731  Blood Culture - Blood, Hand, Right [[id number]]   Blood from Hand, Right     Preliminary result  Blood Culture No growth at 2 days             06/08/2019 0452  06/08/2019 0530  Urinalysis With Microscopic If Indicated (No Culture) - Urine, Clean Catch [[id number]]   (Abnormal)   Urine, Clean Catch     Final result  Color, UA Yellow   Appearance, UA Clear   pH, UA 6.5   Specific Gravity, UA 1.000 Abnormally low     Glucose Negative   Ketones, UA Negative   Bilirubin, UA Negative   Blood, UA Negative   Protein, UA Negative   Leukocytes, UA Negative   Nitrite, UA Positive Abnormal    Urobilinogen, UA 0.2 E.U./dL          06/08/2019 0452  06/08/2019 0536  Urinalysis, Microscopic Only - Urine, Clean Catch [[id number]]   (Abnormal)   Urine, Clean Catch     Final result  RBC, UA None Seen /HPF   WBC, UA 0-2 /HPF   Bacteria, UA Trace Abnormal  /HPF   Squamous Epithelial Cells, UA None Seen /HPF   Hyaline Casts, UA 0-2 /LPF   Methodology: Automated Microscopy           06/08/2019 0452  06/09/2019 0932  Urine Culture - Urine, Urine, Clean Catch [[id number]]   Urine, Clean Catch     Final result  Urine Culture No growth               06/09/2019 1004  06/09/2019 1018  CBC Auto  Differential [[id number]]   (Abnormal)   Blood     Final result  WBC 8.10 10*3/mm3   RBC 4.41 10*6/mm3   Hemoglobin 13.0 g/dL   Hematocrit 41.5 %   MCV 94.1 fL   MCH 29.5 pg   MCHC 31.3 Abnormally low  g/dL   RDW 16.4 Abnormally high  %   RDW-SD 57.1 Abnormally high  fl   MPV 9.8 fL   Platelets 183 10*3/mm3   Neutrophil Rel % 70.9 %   Lymphocyte Rel % 9.9 Abnormally low  %   Monocyte Rel % 14.0 Abnormally high  %   Eosinophil Rel % 4.4 %   Basophil Rel % 0.4 %   Immature Granulocyte Rel % 0.4 %   Neutrophils Absolute 5.75 10*3/mm3   Lymphocytes Absolute 0.80 10*3/mm3   Monocytes Absolute 1.13 Abnormally high  10*3/mm3   Eosinophils Absolute 0.36 10*3/mm3   Basophils Absolute 0.03 10*3/mm3   Immature Grans, Absolute 0.03 10*3/mm3   nRBC 0.0 /100 WBC            XR Chest 1 View [[id number]] Shabbir as Reviewed   Order Status: Completed Collected: 06/08/19 0441    Updated: 06/08/19 0458   Narrative:       Chest single view on  6/8/2019     CLINICAL INDICATION: Fever    COMPARISON: 3/8/2018    FINDINGS: There is an age-indeterminate displaced left mid to  distal clavicle diaphysis fracture. The patient's face and chin  obscures most of the right lung. This is a low-volume inspiration  film. Left apical scarring is noted. There is a trace right  pleural effusion. There is minimal basilar atelectasis. The lungs  are otherwise clear. Heart is borderline in size.   Impression:     1. Much of the right lung is obscured but otherwise no acute  disease noted.  2. Age-indeterminate displaced left clavicle fracture.     Chief Complaint on Day of Discharge: None    Hospital Course:  The patient is a 74 y.o. male  who presented to [facility] emergency room with complaints of fever, nausea, poor appetite of a few days duration and recurrent falls. Initially his only complaint was left shoulder pain secondary to a fall that occurred about 10 days prior to presentation during which he sustained a left clavicular fracture  "that was managed conservatively by his primary care provider with a left arm sling. He does have left sided hemiparesis from cerebral hemorrhage that occurred 50 years ago.  He has a chronic suprapubic catheter for several years. His last catheter change was 2 weeks prior to presentation.     He had leukocytosis of 18,000/cumm and had a fever of 101.4 Fahrenheit at presentation. He was admitted for sepsis secondary to catheter associated urinary tract infection. He received IV ceftriaxone and his urine culture and blood cultures were negative. Patient improved on above therapy and was afebrile during the rest of the admission and had resolution of his leukocytosis.  His suprapubic catheter was changed by urology and he was discharged to follow-up with his primary care provider and complete a course of antibiotics.    Of note patient has known healed left clavicular fracture and has pain on movement.  He also has chronic hypoxemic respiratory failure and is on chronic oxygen therapy at 1 to 2 L.  He also has left-sided hemiparesis from old cerebral hemorrhage and left and right gluteal pressure injury.  On account of all these conditions he is unable to tolerate a seated position for time needed to transport and is unable to sit in a chair or wheelchair for prolonged time due to his decubitus ulcers.  Thus, he will require ambulance for transportation home upon discharge.    Condition on Discharge:  Stable    Physical Exam on Discharge:  /73 (BP Location: Right arm, Patient Position: Lying)   Pulse 89   Temp 98.8 °F (37.1 °C) (Oral)   Resp 18   Ht 170.2 cm (67\")   Wt 86.7 kg (191 lb 3.2 oz)   SpO2 100%   BMI 29.95 kg/m²      Physical Exam   Constitutional: He is oriented to person, place, and time. He appears well-developed and well-nourished. No distress.   HENT:   Head: Normocephalic and atraumatic.   Mouth/Throat: Oropharynx is clear and moist.   Eyes: Conjunctivae and EOM are normal. Pupils are " equal, round, and reactive to light.   Neck: Normal range of motion. Neck supple. No JVD present. No tracheal deviation present. No thyromegaly present.   Cardiovascular: Normal rate, regular rhythm and normal heart sounds. Exam reveals no gallop and no friction rub.   No murmur heard.  Pulmonary/Chest: Effort normal and breath sounds normal. No stridor. No respiratory distress. He has no wheezes. He has no rales. He exhibits no tenderness.   Abdominal: Soft. Bowel sounds are normal. He exhibits no distension and no mass. There is no tenderness. There is no rebound and no guarding. No hernia.   Suprapubic catheter in situ draining cloudy urine.   Musculoskeletal: Normal range of motion. He exhibits edema. He exhibits no tenderness or deformity.   Bilateral pitting pedal edema up to knees   Lymphadenopathy:     He has no cervical adenopathy.   Neurological: He is alert and oriented to person, place, and time. No cranial nerve deficit. Coordination normal.   Left-sided hemiplegia of upper and lower limbs   Skin: Skin is warm and dry. No rash noted. He is not diaphoretic. No erythema. No pallor.   Psychiatric: He has a normal mood and affect. His behavior is normal. Judgment and thought content normal.     Discharge Disposition: Home self-care    Discharge Medications:     Discharge Medications      New Medications      Instructions Start Date   cefdinir 300 MG capsule  Commonly known as:  OMNICEF   300 mg, Oral, 2 Times Daily         Continue These Medications      Instructions Start Date   aspirin 81 MG EC tablet   81 mg, Oral, Daily      atorvastatin 10 MG tablet  Commonly known as:  LIPITOR   10 mg, Oral, Daily      baclofen 10 MG tablet  Commonly known as:  LIORESAL   10 mg, Oral, 2 Times Daily      finasteride 5 MG tablet  Commonly known as:  PROSCAR   5 mg, Oral, Daily      furosemide 20 MG tablet  Commonly known as:  LASIX   20 mg, Oral, Daily      HYDROcodone-acetaminophen  MG per tablet  Commonly known  as:  NORCO   1 tablet, Oral, Every 6 Hours PRN      nebivolol 20 MG tablet  Commonly known as:  BYSTOLIC   20 mg, Oral, Daily      nitrofurantoin 50 MG capsule  Commonly known as:  MACRODANTIN   50 mg, Oral, Nightly      phenytoin 100 MG ER capsule  Commonly known as:  DILANTIN   Oral, 2 Times Daily      potassium chloride 10 MEQ CR tablet  Commonly known as:  K-DUR,KLOR-CON   10 mEq, Oral, 2 Times Daily      rivaroxaban 20 MG tablet  Commonly known as:  XARELTO   20 mg, Oral, Daily      tamsulosin 0.4 MG capsule 24 hr capsule  Commonly known as:  FLOMAX   1 capsule, Oral, Nightly           Discharge Diet: Regular diet    Activity at Discharge: Self-limited activity    Discharge Care Plan/Instructions:   1.  Follow-up with your primary care provider within 1 week of discharge.   2.  Complete a course of antibiotics as prescribed.    Follow-up Appointments:   No future appointments.    Test Results Pending at Discharge:    Order Current Status    Extra Tubes In process    Urine Culture - Urine, Urine, Clean Catch In process    Blood Culture - Blood, Arm, Right Preliminary result    Blood Culture - Blood, Hand, Right Preliminary result          Gilma Stallings MD  06/09/19  1:43 PM    Time: 33 minutes of time was spent evaluating patient and planning discharge

## 2019-06-09 NOTE — PLAN OF CARE
Problem: Patient Care Overview  Goal: Plan of Care Review  Outcome: Ongoing (interventions implemented as appropriate)   06/09/19 0249   Coping/Psychosocial   Plan of Care Reviewed With patient;spouse   Plan of Care Review   Progress no change   OTHER   Outcome Summary VSS, denies of any pain, continue to monitor.      Goal: Individualization and Mutuality  Outcome: Ongoing (interventions implemented as appropriate)    Goal: Discharge Needs Assessment  Outcome: Ongoing (interventions implemented as appropriate)    Goal: Interprofessional Rounds/Family Conf  Outcome: Ongoing (interventions implemented as appropriate)      Problem: Fall Risk (Adult)  Goal: Identify Related Risk Factors and Signs and Symptoms  Outcome: Ongoing (interventions implemented as appropriate)    Goal: Absence of Fall  Outcome: Ongoing (interventions implemented as appropriate)      Problem: Pain, Chronic (Adult)  Goal: Identify Related Risk Factors and Signs and Symptoms  Outcome: Ongoing (interventions implemented as appropriate)    Goal: Acceptable Pain/Comfort Level and Functional Ability  Outcome: Ongoing (interventions implemented as appropriate)      Problem: Skin Injury Risk (Adult)  Goal: Identify Related Risk Factors and Signs and Symptoms  Outcome: Ongoing (interventions implemented as appropriate)    Goal: Skin Health and Integrity  Outcome: Ongoing (interventions implemented as appropriate)      Problem: Urinary Tract Infection (Adult)  Goal: Signs and Symptoms of Listed Potential Problems Will be Absent, Minimized or Managed (Urinary Tract Infection)  Outcome: Ongoing (interventions implemented as appropriate)      Problem: Sepsis/Septic Shock (Adult)  Goal: Signs and Symptoms of Listed Potential Problems Will be Absent, Minimized or Managed (Sepsis/Septic Shock)  Outcome: Ongoing (interventions implemented as appropriate)

## 2019-06-11 NOTE — PROGRESS NOTES
PRE DIAGNOSIS:  Neurogenic bladder     POST DIAGNOSIS:  Neurogenic bladder     PROCEDURE DETAILS:  I have reviewed the diagnosis and treatment options with patient. Risks and benefits explained and patient wishes to proceed with catheter change. Supra pubic site prepped in sterile technique. Existing catheter removed with 16 Swedish catheter inserted with 10 mL sterile water in balloon. Supra pubic site clear with minimal amount of redness and no drainage noted. Placement was checked. Catheter connected to gravity bag, closed system.      COMPLICATIONS:  No Complications.     FINDINGS: Urine cloudy yellow after catheter change.      INSTRUCTIONS: Appropriate post procedure instructions given. Verbalized understanding

## 2019-09-03 PROBLEM — N20.1 URETEROLITHIASIS: Status: ACTIVE | Noted: 2019-01-01

## 2019-09-03 PROBLEM — N39.0 URINARY TRACT INFECTION WITHOUT HEMATURIA: Status: ACTIVE | Noted: 2019-01-01

## 2019-09-03 PROBLEM — N40.0 BPH (BENIGN PROSTATIC HYPERPLASIA): Chronic | Status: ACTIVE | Noted: 2019-01-01

## 2019-09-03 NOTE — PLAN OF CARE
Problem: Patient Care Overview  Goal: Plan of Care Review  Outcome: Ongoing (interventions implemented as appropriate)   09/03/19 8662   Coping/Psychosocial   Plan of Care Reviewed With patient   Plan of Care Review   Progress improving   OTHER   Outcome Summary admitted to floor

## 2019-09-03 NOTE — CONSULTS
Inpatient Urology Consult  Consult performed by: Marco A Frederick APRN  Consult ordered by: Spenser England MD          Patient Care Team:  Manuel Lopez APRN as PCP - General (Family Medicine)    Chief complaint: ureteral stones    Subjective     Mr. Hill is a 74-year-old male consulted to Urology for left obstructing ureteral stones. He is well known to Dr. Al and [facility], last seen last week, history of kidney stones and neurogenic bladder, SP tube changed in the last week and KUB last week showed a left UJV stone. Imaging now shows 3 ureteral stones with perinephric stranding. His urine is clear and yellow. He reports nausea, vomiting, abdominal pain with recent Bactrim use for UTI. 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       Abdominal Pain   This is a new problem. The onset quality is gradual. The problem occurs intermittently. The problem has been gradually worsening. The pain is located in the left flank and LLQ. The pain is moderate. The quality of the pain is sharp. The abdominal pain radiates to the suprapubic region. Associated symptoms include nausea and vomiting. Pertinent negatives include no fever or hematuria. Nothing aggravates the pain. The pain is relieved by nothing. He has tried antibiotics for the symptoms. The treatment provided no relief. Prior diagnostic workup includes CT scan.       Review of Systems   Constitutional: Positive for appetite change and fatigue. Negative for activity change, chills, diaphoresis and fever.   HENT: Negative.    Eyes: Negative.  Negative for pain, discharge, redness and itching.   Respiratory: Negative.    Gastrointestinal: Positive for abdominal pain, nausea and vomiting.   Endocrine: Negative.    Genitourinary: Positive for flank pain. Negative for hematuria.   Musculoskeletal: Positive for gait problem.   Skin: Negative.    Allergic/Immunologic: Negative.    Hematological: Negative.     Psychiatric/Behavioral: Negative.         Past Medical History:   Diagnosis Date   • Atherosclerosis    • Atrial fibrillation (CMS/HCC)    • BPH (benign prostatic hyperplasia)    • Chronic bronchitis (CMS/HCC)    • Hyperlipidemia    • Hypertension    • Kidney stones    • Left hemiplegia (CMS/HCC)    • Osteoarthritis    • Sacral decubitus ulcer    • Stroke (cerebrum) (CMS/HCC)    • UTI (urinary tract infection) due to urinary indwelling catheter (CMS/HCC)    ,   Past Surgical History:   Procedure Laterality Date   • CARDIAC ELECTROPHYSIOLOGY PROCEDURE N/A 3/15/2018   • CHOLECYSTECTOMY     • EXTRACORPOREAL SHOCKWAVE LITHOTRIPSY (ESWL), STENT INSERTION/REMOVAL Right 9/21/2018   • SUPRAPUBIC CATHETER INSERTION     ,   Family History   Problem Relation Age of Onset   • Stroke Mother    • Hypertension Father    ,   Social History     Tobacco Use   • Smoking status: Former Smoker   • Smokeless tobacco: Never Used   Substance Use Topics   • Alcohol use: Yes   • Drug use: No   ,   Medications Prior to Admission   Medication Sig Dispense Refill Last Dose   • atorvastatin (LIPITOR) 10 MG tablet Take 1 tablet by mouth Daily. 15 tablet 0 9/3/2019   • baclofen (LIORESAL) 10 MG tablet Take 10 mg by mouth 2 (Two) Times a Day. Takes two pills at night and one in am   9/3/2019   • finasteride (PROSCAR) 5 MG tablet Take 5 mg by mouth Daily.   9/3/2019 at Unknown time   • furosemide (LASIX) 20 MG tablet Take 20 mg by mouth Daily.   9/3/2019 at Unknown time   • HYDROcodone-acetaminophen (NORCO)  MG per tablet Take 1 tablet by mouth Every 6 (Six) Hours As Needed for Moderate Pain .   9/3/2019 at Unknown time   • nebivolol (BYSTOLIC) 20 MG tablet Take 20 mg by mouth Every Night.   9/2/2019 at Unknown time   • PARoxetine (PAXIL) 20 MG tablet Take 20 mg by mouth Every Morning.   9/3/2019 at Unknown time   • phenytoin (DILANTIN) 100 MG ER capsule Take  by mouth 2 (Two) Times a Day.   9/3/2019 at Unknown time   • potassium chloride  (K-DUR,KLOR-CON) 10 MEQ CR tablet Take 10 mEq by mouth 2 (Two) Times a Day.   9/20/2018 at 0800   • rivaroxaban (XARELTO) 20 MG tablet Take 20 mg by mouth Every Night.   9/2/2019 at Unknown time   • tamsulosin (FLOMAX) 0.4 MG capsule 24 hr capsule Take 1 capsule by mouth Every Night.   9/2/2019 at Unknown time   • aspirin 81 MG EC tablet Take 81 mg by mouth Daily.   Unknown at Unknown time   • nitrofurantoin (MACRODANTIN) 50 MG capsule Take 50 mg by mouth Every Night.           Allergies:  Ambien [zolpidem]; Aleve [naproxen]; Amikacin; Amiodarone; and Vancomycin    Objective      Vital Signs  Temp:  [98.9 °F (37.2 °C)-99 °F (37.2 °C)] 98.9 °F (37.2 °C)  Heart Rate:  [] 115  Resp:  [16-18] 16  BP: ()/(47-80) 128/80    Physical Exam   Constitutional: He is oriented to person, place, and time. He appears well-developed and well-nourished. No distress.   HENT:   Head: Normocephalic and atraumatic.   Right Ear: External ear normal.   Left Ear: External ear normal.   Mouth/Throat: Oropharynx is clear and moist.   Eyes: Conjunctivae and EOM are normal. Pupils are equal, round, and reactive to light.   Cardiovascular: Normal rate and normal heart sounds.   No murmur heard.  Pulmonary/Chest: Effort normal and breath sounds normal. No respiratory distress.   Abdominal: There is tenderness. There is CVA tenderness (left).   Genitourinary:   Genitourinary Comments: No problems at cystostomy site, clear yellow urine   Musculoskeletal: He exhibits no tenderness.   Neurological: He is alert and oriented to person, place, and time.   Skin: Skin is warm and dry. Capillary refill takes less than 2 seconds. He is not diaphoretic.   Psychiatric: He has a normal mood and affect. His behavior is normal.   Vitals reviewed.      Results Review:   Lab Results (last 24 hours)     Procedure Component Value Units Date/Time    [place] Draw [[id number]] Collected:  09/03/19 0724    Specimen:  Blood Updated:  09/03/19 0830     Narrative:       The following orders were created for panel order [place] Draw.  Procedure                               Abnormality         Status                     ---------                               -----------         ------                     Light Blue Top[[id number]]                                   Final result               Green Top (Gel)[[id number]]                                  Final result               Lavender Top[[id number]]                                     Final result               Gold Top - SST[[id number]]                                   Final result                 Please view results for these tests on the individual orders.    Gold Top - SST [[id number]] Collected:  09/03/19 0724    Specimen:  Blood Updated:  09/03/19 0830     Extra Tube Hold for add-ons.     Comment: Auto resulted.       Light Blue Top [[id number]] Collected:  09/03/19 0724    Specimen:  Blood Updated:  09/03/19 0830     Extra Tube hold for add-on     Comment: Auto resulted       Green Top (Gel) [[id number]] Collected:  09/03/19 0724    Specimen:  Blood Updated:  09/03/19 0830     Extra Tube Hold for add-ons.     Comment: Auto resulted.       Lavender Top [[id number]] Collected:  09/03/19 0724    Specimen:  Blood Updated:  09/03/19 0830     Extra Tube hold for add-on     Comment: Auto resulted       Urinalysis, Microscopic Only - Urine, Clean Catch [[id number]]  (Abnormal) Collected:  09/03/19 0804    Specimen:  Urine, Clean Catch Updated:  09/03/19 0820     RBC, UA 0-2 /HPF      WBC, UA 31-50 /HPF      Bacteria, UA Trace /HPF      Squamous Epithelial Cells, UA 0-2 /HPF      Hyaline Casts, UA None Seen /LPF      Methodology Automated Microscopy    Urinalysis With Microscopic If Indicated (No Culture) - Urine, Clean Catch [[id number]]  (Abnormal) Collected:  09/03/19 0804    Specimen:  Urine, Clean Catch Updated:  09/03/19 0814     Color, UA Yellow     Appearance, UA Slightly Cloudy     pH, UA 7.0     Specific Gravity,  UA 1.015     Glucose, UA Negative     Ketones, UA 15 mg/dL (1+)     Bilirubin, UA Small (1+)     Blood, UA Small (1+)     Protein,  mg/dL (2+)     Leuk Esterase, UA Large (3+)     Nitrite, UA Positive     Urobilinogen, UA 1.0 E.U./dL    Comprehensive Metabolic Panel [[id number]]  (Abnormal) Collected:  09/03/19 0724    Specimen:  Blood Updated:  09/03/19 0800     Glucose 123 mg/dL      BUN 34 mg/dL      Creatinine 2.23 mg/dL      Sodium 140 mmol/L      Potassium 4.4 mmol/L      Chloride 94 mmol/L      CO2 31.0 mmol/L      Calcium 8.7 mg/dL      Total Protein 7.9 g/dL      Albumin 3.50 g/dL      ALT (SGPT) 9 U/L      AST (SGOT) 12 U/L      Alkaline Phosphatase 111 U/L      Total Bilirubin 0.7 mg/dL      eGFR Non African Amer 29 mL/min/1.73      Globulin 4.4 gm/dL      A/G Ratio 0.8 g/dL      BUN/Creatinine Ratio 15.2     Anion Gap 15.0 mmol/L     Narrative:       GFR Normal >60  Chronic Kidney Disease <60  Kidney Failure <15    Lipase [[id number]]  (Normal) Collected:  09/03/19 0724    Specimen:  Blood Updated:  09/03/19 0800     Lipase 23 U/L     Lactic Acid, Plasma [[id number]]  (Normal) Collected:  09/03/19 0732    Specimen:  Blood Updated:  09/03/19 0756     Lactate 0.9 mmol/L     CBC & Differential [[id number]] Collected:  09/03/19 0724    Specimen:  Blood Updated:  09/03/19 0741    Narrative:       The following orders were created for panel order CBC & Differential.  Procedure                               Abnormality         Status                     ---------                               -----------         ------                     CBC Auto Differential[[id number]]        Abnormal            Final result                 Please view results for these tests on the individual orders.    CBC Auto Differential [[id number]]  (Abnormal) Collected:  09/03/19 0724    Specimen:  Blood Updated:  09/03/19 0741     WBC 15.35 10*3/mm3      RBC 4.35 10*6/mm3      Hemoglobin 13.2 g/dL      Hematocrit 39.8 %      MCV  91.5 fL      MCH 30.3 pg      MCHC 33.2 g/dL      RDW 15.2 %      RDW-SD 51.0 fl      MPV 10.1 fL      Platelets 205 10*3/mm3      Neutrophil % 81.1 %      Lymphocyte % 4.2 %      Monocyte % 12.3 %      Eosinophil % 1.4 %      Basophil % 0.3 %      Immature Grans % 0.7 %      Neutrophils, Absolute 12.45 10*3/mm3      Lymphocytes, Absolute 0.65 10*3/mm3      Monocytes, Absolute 1.89 10*3/mm3      Eosinophils, Absolute 0.22 10*3/mm3      Basophils, Absolute 0.04 10*3/mm3      Immature Grans, Absolute 0.10 10*3/mm3      nRBC 0.0 /100 WBC          Imaging Results (last 24 hours)     Procedure Component Value Units Date/Time    CT Abdomen Pelvis Without Contrast [[id number]] Collected:  09/03/19 0842     Updated:  09/03/19 0929    Narrative:         PROCEDURE: Ct abdomen and pelvis without contrast    REASON FOR EXAM: R flank, suprapubic pain    FINDINGS: Comparison study dated  August 31, 2018. Axial computer  tomography sequential imaging was performed from the diaphragms  through the symphysis pubis without IV contrast administration.  Sagittal and coronal reformates was performed. This exam was  performed according to our departmental dose optimization  program, which includes automated exposure control, adjustment of  the mA and/or KV according to patient size and/or use of  iterative reconstruction technique.    Imaging through lung bases reveals posterior segment right lower  lobe small patchy opacities with air bronchograms. Very small  right pleural effusion. Lung bases are otherwise unremarkable.    The liver is normal. The gallbladder is not identified and may be  contracted, surgically, or congenitally absent. The biliary  system appears within normal limits. The pancreas is normal. The  spleen is normal. Bilateral adrenal glands are normal. Right  kidney upper pole, mid zone, and lower pole multiple  nonobstructive renal calculi with the largest within the lower  pole which measures 2.1 mm in greatest  diameter. Right kidney  renal pelvis 4.2 mm nonobstructive renal calculi. Right kidney  perirenal space small amount of fatty stranding and fluid. The  right kidney and ureter are otherwise unremarkable. In the region  of the bladder left ureter UVJ there is a 5.6 mm calculi which  may represent recently passed ureter calculi versus calculi  partially involving the left ureter UVJ. Distal left ureter 8.7  mm calculi 1.8 mm above the left ureter UVJ. This distal left  ureter calculi is causing moderate to severe obstruction with  associated moderate to severe hydroureter and hydronephrosis.  Additional distal ureter nonobstructing calculi which measures  2.5 mm in greatest diameter. Mild fatty stranding surrounding the  left ureter and in the left perirenal space may represent sequela  from obstruction with infundibular rupture versus associated  inflammatory changes. Recommend clinical correlation. The left  kidney and ureter are otherwise unremarkable. Suprapubic catheter  is seen involving the bladder with the bladder largely  decompressed. The prostate gland is mildly enlarged. The hollow  viscera is normal. Left para-aortic single enlarged lymph node  which measures 1.86 x 1.05 cm. This lymph node is at the level of  the left renal hilum. No other lymphadenopathy is identified. No  acute osseous abnormality.      Impression:       1. Right lower lobe posterior segment patchy opacities suspicious  for passive atelectasis versus pneumonia.  2. Very small right pleural effusion.  3.The gallbladder is not identified and may be contracted,  surgically, or congenitally absent.  4.In the region of the bladder left ureter UVJ there is a 5.6 mm  calculi which may represent recently passed ureter calculi versus  calculi partially involving the left ureter UVJ. Distal left  ureter 8.7 mm calculi 1.8 mm above the left ureter UVJ. This  distal left ureter calculi is causing moderate to severe  obstruction with associated  moderate to severe hydroureter and  hydronephrosis. Additional distal ureter nonobstructing calculi  which measures 2.5 mm in greatest diameter. Mild fatty stranding  surrounding the left ureter and in the left perirenal space may  represent sequela from obstruction with infundibular rupture  versus associated inflammatory changes. Recommend clinical  correlation.  5. Multiple right kidney nonobstructive calculi described above.  6. Suprapubic catheter decompressing the bladder.  7. Mildly enlarged prostate gland may represent changes from  benign prostatic hypertrophy versus prostate malignancy.  Recommend clinical correlation.  8.Left para-aortic single enlarged lymph node which measures 1.86  x 1.05 cm. This lymph node is of uncertain etiology by strict  size criteria. This most likely represents inflammatory  lymphadenopathy versus less likely lymphadenopathy secondary to  neoplastic involvement.    Electronically signed by:  David Nieto MD  9/3/2019 9:28 AM CDT  Workstation: [id number]           I reviewed the patient's new clinical results.  I reviewed the patient's new imaging results and agree with the interpretation.  I reviewed the patient's other test results and agree with the interpretation        Assessment/Plan       Urinary tract infection without hematuria      Assessment & Plan    1. Left ureteral stones with obstruction and   2. Pyelonephritis  3. AUGIE  -CT abdomen/pelvis wo contrast shows 3 distal ureteral stones: LEFT UVJ stone nearly 6 mm; distal ureteral stone nearly 9 mm above the UVJ stone and another distal ureteral stone; moderate to severe hydroureteronephrosis with mild fatty stranding left ureter and perirenal space. There are multiple right kidney stones not active; mildly enlarged prostate with a left para-aortic single enlarged lymph node of undetermined significance.   -+ UA, urine culture IP  -WBC 15.35  -Estimated Creatinine Clearance: 29.4 mL/min (A) (by C-G formula based on SCr of  2.23 mg/dL (H)).   -Baseline Cr 1.0  -Zosyn day #1  -Flomax, Proscar, home meds    Plan:  NPO after breakfast for LEFT CRULLS tomorrow.  Await urine culture, continue Zosyn.   Continue Flomax and Proscar.     I discussed the patient's findings and my recommendations with patient, family, nursing staff, consulting provider and Dr. Mikaela Frederick, JEAN  09/03/19  3:45 PM

## 2019-09-03 NOTE — NURSING NOTE
Dr England Aware of postive sepsis screen no new orders at this times aware no blood cultures done

## 2019-09-03 NOTE — ED PROVIDER NOTES
Subjective   Patient presents emergency department with 3 to 4 days of central abdominal pain.  Patient was seen in the clinic started on Bactrim for UTI, patient also was thought to possibly have a kidney stone on the right.  Patient has not been having CVA pain or a lot of flank pain.  There is been no dysuria or gross hematuria.  I believe this was based on a x-ray finding that may have shown calcification in that region.  Patient has not passed any stones that he knows of, the patient is catheterized chronically.  Patient notes the nagging abdominal pain with some nausea and vomiting.  Patient hurts so much with movement this morning that it was decided that he needed to get checked out.  Patient has finished his antibiotic course for the UTI.  Patient notes pain in the umbilical to suprapubic region, constant, worse with motion.  Family at the bedside notes some mild confusion while being ill.            Review of Systems   Constitutional: Negative.  Negative for appetite change, chills and fever.   HENT: Negative.  Negative for congestion.    Eyes: Negative.  Negative for photophobia and visual disturbance.   Respiratory: Negative.  Negative for cough, chest tightness and shortness of breath.    Cardiovascular: Negative.  Negative for chest pain and palpitations.   Gastrointestinal: Positive for abdominal pain, nausea and vomiting. Negative for constipation and diarrhea.   Endocrine: Negative.    Genitourinary: Negative.  Negative for decreased urine volume, dysuria, flank pain and hematuria.   Musculoskeletal: Negative.  Negative for arthralgias, back pain, myalgias, neck pain and neck stiffness.   Skin: Negative.  Negative for pallor.   Neurological: Negative.  Negative for dizziness, syncope, weakness, light-headedness, numbness and headaches.   Psychiatric/Behavioral: Positive for confusion. Negative for suicidal ideas. The patient is not nervous/anxious.    All other systems reviewed and are  negative.      Past Medical History:   Diagnosis Date   • Atherosclerosis    • Atrial fibrillation (CMS/HCC)    • BPH (benign prostatic hyperplasia)    • Chronic bronchitis (CMS/HCC)    • Hyperlipidemia    • Hypertension    • Kidney stones    • Left hemiplegia (CMS/HCC)    • Osteoarthritis    • Sacral decubitus ulcer    • Stroke (cerebrum) (CMS/HCC)    • UTI (urinary tract infection) due to urinary indwelling catheter (CMS/HCC)        Allergies   Allergen Reactions   • Ambien [Zolpidem] Hallucinations   • Aleve [Naproxen] Other (See Comments)     seizure   • Amikacin Rash   • Amiodarone Rash   • Vancomycin Rash       Past Surgical History:   Procedure Laterality Date   • CARDIAC ELECTROPHYSIOLOGY PROCEDURE N/A 3/15/2018   • CHOLECYSTECTOMY     • EXTRACORPOREAL SHOCKWAVE LITHOTRIPSY (ESWL), STENT INSERTION/REMOVAL Right 9/21/2018   • SUPRAPUBIC CATHETER INSERTION         Family History   Problem Relation Age of Onset   • Stroke Mother    • Hypertension Father        Social History     Socioeconomic History   • Marital status:      Spouse name: Not on file   • Number of children: Not on file   • Years of education: Not on file   • Highest education level: Not on file   Tobacco Use   • Smoking status: Former Smoker   • Smokeless tobacco: Never Used   Substance and Sexual Activity   • Alcohol use: Yes   • Drug use: No   • Sexual activity: Not Currently           Objective   Physical Exam   Constitutional: He is oriented to person, place, and time. He appears well-developed and well-nourished. No distress.   HENT:   Head: Normocephalic and atraumatic.   Eyes: Conjunctivae and EOM are normal.   Neck: Normal range of motion. Neck supple. No JVD present.   Cardiovascular: Normal rate, regular rhythm, normal heart sounds and intact distal pulses. Exam reveals no gallop and no friction rub.   No murmur heard.  Pulmonary/Chest: Effort normal. No respiratory distress. He has no wheezes. He has no rales. He exhibits no  tenderness.   Abdominal: Soft. Bowel sounds are normal. He exhibits no distension and no mass. There is tenderness in the suprapubic area. There is no rebound and no guarding.   Mild suprapubic tenderness without guarding rigidity or rebound.   Musculoskeletal: Normal range of motion.   Neurological: He is alert and oriented to person, place, and time.   Skin: Skin is warm and dry. Capillary refill takes less than 2 seconds.   Psychiatric: He has a normal mood and affect. His behavior is normal. Judgment and thought content normal.   Nursing note and vitals reviewed.      Procedures           ED Course  Final Diagnosis: as of Sep 03 0946   Urinary tract infection without hematuria, site unspecified   Acute kidney injury (nontraumatic) (CMS/[facility])   Ureterolithiasis      Labs Reviewed   COMPREHENSIVE METABOLIC PANEL - Abnormal; Notable for the following components:       Result Value    Glucose 123 (*)     BUN 34 (*)     Creatinine 2.23 (*)     Chloride 94 (*)     CO2 31.0 (*)     eGFR Non  Amer 29 (*)     All other components within normal limits    Narrative:     GFR Normal >60  Chronic Kidney Disease <60  Kidney Failure <15   CBC WITH AUTO DIFFERENTIAL - Abnormal; Notable for the following components:    WBC 15.35 (*)     Neutrophil % 81.1 (*)     Lymphocyte % 4.2 (*)     Monocyte % 12.3 (*)     Immature Grans % 0.7 (*)     Neutrophils, Absolute 12.45 (*)     Lymphocytes, Absolute 0.65 (*)     Monocytes, Absolute 1.89 (*)     Immature Grans, Absolute 0.10 (*)     All other components within normal limits   URINALYSIS W/ MICROSCOPIC IF INDICATED (NO CULTURE) - Abnormal; Notable for the following components:    Appearance, UA Slightly Cloudy (*)     Ketones, UA 15 mg/dL (1+) (*)     Bilirubin, UA Small (1+) (*)     Blood, UA Small (1+) (*)     Protein,  mg/dL (2+) (*)     Leuk Esterase, UA Large (3+) (*)     Nitrite, UA Positive (*)     All other components within normal limits   URINALYSIS, MICROSCOPIC  ONLY - Abnormal; Notable for the following components:    RBC, UA 0-2 (*)     WBC, UA 31-50 (*)     Bacteria, UA Trace (*)     All other components within normal limits   LIPASE - Normal   LACTIC ACID, PLASMA - Normal   RAINBOW DRAW    Narrative:     The following orders were created for panel order [place] Draw.  Procedure                               Abnormality         Status                     ---------                               -----------         ------                     Light Blue Top[[id number]]                                   Final result               Green Top (Gel)[[id number]]                                  Final result               Lavender Top[[id number]]                                     Final result               Gold Top - SST[[id number]]                                   Final result                 Please view results for these tests on the individual orders.   CBC AND DIFFERENTIAL    Narrative:     The following orders were created for panel order CBC & Differential.  Procedure                               Abnormality         Status                     ---------                               -----------         ------                     CBC Auto Differential[[id number]]        Abnormal            Final result                 Please view results for these tests on the individual orders.   LIGHT BLUE TOP   GREEN TOP   LAVENDER TOP   GOLD TOP - SST       CT Abdomen Pelvis Without Contrast   Final Result   1. Right lower lobe posterior segment patchy opacities suspicious   for passive atelectasis versus pneumonia.   2. Very small right pleural effusion.   3.The gallbladder is not identified and may be contracted,   surgically, or congenitally absent.   4.In the region of the bladder left ureter UVJ there is a 5.6 mm   calculi which may represent recently passed ureter calculi versus   calculi partially involving the left ureter UVJ. Distal left   ureter 8.7 mm calculi 1.8 mm above the  left ureter UVJ. This   distal left ureter calculi is causing moderate to severe   obstruction with associated moderate to severe hydroureter and   hydronephrosis. Additional distal ureter nonobstructing calculi   which measures 2.5 mm in greatest diameter. Mild fatty stranding   surrounding the left ureter and in the left perirenal space may   represent sequela from obstruction with infundibular rupture   versus associated inflammatory changes. Recommend clinical   correlation.   5. Multiple right kidney nonobstructive calculi described above.   6. Suprapubic catheter decompressing the bladder.   7. Mildly enlarged prostate gland may represent changes from   benign prostatic hypertrophy versus prostate malignancy.   Recommend clinical correlation.   8.Left para-aortic single enlarged lymph node which measures 1.86   x 1.05 cm. This lymph node is of uncertain etiology by strict   size criteria. This most likely represents inflammatory   lymphadenopathy versus less likely lymphadenopathy secondary to   neoplastic involvement.      Electronically signed by:  David Nieto MD  9/3/2019 9:28 AM CDT   Workstation: [id number]        Patient with UTI resistant to the Bactrim treatment he has been on, also with acute kidney injury in the setting of the patient's distal kidney stone on the left, 9 mm.  This is obstructive in nature.  Patient will be admitted for IV Zosyn antibiotic, fluids, and urology consultation.            Derrick Ramirez MD  09/03/19 0993

## 2019-09-03 NOTE — H&P
HISTORY AND PHYSICAL  NAME: Mando Hill  : 1945  MRN: [id number]    DATE OF ADMISSION: 19    DATE & TIME SEEN: 19 1:21 PM    PCP: Manuel Lopez APRN    CODE STATUS:   Code Status and Medical Interventions:   Ordered at: 19 1323     Code Status:    CPR     Medical Interventions (Level of Support Prior to Arrest):    Full       CHIEF COMPLAINT Abdominal pain    HPI:  Mando Hill is a 74 y.o. male with medical history significant for Afib, BPH, left hemiplegia from previous CVA, chronic palmer, HLD, HTN, h/o decubitus ulcers presents with abdominal pain.     Patient presented with a 2-3 day history of abdominal pain. Patient was on Bactrim for UTI outpatient. Patient endorsed nausea, vomiting, and abdominal pain. He has some residual deficits from previous CVA.     At time of exam patient is resting comfortably in the hospital bed. He does appear ill, but in no distress. Patient was found to have a large ureter stone and likely pyelonephritis.    CONCURRENT MEDICAL HISTORY:  Past Medical History:   Diagnosis Date   • Atherosclerosis    • Atrial fibrillation (CMS/HCC)    • BPH (benign prostatic hyperplasia)    • Chronic bronchitis (CMS/HCC)    • Hyperlipidemia    • Hypertension    • Kidney stones    • Left hemiplegia (CMS/HCC)    • Osteoarthritis    • Sacral decubitus ulcer    • Stroke (cerebrum) (CMS/HCC)    • UTI (urinary tract infection) due to urinary indwelling catheter (CMS/HCC)        PAST SURGICAL HISTORY:  Past Surgical History:   Procedure Laterality Date   • CARDIAC ELECTROPHYSIOLOGY PROCEDURE N/A 3/15/2018   • CHOLECYSTECTOMY     • EXTRACORPOREAL SHOCKWAVE LITHOTRIPSY (ESWL), STENT INSERTION/REMOVAL Right 2018   • SUPRAPUBIC CATHETER INSERTION         FAMILY HISTORY:  Family History   Problem Relation Age of Onset   • Stroke Mother    • Hypertension Father         SOCIAL HISTORY:  Social History     Socioeconomic History   • Marital status:       Spouse name: Not on file   • Number of children: Not on file   • Years of education: Not on file   • Highest education level: Not on file   Tobacco Use   • Smoking status: Former Smoker   • Smokeless tobacco: Never Used   Substance and Sexual Activity   • Alcohol use: Yes   • Drug use: No   • Sexual activity: Not Currently       HOME MEDICATIONS:  Prior to Admission medications    Medication Sig Start Date End Date Taking? Authorizing Provider   atorvastatin (LIPITOR) 10 MG tablet Take 1 tablet by mouth Daily. 3/16/18  Yes Luca Deleon MD   baclofen (LIORESAL) 10 MG tablet Take 10 mg by mouth 2 (Two) Times a Day. Takes two pills at night and one in am   Yes Michael Plasencia MD   finasteride (PROSCAR) 5 MG tablet Take 5 mg by mouth Daily.   Yes Michael Plasencia MD   furosemide (LASIX) 20 MG tablet Take 20 mg by mouth Daily.   Yes Michael Plasencia MD   HYDROcodone-acetaminophen (NORCO)  MG per tablet Take 1 tablet by mouth Every 6 (Six) Hours As Needed for Moderate Pain .   Yes Michael Plasencia MD   nebivolol (BYSTOLIC) 20 MG tablet Take 20 mg by mouth Every Night.   Yes Michael Plasencia MD   PARoxetine (PAXIL) 20 MG tablet Take 20 mg by mouth Every Morning.   Yes Michael Plasencia MD   phenytoin (DILANTIN) 100 MG ER capsule Take  by mouth 2 (Two) Times a Day.   Yes Michael Plasencia MD   potassium chloride (K-DUR,KLOR-CON) 10 MEQ CR tablet Take 10 mEq by mouth 2 (Two) Times a Day.   Yes Michael Plasencia MD   rivaroxaban (XARELTO) 20 MG tablet Take 20 mg by mouth Every Night.   Yes Michael Plasencia MD   tamsulosin (FLOMAX) 0.4 MG capsule 24 hr capsule Take 1 capsule by mouth Every Night.   Yes Michael Plasencia MD   aspirin 81 MG EC tablet Take 81 mg by mouth Daily.    Michael Plasencia MD   nitrofurantoin (MACRODANTIN) 50 MG capsule Take 50 mg by mouth Every Night.    Michael Plasencia MD       ALLERGIES:  Ambien [zolpidem]; Aleve [naproxen];  Amikacin; Amiodarone; and Vancomycin    REVIEW OF SYSTEMS  Review of Systems   Constitutional: Positive for activity change, appetite change, chills and fatigue. Negative for fever.   Respiratory: Negative for cough and shortness of breath.    Cardiovascular: Negative for chest pain and palpitations.   Gastrointestinal: Positive for abdominal pain, nausea and vomiting.   Musculoskeletal: Positive for arthralgias and gait problem.   Skin: Negative for color change and rash.   Neurological: Positive for weakness.       PHYSICAL EXAM:  Temp:  [98.9 °F (37.2 °C)-99 °F (37.2 °C)] 98.9 °F (37.2 °C)  Heart Rate:  [] 115  Resp:  [16-18] 16  BP: ()/(47-80) 128/80  Body mass index is 29.37 kg/m².     Physical Exam   Constitutional: He is oriented to person, place, and time. He appears well-developed and well-nourished. No distress.   HENT:   Head: Normocephalic and atraumatic.   Right Ear: External ear normal.   Left Ear: External ear normal.   Nose: Nose normal.   Nasal cannula in place.   Eyes: Conjunctivae and EOM are normal. Pupils are equal, round, and reactive to light.   Neck: Neck supple. No thyromegaly present.   Cardiovascular: Normal rate and normal heart sounds.   Pulmonary/Chest: Effort normal. He has no wheezes. He has no rales. He exhibits no tenderness.   Nasal cannula in place.   Abdominal: Soft. Bowel sounds are normal. He exhibits no distension and no mass. There is no tenderness. There is no rebound and no guarding.   Musculoskeletal: He exhibits edema.   Left sided hemiplegia.   Neurological: He is alert and oriented to person, place, and time.   Skin: Skin is warm and dry. No rash noted. He is not diaphoretic. No erythema. No pallor.   Psychiatric: He has a normal mood and affect. His behavior is normal.   Nursing note and vitals reviewed.      DIAGNOSTIC DATA:   Lab Results (last 24 hours)     Procedure Component Value Units Date/Time    [facility] Draw [[id number]] Collected:  09/03/19 0714     Specimen:  Blood Updated:  09/03/19 0830    Narrative:       The following orders were created for panel order [facility] Draw.  Procedure                               Abnormality         Status                     ---------                               -----------         ------                     Light Blue Top[[id number]]                                   Final result               Green Top (Gel)[[id number]]                                  Final result               Lavender Top[[id number]]                                     Final result               Gold Top - SST[[id number]]                                   Final result                 Please view results for these tests on the individual orders.    Gold Top - SST [[id number]] Collected:  09/03/19 0724    Specimen:  Blood Updated:  09/03/19 0830     Extra Tube Hold for add-ons.     Comment: Auto resulted.       Light Blue Top [[id number]] Collected:  09/03/19 0724    Specimen:  Blood Updated:  09/03/19 0830     Extra Tube hold for add-on     Comment: Auto resulted       Green Top (Gel) [[id number]] Collected:  09/03/19 0724    Specimen:  Blood Updated:  09/03/19 0830     Extra Tube Hold for add-ons.     Comment: Auto resulted.       Lavender Top [[id number]] Collected:  09/03/19 0724    Specimen:  Blood Updated:  09/03/19 0830     Extra Tube hold for add-on     Comment: Auto resulted       Urinalysis, Microscopic Only - Urine, Clean Catch [[id number]]  (Abnormal) Collected:  09/03/19 0804    Specimen:  Urine, Clean Catch Updated:  09/03/19 0820     RBC, UA 0-2 /HPF      WBC, UA 31-50 /HPF      Bacteria, UA Trace /HPF      Squamous Epithelial Cells, UA 0-2 /HPF      Hyaline Casts, UA None Seen /LPF      Methodology Automated Microscopy    Urinalysis With Microscopic If Indicated (No Culture) - Urine, Clean Catch [[id number]]  (Abnormal) Collected:  09/03/19 0804    Specimen:  Urine, Clean Catch Updated:  09/03/19 0814     Color, UA Yellow     Appearance, UA Slightly  Cloudy     pH, UA 7.0     Specific Gravity, UA 1.015     Glucose, UA Negative     Ketones, UA 15 mg/dL (1+)     Bilirubin, UA Small (1+)     Blood, UA Small (1+)     Protein,  mg/dL (2+)     Leuk Esterase, UA Large (3+)     Nitrite, UA Positive     Urobilinogen, UA 1.0 E.U./dL    Comprehensive Metabolic Panel [[id number]]  (Abnormal) Collected:  09/03/19 0724    Specimen:  Blood Updated:  09/03/19 0800     Glucose 123 mg/dL      BUN 34 mg/dL      Creatinine 2.23 mg/dL      Sodium 140 mmol/L      Potassium 4.4 mmol/L      Chloride 94 mmol/L      CO2 31.0 mmol/L      Calcium 8.7 mg/dL      Total Protein 7.9 g/dL      Albumin 3.50 g/dL      ALT (SGPT) 9 U/L      AST (SGOT) 12 U/L      Alkaline Phosphatase 111 U/L      Total Bilirubin 0.7 mg/dL      eGFR Non African Amer 29 mL/min/1.73      Globulin 4.4 gm/dL      A/G Ratio 0.8 g/dL      BUN/Creatinine Ratio 15.2     Anion Gap 15.0 mmol/L     Narrative:       GFR Normal >60  Chronic Kidney Disease <60  Kidney Failure <15    Lipase [[id number]]  (Normal) Collected:  09/03/19 0724    Specimen:  Blood Updated:  09/03/19 0800     Lipase 23 U/L     Lactic Acid, Plasma [[id number]]  (Normal) Collected:  09/03/19 0732    Specimen:  Blood Updated:  09/03/19 0756     Lactate 0.9 mmol/L     CBC & Differential [[id number]] Collected:  09/03/19 0724    Specimen:  Blood Updated:  09/03/19 0741    Narrative:       The following orders were created for panel order CBC & Differential.  Procedure                               Abnormality         Status                     ---------                               -----------         ------                     CBC Auto Differential[[id number]]        Abnormal            Final result                 Please view results for these tests on the individual orders.    CBC Auto Differential [[id number]]  (Abnormal) Collected:  09/03/19 0724    Specimen:  Blood Updated:  09/03/19 0741     WBC 15.35 10*3/mm3      RBC 4.35 10*6/mm3      Hemoglobin  13.2 g/dL      Hematocrit 39.8 %      MCV 91.5 fL      MCH 30.3 pg      MCHC 33.2 g/dL      RDW 15.2 %      RDW-SD 51.0 fl      MPV 10.1 fL      Platelets 205 10*3/mm3      Neutrophil % 81.1 %      Lymphocyte % 4.2 %      Monocyte % 12.3 %      Eosinophil % 1.4 %      Basophil % 0.3 %      Immature Grans % 0.7 %      Neutrophils, Absolute 12.45 10*3/mm3      Lymphocytes, Absolute 0.65 10*3/mm3      Monocytes, Absolute 1.89 10*3/mm3      Eosinophils, Absolute 0.22 10*3/mm3      Basophils, Absolute 0.04 10*3/mm3      Immature Grans, Absolute 0.10 10*3/mm3      nRBC 0.0 /100 WBC         Estimated Creatinine Clearance: 29.4 mL/min (A) (by C-G formula based on SCr of 2.23 mg/dL (H)).     Imaging Results (last 24 hours)     Procedure Component Value Units Date/Time    CT Abdomen Pelvis Without Contrast [[id number]] Collected:  09/03/19 0842     Updated:  09/03/19 0929    Narrative:         PROCEDURE: Ct abdomen and pelvis without contrast    REASON FOR EXAM: R flank, suprapubic pain    FINDINGS: Comparison study dated  August 31, 2018. Axial computer  tomography sequential imaging was performed from the diaphragms  through the symphysis pubis without IV contrast administration.  Sagittal and coronal reformates was performed. This exam was  performed according to our departmental dose optimization  program, which includes automated exposure control, adjustment of  the mA and/or KV according to patient size and/or use of  iterative reconstruction technique.    Imaging through lung bases reveals posterior segment right lower  lobe small patchy opacities with air bronchograms. Very small  right pleural effusion. Lung bases are otherwise unremarkable.    The liver is normal. The gallbladder is not identified and may be  contracted, surgically, or congenitally absent. The biliary  system appears within normal limits. The pancreas is normal. The  spleen is normal. Bilateral adrenal glands are normal. Right  kidney upper pole, mid  zone, and lower pole multiple  nonobstructive renal calculi with the largest within the lower  pole which measures 2.1 mm in greatest diameter. Right kidney  renal pelvis 4.2 mm nonobstructive renal calculi. Right kidney  perirenal space small amount of fatty stranding and fluid. The  right kidney and ureter are otherwise unremarkable. In the region  of the bladder left ureter UVJ there is a 5.6 mm calculi which  may represent recently passed ureter calculi versus calculi  partially involving the left ureter UVJ. Distal left ureter 8.7  mm calculi 1.8 mm above the left ureter UVJ. This distal left  ureter calculi is causing moderate to severe obstruction with  associated moderate to severe hydroureter and hydronephrosis.  Additional distal ureter nonobstructing calculi which measures  2.5 mm in greatest diameter. Mild fatty stranding surrounding the  left ureter and in the left perirenal space may represent sequela  from obstruction with infundibular rupture versus associated  inflammatory changes. Recommend clinical correlation. The left  kidney and ureter are otherwise unremarkable. Suprapubic catheter  is seen involving the bladder with the bladder largely  decompressed. The prostate gland is mildly enlarged. The hollow  viscera is normal. Left para-aortic single enlarged lymph node  which measures 1.86 x 1.05 cm. This lymph node is at the level of  the left renal hilum. No other lymphadenopathy is identified. No  acute osseous abnormality.      Impression:       1. Right lower lobe posterior segment patchy opacities suspicious  for passive atelectasis versus pneumonia.  2. Very small right pleural effusion.  3.The gallbladder is not identified and may be contracted,  surgically, or congenitally absent.  4.In the region of the bladder left ureter UVJ there is a 5.6 mm  calculi which may represent recently passed ureter calculi versus  calculi partially involving the left ureter UVJ. Distal left  ureter 8.7 mm  calculi 1.8 mm above the left ureter UVJ. This  distal left ureter calculi is causing moderate to severe  obstruction with associated moderate to severe hydroureter and  hydronephrosis. Additional distal ureter nonobstructing calculi  which measures 2.5 mm in greatest diameter. Mild fatty stranding  surrounding the left ureter and in the left perirenal space may  represent sequela from obstruction with infundibular rupture  versus associated inflammatory changes. Recommend clinical  correlation.  5. Multiple right kidney nonobstructive calculi described above.  6. Suprapubic catheter decompressing the bladder.  7. Mildly enlarged prostate gland may represent changes from  benign prostatic hypertrophy versus prostate malignancy.  Recommend clinical correlation.  8.Left para-aortic single enlarged lymph node which measures 1.86  x 1.05 cm. This lymph node is of uncertain etiology by strict  size criteria. This most likely represents inflammatory  lymphadenopathy versus less likely lymphadenopathy secondary to  neoplastic involvement.    Electronically signed by:  David Nieto MD  9/3/2019 9:28 AM CDT  Workstation: [id number]          I reviewed the patient's new clinical results.    ASSESSMENT AND PLAN: This is a 74 y.o. male with:    Active Hospital Problems    Diagnosis POA   • **Ureterolithiasis [N20.1] Yes   • Urinary tract infection without hematuria [N39.0] Yes   • BPH (benign prostatic hyperplasia) [N40.0] Yes   • Atrial fibrillation (CMS/HCC) [I48.91] Yes       #. Ureterolithiasis with associate cystitis vs pyelonephritis. Zosyn. Urology consulted. Hold xarelto.   #. Acute cystitis vs pyelonephritis. Zosyn. Culture pending.  Results from last 7 days   Lab Units 09/03/19  0724   WBC 10*3/mm3 15.35*   #. AUGIE. Gentle hydration. Monitor. Lasix to restart tomorrow morning.  Results from last 7 days   Lab Units 09/03/19  0724   CREATININE mg/dL 2.23*   #. H/o CVA. Hold xarelto.  #. Afib. Nebivolol. Xarelto hold.  #. BPH.  Flomax. Proscar.  #. Depression. Paxil.  #. HLD. Statin.  #. Seizure disorder. Dilantin ER.  #. Chronic pain. eKASPER reviewed. [place] 10 q6h PRN.      Will monitor patient's hospital course and adjust treatment as hospital course dictates.    DVT prophylaxis: Xarelto  Code status is   Code Status and Medical Interventions:   Ordered at: 09/03/19 1323     Code Status:    CPR     Medical Interventions (Level of Support Prior to Arrest):    Full      [facility] # [id number], reviewed and consistent with patient reported medications.      I discussed the patients findings and my recommendations with patient, family, nursing staff and consulting provider.           This document has been electronically signed by Spenser England MD on September 3, 2019 1:21 PM

## 2019-09-03 NOTE — ED NOTES
SP catheter drainage bag changed prior to collecting urine specimen.     Morgan Xiao RN  09/03/19 0894

## 2019-09-03 NOTE — ED TRIAGE NOTES
Pt c/o central lower abdominal pain. Pt denies n/v/d. Pt states he was dx with right-sided kidney stone 2-3 days ago. Pt's family reports the pt just finished outpatient antibiotic (Septra DM).

## 2019-09-04 PROBLEM — N18.4 CKD (CHRONIC KIDNEY DISEASE) STAGE 4, GFR 15-29 ML/MIN (HCC): Chronic | Status: ACTIVE | Noted: 2019-01-01

## 2019-09-04 NOTE — PROGRESS NOTES
LOS: 1 day     Patient Care Team:  Manuel Lopez APRN as PCP - General (Family Medicine)      Subjective     Left ureteral stones    Objective       Vital Signs  Temp:  [98 °F (36.7 °C)-100.7 °F (38.2 °C)] 98 °F (36.7 °C)  Heart Rate:  [77-92] 82  Resp:  [17-18] 18  BP: ()/(52-71) 117/71    Physical Exam:        General Appearance:   Sick with pain     Respiratory:    UNLABORED RESPIRATIONS.     Abdomen:     SOFT.       Genitourinary:  Urinating through SP tube     Rectal:     DEFERRED       Results Review:       Imaging Results (last 24 hours)     ** No results found for the last 24 hours. **        Lab Results (last 24 hours)     Procedure Component Value Units Date/Time    Blood Culture - Blood, Wrist, Right [[id number]] Collected:  09/03/19 1825    Specimen:  Blood from Wrist, Right Updated:  09/04/19 1845     Blood Culture No growth at 24 hours    Blood Culture - Blood, Wrist, Left [[id number]] Collected:  09/03/19 1815    Specimen:  Blood from Wrist, Left Updated:  09/04/19 1830     Blood Culture No growth at 24 hours    Urine Culture - Urine, Urine, Clean Catch [[id number]] Collected:  09/03/19 0804    Specimen:  Urine, Clean Catch Updated:  09/04/19 0751    Basic Metabolic Panel [[id number]]  (Abnormal) Collected:  09/04/19 0524    Specimen:  Blood Updated:  09/04/19 0622     Glucose 132 mg/dL      BUN 30 mg/dL      Creatinine 2.43 mg/dL      Sodium 139 mmol/L      Potassium 4.2 mmol/L      Chloride 99 mmol/L      CO2 30.0 mmol/L      Calcium 8.4 mg/dL      eGFR Non African Amer 26 mL/min/1.73      BUN/Creatinine Ratio 12.3     Anion Gap 10.0 mmol/L     Narrative:       GFR Normal >60  Chronic Kidney Disease <60  Kidney Failure <15    CBC & Differential [[id number]] Collected:  09/04/19 0524    Specimen:  Blood Updated:  09/04/19 0602    Narrative:       The following orders were created for panel order CBC & Differential.  Procedure                               Abnormality         Status                      ---------                               -----------         ------                     CBC Auto Differential[[id number]]        Abnormal            Final result                 Please view results for these tests on the individual orders.    CBC Auto Differential [[id number]]  (Abnormal) Collected:  09/04/19 0524    Specimen:  Blood Updated:  09/04/19 0602     WBC 18.15 10*3/mm3      RBC 4.27 10*6/mm3      Hemoglobin 12.9 g/dL      Hematocrit 39.6 %      MCV 92.7 fL      MCH 30.2 pg      MCHC 32.6 g/dL      RDW 15.4 %      RDW-SD 52.1 fl      MPV 10.1 fL      Platelets 192 10*3/mm3      Neutrophil % 83.6 %      Lymphocyte % 2.3 %      Monocyte % 12.0 %      Eosinophil % 0.9 %      Basophil % 0.3 %      Immature Grans % 0.9 %      Neutrophils, Absolute 15.17 10*3/mm3      Lymphocytes, Absolute 0.42 10*3/mm3      Monocytes, Absolute 2.18 10*3/mm3      Eosinophils, Absolute 0.16 10*3/mm3      Basophils, Absolute 0.05 10*3/mm3      Immature Grans, Absolute 0.17 10*3/mm3      nRBC 0.0 /100 WBC             I reviewed the patient's new clinical results.  I reviewed the patient's new imaging results and agree with the interpretation.  I reviewed the patient's other test results and agree with the interpretation        Assessment/Plan       Ureterolithiasis    BPH (benign prostatic hyperplasia)    CKD (chronic kidney disease) stage 4, GFR 15-29 ml/min (CMS/[facility])      Cystoscopy left retrograde ureteroscopy laser lithotripsy J stent placement risk and benefits of been discussed      Morgan Al MD  09/04/19  6:52 PM

## 2019-09-04 NOTE — ANESTHESIA PREPROCEDURE EVALUATION
Anesthesia Evaluation     no history of anesthetic complications:  NPO Solid Status: Waived due to emergency  NPO Liquid Status: > 2 hours           Airway   Mallampati: II  TM distance: >3 FB  Neck ROM: limited  Possible difficult intubation  Dental    (+) lower dentures and upper dentures    Pulmonary    (+) COPD, decreased breath sounds,   (-) sleep apnea, not a smoker    ROS comment: On nasal canula O2 in hospital  Cardiovascular   Exercise tolerance: poor (<4 METS)    ECG reviewed  PT is on anticoagulation therapy  Patient on routine beta blocker and Beta blocker given within 24 hours of surgery  Rhythm: regular  Rate: normal    (+) hypertension well controlled, dysrhythmias (s/p ablation) Atrial Flutter, hyperlipidemia,   (-) past MI, angina, murmur, DVT    ROS comment: Sinus rhythm with 1st degree AV block with frequent premature ventricular  complexes  Nonspecific T wave abnormality  Abnormal ECG  When compared with ECG of 08-JUN-2019 06:56,  premature ventricular complexes are now present      · Left ventricular systolic function is normal. Estimated EF = 55%.  · Left ventricular diastolic dysfunction (grade I) consistent with impaired relaxation.  · Mild mitral valve regurgitation is present  · Mild tricuspid valve regurgitation is present    Neuro/Psych  (+) seizures well controlled, CVA (left side from trauma at age 12) residual symptoms, psychiatric history Anxiety,     GI/Hepatic/Renal/Endo    (+)   renal disease (hx of stones) stones,   (-) GERD, hepatitis, liver disease, diabetes, hypothyroidism    ROS Comment: BPH    9 mm distal left ureteral calculus the level of the left hip.  Suprapubic catheter.    Musculoskeletal     (+) arthralgias,   Abdominal   (+) obese,    Substance History   (+) alcohol use (occ),      OB/GYN          Other   (+) arthritis     ROS/Med Hx Other: UTI sepsis                    Anesthesia Plan    ASA 4 - emergent     general   (Does not do well laying flat, has been drowsy  all day.  )  intravenous induction   Anesthetic plan, all risks, benefits, and alternatives have been provided, discussed and informed consent has been obtained with: patient and spouse/significant other.

## 2019-09-04 NOTE — PROGRESS NOTES
Progress Note  Yoan Hamilton MD  Hospitalist    Date of visit: 9/4/2019     LOS: 1 day   Patient Care Team:  Manuel Lopez APRN as PCP - General (Family Medicine)    Chief Complaint: urinary tract symptoms    Subjective     Interval History:     Patient Complaints: dysuria / frequency    History taken from: patient / nursing    Medication Review:   Current Facility-Administered Medications   Medication Dose Route Frequency Provider Last Rate Last Dose   • atorvastatin (LIPITOR) tablet 10 mg  10 mg Oral Daily Spenser England MD   10 mg at 09/04/19 0901   • baclofen (LIORESAL) tablet 10 mg  10 mg Oral Q12H Spenser England MD   10 mg at 09/03/19 2106   • finasteride (PROSCAR) tablet 5 mg  5 mg Oral Daily Spenser England MD   5 mg at 09/04/19 0901   • furosemide (LASIX) tablet 20 mg  20 mg Oral Daily Spenser England MD       • HYDROcodone-acetaminophen (NORCO)  MG per tablet 1 tablet  1 tablet Oral Q6H PRN Spenser England MD       • nebivolol (BYSTOLIC) tablet 20 mg  20 mg Oral Nightly Spenser England MD   20 mg at 09/03/19 2106   • PARoxetine (PAXIL) tablet 20 mg  20 mg Oral QAM Spenser England MD   20 mg at 09/04/19 0649   • Pharmacy to Dose Zosyn   Does not apply Continuous PRN Spenser England MD       • phenytoin (DILANTIN) ER capsule 100 mg  100 mg Oral Q12H Spenser England MD   100 mg at 09/04/19 0901   • piperacillin-tazobactam (ZOSYN) 3.375 g/100 mL 0.9% NS IVPB (mbp)  3.375 g Intravenous Q8H Spenser England MD   3.375 g at 09/04/19 0901   • sodium chloride 0.9 % flush 10 mL  10 mL Intravenous PRN Derrick Whiteside MD       • sodium chloride 0.9 % flush 10 mL  10 mL Intravenous Q12H Spenser England MD       • sodium chloride 0.9 % flush 10 mL  10 mL Intravenous PRN Spenser England MD       • sodium chloride 0.9 % infusion  50 mL/hr Intravenous Continuous Spenser England MD 50 mL/hr at 09/03/19 1838 50 mL/hr at 09/03/19 1838   • tamsulosin (FLOMAX) 24 hr capsule 0.4 mg  0.4 mg Oral Nightly Spenser England MD   0.4 mg  at 09/03/19 2106       Review of Systems:   Review of Systems   Constitutional: Positive for fatigue. Negative for fever.   Respiratory: Negative for cough, choking and wheezing.    Cardiovascular: Negative for chest pain and palpitations.   Gastrointestinal: Negative for abdominal distention, abdominal pain, anal bleeding, diarrhea and nausea.   Genitourinary: Positive for dysuria, frequency and urgency. Negative for discharge, penile swelling and scrotal swelling.   Musculoskeletal: Positive for arthralgias. Negative for back pain, joint swelling and neck stiffness.   Skin: Positive for pallor. Negative for color change and rash.   Neurological: Positive for weakness. Negative for dizziness, tremors, seizures and headaches.   Psychiatric/Behavioral: Negative for agitation, behavioral problems and confusion.   All other systems reviewed and are negative.      Objective     Vital Signs  Temp:  [97.6 °F (36.4 °C)-100.7 °F (38.2 °C)] 99.5 °F (37.5 °C)  Heart Rate:  [] 82  Resp:  [16-18] 17  BP: ()/(54-80) 99/54    Physical Exam:  Physical Exam   Constitutional: He appears well-developed and well-nourished. No distress.   HENT:   Head: Normocephalic and atraumatic.   Eyes: EOM are normal. Pupils are equal, round, and reactive to light. No scleral icterus.   Neck: Normal range of motion. Neck supple.   Cardiovascular: Normal rate and regular rhythm. Exam reveals no friction rub.   No murmur heard.  Pulmonary/Chest: Effort normal and breath sounds normal. No stridor. No respiratory distress. He has no wheezes.   Abdominal: Soft. Bowel sounds are normal. He exhibits no distension. There is no tenderness. There is no guarding.   Supra pubic catheter present   Musculoskeletal: Normal range of motion. He exhibits no edema or tenderness.   Neurological: He is alert. No cranial nerve deficit. Coordination normal.   L sided weakness    Skin: Skin is warm and dry. No rash noted. There is pallor.   Psychiatric: He has  a normal mood and affect. His behavior is normal.   Vitals reviewed.       Results Review:    Lab Results (last 24 hours)     Procedure Component Value Units Date/Time    Urine Culture - Urine, Urine, Clean Catch [[id number]] Collected:  09/03/19 0804    Specimen:  Urine, Clean Catch Updated:  09/04/19 0751    Basic Metabolic Panel [[id number]]  (Abnormal) Collected:  09/04/19 0524    Specimen:  Blood Updated:  09/04/19 0622     Glucose 132 mg/dL      BUN 30 mg/dL      Creatinine 2.43 mg/dL      Sodium 139 mmol/L      Potassium 4.2 mmol/L      Chloride 99 mmol/L      CO2 30.0 mmol/L      Calcium 8.4 mg/dL      eGFR Non African Amer 26 mL/min/1.73      BUN/Creatinine Ratio 12.3     Anion Gap 10.0 mmol/L     Narrative:       GFR Normal >60  Chronic Kidney Disease <60  Kidney Failure <15    CBC & Differential [[id number]] Collected:  09/04/19 0524    Specimen:  Blood Updated:  09/04/19 0602    Narrative:       The following orders were created for panel order CBC & Differential.  Procedure                               Abnormality         Status                     ---------                               -----------         ------                     CBC Auto Differential[[id number]]        Abnormal            Final result                 Please view results for these tests on the individual orders.    CBC Auto Differential [[id number]]  (Abnormal) Collected:  09/04/19 0524    Specimen:  Blood Updated:  09/04/19 0602     WBC 18.15 10*3/mm3      RBC 4.27 10*6/mm3      Hemoglobin 12.9 g/dL      Hematocrit 39.6 %      MCV 92.7 fL      MCH 30.2 pg      MCHC 32.6 g/dL      RDW 15.4 %      RDW-SD 52.1 fl      MPV 10.1 fL      Platelets 192 10*3/mm3      Neutrophil % 83.6 %      Lymphocyte % 2.3 %      Monocyte % 12.0 %      Eosinophil % 0.9 %      Basophil % 0.3 %      Immature Grans % 0.9 %      Neutrophils, Absolute 15.17 10*3/mm3      Lymphocytes, Absolute 0.42 10*3/mm3      Monocytes, Absolute 2.18 10*3/mm3      Eosinophils,  Absolute 0.16 10*3/mm3      Basophils, Absolute 0.05 10*3/mm3      Immature Grans, Absolute 0.17 10*3/mm3      nRBC 0.0 /100 WBC     Blood Culture - Blood, Wrist, Right [[id number]] Collected:  09/03/19 1825    Specimen:  Blood from Wrist, Right Updated:  09/03/19 1831    Blood Culture - Blood, Wrist, Left [[id number]] Collected:  09/03/19 1815    Specimen:  Blood from Wrist, Left Updated:  09/03/19 1821          Imaging Results (last 24 hours)     ** No results found for the last 24 hours. **          Assessment/Plan       Ureterolithiasis    BPH (benign prostatic hyperplasia)    CKD (chronic kidney disease) stage 4, GFR 15-29 ml/min (CMS/[facility])    Scheduled for lithotripsy / stent placement later today    Yoan Hamilton MD  09/04/19  11:24 AM

## 2019-09-04 NOTE — PLAN OF CARE
Problem: Patient Care Overview  Goal: Plan of Care Review  Outcome: Ongoing (interventions implemented as appropriate)   09/04/19 0357 09/04/19 1402   Coping/Psychosocial   Plan of Care Reviewed With caregiver --    Plan of Care Review   Progress no change --    OTHER   Outcome Summary --  pt has been sleeping most of day, surgery scheduled for this afternoon, will continue to monitor        Problem: Urinary Tract Infection (Adult)  Goal: Signs and Symptoms of Listed Potential Problems Will be Absent, Minimized or Managed (Urinary Tract Infection)  Outcome: Ongoing (interventions implemented as appropriate)      Problem: Wound (Includes Pressure Injury) (Adult)  Goal: Signs and Symptoms of Listed Potential Problems Will be Absent, Minimized or Managed (Wound)  Outcome: Ongoing (interventions implemented as appropriate)      Problem: Skin Injury Risk (Adult)  Goal: Identify Related Risk Factors and Signs and Symptoms  Outcome: Outcome(s) achieved Date Met: 09/04/19    Goal: Skin Health and Integrity  Outcome: Ongoing (interventions implemented as appropriate)      Problem: Pain, Chronic (Adult)  Goal: Acceptable Pain/Comfort Level and Functional Ability  Outcome: Ongoing (interventions implemented as appropriate)

## 2019-09-04 NOTE — PROGRESS NOTES
"   LOS: 1 day   Patient Care Team:  Manuel Lopez APRN as PCP - General (Family Medicine)    Subjective     Subjective:  Symptoms:  (Urine is clear and yellow in SP tube's gravity bag. Fatigue. Intermittent pain. Poor appetite. ).    Diet:  NPO.  No nausea or vomiting.    Activity level: Impaired due to weakness.    Pain:  He complains of pain that is moderate.  Pain is requiring pain medication.        History taken from: patient chart family RN    Objective     Vital Signs  Temp:  [97.6 °F (36.4 °C)-100.7 °F (38.2 °C)] 99.5 °F (37.5 °C)  Heart Rate:  [] 82  Resp:  [16-18] 17  BP: ()/(54-80) 99/54    Objective:  General Appearance:  In no acute distress.    Vital signs: (most recent): Blood pressure 99/54, pulse 82, temperature 99.5 °F (37.5 °C), temperature source Axillary, resp. rate 17, height 170.2 cm (67\"), weight 85 kg (187 lb 8 oz), SpO2 91 %.  Vital signs are normal.    Output: Producing urine.    HEENT: Normal HEENT exam.    Lungs:  He is not in respiratory distress.    Abdomen: Abdomen is soft and non-distended.    Pulses: Distal pulses are intact.    Neurological: Patient is alert.    Skin:  Warm and dry.  No rash or ecchymosis.             Results Review:    Lab Results (last 24 hours)     Procedure Component Value Units Date/Time    Urine Culture - Urine, Urine, Clean Catch [[id number]] Collected:  09/03/19 0804    Specimen:  Urine, Clean Catch Updated:  09/04/19 0751    Basic Metabolic Panel [[id number]]  (Abnormal) Collected:  09/04/19 0524    Specimen:  Blood Updated:  09/04/19 0622     Glucose 132 mg/dL      BUN 30 mg/dL      Creatinine 2.43 mg/dL      Sodium 139 mmol/L      Potassium 4.2 mmol/L      Chloride 99 mmol/L      CO2 30.0 mmol/L      Calcium 8.4 mg/dL      eGFR Non African Amer 26 mL/min/1.73      BUN/Creatinine Ratio 12.3     Anion Gap 10.0 mmol/L     Narrative:       GFR Normal >60  Chronic Kidney Disease <60  Kidney Failure <15    CBC & Differential [[id number]] Collected: "  09/04/19 0524    Specimen:  Blood Updated:  09/04/19 0602    Narrative:       The following orders were created for panel order CBC & Differential.  Procedure                               Abnormality         Status                     ---------                               -----------         ------                     CBC Auto Differential[[id number]]        Abnormal            Final result                 Please view results for these tests on the individual orders.    CBC Auto Differential [[id number]]  (Abnormal) Collected:  09/04/19 0524    Specimen:  Blood Updated:  09/04/19 0602     WBC 18.15 10*3/mm3      RBC 4.27 10*6/mm3      Hemoglobin 12.9 g/dL      Hematocrit 39.6 %      MCV 92.7 fL      MCH 30.2 pg      MCHC 32.6 g/dL      RDW 15.4 %      RDW-SD 52.1 fl      MPV 10.1 fL      Platelets 192 10*3/mm3      Neutrophil % 83.6 %      Lymphocyte % 2.3 %      Monocyte % 12.0 %      Eosinophil % 0.9 %      Basophil % 0.3 %      Immature Grans % 0.9 %      Neutrophils, Absolute 15.17 10*3/mm3      Lymphocytes, Absolute 0.42 10*3/mm3      Monocytes, Absolute 2.18 10*3/mm3      Eosinophils, Absolute 0.16 10*3/mm3      Basophils, Absolute 0.05 10*3/mm3      Immature Grans, Absolute 0.17 10*3/mm3      nRBC 0.0 /100 WBC     Blood Culture - Blood, Wrist, Right [[id number]] Collected:  09/03/19 1825    Specimen:  Blood from Wrist, Right Updated:  09/03/19 1831    Blood Culture - Blood, Wrist, Left [[id number]] Collected:  09/03/19 1815    Specimen:  Blood from Wrist, Left Updated:  09/03/19 1821         Imaging Results (last 24 hours)     ** No results found for the last 24 hours. **           I reviewed the patient's new clinical results.  I reviewed the patient's new imaging results and agree with the interpretation.  I reviewed the patient's other test results and agree with the interpretation      Assessment/Plan       Ureterolithiasis    Atrial fibrillation (CMS/HCC)    Acute UTI (urinary tract infection)    BPH  (benign prostatic hyperplasia)      Assessment & Plan    1. Left ureteral stones with obstruction and   2. Pyelonephritis  3. AUGIE  -CT abdomen/pelvis wo contrast shows 3 distal ureteral stones: LEFT UVJ stone nearly 6 mm; distal ureteral stone nearly 9 mm above the UVJ stone and another distal ureteral stone; moderate to severe hydroureteronephrosis with mild fatty stranding left ureter and perirenal space. There are multiple right kidney stones not active; mildly enlarged prostate with a left para-aortic single enlarged lymph node of undetermined significance.   -+ UA, 9/3/19 urine culture IP  -WBC 15.35-->18.15  -Estimated Creatinine Clearance: 27 mL/min (A) (by C-G formula based on SCr of 2.43 mg/dL (H)).  -Cr 2.23-->2.43  -Baseline Cr 1.0  -Zosyn day #2  -Flomax, Proscar, home meds     Plan:  NPO now for LEFT CRULLS with Dr. troy  Await urine culture, continue Zosyn.     JEAN Robbins  09/04/19  9:33 AM

## 2019-09-04 NOTE — NURSING NOTE
Patient has a shearing injury form a lift his daughter uses to move him. It measures 3 x 2 cm It is a combination of granulation and yellow slough. Scant amount of serous drainage. POA yes Patient buttocks are dry and reddened. Needs good skin care., offloading and protection.

## 2019-09-04 NOTE — PLAN OF CARE
Problem: Patient Care Overview  Goal: Plan of Care Review  Outcome: Ongoing (interventions implemented as appropriate)   09/04/19 1528   Coping/Psychosocial   Plan of Care Reviewed With caregiver;patient   Plan of Care Review   Progress no change   OTHER   Outcome Summary New Assessment. Hx of decreased oral intake since illness. Will add supplements when po is resumed. Noted pressure injury present on admit.        Problem: Skin Injury Risk (Adult)  Intervention: Promote/Optimize Nutrition   09/04/19 1528   Nutrition Interventions   Oral Nutrition Promotion calorie dense foods provided;calorie dense liquids provided;nutritional therapy counseling provided

## 2019-09-04 NOTE — PLAN OF CARE
Problem: Fall Risk (Adult)  Goal: Identify Related Risk Factors and Signs and Symptoms  Outcome: Ongoing (interventions implemented as appropriate)    Goal: Absence of Fall  Outcome: Ongoing (interventions implemented as appropriate)      Problem: Patient Care Overview  Goal: Plan of Care Review  Outcome: Ongoing (interventions implemented as appropriate)   09/04/19 0357   Coping/Psychosocial   Plan of Care Reviewed With caregiver   Plan of Care Review   Progress no change   OTHER   Outcome Summary pt. has general feeling of malaise; vss; tolerates meds; cooperative     Goal: Individualization and Mutuality  Outcome: Ongoing (interventions implemented as appropriate)    Goal: Discharge Needs Assessment  Outcome: Ongoing (interventions implemented as appropriate)    Goal: Interprofessional Rounds/Family Conf  Outcome: Ongoing (interventions implemented as appropriate)      Problem: Urinary Tract Infection (Adult)  Goal: Signs and Symptoms of Listed Potential Problems Will be Absent, Minimized or Managed (Urinary Tract Infection)  Outcome: Ongoing (interventions implemented as appropriate)      Problem: Wound (Includes Pressure Injury) (Adult)  Goal: Signs and Symptoms of Listed Potential Problems Will be Absent, Minimized or Managed (Wound)  Outcome: Ongoing (interventions implemented as appropriate)      Problem: Skin Injury Risk (Adult)  Goal: Identify Related Risk Factors and Signs and Symptoms  Outcome: Ongoing (interventions implemented as appropriate)    Goal: Skin Health and Integrity  Outcome: Ongoing (interventions implemented as appropriate)      Problem: Pain, Chronic (Adult)  Goal: Identify Related Risk Factors and Signs and Symptoms  Outcome: Ongoing (interventions implemented as appropriate)    Goal: Acceptable Pain/Comfort Level and Functional Ability  Outcome: Ongoing (interventions implemented as appropriate)

## 2019-09-04 NOTE — CONSULTS
Adult Nutrition  Assessment    Patient Name:  Mando Hill  YOB: 1945  MRN: [id number]  Admit Date:  9/3/2019    Assessment Date:  9/4/2019    Comments:  Pt admitted with dx of obstructing ureteral stones.  He is going for surgery this afternoon.  Hx of decreased oral intake over the last week and especially the last several days.  He has lost ~5# per his daughter.  Pt with decreased alertness and his daughter is requesting a soft diet with chopped meats until he feels better.  He has a hx of CVA with hemiplegia.  He has a shearing injury from the lift his daughter uses.  He does drink Boost at home.  Will boost with all meals and monitor and change diet consistency to soft diet with chopped meats with diet is resumed.    Reason for Assessment     Row Name 09/04/19 1514          Reason for Assessment    Reason For Assessment  identified at risk by screening criteria     Diagnosis  infection/sepsis     Identified At Risk by Screening Criteria  large or nonhealing wound, burn or pressure injury         Nutrition/Diet History     Row Name 09/04/19 1514          Nutrition/Diet History    Typical Food/Fluid Intake  Pt very sleepy.  His daughter who is his caregiver provides information.  Pt was eating well up until Last saturday and his appetite started declining.  He hasn't eatne much at all for the last several days.  He has lost ~5#.  He may need soft foods when he can eat just because he feels so bad.            Labs/Tests/Procedures/Meds     Row Name 09/04/19 1516          Labs/Procedures/Meds    Lab Results Reviewed  reviewed, pertinent     Lab Results Comments  Glucose 132; Bun 30; Creat 2.43        Diagnostic Tests/Procedures    Diagnostic Test/Procedure Reviewed  reviewed, pertinent     Diagnostic Test/Procedures Comments  Urology        Medications    Pertinent Medications Reviewed  reviewed, pertinent     Pertinent Medications Comments  Lasix         Physical Findings     Row Name  09/04/19 1517          Physical Findings    Overall Physical Appearance  other (see comments) Hemiplegia         Estimated/Assessed Needs     Row Name 09/04/19 1518          Calculation Measurements    Weight Used For Calculations  84.8 kg (187 lb)        Estimated/Assessed Needs    Additional Documentation  Additional Requirements (Group);Fluid Requirements (Group);[place]-St. Jeor Equation (Group);Protein Requirements (Group);Calorie Requirements (Group);KCAL/KG (Group)        Calorie Requirements    Estimated Calorie Requirement (kcal/day)  1800     Estimated Calorie Need Method  [place]-St Jeor        [place]-St. Jeor Equation    RMR ([place]-St. Jeor Equation)  1536.855        Protein Requirements    Weight Used For Protein Calculations  67.1 kg (148 lb)     Est Protein Requirement Amount (gms/kg)  -- 67-80 gms (1.0-1.2 gms/kg IBW)        Fluid Requirements    Estimated Fluid Requirements (mL/day)  1800     Estimated Fluid Requirement Method  RDA Method     RDA Method (mL)  1800     Brennen-Franciscoar Method (over 20 kg)  3196.46         Nutrition Prescription Ordered     Row Name 09/04/19 1520          Nutrition Prescription PO    Current PO Diet  NPO         Evaluation of Received Nutrient/Fluid Intake     Row Name 09/04/19 1520          PO Evaluation    Number of Days PO Intake Evaluated  Insufficient Data               Electronically signed by:  Radha Valentine RD  09/04/19 3:35 PM

## 2019-09-05 NOTE — ANESTHESIA PROCEDURE NOTES
Airway  Urgency: elective    Date/Time: 9/4/2019 7:55 PM  Airway not difficult    General Information and Staff    Patient location during procedure: OR  CRNA: Vern Amor CRNA    Indications and Patient Condition  Indications for airway management: airway protection    Preoxygenated: yes  Mask difficulty assessment: 0 - not attempted    Final Airway Details  Final airway type: supraglottic airway      Successful airway: I-gel  Size 5    Number of attempts at approach: 2    Additional Comments  Started with lma #4 with leak, switched to lma #5.

## 2019-09-05 NOTE — PLAN OF CARE
Problem: Fall Risk (Adult)  Goal: Identify Related Risk Factors and Signs and Symptoms  Outcome: Ongoing (interventions implemented as appropriate)    Goal: Absence of Fall  Outcome: Ongoing (interventions implemented as appropriate)      Problem: Patient Care Overview  Goal: Individualization and Mutuality  Outcome: Ongoing (interventions implemented as appropriate)    Goal: Discharge Needs Assessment  Outcome: Ongoing (interventions implemented as appropriate)    Goal: Interprofessional Rounds/Family Conf  Outcome: Ongoing (interventions implemented as appropriate)      Problem: Urinary Tract Infection (Adult)  Goal: Signs and Symptoms of Listed Potential Problems Will be Absent, Minimized or Managed (Urinary Tract Infection)  Outcome: Ongoing (interventions implemented as appropriate)      Problem: Wound (Includes Pressure Injury) (Adult)  Goal: Signs and Symptoms of Listed Potential Problems Will be Absent, Minimized or Managed (Wound)  Outcome: Ongoing (interventions implemented as appropriate)      Problem: Skin Injury Risk (Adult)  Goal: Skin Health and Integrity  Outcome: Ongoing (interventions implemented as appropriate)      Problem: Pain, Chronic (Adult)  Goal: Identify Related Risk Factors and Signs and Symptoms  Outcome: Ongoing (interventions implemented as appropriate)    Goal: Acceptable Pain/Comfort Level and Functional Ability  Outcome: Ongoing (interventions implemented as appropriate)      Problem: NPPV/CPAP (Adult)  Goal: Signs and Symptoms of Listed Potential Problems Will be Absent, Minimized or Managed (NPPV/CPAP)  Outcome: Ongoing (interventions implemented as appropriate)

## 2019-09-05 NOTE — PLAN OF CARE
Problem: Fall Risk (Adult)  Goal: Identify Related Risk Factors and Signs and Symptoms  Outcome: Outcome(s) achieved Date Met: 09/05/19    Goal: Absence of Fall  Outcome: Ongoing (interventions implemented as appropriate)      Problem: Patient Care Overview  Goal: Plan of Care Review  Outcome: Ongoing (interventions implemented as appropriate)   09/05/19 3639   Coping/Psychosocial   Plan of Care Reviewed With patient;significant other   Plan of Care Review   Progress improving   OTHER   Outcome Summary pt coded this morning - CPR and 1 shock, Amio given and drip started, pt placed on Bipap, pt now is on 6L nc and tolerating well, urine output adequate and clearing (was bloody then blood-tinged)surgery cancelled today - postponed till tomorrow, V/S stable now        Problem: Wound (Includes Pressure Injury) (Adult)  Goal: Signs and Symptoms of Listed Potential Problems Will be Absent, Minimized or Managed (Wound)  Outcome: Ongoing (interventions implemented as appropriate)      Problem: Skin Injury Risk (Adult)  Goal: Skin Health and Integrity  Outcome: Ongoing (interventions implemented as appropriate)      Problem: Pain, Chronic (Adult)  Goal: Identify Related Risk Factors and Signs and Symptoms  Outcome: Outcome(s) achieved Date Met: 09/05/19    Goal: Acceptable Pain/Comfort Level and Functional Ability  Outcome: Ongoing (interventions implemented as appropriate)      Problem: NPPV/CPAP (Adult)  Goal: Signs and Symptoms of Listed Potential Problems Will be Absent, Minimized or Managed (NPPV/CPAP)  Outcome: Ongoing (interventions implemented as appropriate)      Problem: Kidney Disease, Chronic/End Stage Renal Disease (Adult)  Goal: Signs and Symptoms of Listed Potential Problems Will be Absent, Minimized or Managed (Kidney Disease, Chronic/End Stage Renal Disease)  Outcome: Ongoing (interventions implemented as appropriate)

## 2019-09-05 NOTE — PLAN OF CARE
Problem: NPPV/CPAP (Adult)  Goal: Signs and Symptoms of Listed Potential Problems Will be Absent, Minimized or Managed (NPPV/CPAP)   Pt still on l Bipap  Will continue to monitor pt.  Wean as piper

## 2019-09-05 NOTE — CONSULTS
Adult Nutrition  Assessment    Patient Name:  Mando Hill  YOB: 1945  MRN: [id number]  Admit Date:  9/3/2019    Assessment Date:  9/5/2019    Comments: A JStent was unable to be placed yesterday.  He was transferred to CCU due to resp status and decreased alertness.  Pt actually coded this am due to Vtach was was quickly resuscitated.  He was put on bipap.  SLP was consulted once again after pt was able to tolerate Nasal cannula.  They have recommended that pt be on a Pureed diet when medically cleared to start po.  Rd will monitor tx plans and will send Boost with all meals when po diet is started     Reason for Assessment     Row Name 09/05/19 1545          Reason for Assessment    Reason For Assessment  follow-up protocol         Nutrition/Diet History     Row Name 09/05/19 1546          Nutrition/Diet History    Typical Food/Fluid Intake  Pt adn staff in the room           Labs/Tests/Procedures/Meds     Row Name 09/05/19 1546          Labs/Procedures/Meds    Lab Results Reviewed  reviewed, pertinent     Lab Results Comments  Bun 30; Creat 2.48        Diagnostic Tests/Procedures    Diagnostic Test/Procedure Reviewed  reviewed, pertinent     Diagnostic Test/Procedures Comments  Urology; CCU--coded and on Bipap now Nasal cannula        Medications    Pertinent Medications Reviewed  reviewed, pertinent     Pertinent Medications Comments  abx; Solumedrol         Physical Findings     Row Name 09/05/19 1547          Physical Findings    Overall Physical Appearance  on oxygen therapy     Skin  other (see comments) wound due to lift           Nutrition Prescription Ordered     Row Name 09/05/19 1548          Nutrition Prescription PO    Current PO Diet  NPO                 Electronically signed by:  Radha Valentine RD  09/05/19 3:50 PM

## 2019-09-05 NOTE — CONSULTS
Cardiology at [facility]  Cardiovascular Consultation Note      Mando Hill  08/A  [id number]  1945    DATE OF ADMISSION: 9/3/2019  DATE OF CONSULTATION:  9/5/2019    Manuel Lopez APRN  Treatment Team:   Attending Provider: Spenser England MD  Consulting Physician: Morgan Al MD  Surgeon: Morgan Al MD  Consulting Physician: Seven Londono MD  Admitting Provider: Spenser England MD    Chief Complaint   Patient presents with   • Abdominal Pain       Chief complaint/ Reason for Consultation: He resuscitative cardiac arrest      History of Present Illness  Body mass index is 29.25 kg/m². BMI is above normal parameters. Recommendations include: exercise counseling, nutrition counseling and referral to primary care.  74 years old patient currently confused unable to provide information in the background history of atrial flutter status post EP study and flutter ablation as there was difficulty in controlling the rate, chronic suprapubic catheter placement, hyperlipidemia, long-standing history of traumatic brain injury, CVA and recently underwent urological procedure and admitted with generalized weakness change in mental status and lethargic with GI symptom.  Patient was in the intensive care unit and developed ventricular tachycardia requiring emergent external defibrillation to sinus rhythm.  EKG sinus bradycardia with a nonspecific ST-T wave changes and echocardiogram study ejection fraction 24% compared to the ejection fraction 55% in March 2018.  Patient is unable to provide information due to his mental conditions.  Patient also noted a renal insufficiency treated with for urosepsis with a history of renal stone    · The left ventricular cavity is mild-to-moderately dilated.  · Estimated EF = 24%.  · Left ventricular systolic function is severely decreased.  · Left ventricular diastolic dysfunction (grade II) consistent with pseudonormalization.  · Mild mitral  valve regurgitation is present  · Moderate tricuspid valve regurgitation is present.  · The tricuspid valve is grossly normal. Moderate tricuspid valve regurgitation is present. Estimated right ventricular systolic pressure from tricuspid regurgitation is moderately elevated (45-55 mmHg). Moderate pulmonary hypertension is present.    3/2018  · Left ventricular systolic function is normal. Estimated EF = 55%.  · Left ventricular diastolic dysfunction (grade I) consistent with impaired relaxation.  · Mild mitral valve regurgitation is present  · Mild tricuspid valve regurgitation is present.  Past Medical History:   Diagnosis Date   • Atherosclerosis    • Atrial fibrillation (CMS/HCC)    • BPH (benign prostatic hyperplasia)    • Chronic bronchitis (CMS/HCC)    • Hyperlipidemia    • Hypertension    • Kidney stones    • Left hemiplegia (CMS/HCC)    • Osteoarthritis    • Sacral decubitus ulcer    • Stroke (cerebrum) (CMS/HCC)    • UTI (urinary tract infection) due to urinary indwelling catheter (CMS/HCC)        Past Surgical History:   Procedure Laterality Date   • CARDIAC ELECTROPHYSIOLOGY PROCEDURE N/A 3/15/2018   • CHOLECYSTECTOMY     • EXTRACORPOREAL SHOCKWAVE LITHOTRIPSY (ESWL), STENT INSERTION/REMOVAL Right 9/21/2018   • SUPRAPUBIC CATHETER INSERTION         Allergies   Allergen Reactions   • Ambien [Zolpidem] Hallucinations   • Aleve [Naproxen] Other (See Comments)     seizure   • Amikacin Rash   • Amiodarone Rash   • Vancomycin Rash       No current facility-administered medications on file prior to encounter.      Current Outpatient Medications on File Prior to Encounter   Medication Sig Dispense Refill   • atorvastatin (LIPITOR) 10 MG tablet Take 1 tablet by mouth Daily. 15 tablet 0   • baclofen (LIORESAL) 10 MG tablet Take 10 mg by mouth 2 (Two) Times a Day. Takes two pills at night and one in am     • finasteride (PROSCAR) 5 MG tablet Take 5 mg by mouth Daily.     • furosemide (LASIX) 20 MG tablet Take 20 mg  by mouth Daily.     • HYDROcodone-acetaminophen (NORCO)  MG per tablet Take 1 tablet by mouth Every 6 (Six) Hours As Needed for Moderate Pain .     • nebivolol (BYSTOLIC) 20 MG tablet Take 20 mg by mouth Every Night.     • PARoxetine (PAXIL) 20 MG tablet Take 20 mg by mouth Every Morning.     • phenytoin (DILANTIN) 100 MG ER capsule Take  by mouth 2 (Two) Times a Day.     • potassium chloride (K-DUR,KLOR-CON) 10 MEQ CR tablet Take 10 mEq by mouth 2 (Two) Times a Day.     • rivaroxaban (XARELTO) 20 MG tablet Take 20 mg by mouth Every Night.     • tamsulosin (FLOMAX) 0.4 MG capsule 24 hr capsule Take 1 capsule by mouth Every Night.     • aspirin 81 MG EC tablet Take 81 mg by mouth Daily.     • nitrofurantoin (MACRODANTIN) 50 MG capsule Take 50 mg by mouth Every Night.         Social History     Socioeconomic History   • Marital status:      Spouse name: Not on file   • Number of children: Not on file   • Years of education: Not on file   • Highest education level: Not on file   Tobacco Use   • Smoking status: Former Smoker   • Smokeless tobacco: Never Used   Substance and Sexual Activity   • Alcohol use: Yes   • Drug use: No   • Sexual activity: Not Currently           REVIEW OF SYSTEMS:   ROS   These review of system was obtained from patient current medical record  Constitutional: Positive for activity change, appetite change, chills and fatigue. Negative for fever.   Respiratory: Negative for cough and shortness of breath.    Cardiovascular: Negative for chest pain and palpitations.   Gastrointestinal: Positive for abdominal pain, nausea and vomiting.   Musculoskeletal: Positive for arthralgias and gait problem.   Skin: Negative for color change and rash.   Neurological: Positive for weakness.      Objective:     Vitals:    09/05/19 1720 09/05/19 1735 09/05/19 1750 09/05/19 1800   BP: 121/65 119/64 118/58 111/55   BP Location: Left arm Left arm Left arm Left arm   Patient Position: Lying Lying Lying  "Lying   Pulse: 70 69 69 68   Resp: 17 19 22 23   Temp:       TempSrc:       SpO2: 97% 98% 97% 97%   Weight:       Height:         Body mass index is 29.25 kg/m².  Flowsheet Rows      First Filed Value   Admission Height  170.2 cm (67\") Documented at 09/03/2019 0700   Admission Weight  85 kg (187 lb 8 oz) Documented at 09/03/2019 0700          Intake/Output Summary (Last 24 hours) at 9/5/2019 1839  Last data filed at 9/5/2019 1739  Gross per 24 hour   Intake 3027 ml   Output 610 ml   Net 2417 ml         Physical Exam   Physical Exam  Constitutional: He is oriented to person, place, and time. He appears well-developed and well-nourished. No distress.   HENT:   Head: Normocephalic   Nasal cannula in place.   Eyes: Conjunctivae and EOM are normal. Pupils are equal, round, and reactive to light.   Neck: Neck supple. No thyromegaly present.   Cardiovascular: Normal rate and normal heart sounds.   Pulmonary/Chest: Effort normal. He has no wheezes. He has no rales. He exhibits no tenderness.   Nasal cannula in place.   Abdominal: Soft. Bowel sounds are normal. He exhibits no distension and no mass. There is no tenderness. There is no rebound and no guarding.   Musculoskeletal: He exhibits edema.   Left sided hemiplegia.   Neurological: He is alert and oriented to person, place, and time.   Skin: Skin is warm and dry. No rash noted. He is not diaphoretic. No erythema. No pallor.   Psychiatric: He has a normal mood and affect. His behavior is normal  Radiology Review    US Guided Vascular Access   Final Result      IR Insert Midline Without Port Pump 5 Plus   Final Result      XR Chest 1 View   Final Result   CONCLUSION:          1. No evidence of an active cardiopulmonary process.                                                                    Electronically signed by:  ERIC Sibley MD  9/5/2019 9:04 AM   CDT Workstation: [id number]      FL Retrograde Pyelogram In OR   Final Result      XR Chest 1 View   Final Result "   1. Limited exam due graft   2. Mild right basilar atelectasis and/or infiltrate      Electronically signed by:  Lc Tucker MD  9/5/2019 12:54 AM   CDT Workstation: [id number]      CT Abdomen Pelvis Without Contrast   Final Result   1. Right lower lobe posterior segment patchy opacities suspicious   for passive atelectasis versus pneumonia.   2. Very small right pleural effusion.   3.The gallbladder is not identified and may be contracted,   surgically, or congenitally absent.   4.In the region of the bladder left ureter UVJ there is a 5.6 mm   calculi which may represent recently passed ureter calculi versus   calculi partially involving the left ureter UVJ. Distal left   ureter 8.7 mm calculi 1.8 mm above the left ureter UVJ. This   distal left ureter calculi is causing moderate to severe   obstruction with associated moderate to severe hydroureter and   hydronephrosis. Additional distal ureter nonobstructing calculi   which measures 2.5 mm in greatest diameter. Mild fatty stranding   surrounding the left ureter and in the left perirenal space may   represent sequela from obstruction with infundibular rupture   versus associated inflammatory changes. Recommend clinical   correlation.   5. Multiple right kidney nonobstructive calculi described above.   6. Suprapubic catheter decompressing the bladder.   7. Mildly enlarged prostate gland may represent changes from   benign prostatic hypertrophy versus prostate malignancy.   Recommend clinical correlation.   8.Left para-aortic single enlarged lymph node which measures 1.86   x 1.05 cm. This lymph node is of uncertain etiology by strict   size criteria. This most likely represents inflammatory   lymphadenopathy versus less likely lymphadenopathy secondary to   neoplastic involvement.      Electronically signed by:  David Nieto MD  9/3/2019 9:28 AM CDT   Workstation: [id number]          Lab Results:  Lab Results (last 24 hours)     Procedure Component Value Units  Date/Time    Blood Culture - Blood, Wrist, Left [[id number]] Collected:  09/03/19 1815    Specimen:  Blood from Wrist, Left Updated:  09/05/19 1830     Blood Culture No growth at 2 days    Urine Culture - Urine, Urine, Clean Catch [[id number]]  (Abnormal) Collected:  09/03/19 0804    Specimen:  Urine, Clean Catch Updated:  09/05/19 1315     Urine Culture 50,000 CFU/mL Gram Negative Bacilli    Troponin [[id number]]  (Abnormal) Collected:  09/05/19 0614    Specimen:  Blood Updated:  09/05/19 0640     Troponin T 0.060 ng/mL     Narrative:       Troponin T Reference Range:  <= 0.03 ng/mL-   Negative for AMI  >0.03 ng/mL-     Abnormal for myocardial necrosis.  Clinicians would have to utilize clinical acumen, EKG, Troponin and serial changes to determine if it is an Acute Myocardial Infarction or myocardial injury due to an underlying chronic condition.     Blood Gas, Arterial [[id number]]  (Abnormal) Collected:  09/05/19 0422    Specimen:  Arterial Blood Updated:  09/05/19 0426     Site Right Radial     Kevin's Test Positive     pH, Arterial 7.279 pH units      Comment: 84 Value below reference range        pCO2, Arterial 59.3 mm Hg      Comment: 83 Value above reference range        pO2, Arterial 70.6 mm Hg      Comment: 84 Value below reference range        HCO3, Arterial 27.8 mmol/L      Comment: 83 Value above reference range        Base Excess, Arterial -0.1 mmol/L      Comment: 84 Value below reference range        O2 Saturation, Arterial 94.0 %      Barometric Pressure for Blood Gas 749 mmHg      Modality BiPap     FIO2 30 %      Ventilator Mode BiPAP     Set [facility] Resp Rate 18.0     IPAP 20     Comment: Meter: [id number]     :  [id number]        EPAP 6     Collected by BS    Troponin [[id number]]  (Abnormal) Collected:  09/05/19 0046    Specimen:  Blood Updated:  09/05/19 0131     Troponin T 0.075 ng/mL     Narrative:       Troponin T Reference Range:  <= 0.03 ng/mL-   Negative for AMI  >0.03 ng/mL-      Abnormal for myocardial necrosis.  Clinicians would have to utilize clinical acumen, EKG, Troponin and serial changes to determine if it is an Acute Myocardial Infarction or myocardial injury due to an underlying chronic condition.     Magnesium [[id number]]  (Normal) Collected:  09/05/19 0046    Specimen:  Blood Updated:  09/05/19 0122     Magnesium 1.9 mg/dL     Comprehensive Metabolic Panel [[id number]]  (Abnormal) Collected:  09/05/19 0046    Specimen:  Blood Updated:  09/05/19 0122     Glucose 122 mg/dL      BUN 30 mg/dL      Creatinine 2.48 mg/dL      Sodium 143 mmol/L      Potassium 4.0 mmol/L      Chloride 102 mmol/L      CO2 29.0 mmol/L      Calcium 8.5 mg/dL      Total Protein 7.3 g/dL      Albumin 2.90 g/dL      ALT (SGPT) 9 U/L      AST (SGOT) 11 U/L      Alkaline Phosphatase 95 U/L      Total Bilirubin 0.6 mg/dL      eGFR Non African Amer 25 mL/min/1.73      Globulin 4.4 gm/dL      A/G Ratio 0.7 g/dL      BUN/Creatinine Ratio 12.1     Anion Gap 12.0 mmol/L     Narrative:       GFR Normal >60  Chronic Kidney Disease <60  Kidney Failure <15    CK [[id number]]  (Normal) Collected:  09/05/19 0046    Specimen:  Blood Updated:  09/05/19 0122     Creatine Kinase 41 U/L     CK-MB [[id number]]  (Normal) Collected:  09/05/19 0046    Specimen:  Blood Updated:  09/05/19 0120     CKMB 3.22 ng/mL     CBC (No Diff) [[id number]]  (Abnormal) Collected:  09/05/19 0046    Specimen:  Blood Updated:  09/05/19 0052     WBC 16.55 10*3/mm3      RBC 4.27 10*6/mm3      Hemoglobin 12.7 g/dL      Hematocrit 41.0 %      MCV 96.0 fL      MCH 29.7 pg      MCHC 31.0 g/dL      RDW 15.5 %      RDW-SD 54.6 fl      MPV 9.9 fL      Platelets 198 10*3/mm3     POC Glucose Once [[id number]]  (Normal) Collected:  09/04/19 2352    Specimen:  Blood Updated:  09/05/19 0006     Glucose 114 mg/dL      Comment: : [id number] QUINCY Martinez ID: [id number]       Blood Gas, Arterial [[id number]]  (Abnormal) Collected:  09/04/19 7907     Specimen:  Arterial Blood Updated:  09/05/19 0002     Site Right Radial     Kevin's Test N/A     pH, Arterial 7.237 pH units      Comment: 84 Value below reference range        pCO2, Arterial 67.8 mm Hg      Comment: 86 Value above critical limit        pO2, Arterial 62.9 mm Hg      Comment: 84 Value below reference range        HCO3, Arterial 28.8 mmol/L      Comment: 83 Value above reference range        Base Excess, Arterial -0.1 mmol/L      Comment: 84 Value below reference range        O2 Saturation, Arterial 91.2 %      Comment: 84 Value below reference range        Barometric Pressure for Blood Gas 749 mmHg      Modality BiPap     FIO2 30 %      Ventilator Mode BiPAP     Set Mech Resp Rate 18.0     IPAP 20     Comment: Meter: [id number]     :  [id number]        EPAP 6     Collected by KEL HUGHES    Blood Gas, Arterial [[id number]]  (Abnormal) Collected:  09/04/19 2151    Specimen:  Arterial Blood Updated:  09/04/19 2156     Site Right Radial     Kevin's Test Positive     pH, Arterial 7.183 pH units      Comment: 85 Value below critical limit        pCO2, Arterial 81.4 mm Hg      Comment: 86 Value above critical limit        pO2, Arterial 83.4 mm Hg      HCO3, Arterial 30.6 mmol/L      Comment: 83 Value above reference range        Base Excess, Arterial -0.1 mmol/L      Comment: 84 Value below reference range        O2 Saturation, Arterial 94.2 %      Barometric Pressure for Blood Gas 749 mmHg      Modality Simple Mask     Flow Rate 10.0 lpm      Ventilator Mode NA     Collected by FLAQUITA HUGHES RRT     Comment: Meter: [id number]     :  [id number]       Blood Culture - Blood, Wrist, Right [[id number]] Collected:  09/03/19 1825    Specimen:  Blood from Wrist, Right Updated:  09/04/19 1845     Blood Culture No growth at 24 hours          I personally viewed and interpreted the patient's EKG/Telemetry data       Assessment/Plan:       #1 status post resuscitative cardiac arrest  2 LV dysfunction  with ejection fraction 25%  #3 history of CVA and long-standing history of traumatic brain injury  #4 recurrent urinary tract infection with a supra pubic catheter placement  #5 history of atrial fibrillation/atrial flutter status post post EP study and flutter ablation    #6 patient is on DNI status    74 years old patient unable to provide information information obtained from current and previous medical record and talking to the nurse and the family present the room.  Given the clinical conditions with multiple comorbid condition, traumatic brain injury, history of CVA not a candidate for invasive cardiac evaluation and ICD or defibrillator placement.  Patient is already on DNR status.  Recommend IV amiodarone and transition to oral.  We will start anti-remodeling medication that is low-dose losartan depends for the patient renal status, Aldactone again depending on patient renal status and beta-blocker.        Thank you for allowing me to participate in the care of Mando Hill. Feel free to contact me directly with any further questions or concerns.    Seven Londono MD  09/05/19  6:39 PM.      Part of this note may be an electronic transcription/translation of spoken language to printed text using the Dragon Dictation system.

## 2019-09-05 NOTE — ANESTHESIA POSTPROCEDURE EVALUATION
Patient: Mando Hill    Procedure Summary     Date:  09/04/19 Room / Location:  [facility] OR 02 / [facility] OR    Anesthesia Start:  1941 Anesthesia Stop:  2028    Procedure:  CYSTOSCOPY, LEFT RETROGRADE, URETEROSCOPY, SUPRAPUBIC CATHETER CHANGE (Left ) Diagnosis:       Ureterolithiasis      (Ureterolithiasis [N20.1])    Surgeon:  Morgan Al MD Provider:  Jaylan Guajardo MD    Anesthesia Type:  general ASA Status:  4 - Emergent          Anesthesia Type: general  Last vitals  BP   117/71 (09/04/19 1645)   Temp   98 °F (36.7 °C) (09/04/19 1645)   Pulse   82 (09/04/19 1645)   Resp   18 (09/04/19 1645)     SpO2   95 % (09/04/19 1645)     Post Anesthesia Care and Evaluation    Patient location during evaluation: PACU  Level of consciousness: obtunded/minimal responses  Pain management: adequate  Airway patency: patent  Anesthetic complications: No anesthetic complications  PONV Status: none  Cardiovascular status: acceptable  Respiratory status: acceptable, spontaneous ventilation and nasal airway  Hydration status: acceptable    Comments: lma in place in pacu with o2 via simple mask lightly over lma. lma to be removed in pacu. Nasal trumpet 32f in right nare to help with gloria.

## 2019-09-05 NOTE — THERAPY DISCHARGE NOTE
Acute Care - Speech Language Pathology   Swallow Eval/Discharge [facility]     Patient Name: Mando Hill  : 1945  MRN: [id number]  Today's Date: 2019               Admit Date: 9/3/2019  Pt seen for swallow evaluation per nurse Patricia.  Pt may still have procedure later today, for that reason only water and small amount of applesauce was given with no solids at this time per nursingDonna.  Significant education for energy conservation, short of air, need for cyclic eating, slow rate, small bolus size, no consecutive sips offered to decrease r/o aspiration.  Pt has no s/s of aspiration on liquids via straw or puree solids.  He is not wearing dentures at this time, but no chewable solids were offered.  Pt has required bipap and should only have PO if he can tolerate removal of bipap for 30 mins after eating/drinking.  Recommend initiate regular diet when medically cleared.  Thank you for this consult.    Visit Dx:    ICD-10-CM ICD-9-CM   1. Ureterolithiasis N20.1 592.1   2. Urinary tract infection without hematuria, site unspecified N39.0 599.0   3. Acute kidney injury (nontraumatic) (CMS/HCC) N17.9 584.9   4. Oropharyngeal dysphagia R13.12 787.22     Patient Active Problem List   Diagnosis   • Osteoarthritis of right shoulder   • Sepsis (CMS/HCC)   • Sacral decubitus ulcer   • UTI (urinary tract infection) due to urinary indwelling catheter (CMS/[facility])   • Hypotension   • Atrial fibrillation (CMS/HCC)   • Typical atrial flutter (CMS/[facility])   • Hyperlipidemia   • Primary osteoarthritis of right shoulder   • Right shoulder pain   • Abdominal pain   • Fever   • Closed fracture of right clavicle   • Acute UTI (urinary tract infection)   • Ureterolithiasis   • BPH (benign prostatic hyperplasia)   • CKD (chronic kidney disease) stage 4, GFR 15-29 ml/min (CMS/HCC)     Past Medical History:   Diagnosis Date   • Atherosclerosis    • Atrial fibrillation (CMS/HCC)    • BPH (benign prostatic  hyperplasia)    • Chronic bronchitis (CMS/HCC)    • Hyperlipidemia    • Hypertension    • Kidney stones    • Left hemiplegia (CMS/HCC)    • Osteoarthritis    • Sacral decubitus ulcer    • Stroke (cerebrum) (CMS/HCC)    • UTI (urinary tract infection) due to urinary indwelling catheter (CMS/HCC)      Past Surgical History:   Procedure Laterality Date   • CARDIAC ELECTROPHYSIOLOGY PROCEDURE N/A 3/15/2018   • CHOLECYSTECTOMY     • EXTRACORPOREAL SHOCKWAVE LITHOTRIPSY (ESWL), STENT INSERTION/REMOVAL Right 9/21/2018   • SUPRAPUBIC CATHETER INSERTION            SWALLOW EVALUATION (last 72 hours)      SLP Adult Swallow Evaluation     Row Name 09/05/19 1515                   Rehab Evaluation    Document Type  evaluation  -EC        Subjective Information  no complaints  -EC        Patient Observations  alert;cooperative;agree to therapy  -EC        Patient/Family Observations  SO present  -EC        Patient Effort  adequate  -EC           General Information    Patient Profile Reviewed  yes  -EC        Pertinent History Of Current Problem  pt requiring bipap but is now on nasal cannula  -EC        Current Method of Nutrition  NPO  -EC           Pain Assessment    Additional Documentation  Pain Scale: Numbers Pre/Post-Treatment (Group)  -EC           Pain Scale: Numbers Pre/Post-Treatment    Pain Scale: Numbers, Pretreatment  0/10 - no pain  -EC        Pain Scale: Numbers, Post-Treatment  0/10 - no pain  -EC           Oral Motor and Function    Dentition Assessment  edentulous, does not have dentures  -EC        Secretion Management  WNL/WFL  -EC        Mucosal Quality  moist, healthy  -EC        Gag Response  WFL  -EC        Volitional Swallow  WFL  -EC           General Eating/Swallowing Observations    Respiratory Support Currently in Use  nasal cannula  -EC        Eating/Swallowing Skills  fed by SLP  -EC        Positioning During Eating  upright 90 degree;upright in bed;contracted  -EC        Utensils Used  straw  -EC         Consistencies Trialed  pureed;thin liquids  -EC           Clinical Swallow Eval    Oral Prep Phase  WFL  -EC        Oral Transit  WFL  -EC        Oral Residue  WFL  -EC        Pharyngeal Phase  WFL  -EC        Esophageal Phase  unremarkable  -EC        Clinical Swallow Evaluation Summary  there is no dysphagia evident at time of evaluation  -EC           Clinical Impression    SLP Swallowing Diagnosis  functional oral phase;functional pharyngeal phase  -EC        Functional Impact  risk of aspiration/pneumonia  -EC        Swallow Criteria for Skilled Therapeutic Interventions Met  not appropriate  -EC           Recommendations    Therapy Frequency (Swallow)  evaluation only  -EC        SLP Diet Recommendation  regular textures;thin liquids  -EC          User Key  (r) = Recorded By, (t) = Taken By, (c) = Cosigned By    Initials Name Effective Dates    Yanci Rodríguez CCC-SLP 07/24/19 -           EDUCATION  The patient has been educated in the following areas:   Dysphagia (Swallowing Impairment) Modified Diet Instruction.    SLP Recommendation and Plan  SLP Swallowing Diagnosis: functional oral phase, functional pharyngeal phase  SLP Diet Recommendation: regular textures, thin liquids           Swallow Criteria for Skilled Therapeutic Interventions Met: not appropriate  Anticipated Dischage Disposition: unknown     Therapy Frequency (Swallow): evaluation only          Plan of Care Reviewed With: patient, significant other  Progress: improving  Outcome Summary: bedside swallow evaluation completed after nursemegan called SLP return since pt tolerating nasal cannula.  pt tolerates puree and liquids with no s/s of aspiration.  education with patient and SO per energy conservation, small bolus, slow rate, for decreased r/o aspiration.  recommend pt begin diet whenmedically cleared.               Time Calculation:   Time Calculation- SLP     Row Name 09/05/19 1542             Time Calculation- SLP    SLP  Start Time  1515  -EC      SLP Stop Time  1545  -EC      SLP Time Calculation (min)  30 min  -EC      Total Timed Code Minutes- SLP  30 minute(s)  -EC      SLP Received On  09/05/19  -EC        User Key  (r) = Recorded By, (t) = Taken By, (c) = Cosigned By    Initials Name Provider Type    EC Yanci Spaulding CCC-SLP Speech and Language Pathologist          Therapy Charges for Today     Code Description Service Date Service Provider Modifiers Qty    [id number] HC ST EVAL ORAL PHARYNG SWALLOW 2 9/5/2019 Yanci Spaulding CCC-SLP GN 1               SLP Discharge Summary  Anticipated Dischage Disposition: unknown  Reason for Discharge: all goals and outcomes met, no further needs identified, no further expectation of functional progress  Discharge Destination: other (see comments)    KING TrejoSLP  9/5/2019

## 2019-09-05 NOTE — PROGRESS NOTES
TWO PATIENT IDENTIFIERS WERE USED. THE PATIENT WAS DRAPED WITH A FULL BODY DRAPE AND THE PATIENT'S RIGHT ARM WAS PREPPED WITH CHLORA PREP. ULTRASOUND WAS USED TO LOCALIZE THERIGHT CEPHALIC VEIN. SUBCUTANEOUS TISSUE AT THE CATHETER SITE WAS INFILTRATED WITH 2% LIDOCAINE. UNDER ULTRASOUND GUIDANCE, THE VEIN WAS ACCESSED WITH A 21 GAUGE  NEEDLE. AN 0.018 WIRE WAS THEN THREADED THROUGH THE NEEDLE. THE 21 GAUGE NEEDLE WAS REMOVED AND A 4 [place] SHEATH WAS PLACED OVER THE WIRE INTO THE VEIN.THE MIDLINE CATHETER WAS TRIMMED TO 20CM. THE MIDLINE CATHETER WAS THEN PLACED OVER THE WIRE INTO THE VEIN, THE SHEATH WAS PEELED AWAY, WIRE WAS REMOVED. CATHETER WAS FLUSHED WITH NORMAL SALINE AND CATHETER TIP APPLIED. BIOPATCH PLACED. CATHETER SECURED WITH STAT LOCK AND TEGADERM. PATIENT TOLERATED PROCEDURE WELL. THIS WAS DONE AT BEDSIDE      IMPRESSION:SUCCESSFUL PLACEMENT OF DUAL LUMEN MIDLINE.           Dana Doss  9/5/2019  10:39 AM

## 2019-09-05 NOTE — PLAN OF CARE
Problem: NPPV/CPAP (Adult)  Goal: Signs and Symptoms of Listed Potential Problems Will be Absent, Minimized or Managed (NPPV/CPAP)  Outcome: Ongoing (interventions implemented as appropriate)   09/05/19 3441   Goal/Outcome Evaluation   Problems Assessed (NPPV/CPAP) all   Problems Present (NPPV/CPAP) none     Pt. ABG results continuing to improve on BiPAP.  Pt. Is beginning to respond purposefully. Will continue to monitor.

## 2019-09-05 NOTE — PLAN OF CARE
Problem: Patient Care Overview  Goal: Plan of Care Review  Outcome: Ongoing (interventions implemented as appropriate)   09/05/19 4816   Coping/Psychosocial   Plan of Care Reviewed With patient   OTHER   Outcome Summary swallow evaluation not completed as pt is not safe to remove bipap without o2 desaturation. nurse, sebastian Gomez. SLP will return tomorrow

## 2019-09-05 NOTE — PLAN OF CARE
Problem: Patient Care Overview  Goal: Plan of Care Review  Outcome: Ongoing (interventions implemented as appropriate)   09/05/19 9668   Coping/Psychosocial   Plan of Care Reviewed With patient;significant other   Plan of Care Review   Progress improving   OTHER   Outcome Summary bedside swallow evaluation completed after megan bhatia called SLP return since pt tolerating nasal cannula. pt tolerates puree and liquids with no s/s of aspiration. education with patient and SO per energy conservation, small bolus, slow rate, for decreased r/o aspiration. recommend pt begin diet whenmedically cleared.

## 2019-09-05 NOTE — OP NOTE
CYSTOSCOPY URETEROSCOPY RETROGRADE PYELOGRAM HOLMIUM LASER STENT INSERTION  Procedure Note    Mando Hill  9/4/2019    Pre-op Diagnosis:   Ureterolithiasis [N20.1]    Post-op Diagnosis:     Post-Op Diagnosis Codes:     * Ureterolithiasis [N20.1]      Procedure(s):  CYSTOSCOPY, LEFT RETROGRADE, URETEROSCOPY, SUPRAPUBIC CATHETER CHANGE    Surgeon(s):  Morgan Al MD    Anesthesia: General    Staff:   Circulator: Rosibel Perales RN  Radiology Technologist: Robert Carpenter  Scrub Person: Dexter Vásquez  Assistant: Lor Robbins CSA    Estimated Blood Loss: minimal    Specimens:                None      Drains:   Suprapubic Catheter 16 Fr. (Active)   Site Assessment Clean;Intact 9/5/2019 12:30 AM   Dressing Status Clean;Dry;Intact 9/5/2019 12:30 AM   Dressing Type Dry dressing 9/5/2019 12:30 AM   Collection Container Standard drainage bag 9/5/2019 12:30 AM   Output (mL) 50 mL 9/4/2019  9:06 PM       [REMOVED] Suprapubic Catheter (Removed)   Output (mL) 350 mL 9/4/2019  1:00 PM       [REMOVED] Suprapubic Catheter (Removed)       Findings: Obstructed left ureter ureter    Complications: Unable to catheterize ureter urinary tract infection hydronephrosis sepsis    Indications: Urinary tract infection hydronephrosis    Description of Procedu: Patient was brought to cystoscopy placed in the dorsal lithotomy position.  After a sterile prep and drape of the genitalia passed a 21 cystoscope into the bladder.  Bladder was very trabeculated possibly ureter was displaced laterally.  We attempted a retrograde catheter as well as an 0.38 Glidewire no avail we did try to shoot contrast into the ureter to no avail at this point we drained the bladder remove the scope is a note on fluoroscopy we saw stones in the distal left ureter.  With a syringe to the old SP tube out and then replaced it with a #20 Yi Gayle with 10 cc placed in 1 this was placed to dependent drainage and the patient was taken to  recovery    Morgan Al MD     Date: 9/5/2019  Time: 6:09 AM

## 2019-09-05 NOTE — PROGRESS NOTES
[facility]  INPATIENT PROGRESS NOTE    Length of Stay: 2  Date of Admission: 9/3/2019  Primary Care Physician: Manuel Lopez APRN    Subjective   Chief Complaint: No new complaints.    HPI:    Mando Hill is a 74 y.o. male with medical history significant for Afib, BPH, left hemiplegia from previous CVA, chronic palmer, HLD, HTN, h/o decubitus ulcers presents with abdominal pain.      Patient presented with a 2-3 day history of abdominal pain. Patient was on Bactrim for UTI outpatient. Patient endorsed nausea, vomiting, and abdominal pain. He has some residual deficits from previous CVA.    Patient is seen for follow-up today.  He had a run of ventricular tachycardia this morning that required cardioversion and CODE BLUE.  He immediately had spontaneous return of vital signs following cardioversion, remains in normal sinus rhythm and on amiodarone infusion.    He is postoperative day #1 status post cystoscopy, left laser lithotripsy and J stent placement.    Review of Systems   Constitutional: Positive for activity change, appetite change and fatigue. Negative for chills, diaphoresis and fever.   HENT: Negative for trouble swallowing and voice change.    Eyes: Negative for photophobia and visual disturbance.   Respiratory: Negative for cough, choking, chest tightness, shortness of breath, wheezing and stridor.    Cardiovascular: Negative for chest pain, palpitations and leg swelling.   Gastrointestinal: Negative for abdominal distention, abdominal pain, blood in stool, constipation, diarrhea, nausea and vomiting.   Endocrine: Negative for cold intolerance, heat intolerance, polydipsia, polyphagia and polyuria.   Genitourinary: Negative for decreased urine volume, difficulty urinating, dysuria, enuresis, flank pain, frequency, hematuria and urgency.   Musculoskeletal: Positive for arthralgias and gait problem. Negative for myalgias, neck pain  and neck stiffness.   Skin: Negative for pallor, rash and wound.   Neurological: Positive for weakness. Negative for dizziness, tremors, seizures, syncope, facial asymmetry, speech difficulty, light-headedness, numbness and headaches.   Hematological: Does not bruise/bleed easily.   Psychiatric/Behavioral: Negative for agitation, behavioral problems and confusion.       Objective    Temp:  [97.1 °F (36.2 °C)-99.4 °F (37.4 °C)] 98.6 °F (37 °C)  Heart Rate:  [] 65  Resp:  [14-24] 18  BP: ()/(32-86) 110/63    Physical Exam   Constitutional: He is oriented to person, place, and time. He appears well-developed and well-nourished. No distress.   HENT:   Head: Normocephalic and atraumatic.   Eyes: EOM are normal. Pupils are equal, round, and reactive to light. No scleral icterus.   Neck: Normal range of motion. Neck supple. No JVD present. No thyromegaly present.   Cardiovascular: Normal rate, regular rhythm and normal heart sounds. Exam reveals no gallop and no friction rub.   No murmur heard.  Pulmonary/Chest: Effort normal and breath sounds normal. He has no wheezes. He has no rales. He exhibits no tenderness.   Abdominal: Soft. Bowel sounds are normal. He exhibits no distension and no mass. There is no tenderness. There is no rebound and no guarding.   Musculoskeletal: He exhibits edema and deformity. He exhibits no tenderness.   Neurological: He is alert and oriented to person, place, and time. No cranial nerve deficit. He exhibits normal muscle tone. Coordination normal.   He has left-sided hemiparesis from previous CVA.   Skin: Skin is warm and dry. No rash noted. He is not diaphoretic. No erythema. No pallor.   Psychiatric: He has a normal mood and affect. His behavior is normal. Judgment and thought content normal.   Nursing note and vitals reviewed.        Medication Review:    Current Facility-Administered Medications:   •  amiodarone (NEXTERONE) 360 mg/200 mL (1.8 mg/mL) infusion, 1 mg/min,  Intravenous, Continuous, Hardy Fisher MD, Last Rate: 33.3 mL/hr at 09/05/19 0845, 1 mg/min at 09/05/19 0845  •  amiodarone (NEXTERONE) 360 mg/200 mL (1.8 mg/mL) infusion, 0.5 mg/min, Intravenous, Continuous, Hardy Fisher MD  •  famotidine (PEPCID) injection 20 mg, 20 mg, Intravenous, Daily, Hardy Fisher MD  •  fluticasone (FLONASE) 50 MCG/ACT nasal spray 2 spray, 2 spray, Each Nare, Daily, Orion Hernandez MD  •  ipratropium-albuterol (DUO-NEB) nebulizer solution 3 mL, 3 mL, Nebulization, 4x Daily - RT, Orion Hernandez MD, 3 mL at 09/04/19 2352  •  methylPREDNISolone sodium succinate (SOLU-Medrol) injection 80 mg, 80 mg, Intravenous, Q6H, Orion Hernandez MD, 80 mg at 09/05/19 0558  •  morphine injection 2 mg, 2 mg, Intravenous, Q4H PRN, Hardy Fisher MD  •  Pharmacy to Dose Zosyn, , Does not apply, Continuous PRN, Spenser England MD  •  phenytoin (DILANTIN) 100 mg in sodium chloride 0.9 % 100 mL IVPB, 100 mg, Intravenous, Q12H, Hardy Fisher MD  •  piperacillin-tazobactam (ZOSYN) 3.375 g/100 mL 0.9% NS IVPB (mbp), 3.375 g, Intravenous, Q8H, Spenser England MD, 3.375 g at 09/05/19 0248  •  sodium chloride 0.9 % flush 10 mL, 10 mL, Intravenous, PRN, Derrick Whiteside MD  •  sodium chloride 0.9 % flush 10 mL, 10 mL, Intravenous, Q12H, Spenser England MD  •  sodium chloride 0.9 % flush 10 mL, 10 mL, Intravenous, PRN, Spenser England MD  •  sodium chloride 0.9 % infusion, 100 mL/hr, Intravenous, Continuous, EchenduHardy MD, Last Rate: 100 mL/hr at 09/05/19 0929, 100 mL/hr at 09/05/19 0929    Results Review:  I have reviewed the labs, radiology results, and diagnostic studies.    Laboratory Data:   Results from last 7 days   Lab Units 09/05/19  0046 09/04/19  0524 09/03/19  0724   SODIUM mmol/L 143 139 140   POTASSIUM mmol/L 4.0 4.2 4.4   CHLORIDE mmol/L 102 99 94*   CO2 mmol/L 29.0 30.0* 31.0*   BUN mg/dL 30* 30* 34*   CREATININE mg/dL 2.48* 2.43* 2.23*   GLUCOSE mg/dL 122*  132* 123*   CALCIUM mg/dL 8.5* 8.4* 8.7   BILIRUBIN mg/dL 0.6  --  0.7   ALK PHOS U/L 95  --  111   ALT (SGPT) U/L 9  --  9   AST (SGOT) U/L 11  --  12   ANION GAP mmol/L 12.0 10.0 15.0     Estimated Creatinine Clearance: 26.3 mL/min (A) (by C-G formula based on SCr of 2.48 mg/dL (H)).  Results from last 7 days   Lab Units 09/05/19  0046   MAGNESIUM mg/dL 1.9         Results from last 7 days   Lab Units 09/05/19  0046 09/04/19  0524 09/03/19  0724   WBC 10*3/mm3 16.55* 18.15* 15.35*   HEMOGLOBIN g/dL 12.7* 12.9* 13.2   HEMATOCRIT % 41.0 39.6 39.8   PLATELETS 10*3/mm3 198 192 205           Culture Data:   Blood Culture   Date Value Ref Range Status   09/03/2019 No growth at 24 hours  Preliminary   09/03/2019 No growth at 24 hours  Preliminary     No results found for: URINECX  No results found for: RESPCX  No results found for: WOUNDCX  No results found for: STOOLCX  No components found for: BODYFLD    Radiology Data:   Imaging Results (last 24 hours)     Procedure Component Value Units Date/Time    IR Insert Midline Without Port Pump 5 Plus [[id number]] Resulted:  09/05/19 1039     Updated:  09/05/19 1039    Narrative:       This procedure was auto-finalized with no dictation required.    XR Chest 1 View [[id number]] Collected:  09/05/19 0841     Updated:  09/05/19 0905    Narrative:         EXAM:         Radiograph(s), Chest   VIEWS:   Frontal  ; 1       DATE/TIME:  9/5/2019 9:02 AM CDT                INDICATION:   SOA, N20.1 Calculus of ureter N39.0 Urinary tract  infection, site not specified N17.9 Acute kidney failure,  unspecified    COMPARISON:  CXR: 9/5/19             FINDINGS:             - lines/tubes:    none     - cardiac:         size within normal limits         - mediastinum: contour within normal limits         - lungs:         no focal air space process, pulmonary  interstitial edema, nodule(s)/mass             - pleura:         no evidence of  fluid                  - osseous:         unremarkable for  age                  - misc.:         Impression:       CONCLUSION:        1. No evidence of an active cardiopulmonary process.                                                              Electronically signed by:  ERIC Sibley MD  9/5/2019 9:04 AM  CDT Workstation: [id number]    FL Retrograde Pyelogram In OR [[id number]] Resulted:  09/05/19 0816     Updated:  09/05/19 0816    XR Chest 1 View [[id number]] Collected:  09/05/19 0003     Updated:  09/05/19 0055    Narrative:       Exam: AP portable chest    INDICATION: Respiratory difficulty    COMPARISON: 6/8/2019    FINDINGS: AP portable chest. Limited exam due to positioning of  the head limiting the evaluation of the upper half of the right  lung. Lungs are hypoinflated which accentuates the heart size and  bronchovascular markings. Heart is enlarged. There is right  basilar atelectasis and infiltrate. There is mild left basilar  atelectasis      Impression:       1. Limited exam due graft  2. Mild right basilar atelectasis and/or infiltrate    Electronically signed by:  Lc Tucker MD  9/5/2019 12:54 AM  CDT Workstation: [id number]          I have reviewed the patient's current medications.     Assessment/Plan     Hospital Problem List:  Principal Problem:    Ureterolithiasis: Patient is status post cystoscopy, laser lithotripsy and J stent placement.  Urologist is following.    Acute cystitis/polynephritis: Continue IV antibiotics.  Blood cultures have shown no growth and urine culture is pending.     BPH (benign prostatic hyperplasia): Continue Flomax and Proscar.    Acute on chronic kidney injury: Baseline creatinine is between 1-1.2.  Continue IV hydration, avoid nephrotoxins, monitor renal function and consult nephrologist if the need arises.    Acute on chronic respiratory failure with hypercapnia and hypoxia: Continue noninvasive respiratory therapy, bronchodilators and steroids.  Repeat ABG in a.m.    Status post CODE BLUE secondary to ventricular  arrhythmia: Continue amiodarone infusion, check echocardiogram and consult cardiologist.  Elevated troponin is likely reactive and is trending downwards.    History of atrial fibrillation: Patient is in normal sinus rhythm.  Continue rate control and anticoagulation.    History of CVA with left-sided hemiplegia: Continue statin and antiplatelet therapy.    Seizure disorder: Continue Dilantin.    Chronic pain syndrome continue pain control.    Nutrition: Consult speech language pathologist for swallowing assessment.    Continue GI prophylaxis.    CODE STATUS was discussed with the patient and he wants to be 'no CPR and no intubation'.    Discharge Planning: In progress.    Hardy Fisher MD   09/05/19   10:56 AM

## 2019-09-05 NOTE — PROGRESS NOTES
"   LOS: 2 days   Patient Care Team:  Manuel Lopez APRN as PCP - General (Family Medicine)    Subjective     Subjective:  Symptoms:  Worsening.  (Code Blue called less than 1 hour ago related to ventricular arrhythmia, amiodarone at present and BiPap, more responsive per nursing. Gross hematuria in SP tube's gravity bag. ).    Diet:  NPO.    Pain:  He complains of pain that is moderate.        History taken from: patient chart family RN    Objective     Vital Signs  Temp:  [97.1 °F (36.2 °C)-99.5 °F (37.5 °C)] 99.5 °F (37.5 °C)  Heart Rate:  [0-150] 53  Resp:  [14-24] 18  BP: ()/(32-86) 131/59    Objective:  General Appearance:  In distress.    Vital signs: (most recent): Blood pressure 131/59, pulse 53, temperature 99.5 °F (37.5 °C), temperature source Axillary, resp. rate 18, height 170.2 cm (67\"), weight 84.7 kg (186 lb 11.7 oz), SpO2 98 %.  Vital signs are normal.    Output: Producing urine.    HEENT: Normal HEENT exam.    Lungs:  Increased effort.  He is not in respiratory distress.  There are decreased breath sounds.    Abdomen: Abdomen is soft and non-distended.  There is suprapubic tenderness.    Pulses: Distal pulses are intact.    Neurological: Patient is alert.    Skin:  Warm and dry.  No rash or ecchymosis.             Results Review:    Lab Results (last 24 hours)     Procedure Component Value Units Date/Time    Urine Culture - Urine, Urine, Clean Catch [[id number]]  (Abnormal) Collected:  09/03/19 0804    Specimen:  Urine, Clean Catch Updated:  09/05/19 1315     Urine Culture 50,000 CFU/mL Gram Negative Bacilli    Troponin [[id number]]  (Abnormal) Collected:  09/05/19 0614    Specimen:  Blood Updated:  09/05/19 0640     Troponin T 0.060 ng/mL     Narrative:       Troponin T Reference Range:  <= 0.03 ng/mL-   Negative for AMI  >0.03 ng/mL-     Abnormal for myocardial necrosis.  Clinicians would have to utilize clinical acumen, EKG, Troponin and serial changes to determine if it is an Acute " Myocardial Infarction or myocardial injury due to an underlying chronic condition.     Blood Gas, Arterial [[id number]]  (Abnormal) Collected:  09/05/19 0422    Specimen:  Arterial Blood Updated:  09/05/19 0426     Site Right Radial     Kevin's Test Positive     pH, Arterial 7.279 pH units      Comment: 84 Value below reference range        pCO2, Arterial 59.3 mm Hg      Comment: 83 Value above reference range        pO2, Arterial 70.6 mm Hg      Comment: 84 Value below reference range        HCO3, Arterial 27.8 mmol/L      Comment: 83 Value above reference range        Base Excess, Arterial -0.1 mmol/L      Comment: 84 Value below reference range        O2 Saturation, Arterial 94.0 %      Barometric Pressure for Blood Gas 749 mmHg      Modality BiPap     FIO2 30 %      Ventilator Mode BiPAP     Set Mech Resp Rate 18.0     IPAP 20     Comment: Meter: [id number]     :  [id number]        EPAP 6     Collected by BS    Troponin [[id number]]  (Abnormal) Collected:  09/05/19 0046    Specimen:  Blood Updated:  09/05/19 0131     Troponin T 0.075 ng/mL     Narrative:       Troponin T Reference Range:  <= 0.03 ng/mL-   Negative for AMI  >0.03 ng/mL-     Abnormal for myocardial necrosis.  Clinicians would have to utilize clinical acumen, EKG, Troponin and serial changes to determine if it is an Acute Myocardial Infarction or myocardial injury due to an underlying chronic condition.     Magnesium [[id number]]  (Normal) Collected:  09/05/19 0046    Specimen:  Blood Updated:  09/05/19 0122     Magnesium 1.9 mg/dL     Comprehensive Metabolic Panel [[id number]]  (Abnormal) Collected:  09/05/19 0046    Specimen:  Blood Updated:  09/05/19 0122     Glucose 122 mg/dL      BUN 30 mg/dL      Creatinine 2.48 mg/dL      Sodium 143 mmol/L      Potassium 4.0 mmol/L      Chloride 102 mmol/L      CO2 29.0 mmol/L      Calcium 8.5 mg/dL      Total Protein 7.3 g/dL      Albumin 2.90 g/dL      ALT (SGPT) 9 U/L      AST (SGOT) 11 U/L       Alkaline Phosphatase 95 U/L      Total Bilirubin 0.6 mg/dL      eGFR Non African Amer 25 mL/min/1.73      Globulin 4.4 gm/dL      A/G Ratio 0.7 g/dL      BUN/Creatinine Ratio 12.1     Anion Gap 12.0 mmol/L     Narrative:       GFR Normal >60  Chronic Kidney Disease <60  Kidney Failure <15    CK [[id number]]  (Normal) Collected:  09/05/19 0046    Specimen:  Blood Updated:  09/05/19 0122     Creatine Kinase 41 U/L     CK-MB [[id number]]  (Normal) Collected:  09/05/19 0046    Specimen:  Blood Updated:  09/05/19 0120     CKMB 3.22 ng/mL     CBC (No Diff) [[id number]]  (Abnormal) Collected:  09/05/19 0046    Specimen:  Blood Updated:  09/05/19 0052     WBC 16.55 10*3/mm3      RBC 4.27 10*6/mm3      Hemoglobin 12.7 g/dL      Hematocrit 41.0 %      MCV 96.0 fL      MCH 29.7 pg      MCHC 31.0 g/dL      RDW 15.5 %      RDW-SD 54.6 fl      MPV 9.9 fL      Platelets 198 10*3/mm3     POC Glucose Once [[id number]]  (Normal) Collected:  09/04/19 2352    Specimen:  Blood Updated:  09/05/19 0006     Glucose 114 mg/dL      Comment: : [id number] QUINCY NATASHARAMeter ID: [id number]       Blood Gas, Arterial [[id number]]  (Abnormal) Collected:  09/04/19 2348    Specimen:  Arterial Blood Updated:  09/05/19 0002     Site Right Radial     Kevin's Test N/A     pH, Arterial 7.237 pH units      Comment: 84 Value below reference range        pCO2, Arterial 67.8 mm Hg      Comment: 86 Value above critical limit        pO2, Arterial 62.9 mm Hg      Comment: 84 Value below reference range        HCO3, Arterial 28.8 mmol/L      Comment: 83 Value above reference range        Base Excess, Arterial -0.1 mmol/L      Comment: 84 Value below reference range        O2 Saturation, Arterial 91.2 %      Comment: 84 Value below reference range        Barometric Pressure for Blood Gas 749 mmHg      Modality BiPap     FIO2 30 %      Ventilator Mode BiPAP     Set [facility] Resp Rate 18.0     IPAP 20     Comment: Meter: [id number]     :  [id number]         EPAP 6     Collected by KEL HUGHES    Blood Gas, Arterial [[id number]]  (Abnormal) Collected:  09/04/19 2151    Specimen:  Arterial Blood Updated:  09/04/19 2156     Site Right Radial     Kevin's Test Positive     pH, Arterial 7.183 pH units      Comment: 85 Value below critical limit        pCO2, Arterial 81.4 mm Hg      Comment: 86 Value above critical limit        pO2, Arterial 83.4 mm Hg      HCO3, Arterial 30.6 mmol/L      Comment: 83 Value above reference range        Base Excess, Arterial -0.1 mmol/L      Comment: 84 Value below reference range        O2 Saturation, Arterial 94.2 %      Barometric Pressure for Blood Gas 749 mmHg      Modality Simple Mask     Flow Rate 10.0 lpm      Ventilator Mode NA     Collected by FLAQUITA HUGHES RRT     Comment: Meter: [id number]     :  [id number]       Blood Culture - Blood, Wrist, Right [[id number]] Collected:  09/03/19 1825    Specimen:  Blood from Wrist, Right Updated:  09/04/19 1845     Blood Culture No growth at 24 hours    Blood Culture - Blood, Wrist, Left [[id number]] Collected:  09/03/19 1815    Specimen:  Blood from Wrist, Left Updated:  09/04/19 1830     Blood Culture No growth at 24 hours         Imaging Results (last 24 hours)     Procedure Component Value Units Date/Time    US Guided Vascular Access [[id number]] Resulted:  09/05/19 1123     Updated:  09/05/19 1123    Narrative:       This procedure was auto-finalized with no dictation required.    IR Insert Midline Without Port Pump 5 Plus [[id number]] Resulted:  09/05/19 1039     Updated:  09/05/19 1039    Narrative:       This procedure was auto-finalized with no dictation required.    XR Chest 1 View [[id number]] Collected:  09/05/19 0841     Updated:  09/05/19 0905    Narrative:         EXAM:         Radiograph(s), Chest   VIEWS:   Frontal  ; 1       DATE/TIME:  9/5/2019 9:02 AM CDT                INDICATION:   SOA, N20.1 Calculus of ureter N39.0 Urinary tract  infection, site not specified N17.9  Acute kidney failure,  unspecified    COMPARISON:  CXR: 9/5/19             FINDINGS:             - lines/tubes:    none     - cardiac:         size within normal limits         - mediastinum: contour within normal limits         - lungs:         no focal air space process, pulmonary  interstitial edema, nodule(s)/mass             - pleura:         no evidence of  fluid                  - osseous:         unremarkable for age                  - misc.:         Impression:       CONCLUSION:        1. No evidence of an active cardiopulmonary process.                                                              Electronically signed by:  ERIC Sibley MD  9/5/2019 9:04 AM  CDT Workstation: [id number]    FL Retrograde Pyelogram In OR [[id number]] Resulted:  09/05/19 0816     Updated:  09/05/19 0816    XR Chest 1 View [[id number]] Collected:  09/05/19 0003     Updated:  09/05/19 0055    Narrative:       Exam: AP portable chest    INDICATION: Respiratory difficulty    COMPARISON: 6/8/2019    FINDINGS: AP portable chest. Limited exam due to positioning of  the head limiting the evaluation of the upper half of the right  lung. Lungs are hypoinflated which accentuates the heart size and  bronchovascular markings. Heart is enlarged. There is right  basilar atelectasis and infiltrate. There is mild left basilar  atelectasis      Impression:       1. Limited exam due graft  2. Mild right basilar atelectasis and/or infiltrate    Electronically signed by:  Lc Tucker MD  9/5/2019 12:54 AM  CDT Workstation: [id number]           I reviewed the patient's new clinical results.  I reviewed the patient's new imaging results and agree with the interpretation.  I reviewed the patient's other test results and agree with the interpretation      Assessment/Plan       Ureterolithiasis    BPH (benign prostatic hyperplasia)    CKD (chronic kidney disease) stage 4, GFR 15-29 ml/min (CMS/[facility])      Assessment & Plan    1. Left ureteral stones with  obstruction and   2. Pyelonephritis  3. AUGIE  -POD #1 cystoscopy unable to negotiate ureter distally due to fishhooking of ureter and J-stent not placed.   -CT abdomen/pelvis wo contrast shows 3 distal ureteral stones: LEFT UVJ stone nearly 6 mm; distal ureteral stone nearly 9 mm above the UVJ stone and another distal ureteral stone; moderate to severe hydroureteronephrosis with mild fatty stranding left ureter and perirenal space. There are multiple right kidney stones not active; mildly enlarged prostate with a left para-aortic single enlarged lymph node of undetermined significance.   -+ UA, 9/3/19 urine culture gram neg bacilli  -WBC 15.35-->18.15  -Estimated Creatinine Clearance: 26.3 mL/min (A) (by C-G formula based on SCr of 2.48 mg/dL (H)).  -Cr 2.23-->2.43-->2.48  -Baseline Cr 1.0  -Zosyn day #3  -Flomax, Proscar, home meds  -bladder scan 178 mL now     Plan:  IR consult for LEFT nephrostomy tube placement  Nursing may irrigate SP tube with sterile normal saline as needed to maintain SP tube patency  Await urine culture, continue Zosyn.     JEAN Robbins  09/05/19  3:39 PM

## 2019-09-06 NOTE — PLAN OF CARE
Problem: NPPV/CPAP (Adult)  Goal: Signs and Symptoms of Listed Potential Problems Will be Absent, Minimized or Managed (NPPV/CPAP)  Outcome: Ongoing (interventions implemented as appropriate)   09/06/19 0359   Goal/Outcome Evaluation   Problems Assessed (NPPV/CPAP) all   Problems Present (NPPV/CPAP) none     BiPAP remained on standby at bedside as pt. Did not require BiPAP throughout the night.  Pt. Has responded appropriately throughout shift.

## 2019-09-06 NOTE — PLAN OF CARE
Problem: NPPV/CPAP (Adult)  Intervention: Monitor and Manage Anxiety Related to NPPV/CPAP   09/06/19 1556   Interventions   Trust Relationship/Rapport care explained;questions answered;reassurance provided;questions encouraged     Intervention: Prevent Aspiration During Therapy   09/06/19 1556   Positioning   Head of Bed (HOB) HOB at 30 degrees     Intervention: Optimize Tolerance of Noninvasive Ventilation   09/06/19 1556   Respiratory Interventions   Airway/Ventilation Management airway patency maintained;calming measures promoted;humidification applied;pulmonary hygiene promoted     Intervention: Prevent/Minimize Device Friction/Shearing and Pressure Points   09/06/19 1556   Respiratory Interventions   NPPV/CPAP Maintenance mask adjusted;mask secure;tubes secured       Goal: Signs and Symptoms of Listed Potential Problems Will be Absent, Minimized or Managed (NPPV/CPAP)  Outcome: Ongoing (interventions implemented as appropriate)  Patient requiring almost constant BIPAP.

## 2019-09-06 NOTE — PLAN OF CARE
Problem: Fall Risk (Adult)  Goal: Absence of Fall  Outcome: Ongoing (interventions implemented as appropriate)      Problem: Patient Care Overview  Goal: Plan of Care Review  Outcome: Ongoing (interventions implemented as appropriate)   09/06/19 0622   Coping/Psychosocial   Plan of Care Reviewed With patient   Plan of Care Review   Progress no change   OTHER   Outcome Summary Patient placed back on Bi-pap this morning, CO2 high; Suprapubic catheter; Midline; VSS; Will continue to monitor.        Problem: Wound (Includes Pressure Injury) (Adult)  Goal: Signs and Symptoms of Listed Potential Problems Will be Absent, Minimized or Managed (Wound)  Outcome: Ongoing (interventions implemented as appropriate)      Problem: Skin Injury Risk (Adult)  Goal: Skin Health and Integrity  Outcome: Ongoing (interventions implemented as appropriate)      Problem: Pain, Chronic (Adult)  Goal: Acceptable Pain/Comfort Level and Functional Ability  Outcome: Ongoing (interventions implemented as appropriate)      Problem: NPPV/CPAP (Adult)  Goal: Signs and Symptoms of Listed Potential Problems Will be Absent, Minimized or Managed (NPPV/CPAP)  Outcome: Ongoing (interventions implemented as appropriate)      Problem: Kidney Disease, Chronic/End Stage Renal Disease (Adult)  Goal: Signs and Symptoms of Listed Potential Problems Will be Absent, Minimized or Managed (Kidney Disease, Chronic/End Stage Renal Disease)  Outcome: Ongoing (interventions implemented as appropriate)

## 2019-09-06 NOTE — CONSULTS
"[facility]  [address] [place] [address]. [address]   - [phone]  F  [phone]     Consultation         PATIENT  DEMOGRAPHICS   PATIENT NAME: Mando Hill                      PHYSICIAN: JEAN Ward  : 1945  MRN: [id number]    Subjective :\" I am feeling okay. My belly is blown up\"  SUBJECTIVE   Referring Provider: Dr Fisher  Reason for Consultation: worsening renal function  History of present illness:  Mando Hill is a 74 y.o. male with medical history significant for Afib, BPH, left hemiplegia from previous CVA, neurogenic bladder, chronic SPC, nephrolithiasis, HLD, HTN, h/o decubitus ulcers presents with abdominal pain. he has been followed by Dr Al for left obstructing ureteral stone.      Patient presented with a 2-3 day history of abdominal pain. Patient was on Bactrim for UTI outpatient. Patient endorsed nausea, vomiting, and abdominal pain. He has left side hemiparesis deficits from previous CVA.  He has had afib for years and had radioablation a few years ago which was unsuccessful. He reacted to amiodarone in the past and had a raised erythematous rash over most of his body that persisted for weeks. He also has associated confusion PTA.    Pt has HO frequent UTIs and received bactrim just prior to admission. This time on ct scan he has 3 ureteral stones with perinephric stranding with left hydronephrosis. During his stay he has VT and required cardioversion and also had code blue. He is on iv amiodarone. He underwent cysto but unable to place left ureteral stent.     Past Medical History:   Diagnosis Date   • Atherosclerosis    • Atrial fibrillation (CMS/HCC)    • BPH (benign prostatic hyperplasia)    • Chronic bronchitis (CMS/HCC)    • Hyperlipidemia    • Hypertension    • Kidney stones    • Left hemiplegia (CMS/HCC)    • Osteoarthritis    • Sacral decubitus ulcer    • Stroke (cerebrum) (CMS/HCC)    • UTI (urinary tract " "infection) due to urinary indwelling catheter (CMS/HCC)      Past Surgical History:   Procedure Laterality Date   • CARDIAC ELECTROPHYSIOLOGY PROCEDURE N/A 3/15/2018   • CHOLECYSTECTOMY     • EXTRACORPOREAL SHOCKWAVE LITHOTRIPSY (ESWL), STENT INSERTION/REMOVAL Right 9/21/2018   • SUPRAPUBIC CATHETER INSERTION       Family History   Problem Relation Age of Onset   • Stroke Mother    • Hypertension Father      Social History     Tobacco Use   • Smoking status: Former Smoker   • Smokeless tobacco: Never Used   Substance Use Topics   • Alcohol use: Yes   • Drug use: No     Allergies:  Ambien [zolpidem]; Aleve [naproxen]; Amikacin; Amiodarone; and Vancomycin     REVIEW OF SYSTEMS    Review of Systems   Constitutional: Positive for fatigue.   HENT: Negative.    Eyes: Negative.    Respiratory: Positive for chest tightness and shortness of breath.    Cardiovascular: Positive for chest pain, palpitations and leg swelling.   Gastrointestinal: Positive for abdominal distention.   Genitourinary:        Chronic SP cath for neurogenic bladder   Musculoskeletal:        Left hemiplegia   Skin: Negative.    Allergic/Immunologic: Negative.    Neurological: Positive for facial asymmetry and weakness.   Hematological: Negative.    Psychiatric/Behavioral: Negative.        Objective   OBJECTIVE   Vital Signs  Temp:  [97.4 °F (36.3 °C)-97.8 °F (36.6 °C)] 97.8 °F (36.6 °C)  Heart Rate:  [51-77] 67  Resp:  [18-29] 22  BP: ()/(51-88) 132/85    Flowsheet Rows      First Filed Value   Admission Height  170.2 cm (67\") Documented at 09/03/2019 0700   Admission Weight  85 kg (187 lb 8 oz) Documented at 09/03/2019 0700           I/O last 3 completed shifts:  In: 5730.4 [I.V.:4605.1; IV Piggyback:1125.3]  Out: 985 [Urine:985]    PHYSICAL EXAM    Physical Exam   Constitutional: He is oriented to person, place, and time. He appears well-developed and well-nourished.   HENT:   Head: Normocephalic and atraumatic.   Eyes: EOM are normal. Pupils " are equal, round, and reactive to light.   Neck:   marked kyphosis   Cardiovascular:   Murmur heard.  Irregular rate   Pulmonary/Chest: He is in respiratory distress.   Abdominal: He exhibits distension. There is tenderness.   Musculoskeletal:   Increased retraction right UE- left hemiplegia   Neurological: He is alert and oriented to person, place, and time.   Skin: Skin is dry.   Psychiatric: He has a normal mood and affect.   Nursing note and vitals reviewed.      RESULTS   Results Review:    Results from last 7 days   Lab Units 09/06/19  0421 09/05/19 0046 09/04/19  0524 09/03/19  0724   SODIUM mmol/L 140 143 139 140   POTASSIUM mmol/L 5.3* 4.0 4.2 4.4   CHLORIDE mmol/L 100 102 99 94*   CO2 mmol/L 23.0 29.0 30.0* 31.0*   BUN mg/dL 41* 30* 30* 34*   CREATININE mg/dL 3.03* 2.48* 2.43* 2.23*   CALCIUM mg/dL 8.3* 8.5* 8.4* 8.7   BILIRUBIN mg/dL  --  0.6  --  0.7   ALK PHOS U/L  --  95  --  111   ALT (SGPT) U/L  --  9  --  9   AST (SGOT) U/L  --  11  --  12   GLUCOSE mg/dL 181* 122* 132* 123*       Estimated Creatinine Clearance: 22.1 mL/min (A) (by C-G formula based on SCr of 3.03 mg/dL (H)).    Results from last 7 days   Lab Units 09/05/19 0046   MAGNESIUM mg/dL 1.9             Results from last 7 days   Lab Units 09/06/19  0421 09/05/19 0046 09/04/19 0524 09/03/19  0724   WBC 10*3/mm3 22.37* 16.55* 18.15* 15.35*   HEMOGLOBIN g/dL 13.6 12.7* 12.9* 13.2   PLATELETS 10*3/mm3 242 198 192 205              MEDICATIONS      amiodarone 200 mg Oral Q24H   atorvastatin 10 mg Oral Daily   baclofen 10 mg Oral Q12H   [START ON 9/7/2019] cefepime 2 g Intravenous Q24H   famotidine 20 mg Intravenous Daily   finasteride 5 mg Oral Daily   fluticasone 2 spray Each Nare Daily   insulin aspart 0-7 Units Subcutaneous 4x Daily AC & at Bedtime   ipratropium-albuterol 3 mL Nebulization 4x Daily - RT   methylPREDNISolone sodium succinate 60 mg Intravenous Q8H   nebivolol 2.5 mg Oral Nightly   phenytoin (DILANTIN) IVPB 100 mg  Intravenous Q12H   rivaroxaban 15 mg Oral Nightly   sodium chloride 10 mL Intravenous Q12H   sodium chloride 10 mL Intravenous Q12H   sodium chloride 10 mL Intravenous Q12H   tamsulosin 0.4 mg Oral Nightly       amiodarone 0.5 mg/min Last Rate: 0.5 mg/min (09/06/19 1358)   amiodarone 0.5 mg/min Last Rate: 0.5 mg/min (09/06/19 0209)   norepinephrine 0.02-0.3 mcg/kg/min    sodium chloride 100 mL/hr Last Rate: 100 mL/hr (09/06/19 6757)     Medications Prior to Admission   Medication Sig Dispense Refill Last Dose   • atorvastatin (LIPITOR) 10 MG tablet Take 1 tablet by mouth Daily. 15 tablet 0 9/3/2019   • baclofen (LIORESAL) 10 MG tablet Take 10 mg by mouth 2 (Two) Times a Day. Takes two pills at night and one in am   9/3/2019   • finasteride (PROSCAR) 5 MG tablet Take 5 mg by mouth Daily.   9/3/2019 at Unknown time   • furosemide (LASIX) 20 MG tablet Take 20 mg by mouth Daily.   9/3/2019 at Unknown time   • HYDROcodone-acetaminophen (NORCO)  MG per tablet Take 1 tablet by mouth Every 6 (Six) Hours As Needed for Moderate Pain .   9/3/2019 at Unknown time   • nebivolol (BYSTOLIC) 20 MG tablet Take 20 mg by mouth Every Night.   9/2/2019 at Unknown time   • PARoxetine (PAXIL) 20 MG tablet Take 20 mg by mouth Every Morning.   9/3/2019 at Unknown time   • phenytoin (DILANTIN) 100 MG ER capsule Take  by mouth 2 (Two) Times a Day.   9/3/2019 at Unknown time   • potassium chloride (K-DUR,KLOR-CON) 10 MEQ CR tablet Take 10 mEq by mouth 2 (Two) Times a Day.   9/20/2018 at 0800   • rivaroxaban (XARELTO) 20 MG tablet Take 20 mg by mouth Every Night.   9/2/2019 at Unknown time   • tamsulosin (FLOMAX) 0.4 MG capsule 24 hr capsule Take 1 capsule by mouth Every Night.   9/2/2019 at Unknown time   • temazepam (RESTORIL) 15 MG capsule Take 15 mg by mouth At Night As Needed for Sleep.      • aspirin 81 MG EC tablet Take 81 mg by mouth Daily.   Unknown at Unknown time   • nitrofurantoin (MACRODANTIN) 50 MG capsule Take 50 mg by  mouth Every Night.        Assessment/Plan   ASSESSMENT / PLAN      Ureterolithiasis    BPH (benign prostatic hyperplasia)    CKD (chronic kidney disease) stage 4, GFR 15-29 ml/min (CMS/[facility])      1.AUGIE- creatinine on admission 1.01. He had been started on bactrim prior to admission for UTI and is also on daily macrodantin. Creatinine is now 3.01 . This is likely due to posst renal cause plus cardioresp arrest and hypoperfusion leading to atn. UO is poor and he appears fluid overload. He is at least 12 lbs above his weight.    I think he needs PCN or attempt of cysto and stent again. He is heading to [facility] and I have discussed with the family in detail. I will try forced diuresis with albumin and lasix. We can stop the ivf due to overload and low ef.    2.UTI- growing pseudomonas- now on cefidipime.     3. Renal calculi- Couple of relatively large left distal ureteral stones causing  minimal obstruction of the left renal collecting system. Dr Al is following- on flomax.  Chronic SP cath for neurogenic bladder. Needs PCN, unable to place stent on left ureter during cysto    4. Afib / VT arrest- BP is variable. Required cardioversion yesterday. On amiodarone drip. EF 24%. Cardiology is following     5 Hyperkalemia- Potassium 5.3-got kayexalate x 1 dose              I discussed the patients findings and my recommendations with patient, family and nursing staff      This document has been electronically signed by JEAN Ward on September 6, 2019 5:36 PM      For this patient encounter, I have reviewed the Nurse Practitioner's documentation, medical decision making, and treatment plan and personally spent time with the patient.

## 2019-09-06 NOTE — PROGRESS NOTES
LOS: 3 days   Patient Care Team:  Manuel Lopez APRN as PCP - General (Family Medicine)    Chief Complaint:  S/P resuscitative cardiac arrest    74 years old patient currently confused unable to provide information in the background history of atrial flutter status post EP study and flutter ablation as there was difficulty in controlling the rate, chronic suprapubic catheter placement, hyperlipidemia, long-standing history of traumatic brain injury, CVA  Glucose  65 - 99 mg/dL 181 Abnormally high     BUN  8 - 23 mg/dL 41 Abnormally high     Creatinine  0.76 - 1.27 mg/dL 3.03 Abnormally high     Sodium  136 - 145 mmol/L 140    Potassium  3.5 - 5.2 mmol/L 5.3 Abnormally high             ·  The left ventricular cavity is mild-to-moderately dilated.  · Estimated EF = 24%.  · Left ventricular systolic function is severely decreased.  · Left ventricular diastolic dysfunction (grade II) consistent with pseudonormalization.  · Mild mitral valve regurgitation is present  · Moderate tricuspid valve regurgitation is present.  · The tricuspid valve is grossly normal. Moderate tricuspid valve regurgitation is present. Estimated right ventricular systolic pressure from tricuspid regurgitation is moderately elevated (45-55 mmHg). Moderate pulmonary hypertension is present.     3/2018  · Left ventricular systolic function is normal. Estimated EF = 55%.  · Left ventricular diastolic dysfunction (grade I) consistent with impaired relaxation.  · Mild mitral valve regurgitation is present  Mild tricuspid valve regurgitation is present.      Review of Systems:   ROS Constitutional: Positive for activity change, appetite change and fatigue. Negative for chills, diaphoresis    Objective     Current Facility-Administered Medications   Medication Dose Route Frequency Provider Last Rate Last Dose   • amiodarone (NEXTERONE) 360 mg/200 mL (1.8 mg/mL) infusion  0.5 mg/min Intravenous Continuous Seven Londono MD   Stopped at 09/05/19 7077    • amiodarone (NEXTERONE) 360 mg/200 mL (1.8 mg/mL) infusion  0.5 mg/min Intravenous Continuous Hardy Fisher MD 16.67 mL/hr at 09/06/19 0209 0.5 mg/min at 09/06/19 0209   • amiodarone (PACERONE) tablet 200 mg  200 mg Oral Q24H Seven Londono MD       • atorvastatin (LIPITOR) tablet 10 mg  10 mg Oral Daily Hardy Fisher MD   10 mg at 09/06/19 0851   • baclofen (LIORESAL) tablet 10 mg  10 mg Oral Q12H Hardy Fisher MD   10 mg at 09/06/19 0849   • dextrose (D50W) 25 g/ 50mL Intravenous Solution 25 g  25 g Intravenous Q15 Min PRN Hardy Fisher MD       • dextrose (GLUTOSE) oral gel 15 g  15 g Oral Q15 Min PRN Hardy Fisher MD       • famotidine (PEPCID) injection 20 mg  20 mg Intravenous Daily Hardy Fisher MD   20 mg at 09/06/19 0851   • finasteride (PROSCAR) tablet 5 mg  5 mg Oral Daily Hardy Fisher MD   5 mg at 09/06/19 0851   • fluticasone (FLONASE) 50 MCG/ACT nasal spray 2 spray  2 spray Each Nare Daily Orion Hernandez MD   2 spray at 09/06/19 0853   • glucagon (human recombinant) (GLUCAGEN DIAGNOSTIC) injection 1 mg  1 mg Subcutaneous Q15 Min PRN Hardy Fisher MD       • HYDROcodone-acetaminophen (NORCO)  MG per tablet 1 tablet  1 tablet Oral Q6H PRN Hardy Fisher MD   1 tablet at 09/06/19 0849   • insulin aspart (novoLOG) injection 0-7 Units  0-7 Units Subcutaneous 4x Daily AC & at Bedtime Hardy Fisher MD       • ipratropium-albuterol (DUO-NEB) nebulizer solution 3 mL  3 mL Nebulization 4x Daily - RT Orion Hernandez MD   3 mL at 09/06/19 1248   • methylPREDNISolone sodium succinate (SOLU-Medrol) injection 60 mg  60 mg Intravenous Q8H Hardy Fisher MD   60 mg at 09/06/19 0545   • morphine injection 2 mg  2 mg Intravenous Q4H PRN Hardy Fisher MD       • nebivolol (BYSTOLIC) half tablet 2.5 mg  2.5 mg Oral Nightly Seven Londono MD       • norepinephrine (LEVOPHED) 8 mg/250 mL (32 mcg/mL) in sodium chloride 0.9% infusion  "(premix)  0.02-0.3 mcg/kg/min Intravenous Titrated Hardy Fisher MD       • Pharmacy to Dose Zosyn   Does not apply Continuous PRN Spenser England MD       • phenytoin (DILANTIN) 100 mg in sodium chloride 0.9 % 100 mL IVPB  100 mg Intravenous Q12H Hardy Fisher  mL/hr at 09/06/19 1004 100 mg at 09/06/19 1004   • piperacillin-tazobactam (ZOSYN) 3.375 g/100 mL 0.9% NS IVPB (mbp)  3.375 g Intravenous Q8H Spenser England MD   3.375 g at 09/06/19 0850   • rivaroxaban (XARELTO) tablet 15 mg  15 mg Oral Nightly Hardy Fisher MD       • sodium chloride 0.9 % flush 10 mL  10 mL Intravenous PRN Derrick Whiteside MD       • sodium chloride 0.9 % flush 10 mL  10 mL Intravenous Q12H Spenser England MD   10 mL at 09/06/19 0853   • sodium chloride 0.9 % flush 10 mL  10 mL Intravenous PRN Spenser England MD       • sodium chloride 0.9 % flush 10 mL  10 mL Intravenous Q12H Hardy Fisher MD   10 mL at 09/06/19 0853   • sodium chloride 0.9 % flush 10 mL  10 mL Intravenous Q12H Hardy Fisher MD   10 mL at 09/06/19 0850   • sodium chloride 0.9 % flush 10 mL  10 mL Intravenous PRN Hardy Fisher MD       • sodium chloride 0.9 % infusion  100 mL/hr Intravenous Continuous Hardy Fisher  mL/hr at 09/06/19 0415 100 mL/hr at 09/06/19 0415   • sodium polystyrene (KAYEXALATE) 15 GM/60ML suspension 15 g  15 g Oral Once Hardy Fisher MD       • tamsulosin (FLOMAX) 24 hr capsule 0.4 mg  0.4 mg Oral Nightly Hardy Fisher MD   0.4 mg at 09/05/19 2001       Vital Sign Min/Max for last 24 hours  Temp  Min: 97.4 °F (36.3 °C)  Max: 97.8 °F (36.6 °C)   BP  Min: 90/53  Max: 153/67   Pulse  Min: 51  Max: 73   Resp  Min: 17  Max: 29   SpO2  Min: 82 %  Max: 100 %   Flow (L/min)  Min: 6  Max: 6   Weight  Min: 89.4 kg (197 lb 1.5 oz)  Max: 89.4 kg (197 lb 1.5 oz)     Flowsheet Rows      First Filed Value   Admission Height  170.2 cm (67\") Documented at 09/03/2019 0700   Admission Weight  85 kg (187 " lb 8 oz) Documented at 09/03/2019 0700            Intake/Output Summary (Last 24 hours) at 9/6/2019 1340  Last data filed at 9/6/2019 0526  Gross per 24 hour   Intake 3867.4 ml   Output 340 ml   Net 3527.4 ml       Physical Exam:     General Appearance:   Lethargic and drowsy   Lungs:    Bilateral decreased air entry    Heart:    Regular rhythm and normal rate, normal S1 and S2, no            murmur, no gallop, no rub, no click   Chest Wall:    No abnormalities observed   Abdomen:     Normal bowel sounds, no masses, no organomegaly, soft        non-tender, non-distended, no guarding, no rebound                tenderness   Extremities:  Positive lower extremity edema   Pulses:   Pulses palpable and equal bilaterally   Skin:   No bleeding, bruising or rash        Results Review:   I reviewed the patient's new clinical results.  Lab Results   Component Value Date    WBC 22.37 (H) 09/06/2019    HGB 13.6 09/06/2019    HCT 44.3 09/06/2019    MCV 98.7 (H) 09/06/2019     09/06/2019     Lab Results   Component Value Date    GLUCOSE 181 (H) 09/06/2019    BUN 41 (H) 09/06/2019    CREATININE 3.03 (H) 09/06/2019    EGFRIFNONA 20 (L) 09/06/2019    BCR 13.5 09/06/2019    CO2 23.0 09/06/2019    CALCIUM 8.3 (L) 09/06/2019    ALBUMIN 2.90 (L) 09/05/2019    AST 11 09/05/2019    ALT 9 09/05/2019     Lab Results   Component Value Date    TSH 1.630 08/01/2018     Lab Results   Component Value Date    INR 2.48 (H) 03/12/2018    INR 1.4 (H) 12/13/2016    INR 1.1 02/01/2015    PROTIME 25.7 (H) 03/12/2018    PROTIME 16.6 (H) 12/13/2016    PROTIME 13.8 02/01/2015     Lab Results   Component Value Date    PTT 34.9 02/01/2015       EKG:     Telemetry:    Ejection Fraction  No results found for: EF    Echo EF Estimated  Lab Results   Component Value Date    ECHOEFEST 24 09/05/2019         Present on Admission:  • Ureterolithiasis  • BPH (benign prostatic hyperplasia)  • CKD (chronic kidney disease) stage 4, GFR 15-29 ml/min  (CMS/[facility])    Assessment/Plan   #1 status post resuscitative cardiac arrest  2 LV dysfunction with ejection fraction 25%  #3 history of CVA and long-standing history of traumatic brain injury  #4 recurrent urinary tract infection with a supra pubic catheter placement  #5 history of atrial fibrillation/atrial flutter status post post EP study and flutter ablation     #6 patient is on DNI status    No further recurrence of ventricular arrhythmia patient is on DNI status we will continue IV amiodarone today along with initiations of oral amiodarone 200 mg a day.  Will decrease the dose of Bystolic to 2.5 mg given the blood pressure on the lower side with the parameters given to the nurse.  He is not a candidate for ACE/ARB and Aldactone at this stage given the significant renal insufficiency.  His son was present the room and clinical condition discussed with the family.  He is not a candidate for aggressive cardiac evaluation and ICD implantation  Seven Londono MD  09/06/19  1:40 PM      Part of this note may be an electronic transcription/translation of spoken language to printed text using the Dragon Dictation system.

## 2019-09-06 NOTE — PROGRESS NOTES
"   LOS: 3 days   Patient Care Team:  Manuel Lopez APRN as PCP - General (Family Medicine)    Subjective     Subjective:  Symptoms:  Worsening.  (BiPap again, complains of left flank pain. ).    Diet:  No vomiting.    Pain:  He complains of pain that is moderate.        History taken from: patient chart family RN    Objective     Vital Signs  Temp:  [97.4 °F (36.3 °C)-97.8 °F (36.6 °C)] 97.8 °F (36.6 °C)  Heart Rate:  [51-73] 54  Resp:  [17-29] 24  BP: ()/(51-93) 93/51    Objective:  General Appearance:  In distress.    Vital signs: (most recent): Blood pressure 93/51, pulse 54, temperature 97.8 °F (36.6 °C), temperature source Axillary, resp. rate 24, height 170.2 cm (67\"), weight 89.4 kg (197 lb 1.5 oz), SpO2 99 %.  Vital signs are normal.    Output: Producing urine.    HEENT: Normal HEENT exam.    Lungs:  Increased effort.  He is not in respiratory distress.  There are decreased breath sounds.    Abdomen: Abdomen is soft and non-distended.  There is suprapubic tenderness.    Pulses: Distal pulses are intact.    Neurological: Patient is alert.    Skin:  Warm and dry.  No rash or ecchymosis.             Results Review:    Lab Results (last 24 hours)     Procedure Component Value Units Date/Time    POC Glucose Once [[id number]]  (Normal) Collected:  09/06/19 1303    Specimen:  Blood Updated:  09/06/19 1318     Glucose 114 mg/dL      Comment: : [id number] GURU See ID: [id number]       Hemoglobin A1c [[id number]]  (Normal) Collected:  09/06/19 0421    Specimen:  Blood Updated:  09/06/19 1201     Hemoglobin A1C 5.60 %     Narrative:       Hemoglobin A1C Ranges:    Increased Risk for Diabetes  5.7% to 6.4%  Diabetes                     >= 6.5%  Diabetic Goal                < 7.0%    Urine Culture - Urine, Urine, Clean Catch [[id number]]  (Abnormal)  (Susceptibility) Collected:  09/03/19 0804    Specimen:  Urine, Clean Catch Updated:  09/06/19 1033     Urine Culture 50,000 CFU/mL Pseudomonas " aeruginosa      25,000 CFU/mL Mixed Annamarie Isolated    Narrative:       Specimen contains mixed organisms of questionable pathogenicity which indicates contamination with commensal annamarie.  Further identification is unlikely to provide clinically useful information.  Suggest recollection.    Susceptibility      Pseudomonas aeruginosa     LULA     Cefepime Susceptible     Ceftazidime Susceptible     Ciprofloxacin Resistant     Gentamicin Susceptible     Levofloxacin Resistant     Piperacillin + Tazobactam Susceptible                    CBC & Differential [[id number]] Collected:  09/06/19 0421    Specimen:  Blood Updated:  09/06/19 1012    Narrative:       The following orders were created for panel order CBC & Differential.  Procedure                               Abnormality         Status                     ---------                               -----------         ------                     CBC Auto Differential[[id number]]        Abnormal            Final result                 Please view results for these tests on the individual orders.    CBC Auto Differential [[id number]]  (Abnormal) Collected:  09/06/19 0421    Specimen:  Blood Updated:  09/06/19 1012     WBC 22.37 10*3/mm3      RBC 4.49 10*6/mm3      Hemoglobin 13.6 g/dL      Hematocrit 44.3 %      MCV 98.7 fL      MCH 30.3 pg      MCHC 30.7 g/dL      RDW 16.1 %      RDW-SD 58.9 fl      MPV 10.6 fL      Platelets 242 10*3/mm3      Neutrophil % 88.5 %      Lymphocyte % 1.7 %      Monocyte % 8.3 %      Eosinophil % 0.0 %      Basophil % 0.2 %      Immature Grans % 1.3 %      Neutrophils, Absolute 19.80 10*3/mm3      Lymphocytes, Absolute 0.39 10*3/mm3      Monocytes, Absolute 1.85 10*3/mm3      Eosinophils, Absolute 0.00 10*3/mm3      Basophils, Absolute 0.05 10*3/mm3      Immature Grans, Absolute 0.28 10*3/mm3      nRBC 0.0 /100 WBC     Blood Gas, Arterial [[id number]]  (Abnormal) Collected:  09/06/19 0533    Specimen:  Arterial Blood Updated:  09/06/19 0523      Site Right Radial     Kevin's Test N/A     pH, Arterial 7.149 pH units      Comment: 85 Value below critical limit        pCO2, Arterial 64.6 mm Hg      Comment: 83 Value above reference range        pO2, Arterial 65.3 mm Hg      Comment: 84 Value below reference range        HCO3, Arterial 22.5 mmol/L      Base Excess, Arterial -7.4 mmol/L      Comment: 84 Value below reference range        O2 Saturation, Arterial 90.9 %      Comment: 84 Value below reference range        Barometric Pressure for Blood Gas 747 mmHg      Modality BiPap     FIO2 50 %      Ventilator Mode BiPAP     Set [facility] Resp Rate 24.0     IPAP 22     Comment: Meter: [id number]     :  [id number]        EPAP 6     Collected by KEL HUGHES    Extra Tubes [[id number]] Collected:  09/06/19 0421    Specimen:  Blood, Venous Line Updated:  09/06/19 0530    Narrative:       The following orders were created for panel order Extra Tubes.  Procedure                               Abnormality         Status                     ---------                               -----------         ------                     Lavender Top[[id number]]                                     Final result                 Please view results for these tests on the individual orders.    Lavender Top [[id number]] Collected:  09/06/19 0421    Specimen:  Blood Updated:  09/06/19 0530     Extra Tube hold for add-on     Comment: Auto resulted       Basic Metabolic Panel [[id number]]  (Abnormal) Collected:  09/06/19 0421    Specimen:  Blood Updated:  09/06/19 0516     Glucose 181 mg/dL      BUN 41 mg/dL      Creatinine 3.03 mg/dL      Sodium 140 mmol/L      Potassium 5.3 mmol/L      Chloride 100 mmol/L      CO2 23.0 mmol/L      Calcium 8.3 mg/dL      eGFR Non African Amer 20 mL/min/1.73      BUN/Creatinine Ratio 13.5     Anion Gap 17.0 mmol/L     Narrative:       GFR Normal >60  Chronic Kidney Disease <60  Kidney Failure <15    Blood Gas, Arterial [[id number]]  (Abnormal) Collected:   09/06/19 0409    Specimen:  Arterial Blood Updated:  09/06/19 0414     Site Right Radial     Kevin's Test Positive     pH, Arterial 7.084 pH units      Comment: 85 Value below critical limit        pCO2, Arterial 79.9 mm Hg      Comment: 86 Value above critical limit        pO2, Arterial 62.5 mm Hg      Comment: 84 Value below reference range        HCO3, Arterial 23.9 mmol/L      Base Excess, Arterial -7.7 mmol/L      Comment: 84 Value below reference range        O2 Saturation, Arterial 88.8 %      Comment: 84 Value below reference range        Barometric Pressure for Blood Gas 747 mmHg      Modality Nasal Cannula     Flow Rate 6.0 lpm      Ventilator Mode NA     Collected by SAUL     Comment: Meter: [id number]     :  [id number]       Blood Culture - Blood, Wrist, Right [[id number]] Collected:  09/03/19 1825    Specimen:  Blood from Wrist, Right Updated:  09/05/19 1845     Blood Culture No growth at 2 days    Blood Culture - Blood, Wrist, Left [[id number]] Collected:  09/03/19 1815    Specimen:  Blood from Wrist, Left Updated:  09/05/19 1830     Blood Culture No growth at 2 days         Imaging Results (last 24 hours)     ** No results found for the last 24 hours. **           I reviewed the patient's new clinical results.  I reviewed the patient's new imaging results and agree with the interpretation.  I reviewed the patient's other test results and agree with the interpretation      Assessment/Plan       Ureterolithiasis    BPH (benign prostatic hyperplasia)    CKD (chronic kidney disease) stage 4, GFR 15-29 ml/min (CMS/[facility])      Assessment & Plan    1. Left ureteral stones with obstruction and   2. Pyelonephritis  3. AUGIE  -POD #2 cystoscopy unable to negotiate ureter distally due to fishhooking of ureter and J-stent not placed.   -CT abdomen/pelvis wo contrast shows 3 distal ureteral stones: LEFT UVJ stone nearly 6 mm; distal ureteral stone nearly 9 mm above the UVJ stone and another distal ureteral  stone; moderate to severe hydroureteronephrosis with mild fatty stranding left ureter and perirenal space. There are multiple right kidney stones not active; mildly enlarged prostate with a left para-aortic single enlarged lymph node of undetermined significance.   -+ UA, 9/3/19  -WBC 15.35-->18.15-->22.37  -Estimated Creatinine Clearance: 22.1 mL/min (A) (by C-G formula based on SCr of 3.03 mg/dL (H)).  -Cr 2.23-->2.43-->2.48-->3.03  -Baseline Cr 1.0  -Zosyn day #4  -Flomax, Proscar, home meds  -bladder scan 178 mL yesterday  -IR consult for LEFT nephrostomy tube placement, unable to due to patient condition  -Urine culture Pseudomonas     Plan:  Non-contrasted CT abdomen/pelvis to evaluate hydronephrosis     JEAN Robbins  09/06/19  1:25 PM

## 2019-09-06 NOTE — CONSULTS
Adult Nutrition  Assessment    Patient Name:  Mando Hill  YOB: 1945  MRN: [id number]  Admit Date:  9/3/2019    Assessment Date:  9/6/2019    Comments:  Pt was put back on the Bipap this am with noted CO2 high.  He was seen by cardiology and due to his past medical hx is not a candidate for invasive cardiac eval.  Labs reviewed and noted.  Worsening renal fxn.  He remains on solumedrol and amio drip.  Will monitor tx plans    Reason for Assessment     Row Name 09/06/19 1030          Reason for Assessment    Reason For Assessment  follow-up protocol         Nutrition/Diet History     Row Name 09/06/19 1030          Nutrition/Diet History    Typical Food/Fluid Intake  Pt resting.           Anthropometrics     Row Name 09/06/19 0526          Anthropometrics    Weight  89.4 kg (197 lb 1.5 oz) Weighed times 3         Labs/Tests/Procedures/Meds     Row Name 09/06/19 1031          Labs/Procedures/Meds    Lab Results Reviewed  reviewed, pertinent     Lab Results Comments  K+ 5.3; glucose 181; Bun 41; Creat 3.03; Alb 2.90        Diagnostic Tests/Procedures    Diagnostic Test/Procedure Reviewed  reviewed, pertinent     Diagnostic Test/Procedures Comments  Urology; Cardiology; CPap        Medications    Pertinent Medications Reviewed  reviewed, pertinent     Pertinent Medications Comments  ABx; Solumedrol, amio drip;          Physical Findings     Row Name 09/06/19 1033          Physical Findings    Overall Physical Appearance  on oxygen therapy     Skin  other (see comments) wound due to lift on coccy           Nutrition Prescription Ordered     Row Name 09/06/19 1033          Nutrition Prescription PO    Current PO Diet  NPO                 Electronically signed by:  Radha Valentine RD  09/06/19 10:36 AM

## 2019-09-07 PROBLEM — J44.1 CHRONIC OBSTRUCTIVE PULMONARY DISEASE WITH ACUTE EXACERBATION (HCC): Status: ACTIVE | Noted: 2019-01-01

## 2019-09-07 PROBLEM — I50.22 CHRONIC SYSTOLIC CONGESTIVE HEART FAILURE (HCC): Status: ACTIVE | Noted: 2019-01-01

## 2019-09-07 PROBLEM — J98.9 CARDIAC ARREST DUE TO RESPIRATORY DISORDER (HCC): Status: ACTIVE | Noted: 2019-01-01

## 2019-09-07 PROBLEM — I46.8 CARDIAC ARREST DUE TO RESPIRATORY DISORDER (HCC): Status: ACTIVE | Noted: 2019-01-01

## 2019-09-07 PROBLEM — I49.01 VENTRICULAR FIBRILLATION (HCC): Status: ACTIVE | Noted: 2019-01-01

## 2019-09-07 NOTE — PLAN OF CARE
Problem: NPPV/CPAP (Adult)  Intervention: Monitor and Manage Anxiety Related to NPPV/CPAP   09/07/19 1629   Interventions   Trust Relationship/Rapport care explained;emotional support provided;empathic listening provided;questions answered;questions encouraged;reassurance provided;thoughts/feelings acknowledged   Safety Management   Medication Review/Management medications reviewed     Intervention: Prevent Aspiration During Therapy   09/07/19 1629   Positioning   Head of Bed (HOB) HOB at 30 degrees     Intervention: Optimize Tolerance of Noninvasive Ventilation   09/07/19 1629   Respiratory Interventions   Airway/Ventilation Management airway patency maintained;humidification applied     Intervention: Prevent/Minimize Device Friction/Shearing and Pressure Points  I applied a nasal pillow for patient comfort. Patient did not tolerate and asked me to remove.    09/07/19 1629   Respiratory Interventions   NPPV/CPAP Maintenance mask adjusted;mask secure;nasal pillows secure       Goal: Signs and Symptoms of Listed Potential Problems Will be Absent, Minimized or Managed (NPPV/CPAP)  Outcome: Ongoing (interventions implemented as appropriate)  Patient remains stable on current BIPAP settings. The FiO2 was lowered from 50% to 45%. Patient requested to be taken off of the BIPAP machine around 9am today. Patient tolerated being off of the machine for about an hour. Patient did not tolerated being on a regular nasal cannula set at 5 liters. Saturations dropped to 87-89%, Spo2 came up to 92%-95% on a high flow nasal cannula set on 10 liters. After about an hour of being on the high flow nasal cannula the patients respiratory rate increased slightly from 24 to 28-30 breaths per minute, pt had shallow breathing, and face was flushed. I placed patient back on BIPAP settings. ABG was ordered but patient did not tolerate and sample was not obtained. Patient has tolerated well. Will continue to monitor the patient.     09/07/19  1629   Goal/Outcome Evaluation   Problems Assessed (NPPV/CPAP) all

## 2019-09-07 NOTE — PROGRESS NOTES
"   LOS: 4 days   Patient Care Team:  Manuel Lopez APRN as PCP - General (Family Medicine)    Subjective     Subjective:  Symptoms:  (BiPap, no SP tube problems, flank pain present.   Says the oral amiodarone caused facial flushing, throat swelling (gone) and hallucinations and does not want to take it again. ).    Diet:  No vomiting.    Pain:  He complains of pain that is moderate.        History taken from: patient chart family RN    Objective     Vital Signs  Temp:  [98 °F (36.7 °C)-98.9 °F (37.2 °C)] 98.9 °F (37.2 °C)  Heart Rate:  [51-77] 76  Resp:  [20-28] 20  BP: ()/(51-97) 152/77    Objective:  General Appearance:  In distress and ill-appearing.    Vital signs: (most recent): Blood pressure 152/77, pulse 76, temperature 98.9 °F (37.2 °C), temperature source Oral, resp. rate 20, height 170.2 cm (67\"), weight 90.8 kg (200 lb 2.8 oz), SpO2 96 %.  Vital signs are normal.  No fever.    Output: Producing urine.    HEENT: Normal HEENT exam.    Lungs:  Increased effort.  He is not in respiratory distress.  There are decreased breath sounds.    Heart: Normal rate.    Abdomen: Abdomen is soft and non-distended.  Bowel sounds are normal.   There is suprapubic tenderness.    Extremities: There is dependent edema.    Pulses: Distal pulses are intact.    Neurological: Patient is alert.    Skin:  Warm and dry.  No rash or ecchymosis.             Results Review:    Lab Results (last 24 hours)     Procedure Component Value Units Date/Time    Basic Metabolic Panel [[id number]]  (Abnormal) Collected:  09/07/19 0941    Specimen:  Blood Updated:  09/07/19 1007     Glucose 138 mg/dL      BUN 53 mg/dL      Creatinine 3.49 mg/dL      Sodium 141 mmol/L      Potassium 3.7 mmol/L      Chloride 103 mmol/L      CO2 20.0 mmol/L      Calcium 7.9 mg/dL      eGFR Non African Amer 17 mL/min/1.73      BUN/Creatinine Ratio 15.2     Anion Gap 18.0 mmol/L     Narrative:       GFR Normal >60  Chronic Kidney Disease <60  Kidney Failure <15 "    POC Glucose Once [[id number]]  (Normal) Collected:  09/07/19 0635    Specimen:  Blood Updated:  09/07/19 0720     Glucose 118 mg/dL      Comment: RN NotifiedSliding Scale AdminOperator: [id number] Fundera PAMMeter ID: [id number]       CBC & Differential [[id number]] Collected:  09/07/19 0454    Specimen:  Blood Updated:  09/07/19 0518    Narrative:       The following orders were created for panel order CBC & Differential.  Procedure                               Abnormality         Status                     ---------                               -----------         ------                     CBC Auto Differential[[id number]]        Abnormal            Final result                 Please view results for these tests on the individual orders.    CBC Auto Differential [[id number]]  (Abnormal) Collected:  09/07/19 0454    Specimen:  Blood Updated:  09/07/19 0518     WBC 19.81 10*3/mm3      RBC 4.01 10*6/mm3      Hemoglobin 12.1 g/dL      Hematocrit 36.5 %      MCV 91.0 fL      MCH 30.2 pg      MCHC 33.2 g/dL      RDW 15.6 %      RDW-SD 51.9 fl      MPV 10.4 fL      Platelets 188 10*3/mm3      Neutrophil % 86.9 %      Lymphocyte % 2.2 %      Monocyte % 8.5 %      Eosinophil % 0.1 %      Basophil % 0.2 %      Immature Grans % 2.1 %      Neutrophils, Absolute 17.21 10*3/mm3      Lymphocytes, Absolute 0.44 10*3/mm3      Monocytes, Absolute 1.68 10*3/mm3      Eosinophils, Absolute 0.02 10*3/mm3      Basophils, Absolute 0.04 10*3/mm3      Immature Grans, Absolute 0.42 10*3/mm3      nRBC 0.2 /100 WBC     POC Glucose Once [[id number]]  (Abnormal) Collected:  09/06/19 2047    Specimen:  Blood Updated:  09/06/19 2111     Glucose 173 mg/dL      Comment: Sliding Scale AdminRN NotifiedOperator: [id number] Fundera PAMMeter ID: [id number]       POC Glucose Once [[id number]]  (Abnormal) Collected:  09/06/19 1653    Specimen:  Blood Updated:  09/06/19 1850     Glucose 142 mg/dL      Comment: : [id number] GURU See  ID: [id number]       Blood Culture - Blood, Wrist, Right [[id number]] Collected:  09/03/19 1825    Specimen:  Blood from Wrist, Right Updated:  09/06/19 1845     Blood Culture No growth at 3 days    Blood Culture - Blood, Wrist, Left [[id number]] Collected:  09/03/19 1815    Specimen:  Blood from Wrist, Left Updated:  09/06/19 1830     Blood Culture No growth at 3 days    POC Glucose Once [[id number]]  (Normal) Collected:  09/06/19 1303    Specimen:  Blood Updated:  09/06/19 1318     Glucose 114 mg/dL      Comment: : [id number] GURU See ID: [id number]       Hemoglobin A1c [[id number]]  (Normal) Collected:  09/06/19 0421    Specimen:  Blood Updated:  09/06/19 1201     Hemoglobin A1C 5.60 %     Narrative:       Hemoglobin A1C Ranges:    Increased Risk for Diabetes  5.7% to 6.4%  Diabetes                     >= 6.5%  Diabetic Goal                < 7.0%         Imaging Results (last 24 hours)     Procedure Component Value Units Date/Time    US Renal Bilateral [[id number]] Collected:  09/06/19 1351     Updated:  09/06/19 1628    Narrative:       Ultrasound renal complete    HISTORY:  Acute on chronic renal failure. Calculus of ureter.  Urinary tract infection.    Ultrasound examination of the kidneys and urinary bladder was  performed.    COMPARISON: December 22, 2014. Correlation CT December 6, 2019.     FINDINGS:  The right kidney measures 9.49 cm in length by 5.70 cm x 5.10 cm  transverse.  The left kidney measures 11.8 cm in length by 7.90 cm x 6.72 cm  transverse.  The kidneys are of normal echotexture.  Cortical scarring right kidney.  Minimal to moderate left hydronephrosis.  Nonobstructive right renal calculi.  No solid or cystic renal mass.    Bladder decompressed by suprapubic catheter.      Impression:       CONCLUSION:  Cortical scarring right kidney.  Minimal to moderate left hydronephrosis.  Nonobstructive right renal calculi.  Bladder decompressed by suprapubic  catheter.    [id number]    Electronically signed by:  Loy Nobles MD  9/6/2019 4:26 PM CDT  Workstation: [email or web address]    CT Abdomen Pelvis Without Contrast [[id number]] Collected:  09/06/19 1443     Updated:  09/06/19 1520    Narrative:       CT abdomen, pelvis without contrast.       CLINICAL INDICATION: Fever, abdominal pain        COMPARISON: CT September 3, 2019.       TECHNIQUE: Noncontrast helical scanning with axial and coronal  reformations. Soft tissue, lung, and bone windows reviewed.    This exam was performed according to our departmental  dose-optimization program, which includes automated exposure  control, adjustment of the mA and/or kV according to patient size  and/or use of iterative reconstruction technique.    CT FINDINGS: Moderate size bilateral pleural effusions and  basilar infiltrative changes, either pneumonitis or atelectasis.  Unfavorable change since prior CT exam.   Colonic interposition, anterior to the right lobe of liver (a  normal variant.)    Atrophic changes right kidney with cortical notching and cortical  thinning.    Mild hydronephrotic changes left kidney with perinephric  stranding. This is similar to prior exam. There is a 0.8 cm stone  and a smaller 2 to 3 mm stone at the left ureterovesical junction  And additional 0.7 cm stone is noted in the ureter more  proximally is several centimeters above the level of the  acetabulum. This is unchanged since prior exam.    There is a suprapubic catheter.    Arthritic changes left hip.      Impression:       CONCLUSION: Increasing bilateral pleural effusions and basilar  infiltrative changes either pneumonitis or atelectasis.  Unfavorable change since prior exam.    Chronic atrophic changes right kidney.    Mild hydronephrotic changes left kidney with dilatation of  calyces renal pelvis and ureter.    Two partially obstructing stones in the distal ureter at the  medial meniscal junction and an additional stone in the distal  ureter  several centimeters superior to the level of the  acetabulum. Similar appearance to prior exam.    Electronically signed by:  Dennis Gates MD  9/6/2019 3:18 PM CDT  Workstation: [id number]           I reviewed the patient's new clinical results.  I reviewed the patient's new imaging results and agree with the interpretation.  I reviewed the patient's other test results and agree with the interpretation      Assessment/Plan       Ureterolithiasis    PAF (paroxysmal atrial fibrillation) (CMS/[facility])    BPH (benign prostatic hyperplasia)    CKD (chronic kidney disease) stage 4, GFR 15-29 ml/min (CMS/[facility])    Chronic systolic congestive heart failure (CMS/[facility])    Chronic obstructive pulmonary disease with acute exacerbation (CMS/[facility])    Cardiac arrest due to respiratory disorder (CMS/[facility])    Ventricular fibrillation (CMS/[facility])      Assessment & Plan    1. Left ureteral stones with obstruction and   2. Pyelonephritis  3. AUGIE  -POD #3 cystoscopy unable to negotiate ureter distally due to fishhooking of ureter and J-stent not placed.   -CT abdomen/pelvis wo contrast shows 3 distal ureteral stones: LEFT UVJ stone nearly 6 mm; distal ureteral stone nearly 9 mm above the UVJ stone and another distal ureteral stone; moderate to severe hydroureteronephrosis with mild fatty stranding left ureter and perirenal space. There are multiple right kidney stones not active; mildly enlarged prostate with a left para-aortic single enlarged lymph node of undetermined significance.   -+ UA, 9/3/19  -WBC 15.35-->18.15-->22.37-->19.81  -Estimated Creatinine Clearance: 19.4 mL/min (A) (by C-G formula based on SCr of 3.49 mg/dL (H)).  -Cr 2.23-->2.43-->2.48-->3.03-->3.49  -Baseline Cr 1.0  -Zosyn day #5  -Flomax, Proscar, home meds  -IR consult for LEFT nephrostomy tube placement, unable to due to patient condition  -Urine culture Pseudomonas  -Non-contrasted CT abdomen/pelvis 9/6/19 reviewed     Plan:  Discussed with patient and family that Dr. Al  will be here this afternoon to discuss taking patient for another attempt at J-stent placement once patient is medically more stable.     JEAN Robbins  09/07/19  11:31 AM

## 2019-09-07 NOTE — NURSING NOTE
Patient and family believed that patient had an adverse reaction after taking amiodarone yesterday and initially refused taking it. Patient's family stated that the patient's face turned red. Patient and family decided to take the amiodarone at 1155 under close supervision and there has since been no reaction. Continuing to monitor.

## 2019-09-07 NOTE — PLAN OF CARE
Problem: Fall Risk (Adult)  Goal: Absence of Fall  Outcome: Ongoing (interventions implemented as appropriate)      Problem: Patient Care Overview  Goal: Interprofessional Rounds/Family Conf  Outcome: Ongoing (interventions implemented as appropriate)   09/07/19 1802   Interdisciplinary Rounds/Family Conf   Summary Patient remains on Bipap at 45% O2 tolerating well. VSS. Plans to transer to [facility]. Will continue to monitor.    Participants ;patient;family;physician       Problem: Urinary Tract Infection (Adult)  Goal: Signs and Symptoms of Listed Potential Problems Will be Absent, Minimized or Managed (Urinary Tract Infection)  Outcome: Ongoing (interventions implemented as appropriate)      Problem: Wound (Includes Pressure Injury) (Adult)  Goal: Signs and Symptoms of Listed Potential Problems Will be Absent, Minimized or Managed (Wound)  Outcome: Ongoing (interventions implemented as appropriate)      Problem: Skin Injury Risk (Adult)  Goal: Skin Health and Integrity  Outcome: Ongoing (interventions implemented as appropriate)      Problem: Pain, Chronic (Adult)  Goal: Acceptable Pain/Comfort Level and Functional Ability  Outcome: Ongoing (interventions implemented as appropriate)      Problem: NPPV/CPAP (Adult)  Goal: Signs and Symptoms of Listed Potential Problems Will be Absent, Minimized or Managed (NPPV/CPAP)  Outcome: Ongoing (interventions implemented as appropriate)      Problem: Kidney Disease, Chronic/End Stage Renal Disease (Adult)  Goal: Signs and Symptoms of Listed Potential Problems Will be Absent, Minimized or Managed (Kidney Disease, Chronic/End Stage Renal Disease)  Outcome: Ongoing (interventions implemented as appropriate)   09/07/19 1802   Goal/Outcome Evaluation   Problems Assessed (Chronic Kidney Disease/ESRD) all   Problems Present (Chronic Kidney/ESRD) chronic pain;infection;situational response

## 2019-09-07 NOTE — PROGRESS NOTES
[facility] Medicine Services  INPATIENT PROGRESS NOTE    Length of Stay: 4  Date of Admission: 9/3/2019  Primary Care Physician: Manuel Lopez APRN    Subjective   Chief Complaint: Shortness of air.      HPI:    Mando Hill is a 74 y.o. male with medical history significant for Afib, BPH, left hemiplegia from previous CVA, chronic palmer, HLD, HTN, h/o decubitus ulcers presents with abdominal pain.      Patient presented with a 2-3 day history of abdominal pain. Patient was on Bactrim for UTI outpatient. Patient endorsed nausea, vomiting, and abdominal pain. He has some residual deficits from previous CVA.    Patient is seen for follow-up today.  He remains on amiodarone secondary to a run of ventricular tachycardia on 9/5/2019 that required cardioversion and CODE BLUE.  He he has not had further arrhythmia and remains on noninvasive respiratory therapy due to shortness of air.      He is postoperative day #3 status post cystoscopy, left laser lithotripsy but J stent placement was unsuccessful.    Review of Systems   Constitutional: Positive for activity change, appetite change and fatigue. Negative for chills, diaphoresis and fever.   HENT: Negative for trouble swallowing and voice change.    Eyes: Negative for photophobia and visual disturbance.   Respiratory: Positive for shortness of breath. Negative for cough, choking, chest tightness, wheezing and stridor.    Cardiovascular: Negative for chest pain, palpitations and leg swelling.   Gastrointestinal: Negative for abdominal distention, abdominal pain, blood in stool, constipation, diarrhea, nausea and vomiting.   Endocrine: Negative for cold intolerance, heat intolerance, polydipsia, polyphagia and polyuria.   Genitourinary: Negative for decreased urine volume, difficulty urinating, dysuria, enuresis, flank pain, frequency, hematuria and urgency.   Musculoskeletal: Positive for arthralgias and gait problem.  Negative for myalgias, neck pain and neck stiffness.   Skin: Negative for pallor, rash and wound.   Neurological: Positive for weakness. Negative for dizziness, tremors, seizures, syncope, facial asymmetry, speech difficulty, light-headedness, numbness and headaches.   Hematological: Does not bruise/bleed easily.   Psychiatric/Behavioral: Negative for agitation, behavioral problems and confusion.       Objective    Temp:  [98 °F (36.7 °C)-98.9 °F (37.2 °C)] 98.9 °F (37.2 °C)  Heart Rate:  [51-77] 60  Resp:  [20-29] 24  BP: ()/(51-97) 143/73    Physical Exam   Constitutional: He is oriented to person, place, and time. He appears well-developed and well-nourished. No distress.   HENT:   Head: Normocephalic and atraumatic.   Eyes: EOM are normal. Pupils are equal, round, and reactive to light. No scleral icterus.   Neck: Normal range of motion. Neck supple. No JVD present. No thyromegaly present.   Cardiovascular: Normal rate, regular rhythm and normal heart sounds. Exam reveals no gallop and no friction rub.   No murmur heard.  Pulmonary/Chest: Effort normal. Tachypnea noted. He has decreased breath sounds. He has no wheezes. He has no rales. He exhibits no tenderness.   Abdominal: Soft. Bowel sounds are normal. He exhibits no distension and no mass. There is no tenderness. There is no rebound and no guarding.   Musculoskeletal: He exhibits edema and deformity. He exhibits no tenderness.   Neurological: He is alert and oriented to person, place, and time. No cranial nerve deficit. He exhibits normal muscle tone. Coordination normal.   He has left-sided hemiplegia from previous CVA.   Skin: Skin is warm and dry. No rash noted. He is not diaphoretic. No erythema. No pallor.   Psychiatric: He has a normal mood and affect. His behavior is normal. Judgment and thought content normal.   Nursing note and vitals reviewed.        Medication Review:    Current Facility-Administered Medications:   •  albumin human 25 % IV  SOLN 12.5 g, 12.5 g, Intravenous, Once, Sofia Mayer MD  •  amiodarone (NEXTERONE) 360 mg/200 mL (1.8 mg/mL) infusion, 0.5 mg/min, Intravenous, Continuous, Hardy Fisher MD, Last Rate: 16.67 mL/hr at 09/06/19 0209, 0.5 mg/min at 09/06/19 0209  •  amiodarone (PACERONE) tablet 200 mg, 200 mg, Oral, Q24H, Seven Londono MD, 200 mg at 09/06/19 1358  •  atorvastatin (LIPITOR) tablet 10 mg, 10 mg, Oral, Daily, Hardy Fisher MD, 10 mg at 09/06/19 0851  •  baclofen (LIORESAL) tablet 10 mg, 10 mg, Oral, Q12H, Hardy Fisher MD, 10 mg at 09/06/19 2038  •  bumetanide (BUMEX) injection 2 mg, 2 mg, Intravenous, Once, Sofia Mayer MD  •  cefepime (MAXIPIME) 2 g/100 mL 0.9% NS (mbp), 2 g, Intravenous, Q24H, Hardy Fisher MD  •  dextrose (D50W) 25 g/ 50mL Intravenous Solution 25 g, 25 g, Intravenous, Q15 Min PRN, Hardy Fisher MD  •  dextrose (GLUTOSE) oral gel 15 g, 15 g, Oral, Q15 Min PRN, Hardy Fisher MD  •  famotidine (PEPCID) injection 20 mg, 20 mg, Intravenous, Daily, Hardy Fisher MD, 20 mg at 09/06/19 0851  •  finasteride (PROSCAR) tablet 5 mg, 5 mg, Oral, Daily, Hardy Fisher MD, 5 mg at 09/06/19 0851  •  fluticasone (FLONASE) 50 MCG/ACT nasal spray 2 spray, 2 spray, Each Nare, Daily, Orion Hernandez MD, 2 spray at 09/06/19 0853  •  glucagon (human recombinant) (GLUCAGEN DIAGNOSTIC) injection 1 mg, 1 mg, Subcutaneous, Q15 Min PRN, Hardy Fisher MD  •  HYDROcodone-acetaminophen (NORCO) 5-325 MG per tablet 1 tablet, 1 tablet, Oral, Q6H PRN, Hardy Fisher MD  •  insulin aspart (novoLOG) injection 0-7 Units, 0-7 Units, Subcutaneous, 4x Daily AC & at Bedtime, Hardy Fisher MD, 2 Units at 09/06/19 2123  •  ipratropium-albuterol (DUO-NEB) nebulizer solution 3 mL, 3 mL, Nebulization, 4x Daily - RT, Orion Hernandez MD, 3 mL at 09/07/19 0634  •  methylPREDNISolone sodium succinate (SOLU-Medrol) injection 60 mg, 60 mg, Intravenous, Q8H, Hardy Fisher  MD, 60 mg at 09/07/19 0547  •  morphine injection 2 mg, 2 mg, Intravenous, Q4H PRN, Hardy Fisher MD  •  nebivolol (BYSTOLIC) half tablet 2.5 mg, 2.5 mg, Oral, Nightly, Seven Londono MD, 2.5 mg at 09/06/19 2040  •  norepinephrine (LEVOPHED) 8 mg/250 mL (32 mcg/mL) in sodium chloride 0.9% infusion (premix), 0.02-0.3 mcg/kg/min, Intravenous, Titrated, Hardy Fisher MD  •  phenytoin (DILANTIN) 100 mg in sodium chloride 0.9 % 100 mL IVPB, 100 mg, Intravenous, Q12H, Hardy Fisher MD, Last Rate: 200 mL/hr at 09/06/19 2144, 100 mg at 09/06/19 2144  •  rivaroxaban (XARELTO) tablet 15 mg, 15 mg, Oral, Nightly, Hardy Fisher MD, 15 mg at 09/06/19 2038  •  sodium chloride 0.9 % flush 10 mL, 10 mL, Intravenous, PRN, Derrick Whiteside MD  •  sodium chloride 0.9 % flush 10 mL, 10 mL, Intravenous, Q12H, Spenser England MD, 10 mL at 09/06/19 2044  •  sodium chloride 0.9 % flush 10 mL, 10 mL, Intravenous, PRN, Spenser England MD  •  sodium chloride 0.9 % flush 10 mL, 10 mL, Intravenous, Q12H, Hardy Fisher MD, 10 mL at 09/06/19 2044  •  sodium chloride 0.9 % flush 10 mL, 10 mL, Intravenous, Q12H, Hardy Fisher MD, 10 mL at 09/06/19 2039  •  sodium chloride 0.9 % flush 10 mL, 10 mL, Intravenous, PRN, Hardy Fisher MD  •  tamsulosin (FLOMAX) 24 hr capsule 0.4 mg, 0.4 mg, Oral, Nightly, Hardy Fisher MD, 0.4 mg at 09/06/19 2038    Results Review:  I have reviewed the labs, radiology results, and diagnostic studies.    Laboratory Data:   Results from last 7 days   Lab Units 09/06/19  0421 09/05/19  0046 09/04/19  0524 09/03/19  0724   SODIUM mmol/L 140 143 139 140   POTASSIUM mmol/L 5.3* 4.0 4.2 4.4   CHLORIDE mmol/L 100 102 99 94*   CO2 mmol/L 23.0 29.0 30.0* 31.0*   BUN mg/dL 41* 30* 30* 34*   CREATININE mg/dL 3.03* 2.48* 2.43* 2.23*   GLUCOSE mg/dL 181* 122* 132* 123*   CALCIUM mg/dL 8.3* 8.5* 8.4* 8.7   BILIRUBIN mg/dL  --  0.6  --  0.7   ALK PHOS U/L  --  95  --  111   ALT (SGPT) U/L   --  9  --  9   AST (SGOT) U/L  --  11  --  12   ANION GAP mmol/L 17.0* 12.0 10.0 15.0     Estimated Creatinine Clearance: 22.3 mL/min (A) (by C-G formula based on SCr of 3.03 mg/dL (H)).  Results from last 7 days   Lab Units 09/05/19  0046   MAGNESIUM mg/dL 1.9         Results from last 7 days   Lab Units 09/07/19  0454 09/06/19  0421 09/05/19  0046 09/04/19  0524 09/03/19  0724   WBC 10*3/mm3 19.81* 22.37* 16.55* 18.15* 15.35*   HEMOGLOBIN g/dL 12.1* 13.6 12.7* 12.9* 13.2   HEMATOCRIT % 36.5* 44.3 41.0 39.6 39.8   PLATELETS 10*3/mm3 188 242 198 192 205           Culture Data:   No results found for: BLOODCX  No results found for: URINECX  No results found for: RESPCX  No results found for: WOUNDCX  No results found for: STOOLCX  No components found for: BODYFLD    Radiology Data:   Imaging Results (last 24 hours)     Procedure Component Value Units Date/Time    US Renal Bilateral [[id number]] Collected:  09/06/19 1351     Updated:  09/06/19 1628    Narrative:       Ultrasound renal complete    HISTORY:  Acute on chronic renal failure. Calculus of ureter.  Urinary tract infection.    Ultrasound examination of the kidneys and urinary bladder was  performed.    COMPARISON: December 22, 2014. Correlation CT December 6, 2019.     FINDINGS:  The right kidney measures 9.49 cm in length by 5.70 cm x 5.10 cm  transverse.  The left kidney measures 11.8 cm in length by 7.90 cm x 6.72 cm  transverse.  The kidneys are of normal echotexture.  Cortical scarring right kidney.  Minimal to moderate left hydronephrosis.  Nonobstructive right renal calculi.  No solid or cystic renal mass.    Bladder decompressed by suprapubic catheter.      Impression:       CONCLUSION:  Cortical scarring right kidney.  Minimal to moderate left hydronephrosis.  Nonobstructive right renal calculi.  Bladder decompressed by suprapubic catheter.    [id number]    Electronically signed by:  Loy Nobles MD  9/6/2019 4:26 PM CDT  Workstation: [id number]     CT Abdomen Pelvis Without Contrast [[id number]] Collected:  09/06/19 1443     Updated:  09/06/19 1520    Narrative:       CT abdomen, pelvis without contrast.       CLINICAL INDICATION: Fever, abdominal pain        COMPARISON: CT September 3, 2019.       TECHNIQUE: Noncontrast helical scanning with axial and coronal  reformations. Soft tissue, lung, and bone windows reviewed.    This exam was performed according to our departmental  dose-optimization program, which includes automated exposure  control, adjustment of the mA and/or kV according to patient size  and/or use of iterative reconstruction technique.    CT FINDINGS: Moderate size bilateral pleural effusions and  basilar infiltrative changes, either pneumonitis or atelectasis.  Unfavorable change since prior CT exam.   Colonic interposition, anterior to the right lobe of liver (a  normal variant.)    Atrophic changes right kidney with cortical notching and cortical  thinning.    Mild hydronephrotic changes left kidney with perinephric  stranding. This is similar to prior exam. There is a 0.8 cm stone  and a smaller 2 to 3 mm stone at the left ureterovesical junction  And additional 0.7 cm stone is noted in the ureter more  proximally is several centimeters above the level of the  acetabulum. This is unchanged since prior exam.    There is a suprapubic catheter.    Arthritic changes left hip.      Impression:       CONCLUSION: Increasing bilateral pleural effusions and basilar  infiltrative changes either pneumonitis or atelectasis.  Unfavorable change since prior exam.    Chronic atrophic changes right kidney.    Mild hydronephrotic changes left kidney with dilatation of  calyces renal pelvis and ureter.    Two partially obstructing stones in the distal ureter at the  medial meniscal junction and an additional stone in the distal  ureter several centimeters superior to the level of the  acetabulum. Similar appearance to prior exam.    Electronically signed by:   Dennis Gates MD  9/6/2019 3:18 PM CDT  Workstation: [id number]          I have reviewed the patient's current medications.     Assessment/Plan     Hospital Problem List:  Principal Problem:    Ureterolithiasis: Patient is status post cystoscopy, laser lithotripsy but had unsuccessful J stent placement.  Patient is tentatively scheduled for placement of left nephrostomy tube.  Urologist is following.    Acute cystitis/polynephritis: Continue IV antibiotics.  Blood cultures have shown no growth and urine culture is positive for Pseudomonas.  Antimicrobial therapy has been changed to cefepime.    Leukocytosis is reactive, improving and will be monitored.     BPH (benign prostatic hyperplasia): Continue Flomax and Proscar.    Acute on chronic kidney injury: Baseline creatinine is between 1-1.2.  Creatinine was 3.03 on 9/6/2019 and BMP for this a.m. is pending. Continue albumin with Bumex as recommended by the nephrologist.  Continue to monitor renal function and avoid nephrotoxins.  Ultrasound showed minimal to moderate left hydronephrosis with nonobstructive right renal calculi.  Input by nephrologist is appreciated.    Acute on chronic respiratory failure with hypercapnia and hypoxia: Continue noninvasive respiratory therapy, bronchodilators and steroids.    Status post CODE BLUE secondary to ventricular arrhythmia: Continue oral amiodarone.  Elevated troponin is likely reactive and did trend downwards.  Cardiologist is actively following.    Echocardiogram done on 9/5/2019 showed:  · The left ventricular cavity is mild-to-moderately dilated.  · Estimated EF = 24%.  · Left ventricular systolic function is severely decreased.  · Left ventricular diastolic dysfunction (grade II) consistent with pseudonormalization.  · Mild mitral valve regurgitation is present  · Moderate tricuspid valve regurgitation is present.  · The tricuspid valve is grossly normal. Moderate tricuspid valve regurgitation is present. Estimated right  ventricular systolic pressure from tricuspid regurgitation is moderately elevated (45-55 mmHg). Moderate pulmonary hypertension is present.     History of atrial fibrillation: Patient is in normal sinus rhythm.  Continue rate control.  Anticoagulation is currently on hold in anticipation of further urological procedure.     History of CVA with left-sided hemiplegia: Continue statin and antiplatelet therapy.    Hyperglycemia: This may be steroid-induced as patient is not a known in diabetic.  Continue  Accu-Cheks and sliding scale insulin.    Hyperkalemia: Patient did receive a dose of Kayexalate on 9/6/2019.  Repeat BMP is pending.      Seizure disorder: Continue Dilantin.    Chronic pain syndrome continue pain control.    Nutrition: Continue diet as recommended by speech-language pathologist.      Continue GI prophylaxis.      Discharge Planning: In progress.    Hardy Fisher MD   09/07/19   9:28 AM

## 2019-09-07 NOTE — DISCHARGE SUMMARY
[facility] Medicine Services  DISCHARGE SUMMARY       Date of Admission: 9/3/2019  Date of Discharge:  9/7/2019  Primary Care Physician: Manuel Lopez APRN    Presenting Problem/History of Present Illness:  Ureterolithiasis [N20.1]  Acute kidney injury (nontraumatic) (CMS/[facility]) [N17.9]  Urinary tract infection without hematuria, site unspecified [N39.0]     Mando Hill is a 74 y.o. male with medical history significant for Afib, BPH, left hemiplegia from previous CVA, chronic palmer, HLD, HTN, h/o decubitus ulcers  who presented to the emergency room with a 2-3 day history of abdominal pain. Patient was on Bactrim for UTI outpatient. Patient endorsed nausea, vomiting, and abdominal pain. He has some residual deficits from previous CVA.      Final Discharge Diagnoses:  Active Hospital Problems    Diagnosis   • **Ureterolithiasis   • Chronic systolic congestive heart failure (CMS/[facility])   • Chronic obstructive pulmonary disease with acute exacerbation (CMS/[facility])   • Cardiac arrest due to respiratory disorder (CMS/[facility])   • Ventricular fibrillation (CMS/[facility])   • CKD (chronic kidney disease) stage 4, GFR 15-29 ml/min (CMS/[facility])   • BPH (benign prostatic hyperplasia)   • PAF (paroxysmal atrial fibrillation) (CMS/[facility])       Consults:   Consults     Date and Time Order Name Status Description    9/6/2019 1717 Inpatient Nephrology Consult      9/5/2019 1119 Inpatient Cardiology Consult Completed     9/3/2019 0944 Urology (on-call MD unless specified) Completed           Procedures Performed: Procedure(s):  CYSTOSCOPY, LEFT RETROGRADE, URETEROSCOPY, SUPRAPUBIC CATHETER CHANGE                Pertinent Test Results:   Imaging Results (last 7 days)     Procedure Component Value Units Date/Time    US Renal Bilateral [[id number]] Collected:  09/06/19 1351     Updated:  09/06/19 1628    Narrative:       Ultrasound renal complete    HISTORY:  Acute on chronic renal failure. Calculus  of ureter.  Urinary tract infection.    Ultrasound examination of the kidneys and urinary bladder was  performed.    COMPARISON: December 22, 2014. Correlation CT December 6, 2019.     FINDINGS:  The right kidney measures 9.49 cm in length by 5.70 cm x 5.10 cm  transverse.  The left kidney measures 11.8 cm in length by 7.90 cm x 6.72 cm  transverse.  The kidneys are of normal echotexture.  Cortical scarring right kidney.  Minimal to moderate left hydronephrosis.  Nonobstructive right renal calculi.  No solid or cystic renal mass.    Bladder decompressed by suprapubic catheter.      Impression:       CONCLUSION:  Cortical scarring right kidney.  Minimal to moderate left hydronephrosis.  Nonobstructive right renal calculi.  Bladder decompressed by suprapubic catheter.    [id number]    Electronically signed by:  Loy Nobels MD  9/6/2019 4:26 PM CDT  Workstation: [id number]    CT Abdomen Pelvis Without Contrast [[id number]] Collected:  09/06/19 1443     Updated:  09/06/19 1520    Narrative:       CT abdomen, pelvis without contrast.       CLINICAL INDICATION: Fever, abdominal pain        COMPARISON: CT September 3, 2019.       TECHNIQUE: Noncontrast helical scanning with axial and coronal  reformations. Soft tissue, lung, and bone windows reviewed.    This exam was performed according to our departmental  dose-optimization program, which includes automated exposure  control, adjustment of the mA and/or kV according to patient size  and/or use of iterative reconstruction technique.    CT FINDINGS: Moderate size bilateral pleural effusions and  basilar infiltrative changes, either pneumonitis or atelectasis.  Unfavorable change since prior CT exam.   Colonic interposition, anterior to the right lobe of liver (a  normal variant.)    Atrophic changes right kidney with cortical notching and cortical  thinning.    Mild hydronephrotic changes left kidney with perinephric  stranding. This is similar to prior exam. There is a  0.8 cm stone  and a smaller 2 to 3 mm stone at the left ureterovesical junction  And additional 0.7 cm stone is noted in the ureter more  proximally is several centimeters above the level of the  acetabulum. This is unchanged since prior exam.    There is a suprapubic catheter.    Arthritic changes left hip.      Impression:       CONCLUSION: Increasing bilateral pleural effusions and basilar  infiltrative changes either pneumonitis or atelectasis.  Unfavorable change since prior exam.    Chronic atrophic changes right kidney.    Mild hydronephrotic changes left kidney with dilatation of  calyces renal pelvis and ureter.    Two partially obstructing stones in the distal ureter at the  medial meniscal junction and an additional stone in the distal  ureter several centimeters superior to the level of the  acetabulum. Similar appearance to prior exam.    Electronically signed by:  Dennis Gates MD  9/6/2019 3:18 PM CDT  Workstation: [id number]    US Guided Vascular Access [[id number]] Resulted:  09/05/19 1123     Updated:  09/05/19 1123    Narrative:       This procedure was auto-finalized with no dictation required.    IR Insert Midline Without Port Pump 5 Plus [[id number]] Resulted:  09/05/19 1039     Updated:  09/05/19 1039    Narrative:       This procedure was auto-finalized with no dictation required.    XR Chest 1 View [[id number]] Collected:  09/05/19 0841     Updated:  09/05/19 0905    Narrative:         EXAM:         Radiograph(s), Chest   VIEWS:   Frontal  ; 1       DATE/TIME:  9/5/2019 9:02 AM CDT                INDICATION:   SOA, N20.1 Calculus of ureter N39.0 Urinary tract  infection, site not specified N17.9 Acute kidney failure,  unspecified    COMPARISON:  CXR: 9/5/19             FINDINGS:             - lines/tubes:    none     - cardiac:         size within normal limits         - mediastinum: contour within normal limits         - lungs:         no focal air space process, pulmonary  interstitial edema,  nodule(s)/mass             - pleura:         no evidence of  fluid                  - osseous:         unremarkable for age                  - misc.:         Impression:       CONCLUSION:        1. No evidence of an active cardiopulmonary process.                                                              Electronically signed by:  ERIC Sibley MD  9/5/2019 9:04 AM  CDT Workstation: [id number]    FL Retrograde Pyelogram In OR [[id number]] Resulted:  09/05/19 0816     Updated:  09/05/19 0816    XR Chest 1 View [[id number]] Collected:  09/05/19 0003     Updated:  09/05/19 0055    Narrative:       Exam: AP portable chest    INDICATION: Respiratory difficulty    COMPARISON: 6/8/2019    FINDINGS: AP portable chest. Limited exam due to positioning of  the head limiting the evaluation of the upper half of the right  lung. Lungs are hypoinflated which accentuates the heart size and  bronchovascular markings. Heart is enlarged. There is right  basilar atelectasis and infiltrate. There is mild left basilar  atelectasis      Impression:       1. Limited exam due graft  2. Mild right basilar atelectasis and/or infiltrate    Electronically signed by:  Lc Tucker MD  9/5/2019 12:54 AM  CDT Workstation: [id number]    CT Abdomen Pelvis Without Contrast [[id number]] Collected:  09/03/19 0842     Updated:  09/03/19 0929    Narrative:         PROCEDURE: Ct abdomen and pelvis without contrast    REASON FOR EXAM: R flank, suprapubic pain    FINDINGS: Comparison study dated  August 31, 2018. Axial computer  tomography sequential imaging was performed from the diaphragms  through the symphysis pubis without IV contrast administration.  Sagittal and coronal reformates was performed. This exam was  performed according to our departmental dose optimization  program, which includes automated exposure control, adjustment of  the mA and/or KV according to patient size and/or use of  iterative reconstruction technique.    Imaging through lung  bases reveals posterior segment right lower  lobe small patchy opacities with air bronchograms. Very small  right pleural effusion. Lung bases are otherwise unremarkable.    The liver is normal. The gallbladder is not identified and may be  contracted, surgically, or congenitally absent. The biliary  system appears within normal limits. The pancreas is normal. The  spleen is normal. Bilateral adrenal glands are normal. Right  kidney upper pole, mid zone, and lower pole multiple  nonobstructive renal calculi with the largest within the lower  pole which measures 2.1 mm in greatest diameter. Right kidney  renal pelvis 4.2 mm nonobstructive renal calculi. Right kidney  perirenal space small amount of fatty stranding and fluid. The  right kidney and ureter are otherwise unremarkable. In the region  of the bladder left ureter UVJ there is a 5.6 mm calculi which  may represent recently passed ureter calculi versus calculi  partially involving the left ureter UVJ. Distal left ureter 8.7  mm calculi 1.8 mm above the left ureter UVJ. This distal left  ureter calculi is causing moderate to severe obstruction with  associated moderate to severe hydroureter and hydronephrosis.  Additional distal ureter nonobstructing calculi which measures  2.5 mm in greatest diameter. Mild fatty stranding surrounding the  left ureter and in the left perirenal space may represent sequela  from obstruction with infundibular rupture versus associated  inflammatory changes. Recommend clinical correlation. The left  kidney and ureter are otherwise unremarkable. Suprapubic catheter  is seen involving the bladder with the bladder largely  decompressed. The prostate gland is mildly enlarged. The hollow  viscera is normal. Left para-aortic single enlarged lymph node  which measures 1.86 x 1.05 cm. This lymph node is at the level of  the left renal hilum. No other lymphadenopathy is identified. No  acute osseous abnormality.      Impression:       1.  Right lower lobe posterior segment patchy opacities suspicious  for passive atelectasis versus pneumonia.  2. Very small right pleural effusion.  3.The gallbladder is not identified and may be contracted,  surgically, or congenitally absent.  4.In the region of the bladder left ureter UVJ there is a 5.6 mm  calculi which may represent recently passed ureter calculi versus  calculi partially involving the left ureter UVJ. Distal left  ureter 8.7 mm calculi 1.8 mm above the left ureter UVJ. This  distal left ureter calculi is causing moderate to severe  obstruction with associated moderate to severe hydroureter and  hydronephrosis. Additional distal ureter nonobstructing calculi  which measures 2.5 mm in greatest diameter. Mild fatty stranding  surrounding the left ureter and in the left perirenal space may  represent sequela from obstruction with infundibular rupture  versus associated inflammatory changes. Recommend clinical  correlation.  5. Multiple right kidney nonobstructive calculi described above.  6. Suprapubic catheter decompressing the bladder.  7. Mildly enlarged prostate gland may represent changes from  benign prostatic hypertrophy versus prostate malignancy.  Recommend clinical correlation.  8.Left para-aortic single enlarged lymph node which measures 1.86  x 1.05 cm. This lymph node is of uncertain etiology by strict  size criteria. This most likely represents inflammatory  lymphadenopathy versus less likely lymphadenopathy secondary to  neoplastic involvement.    Electronically signed by:  David Nieto MD  9/3/2019 9:28 AM CDT  Workstation: [id number]        Lab Results (last 7 days)     Procedure Component Value Units Date/Time    Basic Metabolic Panel [[id number]]  (Abnormal) Collected:  09/07/19 0941    Specimen:  Blood Updated:  09/07/19 1007     Glucose 138 mg/dL      BUN 53 mg/dL      Creatinine 3.49 mg/dL      Sodium 141 mmol/L      Potassium 3.7 mmol/L      Chloride 103 mmol/L      CO2 20.0 mmol/L       Calcium 7.9 mg/dL      eGFR Non African Amer 17 mL/min/1.73      BUN/Creatinine Ratio 15.2     Anion Gap 18.0 mmol/L     Narrative:       GFR Normal >60  Chronic Kidney Disease <60  Kidney Failure <15    POC Glucose Once [[id number]]  (Normal) Collected:  09/07/19 0635    Specimen:  Blood Updated:  09/07/19 0720     Glucose 118 mg/dL      Comment: RN NotifiedSliding Scale AdminOperator: [id number] ELLIOTTMA GoustoMeter ID: [id number]       CBC & Differential [[id number]] Collected:  09/07/19 0454    Specimen:  Blood Updated:  09/07/19 0518    Narrative:       The following orders were created for panel order CBC & Differential.  Procedure                               Abnormality         Status                     ---------                               -----------         ------                     CBC Auto Differential[[id number]]        Abnormal            Final result                 Please view results for these tests on the individual orders.    CBC Auto Differential [[id number]]  (Abnormal) Collected:  09/07/19 0454    Specimen:  Blood Updated:  09/07/19 0518     WBC 19.81 10*3/mm3      RBC 4.01 10*6/mm3      Hemoglobin 12.1 g/dL      Hematocrit 36.5 %      MCV 91.0 fL      MCH 30.2 pg      MCHC 33.2 g/dL      RDW 15.6 %      RDW-SD 51.9 fl      MPV 10.4 fL      Platelets 188 10*3/mm3      Neutrophil % 86.9 %      Lymphocyte % 2.2 %      Monocyte % 8.5 %      Eosinophil % 0.1 %      Basophil % 0.2 %      Immature Grans % 2.1 %      Neutrophils, Absolute 17.21 10*3/mm3      Lymphocytes, Absolute 0.44 10*3/mm3      Monocytes, Absolute 1.68 10*3/mm3      Eosinophils, Absolute 0.02 10*3/mm3      Basophils, Absolute 0.04 10*3/mm3      Immature Grans, Absolute 0.42 10*3/mm3      nRBC 0.2 /100 WBC     POC Glucose Once [[id number]]  (Abnormal) Collected:  09/06/19 2047    Specimen:  Blood Updated:  09/06/19 2111     Glucose 173 mg/dL      Comment: Sliding Scale AdminRN NotifiedOperator: [id number] [employer] SolutionsMeter ID:  [id number]       POC Glucose Once [[id number]]  (Abnormal) Collected:  09/06/19 1653    Specimen:  Blood Updated:  09/06/19 1850     Glucose 142 mg/dL      Comment: : [id number] GURU See ID: [id number]       Blood Culture - Blood, Wrist, Right [[id number]] Collected:  09/03/19 1825    Specimen:  Blood from Wrist, Right Updated:  09/06/19 1845     Blood Culture No growth at 3 days    Blood Culture - Blood, Wrist, Left [[id number]] Collected:  09/03/19 1815    Specimen:  Blood from Wrist, Left Updated:  09/06/19 1830     Blood Culture No growth at 3 days    POC Glucose Once [[id number]]  (Normal) Collected:  09/06/19 1303    Specimen:  Blood Updated:  09/06/19 1318     Glucose 114 mg/dL      Comment: : [id number] GURU See ID: [id number]       Hemoglobin A1c [[id number]]  (Normal) Collected:  09/06/19 0421    Specimen:  Blood Updated:  09/06/19 1201     Hemoglobin A1C 5.60 %     Narrative:       Hemoglobin A1C Ranges:    Increased Risk for Diabetes  5.7% to 6.4%  Diabetes                     >= 6.5%  Diabetic Goal                < 7.0%    Urine Culture - Urine, Urine, Clean Catch [[id number]]  (Abnormal)  (Susceptibility) Collected:  09/03/19 0804    Specimen:  Urine, Clean Catch Updated:  09/06/19 1033     Urine Culture 50,000 CFU/mL Pseudomonas aeruginosa      25,000 CFU/mL Mixed Tina Isolated    Narrative:       Specimen contains mixed organisms of questionable pathogenicity which indicates contamination with commensal tina.  Further identification is unlikely to provide clinically useful information.  Suggest recollection.    Susceptibility      Pseudomonas aeruginosa     LULA     Cefepime Susceptible     Ceftazidime Susceptible     Ciprofloxacin Resistant     Gentamicin Susceptible     Levofloxacin Resistant     Piperacillin + Tazobactam Susceptible                    CBC & Differential [[id number]] Collected:  09/06/19 0421    Specimen:  Blood Updated:  09/06/19 1012     Narrative:       The following orders were created for panel order CBC & Differential.  Procedure                               Abnormality         Status                     ---------                               -----------         ------                     CBC Auto Differential[[id number]]        Abnormal            Final result                 Please view results for these tests on the individual orders.    CBC Auto Differential [[id number]]  (Abnormal) Collected:  09/06/19 0421    Specimen:  Blood Updated:  09/06/19 1012     WBC 22.37 10*3/mm3      RBC 4.49 10*6/mm3      Hemoglobin 13.6 g/dL      Hematocrit 44.3 %      MCV 98.7 fL      MCH 30.3 pg      MCHC 30.7 g/dL      RDW 16.1 %      RDW-SD 58.9 fl      MPV 10.6 fL      Platelets 242 10*3/mm3      Neutrophil % 88.5 %      Lymphocyte % 1.7 %      Monocyte % 8.3 %      Eosinophil % 0.0 %      Basophil % 0.2 %      Immature Grans % 1.3 %      Neutrophils, Absolute 19.80 10*3/mm3      Lymphocytes, Absolute 0.39 10*3/mm3      Monocytes, Absolute 1.85 10*3/mm3      Eosinophils, Absolute 0.00 10*3/mm3      Basophils, Absolute 0.05 10*3/mm3      Immature Grans, Absolute 0.28 10*3/mm3      nRBC 0.0 /100 WBC     Blood Gas, Arterial [[id number]]  (Abnormal) Collected:  09/06/19 0533    Specimen:  Arterial Blood Updated:  09/06/19 0539     Site Right Radial     Kevin's Test N/A     pH, Arterial 7.149 pH units      Comment: 85 Value below critical limit        pCO2, Arterial 64.6 mm Hg      Comment: 83 Value above reference range        pO2, Arterial 65.3 mm Hg      Comment: 84 Value below reference range        HCO3, Arterial 22.5 mmol/L      Base Excess, Arterial -7.4 mmol/L      Comment: 84 Value below reference range        O2 Saturation, Arterial 90.9 %      Comment: 84 Value below reference range        Barometric Pressure for Blood Gas 747 mmHg      Modality BiPap     FIO2 50 %      Ventilator Mode BiPAP     Set [facility] Resp Rate 24.0     IPAP 22     Comment: Meter:  [id number]     :  [id number]        EPAP 6     Collected by KEL HUGHES    Extra Tubes [[id number]] Collected:  09/06/19 0421    Specimen:  Blood, Venous Line Updated:  09/06/19 0530    Narrative:       The following orders were created for panel order Extra Tubes.  Procedure                               Abnormality         Status                     ---------                               -----------         ------                     Lavender Top[[id number]]                                     Final result                 Please view results for these tests on the individual orders.    Lavender Top [[id number]] Collected:  09/06/19 0421    Specimen:  Blood Updated:  09/06/19 0530     Extra Tube hold for add-on     Comment: Auto resulted       Basic Metabolic Panel [[id number]]  (Abnormal) Collected:  09/06/19 0421    Specimen:  Blood Updated:  09/06/19 0516     Glucose 181 mg/dL      BUN 41 mg/dL      Creatinine 3.03 mg/dL      Sodium 140 mmol/L      Potassium 5.3 mmol/L      Chloride 100 mmol/L      CO2 23.0 mmol/L      Calcium 8.3 mg/dL      eGFR Non African Amer 20 mL/min/1.73      BUN/Creatinine Ratio 13.5     Anion Gap 17.0 mmol/L     Narrative:       GFR Normal >60  Chronic Kidney Disease <60  Kidney Failure <15    Blood Gas, Arterial [[id number]]  (Abnormal) Collected:  09/06/19 0409    Specimen:  Arterial Blood Updated:  09/06/19 0414     Site Right Radial     Kevin's Test Positive     pH, Arterial 7.084 pH units      Comment: 85 Value below critical limit        pCO2, Arterial 79.9 mm Hg      Comment: 86 Value above critical limit        pO2, Arterial 62.5 mm Hg      Comment: 84 Value below reference range        HCO3, Arterial 23.9 mmol/L      Base Excess, Arterial -7.7 mmol/L      Comment: 84 Value below reference range        O2 Saturation, Arterial 88.8 %      Comment: 84 Value below reference range        Barometric Pressure for Blood Gas 747 mmHg      Modality Nasal Cannula     Flow Rate 6.0  lpm      Ventilator Mode NA     Collected by SAUL     Comment: Meter: [id number]     :  [id number]       Troponin [[id number]]  (Abnormal) Collected:  09/05/19 0614    Specimen:  Blood Updated:  09/05/19 0640     Troponin T 0.060 ng/mL     Narrative:       Troponin T Reference Range:  <= 0.03 ng/mL-   Negative for AMI  >0.03 ng/mL-     Abnormal for myocardial necrosis.  Clinicians would have to utilize clinical acumen, EKG, Troponin and serial changes to determine if it is an Acute Myocardial Infarction or myocardial injury due to an underlying chronic condition.     Blood Gas, Arterial [[id number]]  (Abnormal) Collected:  09/05/19 0422    Specimen:  Arterial Blood Updated:  09/05/19 0426     Site Right Radial     Kevin's Test Positive     pH, Arterial 7.279 pH units      Comment: 84 Value below reference range        pCO2, Arterial 59.3 mm Hg      Comment: 83 Value above reference range        pO2, Arterial 70.6 mm Hg      Comment: 84 Value below reference range        HCO3, Arterial 27.8 mmol/L      Comment: 83 Value above reference range        Base Excess, Arterial -0.1 mmol/L      Comment: 84 Value below reference range        O2 Saturation, Arterial 94.0 %      Barometric Pressure for Blood Gas 749 mmHg      Modality BiPap     FIO2 30 %      Ventilator Mode BiPAP     Set Mech Resp Rate 18.0     IPAP 20     Comment: Meter: [id number]     :  [id number]        EPAP 6     Collected by KALEIGH    Troponin [[id number]]  (Abnormal) Collected:  09/05/19 0046    Specimen:  Blood Updated:  09/05/19 0131     Troponin T 0.075 ng/mL     Narrative:       Troponin T Reference Range:  <= 0.03 ng/mL-   Negative for AMI  >0.03 ng/mL-     Abnormal for myocardial necrosis.  Clinicians would have to utilize clinical acumen, EKG, Troponin and serial changes to determine if it is an Acute Myocardial Infarction or myocardial injury due to an underlying chronic condition.     Magnesium [[id number]]  (Normal)  Collected:  09/05/19 0046    Specimen:  Blood Updated:  09/05/19 0122     Magnesium 1.9 mg/dL     Comprehensive Metabolic Panel [[id number]]  (Abnormal) Collected:  09/05/19 0046    Specimen:  Blood Updated:  09/05/19 0122     Glucose 122 mg/dL      BUN 30 mg/dL      Creatinine 2.48 mg/dL      Sodium 143 mmol/L      Potassium 4.0 mmol/L      Chloride 102 mmol/L      CO2 29.0 mmol/L      Calcium 8.5 mg/dL      Total Protein 7.3 g/dL      Albumin 2.90 g/dL      ALT (SGPT) 9 U/L      AST (SGOT) 11 U/L      Alkaline Phosphatase 95 U/L      Total Bilirubin 0.6 mg/dL      eGFR Non African Amer 25 mL/min/1.73      Globulin 4.4 gm/dL      A/G Ratio 0.7 g/dL      BUN/Creatinine Ratio 12.1     Anion Gap 12.0 mmol/L     Narrative:       GFR Normal >60  Chronic Kidney Disease <60  Kidney Failure <15    CK [[id number]]  (Normal) Collected:  09/05/19 0046    Specimen:  Blood Updated:  09/05/19 0122     Creatine Kinase 41 U/L     CK-MB [[id number]]  (Normal) Collected:  09/05/19 0046    Specimen:  Blood Updated:  09/05/19 0120     CKMB 3.22 ng/mL     CBC (No Diff) [[id number]]  (Abnormal) Collected:  09/05/19 0046    Specimen:  Blood Updated:  09/05/19 0052     WBC 16.55 10*3/mm3      RBC 4.27 10*6/mm3      Hemoglobin 12.7 g/dL      Hematocrit 41.0 %      MCV 96.0 fL      MCH 29.7 pg      MCHC 31.0 g/dL      RDW 15.5 %      RDW-SD 54.6 fl      MPV 9.9 fL      Platelets 198 10*3/mm3     POC Glucose Once [[id number]]  (Normal) Collected:  09/04/19 2352    Specimen:  Blood Updated:  09/05/19 0006     Glucose 114 mg/dL      Comment: : [id number] QUINCY KAURRAMeter ID: [id number]       Blood Gas, Arterial [[id number]]  (Abnormal) Collected:  09/04/19 2348    Specimen:  Arterial Blood Updated:  09/05/19 0002     Site Right Radial     Kevin's Test N/A     pH, Arterial 7.237 pH units      Comment: 84 Value below reference range        pCO2, Arterial 67.8 mm Hg      Comment: 86 Value above critical limit        pO2, Arterial 62.9 mm  Hg      Comment: 84 Value below reference range        HCO3, Arterial 28.8 mmol/L      Comment: 83 Value above reference range        Base Excess, Arterial -0.1 mmol/L      Comment: 84 Value below reference range        O2 Saturation, Arterial 91.2 %      Comment: 84 Value below reference range        Barometric Pressure for Blood Gas 749 mmHg      Modality BiPap     FIO2 30 %      Ventilator Mode BiPAP     Set Mech Resp Rate 18.0     IPAP 20     Comment: Meter: [id number]     :  [id number]        EPAP 6     Collected by KEL HUGHES    Blood Gas, Arterial [[id number]]  (Abnormal) Collected:  09/04/19 2151    Specimen:  Arterial Blood Updated:  09/04/19 2156     Site Right Radial     Kevin's Test Positive     pH, Arterial 7.183 pH units      Comment: 85 Value below critical limit        pCO2, Arterial 81.4 mm Hg      Comment: 86 Value above critical limit        pO2, Arterial 83.4 mm Hg      HCO3, Arterial 30.6 mmol/L      Comment: 83 Value above reference range        Base Excess, Arterial -0.1 mmol/L      Comment: 84 Value below reference range        O2 Saturation, Arterial 94.2 %      Barometric Pressure for Blood Gas 749 mmHg      Modality Simple Mask     Flow Rate 10.0 lpm      Ventilator Mode NA     Collected by FLAQUITA HUGHES [facility]     Comment: Meter: [id number]     :  [id number]       Basic Metabolic Panel [[id number]]  (Abnormal) Collected:  09/04/19 0524    Specimen:  Blood Updated:  09/04/19 0622     Glucose 132 mg/dL      BUN 30 mg/dL      Creatinine 2.43 mg/dL      Sodium 139 mmol/L      Potassium 4.2 mmol/L      Chloride 99 mmol/L      CO2 30.0 mmol/L      Calcium 8.4 mg/dL      eGFR Non African Amer 26 mL/min/1.73      BUN/Creatinine Ratio 12.3     Anion Gap 10.0 mmol/L     Narrative:       GFR Normal >60  Chronic Kidney Disease <60  Kidney Failure <15    CBC & Differential [[id number]] Collected:  09/04/19 0524    Specimen:  Blood Updated:  09/04/19 0602    Narrative:       The following  orders were created for panel order CBC & Differential.  Procedure                               Abnormality         Status                     ---------                               -----------         ------                     CBC Auto Differential[[id number]]        Abnormal            Final result                 Please view results for these tests on the individual orders.    CBC Auto Differential [[id number]]  (Abnormal) Collected:  09/04/19 0524    Specimen:  Blood Updated:  09/04/19 0602     WBC 18.15 10*3/mm3      RBC 4.27 10*6/mm3      Hemoglobin 12.9 g/dL      Hematocrit 39.6 %      MCV 92.7 fL      MCH 30.2 pg      MCHC 32.6 g/dL      RDW 15.4 %      RDW-SD 52.1 fl      MPV 10.1 fL      Platelets 192 10*3/mm3      Neutrophil % 83.6 %      Lymphocyte % 2.3 %      Monocyte % 12.0 %      Eosinophil % 0.9 %      Basophil % 0.3 %      Immature Grans % 0.9 %      Neutrophils, Absolute 15.17 10*3/mm3      Lymphocytes, Absolute 0.42 10*3/mm3      Monocytes, Absolute 2.18 10*3/mm3      Eosinophils, Absolute 0.16 10*3/mm3      Basophils, Absolute 0.05 10*3/mm3      Immature Grans, Absolute 0.17 10*3/mm3      nRBC 0.0 /100 WBC     [place] Draw [[id number]] Collected:  09/03/19 0724    Specimen:  Blood Updated:  09/03/19 0830    Narrative:       The following orders were created for panel order [place] Draw.  Procedure                               Abnormality         Status                     ---------                               -----------         ------                     Light Blue Top[[id number]]                                   Final result               Green Top (Gel)[[id number]]                                  Final result               Lavender Top[[id number]]                                     Final result               Gold Top - SST[[id number]]                                   Final result                 Please view results for these tests on the individual orders.    Gold Top - SST [[id number]]  Collected:  09/03/19 0724    Specimen:  Blood Updated:  09/03/19 0830     Extra Tube Hold for add-ons.     Comment: Auto resulted.       Light Blue Top [[id number]] Collected:  09/03/19 0724    Specimen:  Blood Updated:  09/03/19 0830     Extra Tube hold for add-on     Comment: Auto resulted       Green Top (Gel) [[id number]] Collected:  09/03/19 0724    Specimen:  Blood Updated:  09/03/19 0830     Extra Tube Hold for add-ons.     Comment: Auto resulted.       Lavender Top [[id number]] Collected:  09/03/19 0724    Specimen:  Blood Updated:  09/03/19 0830     Extra Tube hold for add-on     Comment: Auto resulted       Urinalysis, Microscopic Only - Urine, Clean Catch [[id number]]  (Abnormal) Collected:  09/03/19 0804    Specimen:  Urine, Clean Catch Updated:  09/03/19 0820     RBC, UA 0-2 /HPF      WBC, UA 31-50 /HPF      Bacteria, UA Trace /HPF      Squamous Epithelial Cells, UA 0-2 /HPF      Hyaline Casts, UA None Seen /LPF      Methodology Automated Microscopy    Urinalysis With Microscopic If Indicated (No Culture) - Urine, Clean Catch [[id number]]  (Abnormal) Collected:  09/03/19 0804    Specimen:  Urine, Clean Catch Updated:  09/03/19 0814     Color, UA Yellow     Appearance, UA Slightly Cloudy     pH, UA 7.0     Specific Gravity, UA 1.015     Glucose, UA Negative     Ketones, UA 15 mg/dL (1+)     Bilirubin, UA Small (1+)     Blood, UA Small (1+)     Protein,  mg/dL (2+)     Leuk Esterase, UA Large (3+)     Nitrite, UA Positive     Urobilinogen, UA 1.0 E.U./dL    Comprehensive Metabolic Panel [[id number]]  (Abnormal) Collected:  09/03/19 0724    Specimen:  Blood Updated:  09/03/19 0800     Glucose 123 mg/dL      BUN 34 mg/dL      Creatinine 2.23 mg/dL      Sodium 140 mmol/L      Potassium 4.4 mmol/L      Chloride 94 mmol/L      CO2 31.0 mmol/L      Calcium 8.7 mg/dL      Total Protein 7.9 g/dL      Albumin 3.50 g/dL      ALT (SGPT) 9 U/L      AST (SGOT) 12 U/L      Alkaline Phosphatase 111 U/L      Total  Bilirubin 0.7 mg/dL      eGFR Non African Amer 29 mL/min/1.73      Globulin 4.4 gm/dL      A/G Ratio 0.8 g/dL      BUN/Creatinine Ratio 15.2     Anion Gap 15.0 mmol/L     Narrative:       GFR Normal >60  Chronic Kidney Disease <60  Kidney Failure <15    Lipase [[id number]]  (Normal) Collected:  09/03/19 0724    Specimen:  Blood Updated:  09/03/19 0800     Lipase 23 U/L     Lactic Acid, Plasma [[id number]]  (Normal) Collected:  09/03/19 0732    Specimen:  Blood Updated:  09/03/19 0756     Lactate 0.9 mmol/L     CBC & Differential [[id number]] Collected:  09/03/19 0724    Specimen:  Blood Updated:  09/03/19 0741    Narrative:       The following orders were created for panel order CBC & Differential.  Procedure                               Abnormality         Status                     ---------                               -----------         ------                     CBC Auto Differential[[id number]]        Abnormal            Final result                 Please view results for these tests on the individual orders.    CBC Auto Differential [[id number]]  (Abnormal) Collected:  09/03/19 0724    Specimen:  Blood Updated:  09/03/19 0741     WBC 15.35 10*3/mm3      RBC 4.35 10*6/mm3      Hemoglobin 13.2 g/dL      Hematocrit 39.8 %      MCV 91.5 fL      MCH 30.3 pg      MCHC 33.2 g/dL      RDW 15.2 %      RDW-SD 51.0 fl      MPV 10.1 fL      Platelets 205 10*3/mm3      Neutrophil % 81.1 %      Lymphocyte % 4.2 %      Monocyte % 12.3 %      Eosinophil % 1.4 %      Basophil % 0.3 %      Immature Grans % 0.7 %      Neutrophils, Absolute 12.45 10*3/mm3      Lymphocytes, Absolute 0.65 10*3/mm3      Monocytes, Absolute 1.89 10*3/mm3      Eosinophils, Absolute 0.22 10*3/mm3      Basophils, Absolute 0.04 10*3/mm3      Immature Grans, Absolute 0.10 10*3/mm3      nRBC 0.0 /100 WBC             Chief Complaint on Day of Discharge: Shortness of air.    Hospital Course:  The patient was admitted and managed as  follows:    Ureterolithiasis: Patient was admitted and was seen by the urologist.  He underwent cystoscopy, laser lithotripsy but attempt for J stent placement was unsuccessful.  He was to have left nephrostomy tube placement by interventional radiologist but this was not possible.  As a result the urologist recommended patient to be transferred to a tertiary center for the procedure.  Patient was accepted for transfer to [facility].     Acute cystitis/polynephritis: Patient was treated initially with IV Zosyn.  Blood cultures have shown no growth and urine culture is positive for Pseudomonas.  Antimicrobial therapy has been changed to cefepime based on sensitivities.    Leukocytosis is reactive was improving.      BPH (benign prostatic hyperplasia): He was continued on  Flomax and Proscar.     Acute on chronic kidney injury: Patient's baseline creatinine is between 1-1.2.  Creatinine did steadily increase and was 3.49 on discharge.  He was seen by the nephrologist on consult and appropriate recommendations were made.  Abdominal Ultrasound showed minimal to moderate left hydronephrosis with nonobstructive right renal calculi.  Input by nephrologist is appreciated.     Acute on chronic respiratory failure with hypercapnia and hypoxia: Patient was mostly managed with noninvasive respiratory therapy, bronchodilators and steroids.     Status post CODE BLUE secondary to ventricular arrhythmia: Patient's hospital course was complicated by an episode of ventricular tachycardia prompting a CODE BLUE.  He had a spontaneous return of circulation following cardioversion and was managed with amiodarone infusion which was transitioned to oral amiodarone.  Elevated troponin is likely reactive and did trend downwards.    Echocardiogram done on 9/5/2019 showed:  · The left ventricular cavity is mild-to-moderately dilated.  · Estimated EF = 24%.  · Left ventricular systolic function is severely decreased.  · Left  "ventricular diastolic dysfunction (grade II) consistent with pseudonormalization.  · Mild mitral valve regurgitation is present  · Moderate tricuspid valve regurgitation is present.  · The tricuspid valve is grossly normal. Moderate tricuspid valve regurgitation is present. Estimated right ventricular systolic pressure from tricuspid regurgitation is moderately elevated (45-55 mmHg). Moderate pulmonary hypertension is present.  He was seen by the cardiologist and recommended medical management.  Patient was deemed not a candidate for AICD placement.    History of atrial fibrillation: Patient is in normal sinus rhythm and was continued on rate control.  Anticoagulation was put on hold in anticipation of further urological procedure.      History of CVA with left-sided hemiplegia: He was continued on statin and antiplatelet therapy.     Seizure disorder: Continued on Dilantin.     Chronic pain syndrome: He was continued on pain control.                 Condition on Discharge: Guarded.    Physical Exam on Discharge:  /64   Pulse 65   Temp 98.2 °F (36.8 °C) (Oral)   Resp 18   Ht 170.2 cm (67\")   Wt 90.8 kg (200 lb 2.8 oz)   SpO2 96%   BMI 31.35 kg/m²   Physical Exam   Constitutional: He is oriented to person, place, and time. He appears well-developed and well-nourished. No distress.   HENT:   Head: Normocephalic and atraumatic.   Eyes: EOM are normal. Pupils are equal, round, and reactive to light. No scleral icterus.   Neck: Normal range of motion. Neck supple. No JVD present. No thyromegaly present.   Cardiovascular: Normal rate, regular rhythm and normal heart sounds. Exam reveals no gallop and no friction rub.   No murmur heard.  Pulmonary/Chest: Effort normal. He has decreased breath sounds. He has no wheezes. He has rhonchi. He has no rales. He exhibits no tenderness.   Abdominal: Soft. Bowel sounds are normal. He exhibits no distension and no mass. There is no tenderness. There is no rebound and no " guarding.   Musculoskeletal: He exhibits edema and deformity. He exhibits no tenderness.   Neurological: He is alert and oriented to person, place, and time. No cranial nerve deficit. He exhibits normal muscle tone. Coordination normal.   He has left hemiplegia from previous CVA.   Skin: Skin is warm and dry. No rash noted. He is not diaphoretic. No erythema. No pallor.   Psychiatric: He has a normal mood and affect. His behavior is normal. Judgment and thought content normal.   Nursing note and vitals reviewed.        Discharge Disposition:  Critical Access Hospital w/Planned Readmission    Discharge Medications:     Discharge Medications      New Medications      Instructions Start Date   amiodarone 200 MG tablet  Commonly known as:  PACERONE   200 mg, Oral, Every 24 Hours Scheduled   Start Date:  9/8/2019     cefepime 2 G/ ML solution  Commonly known as:  MAXIPIME   2 g, Intravenous, Every 24 Hours   Start Date:  9/8/2019        Continue These Medications      Instructions Start Date   aspirin 81 MG EC tablet   81 mg, Oral, Daily      atorvastatin 10 MG tablet  Commonly known as:  LIPITOR   10 mg, Oral, Daily      baclofen 10 MG tablet  Commonly known as:  LIORESAL   10 mg, Oral, 2 Times Daily, Takes two pills at night and one in am      finasteride 5 MG tablet  Commonly known as:  PROSCAR   5 mg, Oral, Daily      HYDROcodone-acetaminophen  MG per tablet  Commonly known as:  NORCO   1 tablet, Oral, Every 6 Hours PRN      nebivolol 20 MG tablet  Commonly known as:  BYSTOLIC   20 mg, Oral, Nightly      PARoxetine 20 MG tablet  Commonly known as:  PAXIL   20 mg, Oral, Every Morning      phenytoin 100 MG ER capsule  Commonly known as:  DILANTIN   Oral, 2 Times Daily      potassium chloride 10 MEQ CR tablet  Commonly known as:  K-DUR,KLOR-CON   10 mEq, Oral, 2 Times Daily      rivaroxaban 20 MG tablet  Commonly known as:  XARELTO   20 mg, Oral, Nightly      tamsulosin 0.4 MG capsule 24 hr capsule  Commonly  known as:  FLOMAX   1 capsule, Oral, Nightly      temazepam 15 MG capsule  Commonly known as:  RESTORIL   15 mg, Oral, Nightly PRN         Stop These Medications    furosemide 20 MG tablet  Commonly known as:  LASIX     nitrofurantoin 50 MG capsule  Commonly known as:  MACRODANTIN            Discharge Diet: Heart healthy.     Activity at Discharge: As tolerated.    Discharge Care Plan/Instructions: Patient is being transferred to [facility] to continue inpatient management.     Follow-up Appointments:   No future appointments.    Test Results Pending at Discharge:    Order Current Status    Blood Culture - Blood, Wrist, Left Preliminary result    Blood Culture - Blood, Wrist, Right Preliminary result          Hardy Fisher MD  09/07/19  6:13 PM    Time: 35 minutes.

## 2019-09-07 NOTE — PROGRESS NOTES
"[facility]  [address] [place] [address]. [address]  T - [phone]  F - [phone]     Progress Note          PATIENT  DEMOGRAPHICS   PATIENT NAME: Mando Anders McStephenraeann                      PHYSICIAN: Sofia Mayer MD  : 1945  MRN: [id number]   LOS: 4 days    Patient Care Team:  Manuel Lopez APRN as PCP - General (Family Medicine)  Subjective   SUBJECTIVE   Not on any iv amiodarone. UO has picked up 30-40 cc/hr on bipap now       Objective   OBJECTIVE   Vital Signs  Temp:  [98 °F (36.7 °C)-98.8 °F (37.1 °C)] 98 °F (36.7 °C)  Heart Rate:  [51-77] 58  Resp:  [20-29] 24  BP: ()/(51-88) 147/67    Flowsheet Rows      First Filed Value   Admission Height  170.2 cm (67\") Documented at 2019 0700   Admission Weight  85 kg (187 lb 8 oz) Documented at 2019 0700           I/O last 3 completed shifts:  In: 3471.4 [P.O.:90; I.V.:2506.1; IV Piggyback:875.3]  Out: 860 [Urine:860]    PHYSICAL EXAM    Physical Exam   Constitutional: He is oriented to person, place, and time. He appears well-developed. He appears distressed.   HENT:   Head: Normocephalic.   Eyes: Pupils are equal, round, and reactive to light.   Cardiovascular: Normal rate, regular rhythm and normal heart sounds.   Pulmonary/Chest: Breath sounds normal. He is in respiratory distress.   Abdominal: Soft. Bowel sounds are normal.   Musculoskeletal: He exhibits edema.   Neurological: He is alert and oriented to person, place, and time.       RESULTS   Results Review:    Results from last 7 days   Lab Units 19  0421 19  0046 19  0524 19  0724   SODIUM mmol/L 140 143 139 140   POTASSIUM mmol/L 5.3* 4.0 4.2 4.4   CHLORIDE mmol/L 100 102 99 94*   CO2 mmol/L 23.0 29.0 30.0* 31.0*   BUN mg/dL 41* 30* 30* 34*   CREATININE mg/dL 3.03* 2.48* 2.43* 2.23*   CALCIUM mg/dL 8.3* 8.5* 8.4* 8.7   BILIRUBIN mg/dL  --  0.6  --  0.7   ALK PHOS U/L  --  95  --  111   ALT (SGPT) U/L  --  9  --  9   AST " (SGOT) U/L  --  11  --  12   GLUCOSE mg/dL 181* 122* 132* 123*       Estimated Creatinine Clearance: 22.3 mL/min (A) (by C-G formula based on SCr of 3.03 mg/dL (H)).    Results from last 7 days   Lab Units 09/05/19  0046   MAGNESIUM mg/dL 1.9             Results from last 7 days   Lab Units 09/07/19  0454 09/06/19  0421 09/05/19  0046 09/04/19  0524 09/03/19  0724   WBC 10*3/mm3 19.81* 22.37* 16.55* 18.15* 15.35*   HEMOGLOBIN g/dL 12.1* 13.6 12.7* 12.9* 13.2   PLATELETS 10*3/mm3 188 242 198 192 205               Imaging Results (last 24 hours)     Procedure Component Value Units Date/Time    US Renal Bilateral [[id number]] Collected:  09/06/19 1351     Updated:  09/06/19 1628    Narrative:       Ultrasound renal complete    HISTORY:  Acute on chronic renal failure. Calculus of ureter.  Urinary tract infection.    Ultrasound examination of the kidneys and urinary bladder was  performed.    COMPARISON: December 22, 2014. Correlation CT December 6, 2019.     FINDINGS:  The right kidney measures 9.49 cm in length by 5.70 cm x 5.10 cm  transverse.  The left kidney measures 11.8 cm in length by 7.90 cm x 6.72 cm  transverse.  The kidneys are of normal echotexture.  Cortical scarring right kidney.  Minimal to moderate left hydronephrosis.  Nonobstructive right renal calculi.  No solid or cystic renal mass.    Bladder decompressed by suprapubic catheter.      Impression:       CONCLUSION:  Cortical scarring right kidney.  Minimal to moderate left hydronephrosis.  Nonobstructive right renal calculi.  Bladder decompressed by suprapubic catheter.    [id number]    Electronically signed by:  Loy Nobles MD  9/6/2019 4:26 PM CDT  Workstation: [id number]    CT Abdomen Pelvis Without Contrast [[id number]] Collected:  09/06/19 1443     Updated:  09/06/19 1520    Narrative:       CT abdomen, pelvis without contrast.       CLINICAL INDICATION: Fever, abdominal pain        COMPARISON: CT September 3, 2019.       TECHNIQUE: Noncontrast  helical scanning with axial and coronal  reformations. Soft tissue, lung, and bone windows reviewed.    This exam was performed according to our departmental  dose-optimization program, which includes automated exposure  control, adjustment of the mA and/or kV according to patient size  and/or use of iterative reconstruction technique.    CT FINDINGS: Moderate size bilateral pleural effusions and  basilar infiltrative changes, either pneumonitis or atelectasis.  Unfavorable change since prior CT exam.   Colonic interposition, anterior to the right lobe of liver (a  normal variant.)    Atrophic changes right kidney with cortical notching and cortical  thinning.    Mild hydronephrotic changes left kidney with perinephric  stranding. This is similar to prior exam. There is a 0.8 cm stone  and a smaller 2 to 3 mm stone at the left ureterovesical junction  And additional 0.7 cm stone is noted in the ureter more  proximally is several centimeters above the level of the  acetabulum. This is unchanged since prior exam.    There is a suprapubic catheter.    Arthritic changes left hip.      Impression:       CONCLUSION: Increasing bilateral pleural effusions and basilar  infiltrative changes either pneumonitis or atelectasis.  Unfavorable change since prior exam.    Chronic atrophic changes right kidney.    Mild hydronephrotic changes left kidney with dilatation of  calyces renal pelvis and ureter.    Two partially obstructing stones in the distal ureter at the  medial meniscal junction and an additional stone in the distal  ureter several centimeters superior to the level of the  acetabulum. Similar appearance to prior exam.    Electronically signed by:  Dennis Gates MD  9/6/2019 3:18 PM CDT  Workstation: [id number]           MEDICATIONS      amiodarone 200 mg Oral Q24H   atorvastatin 10 mg Oral Daily   baclofen 10 mg Oral Q12H   cefepime 2 g Intravenous Q24H   famotidine 20 mg Intravenous Daily   finasteride 5 mg Oral Daily    fluticasone 2 spray Each Nare Daily   insulin aspart 0-7 Units Subcutaneous 4x Daily AC & at Bedtime   ipratropium-albuterol 3 mL Nebulization 4x Daily - RT   methylPREDNISolone sodium succinate 60 mg Intravenous Q8H   nebivolol 2.5 mg Oral Nightly   phenytoin (DILANTIN) IVPB 100 mg Intravenous Q12H   rivaroxaban 15 mg Oral Nightly   sodium chloride 10 mL Intravenous Q12H   sodium chloride 10 mL Intravenous Q12H   sodium chloride 10 mL Intravenous Q12H   tamsulosin 0.4 mg Oral Nightly       amiodarone 0.5 mg/min Last Rate: 0.5 mg/min (09/06/19 0209)   norepinephrine 0.02-0.3 mcg/kg/min        Assessment/Plan   ASSESSMENT / PLAN      Ureterolithiasis    BPH (benign prostatic hyperplasia)    CKD (chronic kidney disease) stage 4, GFR 15-29 ml/min (CMS/[facility])    1.AUGIE- creatinine PTA was 1.01. He had been started on bactrim prior to admission for UTI and is also on daily macrodantin. Creatinine is now 3.01 . This is likely due to posst renal cause plus cardioresp arrest and hypoperfusion leading to atn. he appears fluid overload. He is at least 12 lbs above his weight. We have tried albumin bumex yesterday and he has fair UO. I will keep it again. No ivf.      I think he needs PCN or attempt of cysto and stent again. He is heading to [facility] and I have discussed with the family in detail.     2.UTI- growing pseudomonas- now on cefiipime.      3. Renal calculi- Couple of relatively large left distal ureteral stones causing  minimal obstruction of the left renal collecting system. Dr Al is following- on flomax.  Chronic SP cath for neurogenic bladder. unable to place stent on left ureter during cysto     4. Afib / VT arrest- BP is variable. Required cardioversion yesterday. On po amiodarone . EF 24%. Cardiology is following     5 Hyperkalemia- Potassium 5.3-got kayexalate x 1 dose check bmp today    Dr Treviño / Anthony BARBOZA are rounding tomorrow & next week                This document has been electronically signed by Sofia  MD Moreno on September 7, 2019 7:53 AM

## 2019-09-07 NOTE — PLAN OF CARE
Problem: NPPV/CPAP (Adult)  Goal: Signs and Symptoms of Listed Potential Problems Will be Absent, Minimized or Managed (NPPV/CPAP)  Outcome: Ongoing (interventions implemented as appropriate)  Patient has tolerated BiPAP through the night. No changes have been made to BiPAP settings. Will continue to monitor patient and progress.

## 2019-09-07 NOTE — PLAN OF CARE
Problem: NPPV/CPAP (Adult)  Goal: Signs and Symptoms of Listed Potential Problems Will be Absent, Minimized or Managed (NPPV/CPAP)  Outcome: Ongoing (interventions implemented as appropriate)  Patient is on BiPAP at this time. Patient tolerates BiPAP fairly well. When off BiPAP patient is placed on 10L HF nasal cannula. Patient is to transfer via PHI to another facility soon. Will continue to monitor patient and progress.

## 2019-09-07 NOTE — PROGRESS NOTES
"[facility] Progress Note   LOS: 4 days   Patient Care Team:  Manuel Lopez APRN as PCP - General (Family Medicine)    Chief Complaint:    Chief Complaint   Patient presents with   • Abdominal Pain        Subjective     Interval History:   Patient unable to communicate due to bipap.     Objective     Vital Sign Min/Max for last 24 hours  Temp  Min: 98 °F (36.7 °C)  Max: 98.9 °F (37.2 °C)   BP  Min: 90/53  Max: 166/78   Pulse  Min: 51  Max: 77   Resp  Min: 20  Max: 29   SpO2  Min: 80 %  Max: 100 %   Flow (L/min)  Min: 5  Max: 5   Weight  Min: 90.8 kg (200 lb 2.8 oz)  Max: 90.8 kg (200 lb 2.8 oz)     Flowsheet Rows      First Filed Value   Admission Height  170.2 cm (67\") Documented at 09/03/2019 0700   Admission Weight  85 kg (187 lb 8 oz) Documented at 09/03/2019 0700            09/05/19  0436 09/06/19  0526 09/07/19  0200   Weight: 84.7 kg (186 lb 11.7 oz) 89.4 kg (197 lb 1.5 oz) (Weighed times 3) 90.8 kg (200 lb 2.8 oz)       Physical Exam:  Physical Exam   Constitutional: He is oriented to person, place, and time. He appears well-developed and well-nourished. No distress.   HENT:   Head: Normocephalic and atraumatic.   Eyes: EOM are normal. Pupils are equal, round, and reactive to light. No scleral icterus.   Neck: Normal range of motion. Neck supple. No JVD present. No thyromegaly present.   Cardiovascular: Normal rate, regular rhythm and normal heart sounds. Exam reveals no gallop and no friction rub.   No murmur heard.  Pulmonary/Chest: Effort normal. Tachypnea noted. He has decreased breath sounds. He has no wheezes. He has no rales. He exhibits no tenderness.   Abdominal: Soft. Bowel sounds are normal. He exhibits no distension and no mass. There is no tenderness. There is no rebound and no guarding.   Musculoskeletal: He exhibits edema and deformity. He exhibits no tenderness.   Neurological: He is alert and oriented to person, place, and time. No cranial nerve deficit. He exhibits normal muscle " tone. Coordination normal.   He has left-sided hemiplegia from previous CVA.   Skin: Skin is warm and dry. No rash noted. He is not diaphoretic. No erythema. No pallor.   Psychiatric: He has a normal mood and affect. His behavior is normal. Judgment and thought content normal.   Nursing note and vitals reviewed.       Results Review:     Results from last 7 days   Lab Units 09/06/19  0421 09/05/19  0046 09/04/19  0524 09/03/19  0724   SODIUM mmol/L 140 143 139 140   POTASSIUM mmol/L 5.3* 4.0 4.2 4.4   CHLORIDE mmol/L 100 102 99 94*   CO2 mmol/L 23.0 29.0 30.0* 31.0*   BUN mg/dL 41* 30* 30* 34*   CREATININE mg/dL 3.03* 2.48* 2.43* 2.23*   CALCIUM mg/dL 8.3* 8.5* 8.4* 8.7   BILIRUBIN mg/dL  --  0.6  --  0.7   ALK PHOS U/L  --  95  --  111   ALT (SGPT) U/L  --  9  --  9   AST (SGOT) U/L  --  11  --  12   GLUCOSE mg/dL 181* 122* 132* 123*       Estimated Creatinine Clearance: 22.3 mL/min (A) (by C-G formula based on SCr of 3.03 mg/dL (H)).    Results from last 7 days   Lab Units 09/05/19  0046   MAGNESIUM mg/dL 1.9       Results from last 7 days   Lab Units 09/07/19  0454 09/06/19 0421 09/05/19  0046 09/04/19  0524 09/03/19  0724   WBC 10*3/mm3 19.81* 22.37* 16.55* 18.15* 15.35*   HEMOGLOBIN g/dL 12.1* 13.6 12.7* 12.9* 13.2   PLATELETS 10*3/mm3 188 242 198 192 205           No results found for: PROBNP    I/O last 3 completed shifts:  In: 3471.4 [P.O.:90; I.V.:2506.1; IV Piggyback:875.3]  Out: 860 [Urine:860]    Cardiographics:    Results for orders placed during the hospital encounter of 09/03/19   Adult Transthoracic Echo Complete W/ Cont if Necessary Per Protocol    Narrative · The left ventricular cavity is mild-to-moderately dilated.  · Estimated EF = 24%.  · Left ventricular systolic function is severely decreased.  · Left ventricular diastolic dysfunction (grade II) consistent with   pseudonormalization.  · Mild mitral valve regurgitation is present  · Moderate tricuspid valve regurgitation is present.  · The  tricuspid valve is grossly normal. Moderate tricuspid valve   regurgitation is present. Estimated right ventricular systolic pressure   from tricuspid regurgitation is moderately elevated (45-55 mmHg). Moderate   pulmonary hypertension is present.          Ct Abdomen Pelvis Without Contrast    Result Date: 9/6/2019  CONCLUSION: Increasing bilateral pleural effusions and basilar infiltrative changes either pneumonitis or atelectasis. Unfavorable change since prior exam. Chronic atrophic changes right kidney. Mild hydronephrotic changes left kidney with dilatation of calyces renal pelvis and ureter. Two partially obstructing stones in the distal ureter at the medial meniscal junction and an additional stone in the distal ureter several centimeters superior to the level of the acetabulum. Similar appearance to prior exam. Electronically signed by:  Dennis Gates MD  9/6/2019 3:18 PM CDT Workstation: [id number]    Ct Abdomen Pelvis Without Contrast    Result Date: 9/3/2019  1. Right lower lobe posterior segment patchy opacities suspicious for passive atelectasis versus pneumonia. 2. Very small right pleural effusion. 3.The gallbladder is not identified and may be contracted, surgically, or congenitally absent. 4.In the region of the bladder left ureter UVJ there is a 5.6 mm calculi which may represent recently passed ureter calculi versus calculi partially involving the left ureter UVJ. Distal left ureter 8.7 mm calculi 1.8 mm above the left ureter UVJ. This distal left ureter calculi is causing moderate to severe obstruction with associated moderate to severe hydroureter and hydronephrosis. Additional distal ureter nonobstructing calculi which measures 2.5 mm in greatest diameter. Mild fatty stranding surrounding the left ureter and in the left perirenal space may represent sequela from obstruction with infundibular rupture versus associated inflammatory changes. Recommend clinical correlation. 5. Multiple right kidney  nonobstructive calculi described above. 6. Suprapubic catheter decompressing the bladder. 7. Mildly enlarged prostate gland may represent changes from benign prostatic hypertrophy versus prostate malignancy. Recommend clinical correlation. 8.Left para-aortic single enlarged lymph node which measures 1.86 x 1.05 cm. This lymph node is of uncertain etiology by strict size criteria. This most likely represents inflammatory lymphadenopathy versus less likely lymphadenopathy secondary to neoplastic involvement. Electronically signed by:  David Nieto MD  9/3/2019 9:28 AM CDT Workstation: [id number]    Xr Chest 1 View    Result Date: 9/5/2019  CONCLUSION:    1. No evidence of an active cardiopulmonary process.                                                  Electronically signed by:  ERIC Sibley MD  9/5/2019 9:04 AM CDT Workstation: [id number]    Xr Chest 1 View    Result Date: 9/5/2019  1. Limited exam due graft 2. Mild right basilar atelectasis and/or infiltrate Electronically signed by:  Lc Tucker MD  9/5/2019 12:54 AM CDT Workstation: [id number]    Us Renal Bilateral    Result Date: 9/6/2019  CONCLUSION: Cortical scarring right kidney. Minimal to moderate left hydronephrosis. Nonobstructive right renal calculi. Bladder decompressed by suprapubic catheter. [id number] Electronically signed by:  Loy Nobles MD  9/6/2019 4:26 PM CDT Workstation: [id number]      Medication Review:     Current Facility-Administered Medications:   •  albumin human 25 % IV SOLN 12.5 g, 12.5 g, Intravenous, Once, Sofia Mayer MD  •  amiodarone (NEXTERONE) 360 mg/200 mL (1.8 mg/mL) infusion, 0.5 mg/min, Intravenous, Continuous, Hardy Fisher MD, Last Rate: 16.67 mL/hr at 09/06/19 0209, 0.5 mg/min at 09/06/19 0209  •  amiodarone (PACERONE) tablet 200 mg, 200 mg, Oral, Q24H, Seven Londono MD, 200 mg at 09/06/19 1358  •  atorvastatin (LIPITOR) tablet 10 mg, 10 mg, Oral, Daily, Hardy Fisher MD, 10 mg at 09/06/19 0820  •   baclofen (LIORESAL) tablet 10 mg, 10 mg, Oral, Q12H, Hardy Fisher MD, 10 mg at 09/06/19 2038  •  bumetanide (BUMEX) injection 2 mg, 2 mg, Intravenous, Once, Sofia Mayer MD  •  cefepime (MAXIPIME) 2 g/100 mL 0.9% NS (mbp), 2 g, Intravenous, Q24H, Hrady Fisher MD  •  dextrose (D50W) 25 g/ 50mL Intravenous Solution 25 g, 25 g, Intravenous, Q15 Min PRN, Hardy Fisher MD  •  dextrose (GLUTOSE) oral gel 15 g, 15 g, Oral, Q15 Min PRN, Hardy Fisher MD  •  famotidine (PEPCID) injection 20 mg, 20 mg, Intravenous, Daily, Hardy Fisher MD, 20 mg at 09/06/19 0851  •  finasteride (PROSCAR) tablet 5 mg, 5 mg, Oral, Daily, Hardy Fisher MD, 5 mg at 09/06/19 0851  •  fluticasone (FLONASE) 50 MCG/ACT nasal spray 2 spray, 2 spray, Each Nare, Daily, Orion Hernandez MD, 2 spray at 09/06/19 0853  •  glucagon (human recombinant) (GLUCAGEN DIAGNOSTIC) injection 1 mg, 1 mg, Subcutaneous, Q15 Min PRN, Hardy Fisher MD  •  HYDROcodone-acetaminophen (NORCO) 5-325 MG per tablet 1 tablet, 1 tablet, Oral, Q6H PRN, Hardy Fisher MD  •  insulin aspart (novoLOG) injection 0-7 Units, 0-7 Units, Subcutaneous, 4x Daily AC & at Bedtime, Hardy Fisher MD, 2 Units at 09/06/19 2123  •  ipratropium-albuterol (DUO-NEB) nebulizer solution 3 mL, 3 mL, Nebulization, 4x Daily - RT, Orion Hernandez MD, 3 mL at 09/07/19 0634  •  methylPREDNISolone sodium succinate (SOLU-Medrol) injection 60 mg, 60 mg, Intravenous, Q8H, Hardy Fisher MD, 60 mg at 09/07/19 0547  •  morphine injection 2 mg, 2 mg, Intravenous, Q4H PRN, Hardy Fisher MD  •  nebivolol (BYSTOLIC) half tablet 2.5 mg, 2.5 mg, Oral, Nightly, Seven Londono MD, 2.5 mg at 09/06/19 2040  •  norepinephrine (LEVOPHED) 8 mg/250 mL (32 mcg/mL) in sodium chloride 0.9% infusion (premix), 0.02-0.3 mcg/kg/min, Intravenous, Titrated, Hardy Fisher MD  •  phenytoin (DILANTIN) 100 mg in sodium chloride 0.9 % 100 mL IVPB, 100 mg,  Intravenous, Q12H, Haryd Fisher MD, Last Rate: 200 mL/hr at 09/06/19 2144, 100 mg at 09/06/19 2144  •  rivaroxaban (XARELTO) tablet 15 mg, 15 mg, Oral, Nightly, Hardy Fisher MD, 15 mg at 09/06/19 2038  •  sodium chloride 0.9 % flush 10 mL, 10 mL, Intravenous, PRN, Derrick Whiteside MD  •  sodium chloride 0.9 % flush 10 mL, 10 mL, Intravenous, Q12H, Spenser England MD, 10 mL at 09/06/19 2044  •  sodium chloride 0.9 % flush 10 mL, 10 mL, Intravenous, PRN, Spenser England MD  •  sodium chloride 0.9 % flush 10 mL, 10 mL, Intravenous, Q12H, Hardy Fisher MD, 10 mL at 09/06/19 2044  •  sodium chloride 0.9 % flush 10 mL, 10 mL, Intravenous, Q12H, Hardy Fisher MD, 10 mL at 09/06/19 2039  •  sodium chloride 0.9 % flush 10 mL, 10 mL, Intravenous, PRN, Hardy Fisher MD  •  tamsulosin (FLOMAX) 24 hr capsule 0.4 mg, 0.4 mg, Oral, Nightly, Hardy Fisher MD, 0.4 mg at 09/06/19 2038    Assessment/Plan       Ureterolithiasis    PAF (paroxysmal atrial fibrillation) (CMS/[facility])    BPH (benign prostatic hyperplasia)    CKD (chronic kidney disease) stage 4, GFR 15-29 ml/min (CMS/[facility])    Chronic systolic congestive heart failure (CMS/[facility])    Chronic obstructive pulmonary disease with acute exacerbation (CMS/[facility])    Cardiac arrest due to respiratory disorder (CMS/[facility])    Ventricular fibrillation (CMS/[facility])    #1 status post resuscitative cardiac arrest  2 LV dysfunction with ejection fraction 25%  #3 history of CVA and long-standing history of traumatic brain injury  #4 recurrent urinary tract infection with a supra pubic catheter placement  #5 history of atrial fibrillation/atrial flutter status post post EP study and flutter ablation  #6 patient is on DNI status     No further recurrence of ventricular arrhythmia patient is on DNI status  oral amiodarone 200 mg a day. Concern over allergy- no s/s of allergy at this time- continue.   Will decrease the dose of Bystolic to 2.5 mg given the blood pressure  on the lower side with the parameters given to the nurse.    He is not a candidate for ACE/ARB and Aldactone at this stage given the significant renal insufficiency.      He is not a candidate for aggressive cardiac evaluation and ICD implantation with new reduced EF.   Poor outcome.       Plan for disposition: per attending            This document has been electronically signed by JENA Jones on September 7, 2019 9:32 AM

## 2019-09-09 NOTE — PAYOR COMM NOTE
"Evonne Navarrete  [facility]  (P)[phone]  (F)[phone]      auth#[id number]        Michelle Broderick (74 y.o. Male)     Date of Birth Social Security Number Address Home Phone MRN    1945  [id number] [address] [phone] [id number]    Gnosticism Marital Status          None        Admission Date Admission Type Admitting Provider Attending Provider Department, Room/Bed    9/3/19 Emergency Spenser England MD  [facility] CRITICAL CARE, 08/A    Discharge Date Discharge Disposition Discharge Destination        9/7/2019 [facility] w[facility]Planned Readmission              Attending Provider:  (none)   Allergies:  Ambien [Zolpidem], Aleve [Naproxen], Amikacin, Amiodarone, Vancomycin    Isolation:  None   Infection:  None   Code Status:  Prior    Ht:  170.2 cm (67\")   Wt:  90.8 kg (200 lb 2.8 oz)    Admission Cmt:  None   Principal Problem:  Ureterolithiasis [N20.1]                 Active Insurance as of 9/3/2019     Primary Coverage     Payor Plan Insurance Group Employer/Plan Group    HUMANA MEDICARE REPLACEMENT HUMANA MEDICARE REPL [id number]     Payor Plan Address Payor Plan Phone Number Payor Plan Fax Number Effective Dates    [address] [address] [phone]  1/1/2019 - None Entered    [facility] [id number]       Subscriber Name Subscriber Birth Date Member ID       MICHELLE BRODERICK 1945 [id number]                 Emergency Contacts      (Rel.) Home Phone Work Phone Mobile Phone    JovannicaseHeMarly (Significant Other) [phone] -- [phone]               Discharge Summary      Hardy Fisher MD at 9/7/2019  6:12 PM              [facility]  DISCHARGE SUMMARY       Date of Admission: 9/3/2019  Date of Discharge:  9/7/2019  Primary Care Physician: Manuel Lopez APRN    Presenting Problem/History of Present Illness:  Ureterolithiasis " [N20.1]  Acute kidney injury (nontraumatic) (CMS/[facility]) [N17.9]  Urinary tract infection without hematuria, site unspecified [N39.0]     Mando Hill is a 74 y.o. male with medical history significant for Afib, BPH, left hemiplegia from previous CVA, chronic palmer, HLD, HTN, h/o decubitus ulcers   who presented to the emergency room with a 2-3 day history of abdominal pain. Patient was on Bactrim for UTI outpatient. Patient endorsed nausea, vomiting, and abdominal pain. He has some residual deficits from previous CVA.      Final Discharge Diagnoses:  Active Hospital Problems    Diagnosis   • **Ureterolithiasis   • Chronic systolic congestive heart failure (CMS/[facility])   • Chronic obstructive pulmonary disease with acute exacerbation (CMS/HCC)   • Cardiac arrest due to respiratory disorder (CMS/[facility])   • Ventricular fibrillation (CMS/HCC)   • CKD (chronic kidney disease) stage 4, GFR 15-29 ml/min (CMS/[facility])   • BPH (benign prostatic hyperplasia)   • PAF (paroxysmal atrial fibrillation) (CMS/[facility])       Consults:   Consults     Date and Time Order Name Status Description    9/6/2019 1717 Inpatient Nephrology Consult      9/5/2019 1119 Inpatient Cardiology Consult Completed     9/3/2019 0930 Urology (on-call MD unless specified) Completed           Procedures Performed: Procedure(s):  CYSTOSCOPY, LEFT RETROGRADE, URETEROSCOPY, SUPRAPUBIC CATHETER CHANGE                Pertinent Test Results:   Imaging Results (last 7 days)     Procedure Component Value Units Date/Time    US Renal Bilateral [[id number]] Collected:  09/06/19 1351     Updated:  09/06/19 1628    Narrative:       Ultrasound renal complete    HISTORY:  Acute on chronic renal failure. Calculus of ureter.  Urinary tract infection.    Ultrasound examination of the kidneys and urinary bladder was  performed.    COMPARISON: December 22, 2014. Correlation CT December 6, 2019.     FINDINGS:  The right kidney measures 9.49 cm in length by 5.70 cm x 5.10  cm  transverse.  The left kidney measures 11.8 cm in length by 7.90 cm x 6.72 cm  transverse.  The kidneys are of normal echotexture.  Cortical scarring right kidney.  Minimal to moderate left hydronephrosis.  Nonobstructive right renal calculi.  No solid or cystic renal mass.    Bladder decompressed by suprapubic catheter.      Impression:       CONCLUSION:  Cortical scarring right kidney.  Minimal to moderate left hydronephrosis.  Nonobstructive right renal calculi.  Bladder decompressed by suprapubic catheter.    [id number]    Electronically signed by:  Loy Nobles MD  9/6/2019 4:26 PM CDT  Workstation: [id number]    CT Abdomen Pelvis Without Contrast [[id number]] Collected:  09/06/19 1443     Updated:  09/06/19 1520    Narrative:       CT abdomen, pelvis without contrast.       CLINICAL INDICATION: Fever, abdominal pain        COMPARISON: CT September 3, 2019.       TECHNIQUE: Noncontrast helical scanning with axial and coronal  reformations. Soft tissue, lung, and bone windows reviewed.    This exam was performed according to our departmental  dose-optimization program, which includes automated exposure  control, adjustment of the mA and/or kV according to patient size  and/or use of iterative reconstruction technique.    CT FINDINGS: Moderate size bilateral pleural effusions and  basilar infiltrative changes, either pneumonitis or atelectasis.  Unfavorable change since prior CT exam.   Colonic interposition, anterior to the right lobe of liver (a  normal variant.)    Atrophic changes right kidney with cortical notching and cortical  thinning.    Mild hydronephrotic changes left kidney with perinephric  stranding. This is similar to prior exam. There is a 0.8 cm stone  and a smaller 2 to 3 mm stone at the left ureterovesical junction  And additional 0.7 cm stone is noted in the ureter more  proximally is several centimeters above the level of the  acetabulum. This is unchanged since prior exam.    There is a  suprapubic catheter.    Arthritic changes left hip.      Impression:       CONCLUSION: Increasing bilateral pleural effusions and basilar  infiltrative changes either pneumonitis or atelectasis.  Unfavorable change since prior exam.    Chronic atrophic changes right kidney.    Mild hydronephrotic changes left kidney with dilatation of  calyces renal pelvis and ureter.    Two partially obstructing stones in the distal ureter at the  medial meniscal junction and an additional stone in the distal  ureter several centimeters superior to the level of the  acetabulum. Similar appearance to prior exam.    Electronically signed by:  Dennis Gates MD  9/6/2019 3:18 PM CDT  Workstation: [id number]    US Guided Vascular Access [[id number]] Resulted:  09/05/19 1123     Updated:  09/05/19 1123    Narrative:       This procedure was auto-finalized with no dictation required.    IR Insert Midline Without Port Pump 5 Plus [[id number]] Resulted:  09/05/19 1039     Updated:  09/05/19 1039    Narrative:       This procedure was auto-finalized with no dictation required.    XR Chest 1 View [[id number]] Collected:  09/05/19 0841     Updated:  09/05/19 0905    Narrative:         EXAM:         Radiograph(s), Chest   VIEWS:   Frontal  ; 1       DATE/TIME:  9/5/2019 9:02 AM CDT                INDICATION:   SOA, N20.1 Calculus of ureter N39.0 Urinary tract  infection, site not specified N17.9 Acute kidney failure,  unspecified    COMPARISON:  CXR: 9/5/19             FINDINGS:             - lines/tubes:    none     - cardiac:         size within normal limits         - mediastinum: contour within normal limits         - lungs:         no focal air space process, pulmonary  interstitial edema, nodule(s)/mass             - pleura:         no evidence of  fluid                  - osseous:         unremarkable for age                  - misc.:         Impression:       CONCLUSION:        1. No evidence of an active cardiopulmonary process.                                                               Electronically signed by:  ERIC Sibley MD  9/5/2019 9:04 AM  CDT Workstation: [id number]    FL Retrograde Pyelogram In OR [[id number]] Resulted:  09/05/19 0816     Updated:  09/05/19 0816    XR Chest 1 View [[id number]] Collected:  09/05/19 0003     Updated:  09/05/19 0055    Narrative:       Exam: AP portable chest    INDICATION: Respiratory difficulty    COMPARISON: 6/8/2019    FINDINGS: AP portable chest. Limited exam due to positioning of  the head limiting the evaluation of the upper half of the right  lung. Lungs are hypoinflated which accentuates the heart size and  bronchovascular markings. Heart is enlarged. There is right  basilar atelectasis and infiltrate. There is mild left basilar  atelectasis      Impression:       1. Limited exam due graft  2. Mild right basilar atelectasis and/or infiltrate    Electronically signed by:  Lc Tucker MD  9/5/2019 12:54 AM  CDT Workstation: [id number]    CT Abdomen Pelvis Without Contrast [[id number]] Collected:  09/03/19 0842     Updated:  09/03/19 0929    Narrative:         PROCEDURE: Ct abdomen and pelvis without contrast    REASON FOR EXAM: R flank, suprapubic pain    FINDINGS: Comparison study dated  August 31, 2018. Axial computer  tomography sequential imaging was performed from the diaphragms  through the symphysis pubis without IV contrast administration.  Sagittal and coronal reformates was performed. This exam was  performed according to our departmental dose optimization  program, which includes automated exposure control, adjustment of  the mA and/or KV according to patient size and/or use of  iterative reconstruction technique.    Imaging through lung bases reveals posterior segment right lower  lobe small patchy opacities with air bronchograms. Very small  right pleural effusion. Lung bases are otherwise unremarkable.    The liver is normal. The gallbladder is not identified and may be  contracted,  surgically, or congenitally absent. The biliary  system appears within normal limits. The pancreas is normal. The  spleen is normal. Bilateral adrenal glands are normal. Right  kidney upper pole, mid zone, and lower pole multiple  nonobstructive renal calculi with the largest within the lower  pole which measures 2.1 mm in greatest diameter. Right kidney  renal pelvis 4.2 mm nonobstructive renal calculi. Right kidney  perirenal space small amount of fatty stranding and fluid. The  right kidney and ureter are otherwise unremarkable. In the region  of the bladder left ureter UVJ there is a 5.6 mm calculi which  may represent recently passed ureter calculi versus calculi  partially involving the left ureter UVJ. Distal left ureter 8.7  mm calculi 1.8 mm above the left ureter UVJ. This distal left  ureter calculi is causing moderate to severe obstruction with  associated moderate to severe hydroureter and hydronephrosis.  Additional distal ureter nonobstructing calculi which measures  2.5 mm in greatest diameter. Mild fatty stranding surrounding the  left ureter and in the left perirenal space may represent sequela  from obstruction with infundibular rupture versus associated  inflammatory changes. Recommend clinical correlation. The left  kidney and ureter are otherwise unremarkable. Suprapubic catheter  is seen involving the bladder with the bladder largely  decompressed. The prostate gland is mildly enlarged. The hollow  viscera is normal. Left para-aortic single enlarged lymph node  which measures 1.86 x 1.05 cm. This lymph node is at the level of  the left renal hilum. No other lymphadenopathy is identified. No  acute osseous abnormality.      Impression:       1. Right lower lobe posterior segment patchy opacities suspicious  for passive atelectasis versus pneumonia.  2. Very small right pleural effusion.  3.The gallbladder is not identified and may be contracted,  surgically, or congenitally absent.  4.In the  region of the bladder left ureter UVJ there is a 5.6 mm  calculi which may represent recently passed ureter calculi versus  calculi partially involving the left ureter UVJ. Distal left  ureter 8.7 mm calculi 1.8 mm above the left ureter UVJ. This  distal left ureter calculi is causing moderate to severe  obstruction with associated moderate to severe hydroureter and  hydronephrosis. Additional distal ureter nonobstructing calculi  which measures 2.5 mm in greatest diameter. Mild fatty stranding  surrounding the left ureter and in the left perirenal space may  represent sequela from obstruction with infundibular rupture  versus associated inflammatory changes. Recommend clinical  correlation.  5. Multiple right kidney nonobstructive calculi described above.  6. Suprapubic catheter decompressing the bladder.  7. Mildly enlarged prostate gland may represent changes from  benign prostatic hypertrophy versus prostate malignancy.  Recommend clinical correlation.  8.Left para-aortic single enlarged lymph node which measures 1.86  x 1.05 cm. This lymph node is of uncertain etiology by strict  size criteria. This most likely represents inflammatory  lymphadenopathy versus less likely lymphadenopathy secondary to  neoplastic involvement.    Electronically signed by:  David Nieto MD  9/3/2019 9:28 AM CDT  Workstation: [id number]        Lab Results (last 7 days)     Procedure Component Value Units Date/Time    Basic Metabolic Panel [[id number]]  (Abnormal) Collected:  09/07/19 0941    Specimen:  Blood Updated:  09/07/19 1007     Glucose 138 mg/dL      BUN 53 mg/dL      Creatinine 3.49 mg/dL      Sodium 141 mmol/L      Potassium 3.7 mmol/L      Chloride 103 mmol/L      CO2 20.0 mmol/L      Calcium 7.9 mg/dL      eGFR Non African Amer 17 mL/min/1.73      BUN/Creatinine Ratio 15.2     Anion Gap 18.0 mmol/L     Narrative:       GFR Normal >60  Chronic Kidney Disease <60  Kidney Failure <15    POC Glucose Once [[id number]]  (Normal)  Collected:  09/07/19 0635    Specimen:  Blood Updated:  09/07/19 0720     Glucose 118 mg/dL      Comment: RN NotifiedSliding Scale AdminOperator: [id number] CARMEN PAMMeter ID: [id number]       CBC & Differential [[id number]] Collected:  09/07/19 0454    Specimen:  Blood Updated:  09/07/19 0518    Narrative:       The following orders were created for panel order CBC & Differential.  Procedure                               Abnormality         Status                     ---------                               -----------         ------                     CBC Auto Differential[[id number]]        Abnormal            Final result                 Please view results for these tests on the individual orders.    CBC Auto Differential [[id number]]  (Abnormal) Collected:  09/07/19 0454    Specimen:  Blood Updated:  09/07/19 0518     WBC 19.81 10*3/mm3      RBC 4.01 10*6/mm3      Hemoglobin 12.1 g/dL      Hematocrit 36.5 %      MCV 91.0 fL      MCH 30.2 pg      MCHC 33.2 g/dL      RDW 15.6 %      RDW-SD 51.9 fl      MPV 10.4 fL      Platelets 188 10*3/mm3      Neutrophil % 86.9 %      Lymphocyte % 2.2 %      Monocyte % 8.5 %      Eosinophil % 0.1 %      Basophil % 0.2 %      Immature Grans % 2.1 %      Neutrophils, Absolute 17.21 10*3/mm3      Lymphocytes, Absolute 0.44 10*3/mm3      Monocytes, Absolute 1.68 10*3/mm3      Eosinophils, Absolute 0.02 10*3/mm3      Basophils, Absolute 0.04 10*3/mm3      Immature Grans, Absolute 0.42 10*3/mm3      nRBC 0.2 /100 WBC     POC Glucose Once [[id number]]  (Abnormal) Collected:  09/06/19 2047    Specimen:  Blood Updated:  09/06/19 2111     Glucose 173 mg/dL      Comment: Sliding Scale AdminRN NotifiedOperator: [id number] CARMEN PAMMeter ID: [id number]       POC Glucose Once [[id number]]  (Abnormal) Collected:  09/06/19 1653    Specimen:  Blood Updated:  09/06/19 1850     Glucose 142 mg/dL      Comment: : [id number] GURU See ID: [id number]       Blood Culture - Blood,  Wrist, Right [[id number]] Collected:  09/03/19 1825    Specimen:  Blood from Wrist, Right Updated:  09/06/19 1845     Blood Culture No growth at 3 days    Blood Culture - Blood, Wrist, Left [[id number]] Collected:  09/03/19 1815    Specimen:  Blood from Wrist, Left Updated:  09/06/19 1830     Blood Culture No growth at 3 days    POC Glucose Once [[id number]]  (Normal) Collected:  09/06/19 1303    Specimen:  Blood Updated:  09/06/19 1318     Glucose 114 mg/dL      Comment: : [id number] GURU See ID: [id number]       Hemoglobin A1c [[id number]]  (Normal) Collected:  09/06/19 0421    Specimen:  Blood Updated:  09/06/19 1201     Hemoglobin A1C 5.60 %     Narrative:       Hemoglobin A1C Ranges:    Increased Risk for Diabetes  5.7% to 6.4%  Diabetes                     >= 6.5%  Diabetic Goal                < 7.0%    Urine Culture - Urine, Urine, Clean Catch [[id number]]  (Abnormal)  (Susceptibility) Collected:  09/03/19 0804    Specimen:  Urine, Clean Catch Updated:  09/06/19 1033     Urine Culture 50,000 CFU/mL Pseudomonas aeruginosa      25,000 CFU/mL Mixed Tina Isolated    Narrative:       Specimen contains mixed organisms of questionable pathogenicity which indicates contamination with commensal tina.  Further identification is unlikely to provide clinically useful information.  Suggest recollection.    Susceptibility      Pseudomonas aeruginosa     LULA     Cefepime Susceptible     Ceftazidime Susceptible     Ciprofloxacin Resistant     Gentamicin Susceptible     Levofloxacin Resistant     Piperacillin + Tazobactam Susceptible                    CBC & Differential [[id number]] Collected:  09/06/19 0421    Specimen:  Blood Updated:  09/06/19 1012    Narrative:       The following orders were created for panel order CBC & Differential.  Procedure                               Abnormality         Status                     ---------                               -----------         ------                      CBC Auto Differential[[id number]]        Abnormal            Final result                 Please view results for these tests on the individual orders.    CBC Auto Differential [[id number]]  (Abnormal) Collected:  09/06/19 0421    Specimen:  Blood Updated:  09/06/19 1012     WBC 22.37 10*3/mm3      RBC 4.49 10*6/mm3      Hemoglobin 13.6 g/dL      Hematocrit 44.3 %      MCV 98.7 fL      MCH 30.3 pg      MCHC 30.7 g/dL      RDW 16.1 %      RDW-SD 58.9 fl      MPV 10.6 fL      Platelets 242 10*3/mm3      Neutrophil % 88.5 %      Lymphocyte % 1.7 %      Monocyte % 8.3 %      Eosinophil % 0.0 %      Basophil % 0.2 %      Immature Grans % 1.3 %      Neutrophils, Absolute 19.80 10*3/mm3      Lymphocytes, Absolute 0.39 10*3/mm3      Monocytes, Absolute 1.85 10*3/mm3      Eosinophils, Absolute 0.00 10*3/mm3      Basophils, Absolute 0.05 10*3/mm3      Immature Grans, Absolute 0.28 10*3/mm3      nRBC 0.0 /100 WBC     Blood Gas, Arterial [[id number]]  (Abnormal) Collected:  09/06/19 0533    Specimen:  Arterial Blood Updated:  09/06/19 0539     Site Right Radial     Kevin's Test N/A     pH, Arterial 7.149 pH units      Comment: 85 Value below critical limit        pCO2, Arterial 64.6 mm Hg      Comment: 83 Value above reference range        pO2, Arterial 65.3 mm Hg      Comment: 84 Value below reference range        HCO3, Arterial 22.5 mmol/L      Base Excess, Arterial -7.4 mmol/L      Comment: 84 Value below reference range        O2 Saturation, Arterial 90.9 %      Comment: 84 Value below reference range        Barometric Pressure for Blood Gas 747 mmHg      Modality BiPap     FIO2 50 %      Ventilator Mode BiPAP     Set Mech Resp Rate 24.0     IPAP 22     Comment: Meter: [id number]     :  [id number]        EPAP 6     Collected by KEL HUGHES    Extra Tubes [[id number]] Collected:  09/06/19 0421    Specimen:  Blood, Venous Line Updated:  09/06/19 0530    Narrative:       The following orders were created for panel order  Extra Tubes.  Procedure                               Abnormality         Status                     ---------                               -----------         ------                     Lavender Top[[id number]]                                     Final result                 Please view results for these tests on the individual orders.    Lavender Top [[id number]] Collected:  09/06/19 0421    Specimen:  Blood Updated:  09/06/19 0530     Extra Tube hold for add-on     Comment: Auto resulted       Basic Metabolic Panel [[id number]]  (Abnormal) Collected:  09/06/19 0421    Specimen:  Blood Updated:  09/06/19 0516     Glucose 181 mg/dL      BUN 41 mg/dL      Creatinine 3.03 mg/dL      Sodium 140 mmol/L      Potassium 5.3 mmol/L      Chloride 100 mmol/L      CO2 23.0 mmol/L      Calcium 8.3 mg/dL      eGFR Non African Amer 20 mL/min/1.73      BUN/Creatinine Ratio 13.5     Anion Gap 17.0 mmol/L     Narrative:       GFR Normal >60  Chronic Kidney Disease <60  Kidney Failure <15    Blood Gas, Arterial [[id number]]  (Abnormal) Collected:  09/06/19 0409    Specimen:  Arterial Blood Updated:  09/06/19 0414     Site Right Radial     Kevin's Test Positive     pH, Arterial 7.084 pH units      Comment: 85 Value below critical limit        pCO2, Arterial 79.9 mm Hg      Comment: 86 Value above critical limit        pO2, Arterial 62.5 mm Hg      Comment: 84 Value below reference range        HCO3, Arterial 23.9 mmol/L      Base Excess, Arterial -7.7 mmol/L      Comment: 84 Value below reference range        O2 Saturation, Arterial 88.8 %      Comment: 84 Value below reference range        Barometric Pressure for Blood Gas 747 mmHg      Modality Nasal Cannula     Flow Rate 6.0 lpm      Ventilator Mode NA     Collected by SAUL     Comment: Meter: [id number]     :  [id number]       Troponin [[id number]]  (Abnormal) Collected:  09/05/19 0614    Specimen:  Blood Updated:  09/05/19 0640     Troponin T 0.060 ng/mL     Narrative:        Troponin T Reference Range:  <= 0.03 ng/mL-   Negative for AMI  >0.03 ng/mL-     Abnormal for myocardial necrosis.  Clinicians would have to utilize clinical acumen, EKG, Troponin and serial changes to determine if it is an Acute Myocardial Infarction or myocardial injury due to an underlying chronic condition.     Blood Gas, Arterial [[id number]]  (Abnormal) Collected:  09/05/19 0422    Specimen:  Arterial Blood Updated:  09/05/19 0426     Site Right Radial     Kevin's Test Positive     pH, Arterial 7.279 pH units      Comment: 84 Value below reference range        pCO2, Arterial 59.3 mm Hg      Comment: 83 Value above reference range        pO2, Arterial 70.6 mm Hg      Comment: 84 Value below reference range        HCO3, Arterial 27.8 mmol/L      Comment: 83 Value above reference range        Base Excess, Arterial -0.1 mmol/L      Comment: 84 Value below reference range        O2 Saturation, Arterial 94.0 %      Barometric Pressure for Blood Gas 749 mmHg      Modality BiPap     FIO2 30 %      Ventilator Mode BiPAP     Set Mech Resp Rate 18.0     IPAP 20     Comment: Meter: [id number]     :  [id number]        EPAP 6     Collected by     Troponin [[id number]]  (Abnormal) Collected:  09/05/19 0046    Specimen:  Blood Updated:  09/05/19 0131     Troponin T 0.075 ng/mL     Narrative:       Troponin T Reference Range:  <= 0.03 ng/mL-   Negative for AMI  >0.03 ng/mL-     Abnormal for myocardial necrosis.  Clinicians would have to utilize clinical acumen, EKG, Troponin and serial changes to determine if it is an Acute Myocardial Infarction or myocardial injury due to an underlying chronic condition.     Magnesium [[id number]]  (Normal) Collected:  09/05/19 0046    Specimen:  Blood Updated:  09/05/19 0122     Magnesium 1.9 mg/dL     Comprehensive Metabolic Panel [[id number]]  (Abnormal) Collected:  09/05/19 0046    Specimen:  Blood Updated:  09/05/19 0122     Glucose 122 mg/dL      BUN 30 mg/dL       Creatinine 2.48 mg/dL      Sodium 143 mmol/L      Potassium 4.0 mmol/L      Chloride 102 mmol/L      CO2 29.0 mmol/L      Calcium 8.5 mg/dL      Total Protein 7.3 g/dL      Albumin 2.90 g/dL      ALT (SGPT) 9 U/L      AST (SGOT) 11 U/L      Alkaline Phosphatase 95 U/L      Total Bilirubin 0.6 mg/dL      eGFR Non African Amer 25 mL/min/1.73      Globulin 4.4 gm/dL      A/G Ratio 0.7 g/dL      BUN/Creatinine Ratio 12.1     Anion Gap 12.0 mmol/L     Narrative:       GFR Normal >60  Chronic Kidney Disease <60  Kidney Failure <15    CK [[id number]]  (Normal) Collected:  09/05/19 0046    Specimen:  Blood Updated:  09/05/19 0122     Creatine Kinase 41 U/L     CK-MB [[id number]]  (Normal) Collected:  09/05/19 0046    Specimen:  Blood Updated:  09/05/19 0120     CKMB 3.22 ng/mL     CBC (No Diff) [[id number]]  (Abnormal) Collected:  09/05/19 0046    Specimen:  Blood Updated:  09/05/19 0052     WBC 16.55 10*3/mm3      RBC 4.27 10*6/mm3      Hemoglobin 12.7 g/dL      Hematocrit 41.0 %      MCV 96.0 fL      MCH 29.7 pg      MCHC 31.0 g/dL      RDW 15.5 %      RDW-SD 54.6 fl      MPV 9.9 fL      Platelets 198 10*3/mm3     POC Glucose Once [[id number]]  (Normal) Collected:  09/04/19 2352    Specimen:  Blood Updated:  09/05/19 0006     Glucose 114 mg/dL      Comment: : [id number] RUBIAURELIANO KENDRAMeter ID: [id number]       Blood Gas, Arterial [[id number]]  (Abnormal) Collected:  09/04/19 2348    Specimen:  Arterial Blood Updated:  09/05/19 0002     Site Right Radial     Kevin's Test N/A     pH, Arterial 7.237 pH units      Comment: 84 Value below reference range        pCO2, Arterial 67.8 mm Hg      Comment: 86 Value above critical limit        pO2, Arterial 62.9 mm Hg      Comment: 84 Value below reference range        HCO3, Arterial 28.8 mmol/L      Comment: 83 Value above reference range        Base Excess, Arterial -0.1 mmol/L      Comment: 84 Value below reference range        O2 Saturation, Arterial 91.2 %      Comment:  84 Value below reference range        Barometric Pressure for Blood Gas 749 mmHg      Modality BiPap     FIO2 30 %      Ventilator Mode BiPAP     Set Mech Resp Rate 18.0     IPAP 20     Comment: Meter: [id number]     :  [id number]        EPAP 6     Collected by KEL HUGHES    Blood Gas, Arterial [[id number]]  (Abnormal) Collected:  09/04/19 2151    Specimen:  Arterial Blood Updated:  09/04/19 2156     Site Right Radial     Kevin's Test Positive     pH, Arterial 7.183 pH units      Comment: 85 Value below critical limit        pCO2, Arterial 81.4 mm Hg      Comment: 86 Value above critical limit        pO2, Arterial 83.4 mm Hg      HCO3, Arterial 30.6 mmol/L      Comment: 83 Value above reference range        Base Excess, Arterial -0.1 mmol/L      Comment: 84 Value below reference range        O2 Saturation, Arterial 94.2 %      Barometric Pressure for Blood Gas 749 mmHg      Modality Simple Mask     Flow Rate 10.0 lpm      Ventilator Mode NA     Collected by FLAQUITA HUGHES RRT     Comment: Meter: [id number]     :  [id number]       Basic Metabolic Panel [[id number]]  (Abnormal) Collected:  09/04/19 0524    Specimen:  Blood Updated:  09/04/19 0622     Glucose 132 mg/dL      BUN 30 mg/dL      Creatinine 2.43 mg/dL      Sodium 139 mmol/L      Potassium 4.2 mmol/L      Chloride 99 mmol/L      CO2 30.0 mmol/L      Calcium 8.4 mg/dL      eGFR Non African Amer 26 mL/min/1.73      BUN/Creatinine Ratio 12.3     Anion Gap 10.0 mmol/L     Narrative:       GFR Normal >60  Chronic Kidney Disease <60  Kidney Failure <15    CBC & Differential [[id number]] Collected:  09/04/19 0524    Specimen:  Blood Updated:  09/04/19 0602    Narrative:       The following orders were created for panel order CBC & Differential.  Procedure                               Abnormality         Status                     ---------                               -----------         ------                     CBC Auto Differential[[id number]]         Abnormal            Final result                 Please view results for these tests on the individual orders.    CBC Auto Differential [[id number]]  (Abnormal) Collected:  09/04/19 0524    Specimen:  Blood Updated:  09/04/19 0602     WBC 18.15 10*3/mm3      RBC 4.27 10*6/mm3      Hemoglobin 12.9 g/dL      Hematocrit 39.6 %      MCV 92.7 fL      MCH 30.2 pg      MCHC 32.6 g/dL      RDW 15.4 %      RDW-SD 52.1 fl      MPV 10.1 fL      Platelets 192 10*3/mm3      Neutrophil % 83.6 %      Lymphocyte % 2.3 %      Monocyte % 12.0 %      Eosinophil % 0.9 %      Basophil % 0.3 %      Immature Grans % 0.9 %      Neutrophils, Absolute 15.17 10*3/mm3      Lymphocytes, Absolute 0.42 10*3/mm3      Monocytes, Absolute 2.18 10*3/mm3      Eosinophils, Absolute 0.16 10*3/mm3      Basophils, Absolute 0.05 10*3/mm3      Immature Grans, Absolute 0.17 10*3/mm3      nRBC 0.0 /100 WBC     [place] Draw [[id number]] Collected:  09/03/19 0724    Specimen:  Blood Updated:  09/03/19 0830    Narrative:       The following orders were created for panel order [place] Draw.  Procedure                               Abnormality         Status                     ---------                               -----------         ------                     Light Blue Top[[id number]]                                   Final result               Green Top (Gel)[[id number]]                                  Final result               Lavender Top[[id number]]                                     Final result               Gold Top - SST[[id number]]                                   Final result                 Please view results for these tests on the individual orders.    Gold Top - SST [[id number]] Collected:  09/03/19 0724    Specimen:  Blood Updated:  09/03/19 0830     Extra Tube Hold for add-ons.     Comment: Auto resulted.       Light Blue Top [[id number]] Collected:  09/03/19 0724    Specimen:  Blood Updated:  09/03/19 0830     Extra Tube hold for add-on      Comment: Auto resulted       Green Top (Gel) [[id number]] Collected:  09/03/19 0724    Specimen:  Blood Updated:  09/03/19 0830     Extra Tube Hold for add-ons.     Comment: Auto resulted.       Lavender Top [[id number]] Collected:  09/03/19 0724    Specimen:  Blood Updated:  09/03/19 0830     Extra Tube hold for add-on     Comment: Auto resulted       Urinalysis, Microscopic Only - Urine, Clean Catch [[id number]]  (Abnormal) Collected:  09/03/19 0804    Specimen:  Urine, Clean Catch Updated:  09/03/19 0820     RBC, UA 0-2 /HPF      WBC, UA 31-50 /HPF      Bacteria, UA Trace /HPF      Squamous Epithelial Cells, UA 0-2 /HPF      Hyaline Casts, UA None Seen /LPF      Methodology Automated Microscopy    Urinalysis With Microscopic If Indicated (No Culture) - Urine, Clean Catch [[id number]]  (Abnormal) Collected:  09/03/19 0804    Specimen:  Urine, Clean Catch Updated:  09/03/19 0814     Color, UA Yellow     Appearance, UA Slightly Cloudy     pH, UA 7.0     Specific Gravity, UA 1.015     Glucose, UA Negative     Ketones, UA 15 mg/dL (1+)     Bilirubin, UA Small (1+)     Blood, UA Small (1+)     Protein,  mg/dL (2+)     Leuk Esterase, UA Large (3+)     Nitrite, UA Positive     Urobilinogen, UA 1.0 E.U./dL    Comprehensive Metabolic Panel [[id number]]  (Abnormal) Collected:  09/03/19 0724    Specimen:  Blood Updated:  09/03/19 0800     Glucose 123 mg/dL      BUN 34 mg/dL      Creatinine 2.23 mg/dL      Sodium 140 mmol/L      Potassium 4.4 mmol/L      Chloride 94 mmol/L      CO2 31.0 mmol/L      Calcium 8.7 mg/dL      Total Protein 7.9 g/dL      Albumin 3.50 g/dL      ALT (SGPT) 9 U/L      AST (SGOT) 12 U/L      Alkaline Phosphatase 111 U/L      Total Bilirubin 0.7 mg/dL      eGFR Non African Amer 29 mL/min/1.73      Globulin 4.4 gm/dL      A/G Ratio 0.8 g/dL      BUN/Creatinine Ratio 15.2     Anion Gap 15.0 mmol/L     Narrative:       GFR Normal >60  Chronic Kidney Disease <60  Kidney Failure <15    Lipase [[id number]]   (Normal) Collected:  09/03/19 0724    Specimen:  Blood Updated:  09/03/19 0800     Lipase 23 U/L     Lactic Acid, Plasma [[id number]]  (Normal) Collected:  09/03/19 0732    Specimen:  Blood Updated:  09/03/19 0756     Lactate 0.9 mmol/L     CBC & Differential [[id number]] Collected:  09/03/19 0724    Specimen:  Blood Updated:  09/03/19 0741    Narrative:       The following orders were created for panel order CBC & Differential.  Procedure                               Abnormality         Status                     ---------                               -----------         ------                     CBC Auto Differential[[id number]]        Abnormal            Final result                 Please view results for these tests on the individual orders.    CBC Auto Differential [[id number]]  (Abnormal) Collected:  09/03/19 0724    Specimen:  Blood Updated:  09/03/19 0741     WBC 15.35 10*3/mm3      RBC 4.35 10*6/mm3      Hemoglobin 13.2 g/dL      Hematocrit 39.8 %      MCV 91.5 fL      MCH 30.3 pg      MCHC 33.2 g/dL      RDW 15.2 %      RDW-SD 51.0 fl      MPV 10.1 fL      Platelets 205 10*3/mm3      Neutrophil % 81.1 %      Lymphocyte % 4.2 %      Monocyte % 12.3 %      Eosinophil % 1.4 %      Basophil % 0.3 %      Immature Grans % 0.7 %      Neutrophils, Absolute 12.45 10*3/mm3      Lymphocytes, Absolute 0.65 10*3/mm3      Monocytes, Absolute 1.89 10*3/mm3      Eosinophils, Absolute 0.22 10*3/mm3      Basophils, Absolute 0.04 10*3/mm3      Immature Grans, Absolute 0.10 10*3/mm3      nRBC 0.0 /100 WBC             Chief Complaint on Day of Discharge: Shortness of air.    Hospital Course:  The patient was admitted and managed as follows:    Ureterolithiasis: Patient was admitted and was seen by the urologist.  He underwent cystoscopy, laser lithotripsy but attempt for J stent placement was unsuccessful.  He was to have left nephrostomy tube placement by interventional radiologist but this was not possible.  As a result the  urologist recommended patient to be transferred to a tertiary center for the procedure.  Patient was accepted for transfer to [facility].     Acute cystitis/polynephritis: Patient was treated initially with IV Zosyn.  Blood cultures have shown no growth and urine culture is positive for Pseudomonas.  Antimicrobial therapy has been changed to cefepime based on sensitivities.    Leukocytosis is reactive was improving .      BPH (benign prostatic hyperplasia): He was continued on  Flomax and Proscar.     Acute on chronic kidney injury: Patient's baseline creatinine is between 1-1.2.  Creatinine  did steadily increase and was 3.49 on discharge.  He was seen by the nephrologist on consult and appropriate recommendations were made.  Abdominal Ultrasound showed minimal to moderate left hydronephrosis with nonobstructive right renal calculi.  Input by nephrologist is appreciated.     Acute on chronic respiratory failure with hypercapnia and hypoxia: Patient was mostly managed with noninvasive respiratory therapy, bronchodilators and steroids.     Status post CODE BLUE secondary to ventricular arrhythmia: Patient's hospital course was complicated by an episode of ventricular tachycardia prompting a CODE BLUE.  He had a spontaneous return of circulation following cardioversion and was managed with amiodarone infusion which was transitioned to oral amiodarone.  Elevated troponin is likely reactive and did trend downwards.    Echocardiogram done on 9/5/2019 showed:  · The left ventricular cavity is mild-to-moderately dilated.  · Estimated EF = 24%.  · Left ventricular systolic function is severely decreased.  · Left ventricular diastolic dysfunction (grade II) consistent with pseudonormalization.  · Mild mitral valve regurgitation is present  · Moderate tricuspid valve regurgitation is present.  · The tricuspid valve is grossly normal. Moderate tricuspid valve regurgitation is present. Estimated right ventricular  "systolic pressure from tricuspid regurgitation is moderately elevated (45-55 mmHg). Moderate pulmonary hypertension is present.  He was seen by the cardiologist and recommended medical management.  Patient was deemed not a candidate for AICD placement.    History of atrial fibrillation: Patient is in normal sinus rhythm and was continued on rate control.  Anticoagulation  was put on hold in anticipation of further urological procedure.      History of CVA with left-sided hemiplegia: He was continued on statin and antiplatelet therapy.     Seizure disorder: Continued on Dilantin.     Chronic pain syndrome: He was continued on pain control.                 Condition on Discharge: Guarded.    Physical Exam on Discharge:  /64   Pulse 65   Temp 98.2 °F (36.8 °C) (Oral)   Resp 18   Ht 170.2 cm (67\")   Wt 90.8 kg (200 lb 2.8 oz)   SpO2 96%   BMI 31.35 kg/m²    Physical Exam   Constitutional: He is oriented to person, place, and time. He appears well-developed and well-nourished. No distress.   HENT:   Head: Normocephalic and atraumatic.   Eyes: EOM are normal. Pupils are equal, round, and reactive to light. No scleral icterus.   Neck: Normal range of motion. Neck supple. No JVD present. No thyromegaly present.   Cardiovascular: Normal rate, regular rhythm and normal heart sounds. Exam reveals no gallop and no friction rub.   No murmur heard.  Pulmonary/Chest: Effort normal. He has decreased breath sounds. He has no wheezes. He has rhonchi. He has no rales. He exhibits no tenderness.   Abdominal: Soft. Bowel sounds are normal. He exhibits no distension and no mass. There is no tenderness. There is no rebound and no guarding.   Musculoskeletal: He exhibits edema and deformity. He exhibits no tenderness.   Neurological: He is alert and oriented to person, place, and time. No cranial nerve deficit. He exhibits normal muscle tone. Coordination normal.   He has left hemiplegia from previous CVA.   Skin: Skin is warm " and dry. No rash noted. He is not diaphoretic. No erythema. No pallor.   Psychiatric: He has a normal mood and affect. His behavior is normal. Judgment and thought content normal.   Nursing note and vitals reviewed.        Discharge Disposition:  Critical Access Hospital w/Planned Readmission    Discharge Medications:     Discharge Medications      New Medications      Instructions Start Date   amiodarone 200 MG tablet  Commonly known as:  PACERONE   200 mg, Oral, Every 24 Hours Scheduled   Start Date:  9/8/2019     cefepime 2 G/ ML solution  Commonly known as:  MAXIPIME   2 g, Intravenous, Every 24 Hours   Start Date:  9/8/2019        Continue These Medications      Instructions Start Date   aspirin 81 MG EC tablet   81 mg, Oral, Daily      atorvastatin 10 MG tablet  Commonly known as:  LIPITOR   10 mg, Oral, Daily      baclofen 10 MG tablet  Commonly known as:  LIORESAL   10 mg, Oral, 2 Times Daily, Takes two pills at night and one in am      finasteride 5 MG tablet  Commonly known as:  PROSCAR   5 mg, Oral, Daily      HYDROcodone-acetaminophen  MG per tablet  Commonly known as:  NORCO   1 tablet, Oral, Every 6 Hours PRN      nebivolol 20 MG tablet  Commonly known as:  BYSTOLIC   20 mg, Oral, Nightly      PARoxetine 20 MG tablet  Commonly known as:  PAXIL   20 mg, Oral, Every Morning      phenytoin 100 MG ER capsule  Commonly known as:  DILANTIN   Oral, 2 Times Daily      potassium chloride 10 MEQ CR tablet  Commonly known as:  K-DUR,KLOR-CON   10 mEq, Oral, 2 Times Daily      rivaroxaban 20 MG tablet  Commonly known as:  XARELTO   20 mg, Oral, Nightly      tamsulosin 0.4 MG capsule 24 hr capsule  Commonly known as:  FLOMAX   1 capsule, Oral, Nightly      temazepam 15 MG capsule  Commonly known as:  RESTORIL   15 mg, Oral, Nightly PRN         Stop These Medications    furosemide 20 MG tablet  Commonly known as:  LASIX     nitrofurantoin 50 MG capsule  Commonly known as:  MACRODANTIN             Discharge Diet: Heart healthy.     Activity at Discharge: As tolerated.    Discharge Care Plan/Instructions: Patient is being transferred to [facility] [place] to continue inpatient management.     Follow-up Appointments:   No future appointments.    Test Results Pending at Discharge:    Order Current Status    Blood Culture - Blood, Wrist, Left Preliminary result    Blood Culture - Blood, Wrist, Right Preliminary result          Hardy Fisher MD  09/07/19  6:13 PM    Time: 35 minutes.                Electronically signed by Hardy Fisher MD at 9/7/2019  6:29 PM       Discharge Order (From admission, onward)    Start     Ordered    09/07/19 1809  Discharge patient  Once     Expected Discharge Date:  09/07/19    Discharge Disposition:  Critical Access Hospital w/Planned Readmission    Physician of Record for Attribution - Please select from Treatment Team:  HARDY FISHER [[id number]]    Review needed by CMO to determine Physician of Record:  No       Question Answer Comment   Physician of Record for Attribution - Please select from Treatment Team HARDY FISHER    Review needed by CMO to determine Physician of Record No        09/07/19 1812

## 2022-09-12 NOTE — PLAN OF CARE
[facility] Triage and after hours / weekends / holidays:  [phone]    Please call the triage or after hours line if you experience a temperature greater than or equal to 100.4, shaking chills, have uncontrolled nausea, vomiting and/or diarrhea, dizziness, shortness of breath, chest pain, bleeding, unexplained bruising, or if you have any other new/concerning symptoms, questions or concerns.      If you are having any concerning symptoms or wish to speak to a provider before your next infusion visit, please call your care coordinator or triage to notify them so we can adequately serve you.     If you need a refill on a narcotic prescription or other medication, please call before your infusion appointment.               Problem: Patient Care Overview (Adult)  Goal: Plan of Care Review  Outcome: Ongoing (interventions implemented as appropriate)  Encourage intake of meals and supplement.   03/13/18 1315   Coping/Psychosocial Response Interventions   Plan Of Care Reviewed With patient   Patient Care Overview   Progress no change   Outcome Evaluation   Outcome Summary/Follow up Plan initial assessment

## (undated) DEVICE — GLV SURG NEOLON 2G PF LF 6.5 STRL

## (undated) DEVICE — CATHETER,FOLEY,100%SILICONE,16FR,10ML,LF: Brand: MEDLINE

## (undated) DEVICE — PK CYSTO LF 60

## (undated) DEVICE — NITINOL WIRE WITH HYDROPHILIC TIP: Brand: SENSOR

## (undated) DEVICE — SOL IRRG H2O PL/BG 1000ML STRL

## (undated) DEVICE — SOL IRR H2O BTL 1000ML STRL

## (undated) DEVICE — KT INTRO MINISTICK MAX W/GW NITNL/TUNG ECHO 4F 21G 7CM

## (undated) DEVICE — SYR LL TP 10ML STRL

## (undated) DEVICE — SOL IRR NACL 0.9PCT 3000ML

## (undated) DEVICE — INTRO SHEATH ULTIMUM ACT 5F

## (undated) DEVICE — ELECTRODE,RT,STRESS,FOAM,50PK: Brand: MEDLINE

## (undated) DEVICE — INTRO SHEATH FASTCATH RAMP .038IN 8F 18MM 60CM

## (undated) DEVICE — STERILE POLYISOPRENE POWDER-FREE SURGICAL GLOVES WITH EMOLLIENT COATING: Brand: PROTEXIS

## (undated) DEVICE — TBG ABL COOL PT 2.6M 100042049

## (undated) DEVICE — DRSNG SURESITE WNDW 4X4.5

## (undated) DEVICE — SOL PVPI SPRY BETADINE 3OZ

## (undated) DEVICE — GLV SURG NEOLON 2G PF LF 7.5 STRL

## (undated) DEVICE — GW PTFE FIX/CORE FLXTIP .038 3X150CM

## (undated) DEVICE — ST CVR PROB PULLUP ULTRASND 5X48IN

## (undated) DEVICE — CATH EP SUPREME QUADPOLAR JSN 4F 5-5-5M 120CM

## (undated) DEVICE — ELECTRD ECD ENSITE VELOCITY NAVX PTCH

## (undated) DEVICE — CONTAINER,SPECIMEN,OR STERILE,4OZ: Brand: MEDLINE

## (undated) DEVICE — DRSNG TELFA PAD NONADH STR 1S 3X8IN

## (undated) DEVICE — INTRO SHEATH ENGAGE W/50 GW .038 8F12

## (undated) DEVICE — CATH DUO DECAPLR 7F SUPR LG CURL

## (undated) DEVICE — PK CATH LAB 60

## (undated) DEVICE — PAD E/S GRND SGL/FOIL 9FT/CORD DISP

## (undated) DEVICE — DRAINBAG,ANTI-REFLUX TOWER,L/F,2000ML,LL: Brand: MEDLINE

## (undated) DEVICE — CATH ABL IRR TACTICATH 65MM/CRV 6HL 7F 2-5-2MM 3.5MM 115CM

## (undated) DEVICE — CATH URETRL OPEN END W/CONNECT 5F 70CM